# Patient Record
Sex: MALE | Race: WHITE | NOT HISPANIC OR LATINO | Employment: OTHER | URBAN - METROPOLITAN AREA
[De-identification: names, ages, dates, MRNs, and addresses within clinical notes are randomized per-mention and may not be internally consistent; named-entity substitution may affect disease eponyms.]

---

## 2017-01-31 ENCOUNTER — TELEPHONE (OUTPATIENT)
Dept: VASCULAR LAB | Facility: MEDICAL CENTER | Age: 77
End: 2017-01-31

## 2017-02-02 ENCOUNTER — HOSPITAL ENCOUNTER (OUTPATIENT)
Dept: LAB | Facility: MEDICAL CENTER | Age: 77
End: 2017-02-02
Attending: OPHTHALMOLOGY
Payer: MEDICARE

## 2017-02-02 ENCOUNTER — HOSPITAL ENCOUNTER (OUTPATIENT)
Dept: RADIOLOGY | Facility: MEDICAL CENTER | Age: 77
End: 2017-02-02
Attending: OPHTHALMOLOGY
Payer: MEDICARE

## 2017-02-02 ENCOUNTER — APPOINTMENT (OUTPATIENT)
Dept: LAB | Facility: MEDICAL CENTER | Age: 77
End: 2017-02-02
Payer: MEDICARE

## 2017-02-02 ENCOUNTER — HOSPITAL ENCOUNTER (OUTPATIENT)
Dept: LAB | Facility: MEDICAL CENTER | Age: 77
End: 2017-02-02
Attending: FAMILY MEDICINE
Payer: MEDICARE

## 2017-02-02 DIAGNOSIS — I82.402 LEG DVT (DEEP VENOUS THROMBOEMBOLISM), ACUTE, LEFT (HCC): ICD-10-CM

## 2017-02-02 DIAGNOSIS — I26.99 PULMONARY EMBOLISM ON RIGHT (HCC): ICD-10-CM

## 2017-02-02 LAB
BUN SERPL-MCNC: 22 MG/DL (ref 8–22)
CREAT SERPL-MCNC: 0.94 MG/DL (ref 0.5–1.4)
INR PPP: 1.78 (ref 0.87–1.13)
PROTHROMBIN TIME: 21.3 SEC (ref 12–14.6)

## 2017-02-02 PROCEDURE — 85610 PROTHROMBIN TIME: CPT

## 2017-02-03 ENCOUNTER — ANTICOAGULATION MONITORING (OUTPATIENT)
Dept: VASCULAR LAB | Facility: MEDICAL CENTER | Age: 77
End: 2017-02-03

## 2017-02-03 NOTE — PROGRESS NOTES
LM for patient to call back to Anticoagulation Clinic to verify current dosing.  He does have subtherapeutic INR of 1.78, but we do not have his current warfarin dosing.  He has been on and off warfarin recently for several eye procedures.  Zafar Eubanks, PHARMD

## 2017-02-13 ENCOUNTER — HOSPITAL ENCOUNTER (OUTPATIENT)
Dept: RADIOLOGY | Facility: MEDICAL CENTER | Age: 77
End: 2017-02-13
Attending: OPHTHALMOLOGY
Payer: MEDICARE

## 2017-02-13 DIAGNOSIS — C69.62: ICD-10-CM

## 2017-02-13 PROCEDURE — 700117 HCHG RX CONTRAST REV CODE 255: Performed by: OPHTHALMOLOGY

## 2017-02-13 PROCEDURE — 70543 MRI ORBT/FAC/NCK W/O &W/DYE: CPT

## 2017-02-13 PROCEDURE — A9579 GAD-BASE MR CONTRAST NOS,1ML: HCPCS | Performed by: OPHTHALMOLOGY

## 2017-02-13 RX ADMIN — GADODIAMIDE 20 ML: 287 INJECTION INTRAVENOUS at 18:17

## 2017-02-27 ENCOUNTER — TELEPHONE (OUTPATIENT)
Dept: VASCULAR LAB | Facility: MEDICAL CENTER | Age: 77
End: 2017-02-27

## 2017-02-27 ENCOUNTER — HOSPITAL ENCOUNTER (OUTPATIENT)
Facility: MEDICAL CENTER | Age: 77
End: 2017-02-27
Attending: FAMILY MEDICINE
Payer: MEDICARE

## 2017-02-27 ENCOUNTER — OFFICE VISIT (OUTPATIENT)
Dept: URGENT CARE | Facility: PHYSICIAN GROUP | Age: 77
End: 2017-02-27
Payer: MEDICARE

## 2017-02-27 VITALS
BODY MASS INDEX: 39.37 KG/M2 | TEMPERATURE: 99.1 F | RESPIRATION RATE: 24 BRPM | WEIGHT: 275 LBS | OXYGEN SATURATION: 96 % | DIASTOLIC BLOOD PRESSURE: 74 MMHG | HEIGHT: 70 IN | SYSTOLIC BLOOD PRESSURE: 146 MMHG | HEART RATE: 85 BPM

## 2017-02-27 DIAGNOSIS — J20.9 ACUTE BRONCHITIS, UNSPECIFIED ORGANISM: ICD-10-CM

## 2017-02-27 LAB
INR PPP: 2.78 (ref 0.87–1.13)
PROTHROMBIN TIME: 30.2 SEC (ref 12–14.6)

## 2017-02-27 PROCEDURE — 99214 OFFICE O/P EST MOD 30 MIN: CPT | Performed by: PHYSICIAN ASSISTANT

## 2017-02-27 PROCEDURE — G8484 FLU IMMUNIZE NO ADMIN: HCPCS | Performed by: PHYSICIAN ASSISTANT

## 2017-02-27 PROCEDURE — 4040F PNEUMOC VAC/ADMIN/RCVD: CPT | Performed by: PHYSICIAN ASSISTANT

## 2017-02-27 PROCEDURE — 1036F TOBACCO NON-USER: CPT | Performed by: PHYSICIAN ASSISTANT

## 2017-02-27 PROCEDURE — 1101F PT FALLS ASSESS-DOCD LE1/YR: CPT | Performed by: PHYSICIAN ASSISTANT

## 2017-02-27 PROCEDURE — G8432 DEP SCR NOT DOC, RNG: HCPCS | Performed by: PHYSICIAN ASSISTANT

## 2017-02-27 PROCEDURE — 85610 PROTHROMBIN TIME: CPT

## 2017-02-27 PROCEDURE — G8419 CALC BMI OUT NRM PARAM NOF/U: HCPCS | Performed by: PHYSICIAN ASSISTANT

## 2017-02-27 PROCEDURE — 36415 COLL VENOUS BLD VENIPUNCTURE: CPT

## 2017-02-27 RX ORDER — DOXYCYCLINE HYCLATE 100 MG
100 TABLET ORAL 2 TIMES DAILY
Qty: 20 TAB | Refills: 0 | Status: SHIPPED | OUTPATIENT
Start: 2017-02-27 | End: 2017-07-06

## 2017-02-27 NOTE — MR AVS SNAPSHOT
"        Kiran Flores Yumi   2017 4:05 PM   Office Visit   MRN: [id number]    Department:  [facility]   Dept Phone:  [phone]    Description:  Male : 1940   Provider:  Lindsey Anderson PA-C           Reason for Visit     Cough Pt states he's had cough for a while but it's become worse in the last 3-4 days       Allergies as of 2017     Allergen Noted Reactions    Hydrocodone 2016   Vomiting, Nausea    Nexium 2016       Diarrhea        You were diagnosed with     Acute bronchitis, unspecified organism   [[id number]]         Vital Signs     Blood Pressure Pulse Temperature Respirations Height Weight    146/74 mmHg 85 37.3 °C (99.1 °F) 24 1.778 m (5' 10\") 124.739 kg (275 lb)    Body Mass Index Oxygen Saturation Smoking Status             39.46 kg/m2 96% Never Smoker          Basic Information     Date Of Birth Sex Race Ethnicity Preferred Language    1940 Male White Non- English      Problem List              ICD-10-CM Priority Class Noted - Resolved    Essential hypertension, benign I10   2009 - Present    ED (erectile dysfunction) N52.9   2009 - Present    Glaucoma H40.9   2009 - Present    Rosacea L71.9   2009 - Present    Arthropathy M12.9   2009 - Present    Reflux esophagitis K21.0   2009 - Present    Esophageal stricture K22.2   2009 - Present    Essential hypertension I10   2009 - Present    Rosacea L71.9   2009 - Present    Arthropathy of ankle and foot M12.9   2009 - Present    Glaucoma H40.9   2009 - Present    Foot pain M79.673   2010 - Present    BPH (benign prostatic hyperplasia) N40.0   2010 - Present    Low serum testosterone level E29.1   2012 - Present    Vitamin D deficiency disease E55.9   2012 - Present    Carpal tunnel syndrome, bilateral G56.03   2012 - Present    Chronic gout M1A.9XX0   2012 - Present    Benign neoplasm of eyelid D23.10   2015 - Present   " Malignant neoplasm of skin of eyelid, 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   4/1/2015 - Present    Diverticulosis of large intestine without hemorrhage K57.30   8/4/2015 - Present    History of colonic polyps Z86.010   8/4/2015 - Present    Pulmonary embolism on right (CMS-HCC) I26.99   11/30/2015 - Present    History of inferior vena caval filter placement Z98.890   7/26/2016 - Present    Generalized edema R60.1   9/16/2016 - Present    Edema R60.9   10/21/2016 - Present    Sleep apnea G47.30   10/21/2016 - Present    Obesity (BMI 30-39.9) E66.9   10/21/2016 - Present    Leg DVT (deep venous thromboembolism), acute (HCC) I82.409   11/9/2016 - Present      Health Maintenance        Date Due Completion Dates    IMM INFLUENZA (1) 9/1/2016 11/30/2015, 11/1/2008    COLONOSCOPY 8/4/2020 8/4/2015    IMM DTaP/Tdap/Td Vaccine (3 - Td) 12/30/2021 12/30/2011, 12/1/2001, 12/1/2001            Current Immunizations     13-VALENT PCV PREVNAR 5/15/2015    Dtap Vaccine 12/1/2001    Influenza Vaccine 11/1/2008    Influenza Vaccine Pediatric 11/1/2008    Influenza Vaccine Quad Inj (Pf) 11/30/2015    Pneumococcal Vaccine (UF)Historical Data 12/1/2001, 12/1/2001    Pneumococcal polysaccharide vaccine (PPSV-23) 12/30/2011    SHINGLES VACCINE 8/31/2009    TD Vaccine 12/1/2001    Tdap Vaccine 12/30/2011      Below and/or attached are the medications your provider expects you to take. Review all of your home medications and newly ordered medications with your provider and/or pharmacist. Follow medication instructions as directed by your provider and/or pharmacist. Please keep your medication list with you and share with your provider. Update the information when medications are discontinued, doses are changed, or new medications (including over-the-counter products) are added; and carry medication information at all times in the event of emergency situations     Allergies:  HYDROCODONE - Vomiting,Nausea     NEXIUM - (reactions not documented)                Medications  Valid as of: February 27, 2017 -  4:58 PM    Generic Name Brand Name Tablet Size Instructions for use    Albuterol Sulfate (Aero Soln) PROAIR  (90 BASE) MCG/ACT INHALE 2 PUFFS BY MOUTH EVERY 6 HOURS AS NEEDED FOR SHORTNESS OF BREATH. PROAIR OKAY        Cephalexin (Tab) Cephalexin 500 MG Take 1 Tab by mouth 4 times a day.        Doxycycline Hyclate (Tab) VIBRAMYCIN 100 MG Take 1 Tab by mouth 2 times a day.        Finasteride (Tab) PROSCAR 5 MG Take 1 tablet by mouth  every day        Furosemide (Tab) LASIX 40 MG Take 1 Tab by mouth every day.        Guaifenesin-Codeine (Syrup) TUSSI-ORGANIDIN -10 MG/5ML Take 5-10 mL by mouth 3 times a day as needed for Cough.        HydroCHLOROthiazide (Tab) HYDRODIURIL 25 MG Take 1 tablet by mouth  every day        Indomethacin (Cap) INDOCIN 50 MG Take 1 capsule by mouth  twice daily with meals for  gout flare ups        Latanoprost (Solution) XALATAN 0.005 % Place 1 Drop in right eye every evening.        Losartan Potassium (Tab) COZAAR 50 MG Take 1 Tab by mouth every day.        Metoprolol Tartrate (Tab) LOPRESSOR 50 MG Take 50 mg by mouth 2 times a day.        Multiple Vitamins-Minerals (Tab) CENTRUM SILVER  Take 2 Tabs by mouth every day.        Omega-3 Fatty Acids (Cap) fish oil 1000 MG Take 1,000 mg by mouth every day.        Omeprazole (CAPSULE DELAYED RELEASE) PRILOSEC 20 MG Take 20 mg by mouth every day.        Potassium (Tab) Potassium 99 MG Take 1 Tab by mouth every day.        Potassium Chloride (Tab CR) KLOR-CON 10 MEQ Take 1 Tab by mouth 2 times a day.        Tamsulosin HCl (Cap) FLOMAX 0.4 MG Take 0.4 mg by mouth ONE-HALF HOUR AFTER BREAKFAST.        Warfarin Sodium (Tab) COUMADIN 1 MG Take 2-3 mg by mouth See Admin Instructions. 2 mg then 3 mg and repeat sequence        .                 Medicines prescribed today were sent to:     [id number] - [place] - [address] [address]    Phone: [phone] Fax:  [phone]    Open 24 Hours?: No    [facility] - [address]    [address] [facility] [address] [facility] [address]    Phone: [phone] Fax: [phone]    Open 24 Hours?: No      Medication refill instructions:       If your prescription bottle indicates you have medication refills left, it is not necessary to call your provider’s office. Please contact your pharmacy and they will refill your medication.    If your prescription bottle indicates you do not have any refills left, you may request refills at any time through one of the following ways: The online [employer] system (except Urgent Care), by calling your provider’s office, or by asking your pharmacy to contact your provider’s office with a refill request. Medication refills are processed only during regular business hours and may not be available until the next business day. Your provider may request additional information or to have a follow-up visit with you prior to refilling your medication.   *Please Note: Medication refills are assigned a new Rx number when refilled electronically. Your pharmacy may indicate that no refills were authorized even though a new prescription for the same medication is available at the pharmacy. Please request the medicine by name with the pharmacy before contacting your provider for a refill.           MyChart Status: Patient Declined

## 2017-02-28 NOTE — PROGRESS NOTES
Chief Complaint   Patient presents with   • Cough     Pt states he's had cough for a while but it's become worse in the last 3-4 days        HISTORY OF PRESENT ILLNESS: Patient is a 76 y.o. male who presents today for 3-4 days of worsening cough, 2 weeks overall of not feeling well.  He feels his cough has become more productive and has been feeling unwell in general.  Does feel a bit winded as well.  He admits to preceding sinus pressure and mild sore throat but those symptoms have improved rather than worsening.  Has felt warm but no confirmed fevers.   Denies leg swelling, or inability lay flat.  No chest pain.  He has not taken anything for the symptoms other than a bit of OTC not helping. No hx of COPD.      Patient Active Problem List    Diagnosis Date Noted   • Leg DVT (deep venous thromboembolism), acute ([facility]) 11/09/2016   • Edema 10/21/2016   • Sleep apnea 10/21/2016   • Obesity (BMI 30-39.9) 10/21/2016   • Generalized edema 09/16/2016   • History of inferior vena caval filter placement 07/26/2016   • Pulmonary embolism on right (CMS-HCC) 11/30/2015   • Diverticulosis of large intestine without hemorrhage 08/04/2015   • History of colonic polyps 08/04/2015   • Malignant neoplasm of skin of eyelid, including canthus 04/01/2015   • Benign neoplasm of eyelid 02/11/2015   • Chronic gout 12/17/2012   • Carpal tunnel syndrome, bilateral 02/14/2012   • Low serum testosterone level 01/06/2012   • Vitamin D deficiency disease 01/06/2012   • BPH (benign prostatic hyperplasia) 12/30/2010   • Foot pain 09/28/2010   • Essential hypertension 09/18/2009   • Rosacea 09/18/2009   • Arthropathy of ankle and foot 09/18/2009   • Glaucoma 09/18/2009   • Essential hypertension, benign 05/18/2009   • ED (erectile dysfunction) 05/18/2009   • Glaucoma 05/18/2009   • Rosacea 05/18/2009   • Arthropathy 05/18/2009   • Reflux esophagitis 05/18/2009   • Esophageal stricture 05/18/2009       Allergies:Hydrocodone and Nexium    Current  Outpatient Prescriptions Ordered in [facility]   Medication Sig Dispense Refill   • losartan (COZAAR) 50 MG Tab Take 1 Tab by mouth every day. 30 Tab 3   • Cephalexin 500 MG Tab Take 1 Tab by mouth 4 times a day. 20 Tab 0   • guaifenesin-codeine (TUSSI-ORGANIDIN NR) 100-10 MG/5ML syrup Take 5-10 mL by mouth 3 times a day as needed for Cough. 120 mL 0   • tamsulosin (FLOMAX) 0.4 MG capsule Take 0.4 mg by mouth ONE-HALF HOUR AFTER BREAKFAST.     • PROAIR  (90 BASE) MCG/ACT Aero Soln inhalation aerosol INHALE 2 PUFFS BY MOUTH EVERY 6 HOURS AS NEEDED FOR SHORTNESS OF BREATH. PROAIR OKAY 8.5 Inhaler 3   • warfarin (COUMADIN) 1 MG Tab Take 2-3 mg by mouth See Admin Instructions. 2 mg then 3 mg and repeat sequence     • latanoprost (XALATAN) 0.005 % Solution Place 1 Drop in right eye every evening.     • Potassium 99 MG Tab Take 1 Tab by mouth every day.     • furosemide (LASIX) 40 MG Tab Take 1 Tab by mouth every day. 14 Tab 1   • potassium chloride ER (KLOR-CON) 10 MEQ tablet Take 1 Tab by mouth 2 times a day. 30 Tab 0   • hydrochlorothiazide (HYDRODIURIL) 25 MG Tab Take 1 tablet by mouth  every day 90 Tab 3   • indomethacin (INDOCIN) 50 MG Cap Take 1 capsule by mouth  twice daily with meals for  gout flare ups 160 Cap 1   • finasteride (PROSCAR) 5 MG Tab Take 1 tablet by mouth  every day 90 Tab 3   • metoprolol (LOPRESSOR) 50 MG Tab Take 50 mg by mouth 2 times a day.     • omeprazole (PRILOSEC) 20 MG delayed-release capsule Take 20 mg by mouth every day.     • Omega-3 Fatty Acids (FISH OIL) 1000 MG Cap capsule Take 1,000 mg by mouth every day.     • Multiple Vitamins-Minerals (CENTRUM SILVER) TABS Take 2 Tabs by mouth every day. 100 Tab 3     No current Epic-ordered facility-administered medications on file.       Past Medical History   Diagnosis Date   • Foot fracture 12/07   • Essential hypertension, benign    • Hypertrophy of prostate without urinary obstruction and other lower urinary tract symptoms (LUTS)    •  "Rosacea    • Reflux esophagitis    • Esophageal stricture 2007   • Disorders of bursae and tendons in shoulder region, unspecified    • Arthritis    • Arthropathy, unspecified, site unspecified    • Unspecified glaucoma    • Skin cancer    • Gastric ulcer 2007   • Indigestion    • Cancer (CMS-[facility])      lower lip skin cancer--   • Pneumonia    • Heart burn    • Pain 2/13/12     2/10 ankles, lower back   • GERD (gastroesophageal reflux disease)    • Bronchitis 2014   • Unspecified cataract      ONE REMOVED   • Personal history of venous thrombosis and embolism 2008     post knee replacement -took coumadin then       Social History   Substance Use Topics   • Smoking status: Never Smoker    • Smokeless tobacco: Former User     Types: Chew   • Alcohol Use: 3.5 oz/week     7 Glasses of wine per week       Family Status   Relation Status Death Age   • Mother     • Father     No family history on file. No pertinent FH     ROS:  Review of Systems   Constitutional: SEE HPI  HENT:SEE HPI  Eyes: Negative for blurred vision.   Respiratory: SEE HPI  Cardiovascular: Negative for chest pain, palpitations, orthopnea and leg swelling.   Gastrointestinal: Negative for heartburn, nausea, vomiting and abdominal pain.   All other systems reviewed and are negative.     Exam:  Blood pressure 146/74, pulse 85, temperature 37.3 °C (99.1 °F), resp. rate 24, height 1.778 m (5' 10\"), weight 124.739 kg (275 lb), SpO2 96 %.  General:  Well nourished, well developed male in NAD  Eyes: PERRLA, EOM within normal limits, no conjunctival injection, no scleral icterus, visual fields and acuity grossly intact.  Ears: Normal shape and symmetry, no tenderness, no discharge. External canals are without any significant edema or erythema. Tympanic membranes are without any inflammation, no effusion. Gross auditory acuity is intact  Nose: Symmetrical, sinuses without tenderness, no discharge.   Mouth: reasonable " hygiene, no erythema exudates or tonsillar enlargement.  Neck: no masses, range of motion within normal limits, no tracheal deviation. No lymphadenopathy  Pulmonary: Normal respiratory effort, few faint expiratory wheezes without crackles or rhonchi.   Cardiovascular: regular rate and rhythm without murmurs, rubs, or gallops.  Skin: No visible rashes or lesion. Warm, pink, dry.   Extremities: no clubbing, cyanosis, or edema.  Neuro: A&O x 3. Speech normal/clear.  Normal gait.       Assessment/Plan:  1. Acute bronchitis, unspecified organism  doxycycline (VIBRAMYCIN) 100 MG Tab       -fluids, rest emphasized.    -humidifier/steam inhalations.    -tx as above, patient was informed of abx interaction with warfarin and he will contact his clinic and inform them tomorrow.   -strict RTC precautions discussed as well as ER precautions.    -please keep upcoming appts with PCP and specialists.        Supportive care, differential diagnoses, and indications for immediate follow-up discussed with patient.   Pathogenesis of diagnosis discussed including typical length and natural progression.   Instructed to return to clinic or nearest emergency department for any change in condition, further concerns, or worsening of symptoms.  Patient states understanding of the plan of care and discharge instructions.  Instructed to make an appointment, for follow up, with their primary care provider.      Lindsey Anderson PA-C

## 2017-03-10 RX ORDER — TAMSULOSIN HYDROCHLORIDE 0.4 MG/1
CAPSULE ORAL
Qty: 90 CAP | Refills: 3 | Status: SHIPPED | OUTPATIENT
Start: 2017-03-10 | End: 2017-07-06

## 2017-03-24 ENCOUNTER — PATIENT OUTREACH (OUTPATIENT)
Dept: HEALTH INFORMATION MANAGEMENT | Facility: OTHER | Age: 77
End: 2017-03-24

## 2017-04-06 DIAGNOSIS — I10 ESSENTIAL HYPERTENSION, BENIGN: ICD-10-CM

## 2017-04-07 NOTE — PROGRESS NOTES
Attempt #:3    Verify PCP: yes    Communication Preference Obtained: yes     Review Care Team: yes    Annual Wellness Visit Scheduling  1. Scheduling Status:Scheduled    Care Gap Scheduling      Health Maintenance Due   Topic Date Due   • Annual Wellness Visit  SCHEDULED         MyChart Activation: declined  MyChart Jitendra: no  Virtual Visits: no  Opt In to Text Messages: no

## 2017-04-10 RX ORDER — LOSARTAN POTASSIUM 50 MG/1
50 TABLET ORAL DAILY
Qty: 90 TAB | Refills: 3 | Status: SHIPPED | OUTPATIENT
Start: 2017-04-10 | End: 2017-07-06

## 2017-04-10 RX ORDER — POTASSIUM CHLORIDE 750 MG/1
10 TABLET, FILM COATED, EXTENDED RELEASE ORAL 2 TIMES DAILY
Qty: 180 TAB | Refills: 3 | Status: SHIPPED | OUTPATIENT
Start: 2017-04-10 | End: 2017-07-06 | Stop reason: SDUPTHER

## 2017-04-10 RX ORDER — FINASTERIDE 5 MG/1
TABLET, FILM COATED ORAL
Qty: 90 TAB | Refills: 3 | Status: SHIPPED | OUTPATIENT
Start: 2017-04-10 | End: 2017-07-06 | Stop reason: SDUPTHER

## 2017-05-05 ENCOUNTER — HOSPITAL ENCOUNTER (OUTPATIENT)
Dept: HOSPITAL 8 - ROC | Age: 77
Discharge: HOME | End: 2017-05-05
Attending: RADIOLOGY
Payer: MEDICARE

## 2017-05-05 ENCOUNTER — HOSPITAL ENCOUNTER (OUTPATIENT)
Dept: LAB | Facility: MEDICAL CENTER | Age: 77
End: 2017-05-05
Attending: FAMILY MEDICINE
Payer: MEDICARE

## 2017-05-05 DIAGNOSIS — C44.129: Primary | ICD-10-CM

## 2017-05-05 DIAGNOSIS — I26.99 PULMONARY EMBOLISM ON RIGHT (HCC): ICD-10-CM

## 2017-05-05 DIAGNOSIS — I82.402 LEG DVT (DEEP VENOUS THROMBOEMBOLISM), ACUTE, LEFT (HCC): ICD-10-CM

## 2017-05-05 LAB
INR PPP: 1.41 (ref 0.87–1.13)
PROTHROMBIN TIME: 17.7 SEC (ref 12–14.6)

## 2017-05-05 PROCEDURE — 85610 PROTHROMBIN TIME: CPT

## 2017-05-05 PROCEDURE — 36415 COLL VENOUS BLD VENIPUNCTURE: CPT

## 2017-05-05 PROCEDURE — G0463 HOSPITAL OUTPT CLINIC VISIT: HCPCS

## 2017-05-08 RX ORDER — IPRATROPIUM BROMIDE AND ALBUTEROL 20; 100 UG/1; UG/1
SPRAY, METERED RESPIRATORY (INHALATION)
Qty: 1 INHALER | Refills: 3 | Status: SHIPPED | OUTPATIENT
Start: 2017-05-08 | End: 2017-07-06 | Stop reason: SDUPTHER

## 2017-05-15 ENCOUNTER — ANTICOAGULATION MONITORING (OUTPATIENT)
Dept: VASCULAR LAB | Facility: MEDICAL CENTER | Age: 77
End: 2017-05-15

## 2017-05-22 NOTE — PROGRESS NOTES
LM for patient to call Anticoagulation Clinic to establish care.  Received results indicating subtherapeutic INR of 1.41.  GABO GarciaD

## 2017-05-30 DIAGNOSIS — I26.99 PULMONARY EMBOLISM ON RIGHT (HCC): ICD-10-CM

## 2017-06-02 ENCOUNTER — APPOINTMENT (OUTPATIENT)
Dept: RADIOLOGY | Facility: MEDICAL CENTER | Age: 77
DRG: 907 | End: 2017-06-02
Attending: EMERGENCY MEDICINE
Payer: MEDICARE

## 2017-06-02 ENCOUNTER — APPOINTMENT (OUTPATIENT)
Dept: RADIOLOGY | Facility: MEDICAL CENTER | Age: 77
DRG: 907 | End: 2017-06-02
Attending: SURGERY
Payer: MEDICARE

## 2017-06-02 ENCOUNTER — RESOLUTE PROFESSIONAL BILLING HOSPITAL PROF FEE (OUTPATIENT)
Dept: HOSPITALIST | Facility: MEDICAL CENTER | Age: 77
End: 2017-06-02
Payer: MEDICARE

## 2017-06-02 ENCOUNTER — APPOINTMENT (OUTPATIENT)
Dept: RADIOLOGY | Facility: MEDICAL CENTER | Age: 77
DRG: 907 | End: 2017-06-02
Attending: NURSE PRACTITIONER
Payer: MEDICARE

## 2017-06-02 ENCOUNTER — HOSPITAL ENCOUNTER (INPATIENT)
Facility: MEDICAL CENTER | Age: 77
LOS: 26 days | DRG: 907 | End: 2017-06-28
Attending: EMERGENCY MEDICINE | Admitting: SURGERY
Payer: MEDICARE

## 2017-06-02 DIAGNOSIS — T45.515A WARFARIN-INDUCED COAGULOPATHY (HCC): ICD-10-CM

## 2017-06-02 DIAGNOSIS — Z86.711 HISTORY OF PULMONARY EMBOLUS (PE): ICD-10-CM

## 2017-06-02 DIAGNOSIS — D68.32 WARFARIN-INDUCED COAGULOPATHY (HCC): ICD-10-CM

## 2017-06-02 DIAGNOSIS — I44.1 AV BLOCK, MOBITZ 1: ICD-10-CM

## 2017-06-02 DIAGNOSIS — I82.539 CHRONIC DEEP VEIN THROMBOSIS (DVT) OF POPLITEAL VEIN, UNSPECIFIED LATERALITY (HCC): ICD-10-CM

## 2017-06-02 DIAGNOSIS — K66.8 PNEUMOPERITONEUM: ICD-10-CM

## 2017-06-02 DIAGNOSIS — D62 ACUTE BLOOD LOSS ANEMIA: ICD-10-CM

## 2017-06-02 DIAGNOSIS — J95.821 RESPIRATORY FAILURE FOLLOWING TRAUMA AND SURGERY (HCC): ICD-10-CM

## 2017-06-02 DIAGNOSIS — S21.432A: ICD-10-CM

## 2017-06-02 DIAGNOSIS — J94.2 HEMOTHORAX ON LEFT: ICD-10-CM

## 2017-06-02 PROBLEM — S21.439A: Status: ACTIVE | Noted: 2017-06-02

## 2017-06-02 LAB
ABO GROUP BLD: NORMAL
ALBUMIN SERPL BCP-MCNC: 3.4 G/DL (ref 3.2–4.9)
ALBUMIN/GLOB SERPL: 1.4 G/DL
ALP SERPL-CCNC: 85 U/L (ref 30–99)
ALT SERPL-CCNC: 24 U/L (ref 2–50)
ANION GAP SERPL CALC-SCNC: 10 MMOL/L (ref 0–11.9)
APTT PPP: 34.4 SEC (ref 24.7–36)
AST SERPL-CCNC: 15 U/L (ref 12–45)
BARCODED ABORH UBTYP: 5100
BARCODED ABORH UBTYP: 5100
BARCODED PRD CODE UBPRD: NORMAL
BARCODED PRD CODE UBPRD: NORMAL
BARCODED UNIT NUM UBUNT: NORMAL
BARCODED UNIT NUM UBUNT: NORMAL
BILIRUB SERPL-MCNC: 0.5 MG/DL (ref 0.1–1.5)
BLD GP AB SCN SERPL QL: NORMAL
BUN SERPL-MCNC: 22 MG/DL (ref 8–22)
CALCIUM SERPL-MCNC: 8.8 MG/DL (ref 8.5–10.5)
CFT BLD TEG: 5.6 MIN (ref 5–10)
CHLORIDE SERPL-SCNC: 106 MMOL/L (ref 96–112)
CLOT ANGLE BLD TEG: 67 DEGREES (ref 53–72)
CLOT LYSIS 30M P MA LENFR BLD TEG: 0 % (ref 0–8)
CO2 SERPL-SCNC: 21 MMOL/L (ref 20–33)
COMPONENT R 8504R: NORMAL
COMPONENT R 8504R: NORMAL
CREAT SERPL-MCNC: 0.97 MG/DL (ref 0.5–1.4)
CT.EXTRINSIC BLD ROTEM: 1.6 MIN (ref 1–3)
ERYTHROCYTE [DISTWIDTH] IN BLOOD BY AUTOMATED COUNT: 53.1 FL (ref 35.9–50)
ETHANOL BLD-MCNC: 0 G/DL
GFR SERPL CREATININE-BSD FRML MDRD: >60 ML/MIN/1.73 M 2
GLOBULIN SER CALC-MCNC: 2.5 G/DL (ref 1.9–3.5)
GLUCOSE SERPL-MCNC: 171 MG/DL (ref 65–99)
HCT VFR BLD AUTO: 42.8 % (ref 42–52)
HGB BLD-MCNC: 13.7 G/DL (ref 14–18)
INR PPP: 2 (ref 0.87–1.13)
MCF BLD TEG: 67.7 MM (ref 50–70)
MCH RBC QN AUTO: 29.8 PG (ref 27–33)
MCHC RBC AUTO-ENTMCNC: 32 G/DL (ref 33.7–35.3)
MCV RBC AUTO: 93 FL (ref 81.4–97.8)
PA AA BLD-ACNC: 9.1 %
PA ADP BLD-ACNC: 41.2 %
PLATELET # BLD AUTO: 170 K/UL (ref 164–446)
PMV BLD AUTO: 10.7 FL (ref 9–12.9)
POTASSIUM SERPL-SCNC: 4.5 MMOL/L (ref 3.6–5.5)
PRODUCT TYPE UPROD: NORMAL
PRODUCT TYPE UPROD: NORMAL
PROT SERPL-MCNC: 5.9 G/DL (ref 6–8.2)
PROTHROMBIN TIME: 23.3 SEC (ref 12–14.6)
RBC # BLD AUTO: 4.6 M/UL (ref 4.7–6.1)
RH BLD: NORMAL
SODIUM SERPL-SCNC: 137 MMOL/L (ref 135–145)
TEG ALGORITHM TGALG: NORMAL
UNIT STATUS USTAT: NORMAL
UNIT STATUS USTAT: NORMAL
WBC # BLD AUTO: 14.1 K/UL (ref 4.8–10.8)

## 2017-06-02 PROCEDURE — 86901 BLOOD TYPING SEROLOGIC RH(D): CPT

## 2017-06-02 PROCEDURE — 302220 CHEST TUBE TRAY: Performed by: SURGERY

## 2017-06-02 PROCEDURE — 02HV33Z INSERTION OF INFUSION DEVICE INTO SUPERIOR VENA CAVA, PERCUTANEOUS APPROACH: ICD-10-PCS | Performed by: SURGERY

## 2017-06-02 PROCEDURE — 700102 HCHG RX REV CODE 250 W/ 637 OVERRIDE(OP): Performed by: NURSE PRACTITIONER

## 2017-06-02 PROCEDURE — 700117 HCHG RX CONTRAST REV CODE 255: Performed by: EMERGENCY MEDICINE

## 2017-06-02 PROCEDURE — 80053 COMPREHEN METABOLIC PANEL: CPT

## 2017-06-02 PROCEDURE — 71010 DX-CHEST-LIMITED (1 VIEW): CPT

## 2017-06-02 PROCEDURE — B548ZZA ULTRASONOGRAPHY OF SUPERIOR VENA CAVA, GUIDANCE: ICD-10-PCS | Performed by: SURGERY

## 2017-06-02 PROCEDURE — 99291 CRITICAL CARE FIRST HOUR: CPT

## 2017-06-02 PROCEDURE — 90471 IMMUNIZATION ADMIN: CPT

## 2017-06-02 PROCEDURE — 85610 PROTHROMBIN TIME: CPT

## 2017-06-02 PROCEDURE — 770022 HCHG ROOM/CARE - ICU (200)

## 2017-06-02 PROCEDURE — 700101 HCHG RX REV CODE 250

## 2017-06-02 PROCEDURE — 85384 FIBRINOGEN ACTIVITY: CPT

## 2017-06-02 PROCEDURE — 700105 HCHG RX REV CODE 258: Performed by: EMERGENCY MEDICINE

## 2017-06-02 PROCEDURE — 3E0234Z INTRODUCTION OF SERUM, TOXOID AND VACCINE INTO MUSCLE, PERCUTANEOUS APPROACH: ICD-10-PCS | Performed by: SURGERY

## 2017-06-02 PROCEDURE — 30233N1 TRANSFUSION OF NONAUTOLOGOUS RED BLOOD CELLS INTO PERIPHERAL VEIN, PERCUTANEOUS APPROACH: ICD-10-PCS | Performed by: SURGERY

## 2017-06-02 PROCEDURE — 0W9B30Z DRAINAGE OF LEFT PLEURAL CAVITY WITH DRAINAGE DEVICE, PERCUTANEOUS APPROACH: ICD-10-PCS | Performed by: SURGERY

## 2017-06-02 PROCEDURE — 71260 CT THORAX DX C+: CPT

## 2017-06-02 PROCEDURE — P9016 RBC LEUKOCYTES REDUCED: HCPCS

## 2017-06-02 PROCEDURE — G0390 TRAUMA RESPONS W/HOSP CRITI: HCPCS

## 2017-06-02 PROCEDURE — 85347 COAGULATION TIME ACTIVATED: CPT

## 2017-06-02 PROCEDURE — 36415 COLL VENOUS BLD VENIPUNCTURE: CPT

## 2017-06-02 PROCEDURE — 700111 HCHG RX REV CODE 636 W/ 250 OVERRIDE (IP)

## 2017-06-02 PROCEDURE — 36430 TRANSFUSION BLD/BLD COMPNT: CPT

## 2017-06-02 PROCEDURE — A9270 NON-COVERED ITEM OR SERVICE: HCPCS | Performed by: NURSE PRACTITIONER

## 2017-06-02 PROCEDURE — 86900 BLOOD TYPING SEROLOGIC ABO: CPT

## 2017-06-02 PROCEDURE — 85730 THROMBOPLASTIN TIME PARTIAL: CPT

## 2017-06-02 PROCEDURE — 80307 DRUG TEST PRSMV CHEM ANLYZR: CPT

## 2017-06-02 PROCEDURE — 86850 RBC ANTIBODY SCREEN: CPT

## 2017-06-02 PROCEDURE — 90715 TDAP VACCINE 7 YRS/> IM: CPT | Performed by: EMERGENCY MEDICINE

## 2017-06-02 PROCEDURE — C1751 CATH, INF, PER/CENT/MIDLINE: HCPCS

## 2017-06-02 PROCEDURE — 96374 THER/PROPH/DIAG INJ IV PUSH: CPT

## 2017-06-02 PROCEDURE — 85027 COMPLETE CBC AUTOMATED: CPT

## 2017-06-02 PROCEDURE — 71010 DX-CHEST-PORTABLE (1 VIEW): CPT

## 2017-06-02 PROCEDURE — 700105 HCHG RX REV CODE 258: Performed by: NURSE PRACTITIONER

## 2017-06-02 PROCEDURE — 700111 HCHG RX REV CODE 636 W/ 250 OVERRIDE (IP): Performed by: EMERGENCY MEDICINE

## 2017-06-02 PROCEDURE — 36556 INSERT NON-TUNNEL CV CATH: CPT

## 2017-06-02 PROCEDURE — 700111 HCHG RX REV CODE 636 W/ 250 OVERRIDE (IP): Performed by: NURSE PRACTITIONER

## 2017-06-02 PROCEDURE — 85576 BLOOD PLATELET AGGREGATION: CPT

## 2017-06-02 PROCEDURE — 700111 HCHG RX REV CODE 636 W/ 250 OVERRIDE (IP): Performed by: SURGERY

## 2017-06-02 PROCEDURE — 96375 TX/PRO/DX INJ NEW DRUG ADDON: CPT

## 2017-06-02 RX ORDER — BISACODYL 10 MG
10 SUPPOSITORY, RECTAL RECTAL
Status: DISCONTINUED | OUTPATIENT
Start: 2017-06-02 | End: 2017-06-28 | Stop reason: HOSPADM

## 2017-06-02 RX ORDER — HYDROCODONE BITARTRATE AND ACETAMINOPHEN 5; 325 MG/1; MG/1
1-2 TABLET ORAL EVERY 6 HOURS PRN
Status: DISCONTINUED | OUTPATIENT
Start: 2017-06-02 | End: 2017-06-03

## 2017-06-02 RX ORDER — ONDANSETRON 2 MG/ML
INJECTION INTRAMUSCULAR; INTRAVENOUS
Status: COMPLETED | OUTPATIENT
Start: 2017-06-02 | End: 2017-06-02

## 2017-06-02 RX ORDER — AMOXICILLIN 250 MG
1 CAPSULE ORAL
Status: DISCONTINUED | OUTPATIENT
Start: 2017-06-02 | End: 2017-06-28 | Stop reason: HOSPADM

## 2017-06-02 RX ORDER — SODIUM CHLORIDE, SODIUM LACTATE, POTASSIUM CHLORIDE, CALCIUM CHLORIDE 600; 310; 30; 20 MG/100ML; MG/100ML; MG/100ML; MG/100ML
INJECTION, SOLUTION INTRAVENOUS CONTINUOUS
Status: DISCONTINUED | OUTPATIENT
Start: 2017-06-02 | End: 2017-06-07

## 2017-06-02 RX ORDER — ACETAMINOPHEN 325 MG/1
650 TABLET ORAL EVERY 6 HOURS
Status: DISCONTINUED | OUTPATIENT
Start: 2017-06-02 | End: 2017-06-02

## 2017-06-02 RX ORDER — MIDAZOLAM HYDROCHLORIDE 1 MG/ML
INJECTION INTRAMUSCULAR; INTRAVENOUS
Status: DISCONTINUED
Start: 2017-06-02 | End: 2017-06-02

## 2017-06-02 RX ORDER — DOCUSATE SODIUM 100 MG/1
100 CAPSULE, LIQUID FILLED ORAL 2 TIMES DAILY
Status: DISCONTINUED | OUTPATIENT
Start: 2017-06-02 | End: 2017-06-21

## 2017-06-02 RX ORDER — LIDOCAINE HYDROCHLORIDE 10 MG/ML
INJECTION, SOLUTION INFILTRATION; PERINEURAL
Status: DISCONTINUED
Start: 2017-06-02 | End: 2017-06-02

## 2017-06-02 RX ORDER — POLYETHYLENE GLYCOL 3350 17 G/17G
1 POWDER, FOR SOLUTION ORAL 2 TIMES DAILY
Status: DISCONTINUED | OUTPATIENT
Start: 2017-06-02 | End: 2017-06-28 | Stop reason: HOSPADM

## 2017-06-02 RX ORDER — SODIUM CHLORIDE 9 MG/ML
INJECTION, SOLUTION INTRAVENOUS
Status: COMPLETED | OUTPATIENT
Start: 2017-06-02 | End: 2017-06-02

## 2017-06-02 RX ORDER — ONDANSETRON 2 MG/ML
4 INJECTION INTRAMUSCULAR; INTRAVENOUS EVERY 4 HOURS PRN
Status: DISCONTINUED | OUTPATIENT
Start: 2017-06-02 | End: 2017-06-28 | Stop reason: HOSPADM

## 2017-06-02 RX ORDER — CHLORHEXIDINE GLUCONATE ORAL RINSE 1.2 MG/ML
15 SOLUTION DENTAL EVERY 12 HOURS
Status: DISCONTINUED | OUTPATIENT
Start: 2017-06-02 | End: 2017-06-02

## 2017-06-02 RX ORDER — AMOXICILLIN 250 MG
1 CAPSULE ORAL NIGHTLY
Status: DISCONTINUED | OUTPATIENT
Start: 2017-06-02 | End: 2017-06-21

## 2017-06-02 RX ORDER — ENEMA 19; 7 G/133ML; G/133ML
1 ENEMA RECTAL
Status: DISCONTINUED | OUTPATIENT
Start: 2017-06-02 | End: 2017-06-28 | Stop reason: HOSPADM

## 2017-06-02 RX ORDER — FAMOTIDINE 20 MG/1
20 TABLET, FILM COATED ORAL 2 TIMES DAILY
Status: DISCONTINUED | OUTPATIENT
Start: 2017-06-02 | End: 2017-06-02

## 2017-06-02 RX ORDER — ACETAMINOPHEN 325 MG/1
650 TABLET ORAL 4 TIMES DAILY
Status: DISCONTINUED | OUTPATIENT
Start: 2017-06-03 | End: 2017-06-06

## 2017-06-02 RX ORDER — MIDAZOLAM HYDROCHLORIDE 1 MG/ML
5 INJECTION INTRAMUSCULAR; INTRAVENOUS ONCE
Status: COMPLETED | OUTPATIENT
Start: 2017-06-02 | End: 2017-06-02

## 2017-06-02 RX ADMIN — SODIUM CHLORIDE 1000 ML: 9 INJECTION, SOLUTION INTRAVENOUS at 18:37

## 2017-06-02 RX ADMIN — MIDAZOLAM 5 MG: 1 INJECTION INTRAMUSCULAR; INTRAVENOUS at 19:30

## 2017-06-02 RX ADMIN — LIDOCAINE HYDROCHLORIDE: 10 INJECTION, SOLUTION INFILTRATION; PERINEURAL at 20:00

## 2017-06-02 RX ADMIN — CLOSTRIDIUM TETANI TOXOID ANTIGEN (FORMALDEHYDE INACTIVATED), CORYNEBACTERIUM DIPHTHERIAE TOXOID ANTIGEN (FORMALDEHYDE INACTIVATED), BORDETELLA PERTUSSIS TOXOID ANTIGEN (GLUTARALDEHYDE INACTIVATED), BORDETELLA PERTUSSIS FILAMENTOUS HEMAGGLUTININ ANTIGEN (FORMALDEHYDE INACTIVATED), BORDETELLA PERTUSSIS PERTACTIN ANTIGEN, AND BORDETELLA PERTUSSIS FIMBRIAE 2/3 ANTIGEN 0.5 ML: 5; 2; 2.5; 5; 3; 5 INJECTION, SUSPENSION INTRAMUSCULAR at 18:41

## 2017-06-02 RX ADMIN — MIDAZOLAM HYDROCHLORIDE 2 MG: 1 INJECTION, SOLUTION INTRAMUSCULAR; INTRAVENOUS at 19:30

## 2017-06-02 RX ADMIN — COAGULATION FACTOR VIIA (RECOMBINANT) 1000 MCG: KIT at 18:58

## 2017-06-02 RX ADMIN — HYDROCODONE BITARTRATE AND ACETAMINOPHEN 1 TABLET: 5; 325 TABLET ORAL at 21:11

## 2017-06-02 RX ADMIN — ONDANSETRON 4 MG: 2 INJECTION INTRAMUSCULAR; INTRAVENOUS at 18:50

## 2017-06-02 RX ADMIN — IOHEXOL 100 ML: 350 INJECTION, SOLUTION INTRAVENOUS at 19:12

## 2017-06-02 RX ADMIN — SODIUM CHLORIDE, POTASSIUM CHLORIDE, SODIUM LACTATE AND CALCIUM CHLORIDE: 600; 310; 30; 20 INJECTION, SOLUTION INTRAVENOUS at 21:11

## 2017-06-02 RX ADMIN — ACETAMINOPHEN 650 MG: 325 TABLET, FILM COATED ORAL at 21:11

## 2017-06-02 RX ADMIN — CEFAZOLIN SODIUM 2 G: 1 INJECTION, SOLUTION INTRAVENOUS at 18:48

## 2017-06-02 RX ADMIN — PHYTONADIONE 10 MG: 10 INJECTION, EMULSION INTRAMUSCULAR; INTRAVENOUS; SUBCUTANEOUS at 21:11

## 2017-06-02 RX ADMIN — FENTANYL CITRATE 25 MCG: 50 INJECTION, SOLUTION INTRAMUSCULAR; INTRAVENOUS at 19:45

## 2017-06-02 ASSESSMENT — ENCOUNTER SYMPTOMS
FLANK PAIN: 1
ABDOMINAL PAIN: 0
LOSS OF CONSCIOUSNESS: 0
SHORTNESS OF BREATH: 1
TINGLING: 0
BACK PAIN: 1
CHILLS: 0
FEVER: 0

## 2017-06-02 ASSESSMENT — COPD QUESTIONNAIRES
COPD SCREENING SCORE: 2
DO YOU EVER COUGH UP ANY MUCUS OR PHLEGM?: NO/ONLY WITH OCCASIONAL COLDS OR INFECTIONS
DURING THE PAST 4 WEEKS HOW MUCH DID YOU FEEL SHORT OF BREATH: NONE/LITTLE OF THE TIME
HAVE YOU SMOKED AT LEAST 100 CIGARETTES IN YOUR ENTIRE LIFE: NO/DON'T KNOW

## 2017-06-02 ASSESSMENT — PAIN SCALES - GENERAL
PAINLEVEL_OUTOF10: 5
PAINLEVEL_OUTOF10: 4

## 2017-06-02 ASSESSMENT — LIFESTYLE VARIABLES: EVER_SMOKED: NEVER

## 2017-06-02 NOTE — IP AVS SNAPSHOT
" <p align=\"LEFT\"><IMG SRC=\"//[email or web address]\" alt=\"Image\" WIDTH=\"50%\" HEIGHT=\"200\" BORDER=\"\"></p>                   Name:Kiran Eason  Medical Record Number:[id number]  CSN: [id number]    YOB: 1940   Age: 77 y.o.  Sex: male  HT:1.778 m (5' 10\") WT: 124 kg (273 lb 5.9 oz)          Admit Date: 6/2/2017     Discharge Date:   Today's Date: 6/28/2017  Attending Doctor:  Wolf Vaughn M.D.                  Allergies:  Review of patient's allergies indicates no known allergies.          Follow-up Information     1. Follow up with Wilfred Amin M.D.. Schedule an appointment as soon as possible for a visit on 7/10/2017.    Specialty:  Surgery    Contact information    [address]  [address]  [facility] [address] [phone]          2. Follow up with Jesus Craft M.D. In 1 week.    Specialty:  Family Medicine    Contact information    [address]  [facility] [address] [phone]           Medication List      Take these Medications        Instructions    acetaminophen 325 MG Tabs   Commonly known as:  TYLENOL    Take 2 Tabs by mouth 4 times a day.   Dose:  650 mg       albuterol 108 (90 BASE) MCG/ACT Aers inhalation aerosol    Inhale 2 Puffs by mouth every four hours as needed.   Dose:  2 Puff       enoxaparin 120 MG/0.8ML Soln inj   Commonly known as:  LOVENOX    Inject 120 mg as instructed every 12 hours.   Dose:  1 mg/kg       finasteride 5 MG Tabs   Commonly known as:  PROSCAR    Take 5 mg by mouth every day.   Dose:  5 mg       furosemide 40 MG Tabs   Commonly known as:  LASIX    Take 1 Tab by mouth every day.   Dose:  40 mg       oxycodone immediate-release 5 MG Tabs   Commonly known as:  ROXICODONE    Take 1 Tab by mouth every 6 hours as needed for Severe Pain.   Dose:  5 mg       potassium chloride 20 MEQ Pack   Commonly known as:  KLOR-CON    Take 10 mEq by mouth 2 times a day.   Dose:  10 mEq       potassium chloride SA 20 MEQ Tbcr   Commonly known as:  Kdur   " Take 1 Tab by mouth every day.   Dose:  20 mEq       warfarin 1 MG Tabs   Commonly known as:  COUMADIN    Take 1 mg by mouth every day.   Dose:  1 mg

## 2017-06-02 NOTE — IP AVS SNAPSHOT
6/28/2017    Kiran Eason  [address]  [facility] [address]    Dear Kiran:    [facility] wants to ensure your discharge home is safe and you or your loved ones have had all of your questions answered regarding your care after you leave the hospital.    Below is a list of resources and contact information should you have any questions regarding your hospital stay, follow-up instructions, or active medical symptoms.    Questions or Concerns Regarding… Contact   Medical Questions Related to Your Discharge  (7 days a week, 8am-5pm) Contact a Nurse Care Coordinator   [phone]   Medical Questions Not Related to Your Discharge  (24 hours a day / 7 days a week)  Contact the Nurse Health Line   [phone]    Medications or Discharge Instructions Refer to your discharge packet   or contact your [facility] Primary Care Provider   [phone]   Follow-up Appointment(s) Schedule your appointment via HipChat   or contact Scheduling [phone]   Billing Review your statement via HipChat  or contact Billing [phone]   Medical Records Review your records via HipChat   or contact Medical Records [phone]     You may receive a telephone call within two days of discharge. This call is to make certain you understand your discharge instructions and have the opportunity to have any questions answered. You can also easily access your medical information, test results and upcoming appointments via the HipChat free online health management tool. You can learn more and sign up at [employer]/HipChat. For assistance setting up your HipChat account, please call [phone].    Once again, we want to ensure your discharge home is safe and that you have a clear understanding of any next steps in your care. If you have any questions or concerns, please do not hesitate to contact us, we are here for you. Thank you for choosing [facility] for your healthcare needs.    Sincerely,    Your [facility] Healthcare Team

## 2017-06-02 NOTE — IP AVS SNAPSHOT
" Home Care Instructions                                                                                                                  Name:Kiran Eason  Medical Record Number:[id number]  CSN: [id number]    YOB: 1940   Age: 77 y.o.  Sex: male  HT:1.778 m (5' 10\") WT: 124 kg (273 lb 5.9 oz)          Admit Date: 6/2/2017     Discharge Date:   Today's Date: 6/28/2017  Attending Doctor:  Wolf Vaughn M.D.                  Allergies:  Review of patient's allergies indicates no known allergies.            Discharge Instructions       Discharge Instructions    Discharged to home by car with relative. Discharged via wheelchair, hospital escort: Yes.  Special equipment needed: Oxygen    Be sure to schedule a follow-up appointment with your primary care doctor or any specialists as instructed.     Discharge Plan:   Diet Plan: Discussed  Activity Level: Discussed  Confirmed Follow up Appointment: Patient to Call and Schedule Appointment  Confirmed Symptoms Management: Discussed  Medication Reconciliation Updated: Yes  Influenza Vaccine Indication: Patient Refuses    I understand that a diet low in cholesterol, fat, and sodium is recommended for good health. Unless I have been given specific instructions below for another diet, I accept this instruction as my diet prescription.   Other diet: cardiac    Special Instructions: None    · Is patient discharged on Warfarin / Coumadin?   Yes    You are receiving the drug warfarin. Please understand the importance of monitoring warfarin with scheduled PT/INR blood draws.  Follow-up with a call to your personal Doctor's office in 3 days to schedule a PT/INR. Home health RN to draw INR on Friday    IMPORTANT: HOW TO USE THIS INFORMATION:  This is a summary and does NOT have all possible information about this product. This information does not assure that this product is safe, effective, or appropriate for you. This information is not individual medical advice and does " "not substitute for the advice of your health care professional. Always ask your health care professional for complete information about this product and your specific health needs.      WARFARIN - ORAL (WARF-uh-rin)      COMMON BRAND NAME(S): Coumadin      WARNING:  Warfarin can cause very serious (possibly fatal) bleeding. This is more likely to occur when you first start taking this medication or if you take too much warfarin. To decrease your risk for bleeding, your doctor or other health care provider will monitor you closely and check your lab results (INR test) to make sure you are not taking too much warfarin. Keep all medical and laboratory appointments. Tell your doctor right away if you notice any signs of serious bleeding. See also Side Effects section.      USES:  This medication is used to treat blood clots (such as in deep vein thrombosis-DVT or pulmonary embolus-PE) and/or to prevent new clots from forming in your body. Preventing harmful blood clots helps to reduce the risk of a stroke or heart attack. Conditions that increase your risk of developing blood clots include a certain type of irregular heart rhythm (atrial fibrillation), heart valve replacement, recent heart attack, and certain surgeries (such as hip/knee replacement). Warfarin is commonly called a \"blood thinner,\" but the more correct term is \"anticoagulant.\" It helps to keep blood flowing smoothly in your body by decreasing the amount of certain substances (clotting proteins) in your blood.      HOW TO USE:  Read the Medication Guide provided by your pharmacist before you start taking warfarin and each time you get a refill. If you have any questions, ask your doctor or pharmacist. Take this medication by mouth with or without food as directed by your doctor or other health care professional, usually once a day. It is very important to take it exactly as directed. Do not increase the dose, take it more frequently, or stop using it " unless directed by your doctor. Dosage is based on your medical condition, laboratory tests (such as INR), and response to treatment. Your doctor or other health care provider will monitor you closely while you are taking this medication to determine the right dose for you. Use this medication regularly to get the most benefit from it. To help you remember, take it at the same time each day. It is important to eat a balanced, consistent diet while taking warfarin. Some foods can affect how warfarin works in your body and may affect your treatment and dose. Avoid sudden large increases or decreases in your intake of foods high in vitamin K (such as broccoli, cauliflower, cabbage, brussels sprouts, kale, spinach, and other green leafy vegetables, liver, green tea, certain vitamin supplements). If you are trying to lose weight, check with your doctor before you try to go on a diet. Cranberry products may also affect how your warfarin works. Limit the amount of cranberry juice (16 ounces/480 milliliters a day) or other cranberry products you may drink or eat.      SIDE EFFECTS:  Nausea, loss of appetite, or stomach/abdominal pain may occur. If any of these effects persist or worsen, tell your doctor or pharmacist promptly. Remember that your doctor has prescribed this medication because he or she has judged that the benefit to you is greater than the risk of side effects. Many people using this medication do not have serious side effects. This medication can cause serious bleeding if it affects your blood clotting proteins too much (shown by unusually high INR lab results). Even if your doctor stops your medication, this risk of bleeding can continue for up to a week. Tell your doctor right away if you have any signs of serious bleeding, including: unusual pain/swelling/discomfort, unusual/easy bruising, prolonged bleeding from cuts or gums, persistent/frequent nosebleeds, unusually heavy/prolonged menstrual flow,  pink/dark urine, coughing up blood, vomit that is bloody or looks like coffee grounds, severe headache, dizziness/fainting, unusual or persistent tiredness/weakness, bloody/black/tarry stools, chest pain, shortness of breath, difficulty swallowing. Tell your doctor right away if any of these unlikely but serious side effects occur: persistent nausea/vomiting, severe stomach/abdominal pain, yellowing eyes/skin. This drug rarely has caused very serious (possibly fatal) problems if its effects lead to small blood clots (usually at the beginning of treatment). This can lead to severe skin/tissue damage that may require surgery or amputation if left untreated. Patients with certain blood conditions (protein C or S deficiency) may be at greater risk. Get medical help right away if any of these rare but serious side effects occur: painful/red/purplish patches on the skin (such as on the toe, breast, abdomen), change in the amount of urine, vision changes, confusion, slurred speech, weakness on one side of the body. A very serious allergic reaction to this drug is rare. However, get medical help right away if you notice any symptoms of a serious allergic reaction, including: rash, itching/swelling (especially of the face/tongue/throat), severe dizziness, trouble breathing. This is not a complete list of possible side effects. If you notice other effects not listed above, contact your doctor or pharmacist. In the [place] - Call your doctor for medical advice about side effects. You may report side effects to FDA at 3-482-QMD-7130. In [place] - Call your doctor for medical advice about side effects. You may report side effects to [facility] [place] at 1-670.714.8718.      PRECAUTIONS:  Before taking warfarin, tell your doctor or pharmacist if you are allergic to it; or if you have any other allergies. This product may contain inactive ingredients, which can cause allergic reactions or other problems. Talk to your pharmacist for more  details. Before using this medication, tell your doctor or pharmacist your medical history, especially of: blood disorders (such as anemia, hemophilia), bleeding problems (such as bleeding of the stomach/intestines, bleeding in the brain), blood vessel disorders (such as aneurysms), recent major injury/surgery, liver disease, alcohol use, mental/mood disorders (including memory problems), frequent falls/injuries. It is important that all your doctors and dentists know that you take warfarin. Before having surgery or any medical/dental procedures, tell your doctor or dentist that you are taking this medication and about all the products you use (including prescription drugs, nonprescription drugs, and herbal products). Avoid getting injections into the muscles. If you must have an injection into a muscle (for example, a flu shot), it should be given in the arm. This way, it will be easier to check for bleeding and/or apply pressure bandages. This medication may cause stomach bleeding. Daily use of alcohol while using this medicine will increase your risk for stomach bleeding and may also affect how this medication works. Limit or avoid alcoholic beverages. If you have not been eating well, if you have an illness or infection that causes fever, vomiting, or diarrhea for more than 2 days, or if you start using any antibiotic medications, contact your doctor or pharmacist immediately because these conditions can affect how warfarin works. This medication can cause heavy bleeding. To lower the chance of getting cut, bruised, or injured, use great caution with sharp objects like safety razors and nail cutters. Use an electric razor when shaving and a soft toothbrush when brushing your teeth. Avoid activities such as contact sports. If you fall or injure yourself, especially if you hit your head, call your doctor immediately. Your doctor may need to check you. The Food & Drug Administration has stated that generic warfarin  "products are interchangeable. However, consult your doctor or pharmacist before switching warfarin products. Be careful not to take more than one medication that contains warfarin unless specifically directed by the doctor or health care provider who is monitoring your warfarin treatment. Older adults may be at greater risk for bleeding while using this drug. This medication is not recommended for use during pregnancy because of serious (possibly fatal) harm to an unborn baby. Discuss the use of reliable forms of birth control with your doctor. If you become pregnant or think you may be pregnant, tell your doctor immediately. If you are planning pregnancy, discuss a plan for managing your condition with your doctor before you become pregnant. Your doctor may switch the type of medication you use during pregnancy. Very small amounts of this medication may pass into breast milk but is unlikely to harm a nursing infant. Consult your doctor before breast-feeding.      DRUG INTERACTIONS:  Drug interactions may change how your medications work or increase your risk for serious side effects. This document does not contain all possible drug interactions. Keep a list of all the products you use (including prescription/nonprescription drugs and herbal products) and share it with your doctor and pharmacist. Do not start, stop, or change the dosage of any medicines without your doctor's approval. Warfarin interacts with many prescription, nonprescription, vitamin, and herbal products. This includes medications that are applied to the skin or inside the vagina or rectum. The interactions with warfarin usually result in an increase or decrease in the \"blood-thinning\" (anticoagulant) effect. Your doctor or other health care professional should closely monitor you to prevent serious bleeding or clotting problems. While taking warfarin, it is very important to tell your doctor or pharmacist of any changes in medications, vitamins, " or herbal products that you are taking. Some products that may interact with this drug include: capecitabine, imatinib, mifepristone. Aspirin, aspirin-like drugs (salicylates), and nonsteroidal anti-inflammatory drugs (NSAIDs such as ibuprofen, naproxen, celecoxib) may have effects similar to warfarin. These drugs may increase the risk of bleeding problems if taken during treatment with warfarin. Carefully check all prescription/nonprescription product labels (including drugs applied to the skin such as pain-relieving creams) since the products may contain NSAIDs or salicylates. Talk to your doctor about using a different medication (such as acetaminophen) to treat pain/fever. Low-dose aspirin and related drugs (such as clopidogrel, ticlopidine) should be continued if prescribed by your doctor for specific medical reasons such as heart attack or stroke prevention. Consult your doctor or pharmacist for more details. Many herbal products interact with warfarin. Tell your doctor before taking any herbal products, especially bromelains, coenzyme Q10, cranberry, danshen, dong quai, fenugreek, garlic, ginkgo biloba, ginseng, and [place] wort, among others. This medication may interfere with a certain laboratory test to measure theophylline levels, possibly causing false test results. Make sure laboratory personnel and all your doctors know you use this drug.      OVERDOSE:  If overdose is suspected, contact a poison control center or emergency room immediately. [place] residents can call the [place] National Poison Hotline at 1-458.486.2882. [place] residents can call a provincial poison control center. Symptoms of overdose may include: bloody/black/tarry stools, pink/dark urine, unusual/prolonged bleeding.      NOTES:  Do not share this medication with others. Laboratory and/or medical tests (such as INR, complete blood count) must be performed periodically to monitor your progress or check for side effects. Consult your doctor  for more details.      MISSED DOSE:  For the best possible benefit, do not miss any doses. If you do miss a dose and remember on the same day, take it as soon as you remember. If you remember on the next day, skip the missed dose and resume your usual dosing schedule. Do not double the dose to catch up because this could increase your risk for bleeding. Keep a record of missed doses to give to your doctor or pharmacist. Contact your doctor or pharmacist if you miss 2 or more doses in a row.      STORAGE:  Store at room temperature away from light and moisture. Do not store in the bathroom. Keep all medications away from children and pets. Do not flush medications down the toilet or pour them into a drain unless instructed to do so. Properly discard this product when it is  or no longer needed. Consult your pharmacist or local waste disposal company for more details about how to safely discard your product.      MEDICAL ALERT:  Your condition and medication can cause complications in a medical emergency. For information about enrolling in Medic[employer], call 1-240.228.4970 ([place]) or 1-868.833.5632 ([place]).      Information last revised 2010 Copyright(c) 2010 [employer]             · Is patient Post Blood Transfusion?  Yes  POST BLOOD TRANSFUSION INFORMATION (ADULT)    The purpose of blood transfusion is to correct anemia, low levels of blood clotting factors or to correct acute blood loss.   Blood transfusion is very safe but occasionally unexpected adverse reactions do occur. Most adverse reactions occur during transfusion, within one to two days following transfusion or one to two weeks following transfusion. Some adverse reactions can occur one to six months after transfusion and some even years later.             If any of the symptoms listed below happen to you during your transfusion,                                 please notify your nurse immediately.   · Fever or Chills  · Flushing of  the Face  · Hives, rash or itching  · Difficulty in breathing or shortness of breath  · Pain or oozing of blood from the IV needle site  · Low back pain  · Nausea or vomiting  · Weakness or fainting  · Chest pain  · Blood in the urine  · Decreased frequency of urination    The above symptoms may also occur within 24 to 48 after transfusion; if so, notify your physician.     · Yellowing of the skin    This can happen one to six months after transfusion; if so, notify your physician    Depression / Suicide Risk    As you are discharged from this [facility] facility, it is important to learn how to keep safe from harming yourself.    Recognize the warning signs:  · Abrupt changes in personality, positive or negative- including increase in energy   · Giving away possessions  · Change in eating patterns- significant weight changes-  positive or negative  · Change in sleeping patterns- unable to sleep or sleeping all the time   · Unwillingness or inability to communicate  · Depression  · Unusual sadness, discouragement and loneliness  · Talk of wanting to die  · Neglect of personal appearance   · Rebelliousness- reckless behavior  · Withdrawal from people/activities they love  · Confusion- inability to concentrate     If you or a loved one observes any of these behaviors or has concerns about self-harm, here's what you can do:  · Talk about it- your feelings and reasons for harming yourself  · Remove any means that you might use to hurt yourself (examples: pills, rope, extension cords, firearm)  · Get professional help from the community (Mental Health, Substance Abuse, psychological counseling)  · Do not be alone:Call your Safe Contact- someone whom you trust who will be there for you.  · Call your local CRISIS HOTLINE 704-8716 or 795-271-0451  · Call your local Children's Mobile Crisis Response Team [place] (503) 765-8373 or www.CommuniClique  · Call the toll free National Suicide Prevention Hotlines    · National Suicide Prevention Lifeline 937-358-PWKM (2702)  · [facility] 800-SUICIDE (668-2420)        1. DIET: Upon discharge from the hospital you may resume your normal preoperative diet. You may wish to stay with a bland diet for the first few days. However, you may advance your diet as quickly as you feel ready.     2. ACTIVITIES: After discharge from the hospital, you may resume full routine activities. Heavy lifting (over 25 pounds) and strenuous activities will make your incision sore and should generally be avoided until your first post-operative appointment. Routine activities of daily living are acceptable.     3. DRIVING: You may drive whenever you are no longer taking narcotic pain medications and are able to perform the activities needed to drive safely, i.e. turning, bending, twisting, etc.     4. BATHING: You may get the wound wet at any time after leaving the hospital. You may shower, but do not submerge in a bath for at least 48 hours. Dressings may come off after 48 hours.     5. BOWEL FUNCTION: A few patients, after this operation, will develop either frequent or loose stools after meals. This usually corrects itself after a few days, to a few weeks. If this occurs, do not worry; it is not unusual and will likely resolve. Much more common than loose stools, is constipation. The combination of pain medication and decreased activity level can cause constipation in otherwise normal patients. If you feel this is occurring, take a laxative (Milk of Magnesia, Ex-Lax, Senokot, etc.) until the problem has resolved.     6. PAIN MEDICATION: You will be given a prescription for pain medication at discharge. Please take these as directed. It is advisable to take your medication around the clock for the first 24-48 hours. You may continue any non-steroidal antiinflammatory medications such as Advil in the post-operative period. These may and should be taken with your narcotic pain  medication. It is important to remember not to take medications on an empty stomach as this may cause nausea. Avoid alcohol consumption while taking pain medication.     7. Call if you have: (1) Fevers to more than 101.0 F, (2) Unusual chest or leg pain, (3) Drainage or fluid from incision that may be foul smelling, increased tenderness or soreness at the wound or the wound edges are no longer together, redness or swelling at the incision site.       If you have any additional questions, please do not hesitate to call the office and speak to my medical assistant, myself, or the physician on call      Chest Tube, Care After  Refer to this sheet in the next few weeks. These instructions provide you with information on caring for yourself after your procedure. Your health care provider may also give you more specific instructions. Your treatment has been planned according to current medical practices, but problems sometimes occur. Call your health care provider if you have any problems or questions after your procedure.   WHAT TO EXPECT AFTER THE PROCEDURE  After the procedure, you will have a thin tube that passes through the skin between your ribs and into your chest. It is normal to have some pain or discomfort at the site of the chest tube insertion.   HOME CARE INSTRUCTIONS  If you go home with a chest tube still in place, follow these instructions:   · Be careful to avoid any activity that may cause your chest tube to become dislodged.  · Keep two clamps nearby. If any tube gets disconnected, clamp the tubing closest to your body with both clamps. Call your health care provider for directions. You may need to go to the emergency department if the problem cannot be solved.    · Check the tubing often to make sure it is not kinked. When the tubing is kinked, draining will not take place properly, and you may have some trouble breathing.    · Take these steps if the chest tube falls out:    · Do not panic.    · Open  a package of gauze coated with petroleum jelly.    · Open a package of dry, square gauze.    · Cover the wound first with the petroleum jelly gauze and then cover that with the dry, square gauze.    · Tape the dry, square gauze in place.    · After you have covered the site, call your health care provider for instructions. Your health care provider will decide if you need to go to the emergency department.    · You may shower with the chest tube in place if your health care provider approves. Cover the area where the chest tube comes out with a small plastic bag and tape it well. Bring the drainage device to the shower area and leave it outside the shower curtain or door. The unit should never be under the direct flow of water.    · Perform gentle exercise such as walking as directed by your health care provider. Talk to your health care provider about any other activity or exercise you want to do.    · Only take over-the-counter or prescription medicines as directed by your health care provider.    · Follow up with your health care provider as directed.  SEEK MEDICAL CARE IF:  · You have redness or swelling at the incision.    · Your incision has opened (the edges are not staying together).  · You have drainage from the incision area.    · Your drainage from the chest tube changes color or becomes red or bloody.    · Your pain is not controlled with the medicines prescribed.    SEEK IMMEDIATE MEDICAL CARE IF:  · You have a fever.    · You have any new trouble with breathing.    · You develop any chest pain.    · You develop unusual sweating.  · You have weakness.  · You feel lightheaded, or you faint.  · Your connecting tubes become disconnected.  · You develop red streaking of the skin that extends above or below the incision.  · Your chest tube falls out.       This information is not intended to replace advice given to you by your health care provider. Make sure you discuss any questions you have with your  health care provider.     Document Released: 10/08/2014 Document Reviewed: 10/08/2014  [employer] Patient Education ©2016 [employer]      Hemothorax  Hemothorax is a buildup of blood between your lung and the wall of your chest (pleural cavity). It is usually caused by an injury that results in bleeding.  Hemothorax can be a dangerous condition. As blood builds up in the pleural cavity, it can press on your lung and make it hard for you to breathe. Your lung may collapse. This means that air leaks from your lung and builds up in your pleural cavity (pneumothorax). This prevents your lung from expanding.   CAUSES   An injury (trauma) that causes a tear in a lung or in a blood vessel in the chest is the main cause of hemothorax. Other possible causes include:  · Tuberculosis.  · An injury caused by placing a tube into a blood vessel in the chest (central venous catheter).  · Cancer in the chest.  · A blood-clotting problem.  · Blood-thinning medicine.  · Lung or heart surgery.  SIGNS AND SYMPTOMS  Signs and symptoms may include:  · Rapid breathing.  · Difficulty breathing.  · Shortness of breath.  · Feeling light-headed.  · Anxiety.  · Restlessness.  · A rapid heart rate.  · Low blood pressure (hypotension).  · Chest pain.  · Cool, pale, blue, or sweaty skin.  DIAGNOSIS   Your health care provider may suspect a hemothorax from your signs and symptoms, especially if you had a recent injury. Your health care provider will also do a physical exam. This includes checking your breathing. You may also have a chest X-ray. If the cause of the hemothorax is not known, fluid from the pleural space may be removed for testing.  TREATMENT   You may have an IV line started to give you fluids or blood from a donor (transfusion). Treatment usually includes placing a small, flexible tube (chest tube) into the pleural cavity to drain fluid, blood, or extra air. A chest tube can also re-expand your lung if it collapses. If  bleeding continues after the chest tube is in place, you may need surgery to open the chest (thoracotomy) and control the bleeding.     This information is not intended to replace advice given to you by your health care provider. Make sure you discuss any questions you have with your health care provider.     Document Released: 09/13/2005 Document Revised: 01/08/2016 Document Reviewed: 09/23/2015  [employer] Interactive Patient Education ©2016 [employer]    Exploratory Laparotomy, Adult, Care After  Refer to this sheet in the next few weeks. These instructions provide you with information about caring for yourself after your procedure. Your health care provider may also give you more specific instructions. Your treatment has been planned according to current medical practices, but problems sometimes occur. Call your health care provider if you have any problems or questions after your procedure.  WHAT TO EXPECT AFTER THE PROCEDURE  After your procedure, it is typical to have:  · Abdominal soreness.  · Fatigue.  · A sore throat from tubes in your throat.  · A lack of appetite.  HOME CARE INSTRUCTIONS  Medicines  · Take medicines only as directed by your health care provider.  · Do not drive or operate heavy machinery while taking pain medicine.  Incision Care  · There are many different ways to close and cover an incision, including stitches (sutures), skin glue, and adhesive strips. Follow your health care provider's instructions about:  · Incision care.  · Bandage (dressing) changes and removal.  · Incision closure removal.  · Do not take showers or baths until your health care provider says that you can.  · Check your incision area daily for signs of infection. Watch for:  · Redness.  · Tenderness.  · Swelling.  · Drainage.  Activities  · Do not lift anything that is heavier than 10 pounds (4.5 kg) until your health care provider says that it is safe.  · Try to walk a little bit each day if your health care  provider says that it is okay.  · Ask your health care provider when you can start to do your usual activities again, such as driving, going back to work, and having sex.  Eating and Drinking  · You may eat what you usually eat. Include lots of whole grains, fruits, and vegetables in your diet. This will help to prevent constipation.  · Drink enough fluid to keep your urine clear or pale yellow.  General Instructions  · Keep all follow-up visits as directed by your health care provider. This is important.  SEEK MEDICAL CARE IF:   · You have a fever.  · You have chills.  · Your pain medicine is not helping.  · You have constipation or diarrhea.  · You have nausea or vomiting.  · You have drainage, redness, swelling, or pain at your incision site.  SEEK IMMEDIATE MEDICAL CARE IF:  · Your pain is getting worse.  · It has been more than 3 days since you been able to have a bowel movement.  · You have ongoing (persistent) vomiting.  · The edges of your incision open up.  · You have warmth, tenderness, and swelling in your calf.  · You have trouble breathing.  · You have chest pain.     This information is not intended to replace advice given to you by your health care provider. Make sure you discuss any questions you have with your health care provider.     Document Released: 08/01/2005 Document Revised: 01/08/2016 Document Reviewed: 08/05/2015  [employer] Interactive Patient Education ©2016 [employer]    Incentive Spirometer  An incentive spirometer is a tool that can help keep your lungs clear and active. This tool measures how well you are filling your lungs with each breath. Taking long, deep breaths may help reverse or decrease the chance of developing breathing (pulmonary) problems (especially infection) following:  · Surgery of the chest or abdomen.  · Surgery if you have a history of smoking or a lung problem.  · A long period of time when you are unable to move or be active.  BEFORE THE PROCEDURE   · If the  spirometer includes an indicator to show your best effort, your nurse or respiratory therapist will set it to a desired goal.  · If possible, sit up straight or lean slightly forward. Try not to slouch.  · Hold the incentive spirometer in an upright position.  INSTRUCTIONS FOR USE   · Sit on the edge of your bed if possible, or sit up as far as you can in bed or on a chair.  · Hold the incentive spirometer in an upright position.  · Breathe out normally.  · Place the mouthpiece in your mouth and seal your lips tightly around it.  · Breathe in slowly and as deeply as possible, raising the piston or the ball toward the top of the column.  · Hold your breath for 3-5 seconds or for as long as possible. Allow the piston or ball to fall to the bottom of the column.  · Remove the mouthpiece from your mouth and breathe out normally.  · Rest for a few seconds and repeat Steps 1 through 7 at least 10 times every 1-2 hours when you are awake. Take your time and take a few normal breaths between deep breaths.  · The spirometer may include an indicator to show your best effort. Use the indicator as a goal to work toward during each repetition.  · After each set of 10 deep breaths, practice coughing to be sure your lungs are clear. If you have an incision (the cut made at the time of surgery), support your incision when coughing by placing a pillow or rolled-up towels firmly against it.  Once you are able to get out of bed, walk around indoors and cough well. You may stop using the incentive spirometer when instructed by your caregiver.   RISKS AND COMPLICATIONS  · Breathing too quickly may cause dizziness. At an extreme, this could cause you to pass out. Take your time so you do not get dizzy or light-headed.  · If you are in pain, you may need to take or ask for pain medication before doing incentive spirometry. It is harder to take a deep breath if you are having pain.  AFTER USE  · Rest and breathe slowly and easily.  · It  can be helpful to keep a log of your progress. Your caregiver can provide you with a simple table to help with this.  If you are using the spirometer at home, follow these instructions:  SEEK MEDICAL CARE IF:   · You are having difficultly using the spirometer.  · You have trouble using the spirometer as often as instructed.  · Your pain medication is not giving enough relief while using the spirometer.  · You develop fever of 100.5°F (38.1°C) or higher.  SEEK IMMEDIATE MEDICAL CARE IF:   · You cough up bloody sputum that had not been present before.  · You develop fever of 102°F (38.9°C) or greater.  · You develop worsening pain at or near the incision site.  MAKE SURE YOU:   · Understand these instructions.  · Will watch your condition.  · Will get help right away if you are not doing well or get worse.     This information is not intended to replace advice given to you by your health care provider. Make sure you discuss any questions you have with your health care provider.     Document Released: 04/29/2008 Document Revised: 01/08/2016 Document Reviewed: 07/27/2015  [employer] Interactive Patient Education ©2016 [employer]    How and Where to Give Subcutaneous Enoxaparin Injections  Enoxaparin is an injectable medicine. It is used to help prevent blood clots from developing in your veins. Health care providers often use anticoagulants like enoxaparin to prevent clots following surgery. Enoxaparin is also used in combination with other medicines to treat blood clots and heart attacks. If blood clots are left untreated, they can be life threatening.   Enoxaparin comes in single-use syringes. You inject enoxaparin through a syringe into your belly (abdomen). You should change the injection site each time you give yourself a shot. Continue the enoxaparin injections as directed by your health care provider. Your health care provider will use blood clotting test results to decide when you can safely stop using  enoxaparin injections. If your health care provider prescribes any additional medicines, use the medicines exactly as directed.  HOW DO I INJECT ENOXAPARIN?   · Wash your hands with soap and water.  · Clean the selected injection site as directed by your health care provider.  · Remove the needle cap by pulling it straight off the syringe.  · Hold the syringe like a pencil using your writing hand.  · Use your other hand to pinch and hold an inch of the cleansed skin.  · Insert the entire needle straight down into the fold of skin.  · Push the plunger with your thumb until the syringe is empty.  · Pull the needle straight out of your skin.  · Enoxaparin injection prefilled syringes and graduated prefilled syringes are available with a system that shields the needle after injection. After you have completed your injection and removed the needle from your skin, firmly push down on the plunger. The protective sleeve will automatically cover the needle and you will hear a click. The click means the needle is safely covered.  · Place the syringe in the nearest needle box, also called a sharps container. If you do not have a sharps container, you can use a hard-sided plastic container with a secure lid, such as an empty laundry detergent bottle.  WHAT ELSE DO I NEED TO KNOW?  · Do not use enoxaparin if:  · You have allergies to heparin or pork products.  · You have been diagnosed with a condition called thrombocytopenia.  · Do not use the syringe or needle more than one time.  · Use medicines only as directed by your health care provider.  · Changes in medicines, supplements, diet, and illness can affect your anticoagulation therapy. Be sure to inform your health care provider of any of these changes.  · It is important that you tell all of your health care providers and your dentist that you are taking an anticoagulant, especially if you are injured or plan to have any type of procedure.  · While on anticoagulants, you  will need to have blood tests done routinely as directed by your health care provider.  · While using this medicine, avoid physical activities or sports that could result in a fall or cause injury.  · Follow up with your laboratory test and health care provider appointments as directed. It is very important to keep your appointments. Not keeping appointments could result in a chronic or permanent injury, pain, or disability.  · Before giving your medicine, you should make sure the injection is a clear and colorless or pale yellow solution. If your medicine becomes discolored or if there are particles in the syringe, do not use it and notify your health care provider.  · Keep your medicine safely stored at room temperature.  SEEK MEDICAL CARE IF:  · You develop any rashes on your skin.  · You have large areas of bruising on your skin.  · You have any worsening of the condition for which you take Enoxaparin.  · You develop a fever.  SEEK IMMEDIATE MEDICAL CARE IF:  · You develop bleeding problems such as:  · Bleeding from the gums or nose that does not stop quickly.  · Vomiting blood or coughing up blood.  · Blood in your urine.  · Blood in your stool, or stool that has a dark, tarry, or coffee grounds appearance.  · A cut that does not stop bleeding within 10 minutes.  These symptoms may represent a serious problem that is an emergency. Do not wait to see if the symptoms will go away. Get medical help right away. Call your local emergency services (911 in the [place] Do not drive yourself to the hospital.      This information is not intended to replace advice given to you by your health care provider. Make sure you discuss any questions you have with your health care provider.     Document Released: 10/19/2005 Document Revised: 01/08/2016 Document Reviewed: 06/04/2015  [employer] Interactive Patient Education ©2016 [employer]    Oxygen Use at Home  Oxygen can be prescribed for home use. The prescription will show  the flow rate. This is how much oxygen is to be used per minute. This will be listed in liters per minute (LPM or L/M). A liter is a metric measurement of volume.  You will use oxygen therapy as directed. It can be used while exercising, sleeping, or at rest. You may need oxygen continuously. Your health care provider may order a blood oxygen test (arterial blood gas or pulse oximetry test) that will show what your oxygen level is. Your health care provider will use these measurements to learn about your needs and follow your progress.  Home oxygen therapy is commonly used on patients with various lung (pulmonary) related conditions. Some of these conditions include:  · Asthma.  · Lung cancer.  · Pneumonia.  · Emphysema.  · Chronic bronchitis.  · Cystic fibrosis.  · Other lung diseases.  · Pulmonary fibrosis.  · Occupational lung disease.  · Heart failure.  · Chronic obstructive pulmonary disease (COPD).  3 COMMON WAYS OF PROVIDING OXYGEN THERAPY  · Gas: The gas form of oxygen is put into variously sized cylinders or tanks. The cylinders or oxygen tanks contain compressed oxygen. The cylinder is equipped with a regulator that controls the flow rate. Because the flow of oxygen out of the cylinder is constant, an oxygen conserving device may be attached to the system to avoid waste. This device releases the gas only when you inhale and cuts it off when you exhale. Oxygen can be provided in a small cylinder that can be carried with you. Large tanks are heavy and are only for stationary use. After use, empty tanks must be exchanged for full tanks.  · Liquid: The liquid form of oxygen is put into a container similar to a thermos. When released, the liquid converts to a gas and you breathe it in just like the compressed gas. This storage method takes up less space than the compressed gas cylinder, and you can transfer the liquid to a small, portable vessel at home. Liquid oxygen is more expensive than the compressed gas,  "and the vessel vents when not in use. An oxygen conserving device may be built into the vessel to conserve the oxygen. Liquid oxygen is very cold, around 297° below zero.  · Oxygen concentrator: This medical device filters oxygen from room air and gives almost 100% oxygen to the patient. Oxygen concentrators are powered by electricity. Benefits of this system are:  · It does not need to be resupplied.  · It is not as costly as liquid oxygen.  · Extra tubing permits the user to move around easier.  There are several types of small, portable oxygen systems available which can help you remain active and mobile. You must have a cylinder of oxygen as a backup in the event of a power failure. Advise your electric power company that you are on oxygen therapy in order to get priority service when there is a power failure.  OXYGEN DELIVERY DEVICES  There are 3 common ways to deliver oxygen to your body.  · Nasal cannula. This is a 2-pronged device inserted in the nostrils that is connected to tubing carrying the oxygen. The tubing can rest on the ears or be attached to the frame of eyeglasses.  · Mask. People who need a high flow of oxygen generally use a mask.  · Transtracheal catheter. Transtracheal oxygen therapy requires the insertion of a small, flexible tube (catheter) in the windpipe (trachea). This catheter is held in place by a necklace. Since transtracheal oxygen bypasses the mouth, nose, and throat, a humidifier is absolutely required at flow rates of 1 LPM or greater.  OXYGEN USE SAFETY TIPS  · Never smoke while using oxygen. Oxygen does not burn or explode, but flammable materials will burn faster in the presence of oxygen.  · Keep a fire extinguisher close by. Let your fire department know that you have oxygen in your home.  · Warn visitors not to smoke near you when you are using oxygen. Put up \"no smoking\" signs in your home where you most often use the oxygen.  · When you go to a restaurant with your " portable oxygen source, ask to be seated in the nonsmoking section.  · Stay at least 5 feet away from gas stoves, candles, lighted fireplaces, or other heat sources.  · Do not use materials that burn easily (flammable) while using your oxygen.  · If you use an oxygen cylinder, make sure it is secured to some fixed object or in a stand. If you use liquid oxygen, make sure the vessel is kept upright to keep the oxygen from pouring out. Liquid oxygen is so cold it can hurt your skin.  · If you use an oxygen concentrator, call your electric company so you will be given priority service if your power goes out. Avoid using extension cords, if possible.  · Regularly test your smoke detectors at home to make sure they work. If you receive care in your home from a nurse or other health care provider, he or she may also check to make sure your smoke detectors work.  GUIDELINES FOR CLEANING YOUR EQUIPMENT  · Wash the nasal prongs with a liquid soap. Thoroughly rinse them once or twice a week.  · Replace the prongs every 2 to 4 weeks. If you have an infection (cold, pneumonia) change them when you are well.  · Your health care provider will give you instructions on how to clean your transtracheal catheter.  · The humidifier bottle should be washed with soap and warm water and rinsed thoroughly between each refill. Air-dry the bottle before filling it with sterile or distilled water. The bottle and its top should be disinfected after they are cleaned.  · If you use an oxygen concentrator, unplug the unit. Then wipe down the cabinet with a damp cloth and dry it daily. The air filter should be cleaned at least twice a week.  · Follow your home medical equipment and service company's directions for cleaning the compressor filter.  HOME CARE INSTRUCTIONS   · Do not change the flow of oxygen unless directed by your health care provider.  · Do not use alcohol or other sedating drugs unless instructed. They slow your breathing  rate.  · Do not use materials that burn easily (flammable) while using your oxygen.  · Always keep a spare tank of oxygen. Plan ahead for holidays when you may not be able to get a prescription filled.  · Use water-based lubricants on your lips or nostrils. Do not use an oil-based product like petroleum jelly.  · To prevent your cheeks or the skin behind your ears from becoming irritated, tuck some gauze under the tubing.  · If you have persistent redness under your nose, call your health care provider.  · When you no longer need oxygen, your doctor will have the oxygen discontinued. Oxygen is not addicting or habit forming.  · Use the oxygen as instructed. Too much oxygen can be harmful and too little will not give you the benefit you need.  · Shortness of breath is not always from a lack of oxygen. If your oxygen level is not the cause of your shortness of breath, taking oxygen will not help.  SEEK MEDICAL CARE IF:   · You have frequent headaches.  · You have shortness of breath or a lasting cough.  · You have anxiety.  · You are confused.  · You are drowsy or sleepy all the time.  · You develop an illness which aggravates your breathing.  · You cannot exercise.  · You are restless.  · You have blue lips or fingernails.  · You have difficult or irregular breathing and it is getting worse.  · You have a fever.     This information is not intended to replace advice given to you by your health care provider. Make sure you discuss any questions you have with your health care provider.     Document Released: 03/09/2005 Document Revised: 01/08/2016 Document Reviewed: 07/30/2014  [employer] Interactive Patient Education ©2016 [employer]    Heart Failure  Heart failure is a condition in which the heart has trouble pumping blood. This means your heart does not pump blood efficiently for your body to work well. In some cases of heart failure, fluid may back up into your lungs or you may have swelling (edema) in your lower  legs. Heart failure is usually a long-term (chronic) condition. It is important for you to take good care of yourself and follow your health care provider's treatment plan.  CAUSES   Some health conditions can cause heart failure. Those health conditions include:  · High blood pressure (hypertension). Hypertension causes the heart muscle to work harder than normal. When pressure in the blood vessels is high, the heart needs to pump (contract) with more force in order to circulate blood throughout the body. High blood pressure eventually causes the heart to become stiff and weak.  · Coronary artery disease (CAD). CAD is the buildup of cholesterol and fat (plaque) in the arteries of the heart. The blockage in the arteries deprives the heart muscle of oxygen and blood. This can cause chest pain and may lead to a heart attack. High blood pressure can also contribute to CAD.  · Heart attack (myocardial infarction). A heart attack occurs when one or more arteries in the heart become blocked. The loss of oxygen damages the muscle tissue of the heart. When this happens, part of the heart muscle dies. The injured tissue does not contract as well and weakens the heart's ability to pump blood.  · Abnormal heart valves. When the heart valves do not open and close properly, it can cause heart failure. This makes the heart muscle pump harder to keep the blood flowing.  · Heart muscle disease (cardiomyopathy or myocarditis). Heart muscle disease is damage to the heart muscle from a variety of causes. These can include drug or alcohol abuse, infections, or unknown reasons. These can increase the risk of heart failure.  · Lung disease. Lung disease makes the heart work harder because the lungs do not work properly. This can cause a strain on the heart, leading it to fail.  · Diabetes. Diabetes increases the risk of heart failure. High blood sugar contributes to high fat (lipid) levels in the blood. Diabetes can also cause slow  damage to tiny blood vessels that carry important nutrients to the heart muscle. When the heart does not get enough oxygen and food, it can cause the heart to become weak and stiff. This leads to a heart that does not contract efficiently.  · Other conditions can contribute to heart failure. These include abnormal heart rhythms, thyroid problems, and low blood counts (anemia).  Certain unhealthy behaviors can increase the risk of heart failure, including:  · Being overweight.  · Smoking or chewing tobacco.  · Eating foods high in fat and cholesterol.  · Abusing illicit drugs or alcohol.  · Lacking physical activity.  SYMPTOMS   Heart failure symptoms may vary and can be hard to detect. Symptoms may include:  · Shortness of breath with activity, such as climbing stairs.  · Persistent cough.  · Swelling of the feet, ankles, legs, or abdomen.  · Unexplained weight gain.  · Difficulty breathing when lying flat (orthopnea).  · Waking from sleep because of the need to sit up and get more air.  · Rapid heartbeat.  · Fatigue and loss of energy.  · Feeling light-headed, dizzy, or close to fainting.  · Loss of appetite.  · Nausea.  · Increased urination during the night (nocturia).  DIAGNOSIS   A diagnosis of heart failure is based on your history, symptoms, physical examination, and diagnostic tests. Diagnostic tests for heart failure may include:  · Echocardiography.  · Electrocardiography.  · Chest X-ray.  · Blood tests.  · Exercise stress test.  · Cardiac angiography.  · Radionuclide scans.  TREATMENT   Treatment is aimed at managing the symptoms of heart failure. Medicines, behavioral changes, or surgical intervention may be necessary to treat heart failure.  · Medicines to help treat heart failure may include:  ¨ Angiotensin-converting enzyme (ACE) inhibitors. This type of medicine blocks the effects of a blood protein called angiotensin-converting enzyme. ACE inhibitors relax (dilate) the blood vessels and help lower  blood pressure.  ¨ Angiotensin receptor blockers (ARBs). This type of medicine blocks the actions of a blood protein called angiotensin. Angiotensin receptor blockers dilate the blood vessels and help lower blood pressure.  ¨ Water pills (diuretics). Diuretics cause the kidneys to remove salt and water from the blood. The extra fluid is removed through urination. This loss of extra fluid lowers the volume of blood the heart pumps.  ¨ Beta blockers. These prevent the heart from beating too fast and improve heart muscle strength.  ¨ Digitalis. This increases the force of the heartbeat.  · Healthy behavior changes include:  ¨ Obtaining and maintaining a healthy weight.  ¨ Stopping smoking or chewing tobacco.  ¨ Eating heart-healthy foods.  ¨ Limiting or avoiding alcohol.  ¨ Stopping illicit drug use.  ¨ Physical activity as directed by your health care provider.  · Surgical treatment for heart failure may include:  ¨ A procedure to open blocked arteries, repair damaged heart valves, or remove damaged heart muscle tissue.  ¨ A pacemaker to improve heart muscle function and control certain abnormal heart rhythms.  ¨ An internal cardioverter defibrillator to treat certain serious abnormal heart rhythms.  ¨ A left ventricular assist device (LVAD) to assist the pumping ability of the heart.  HOME CARE INSTRUCTIONS   · Take medicines only as directed by your health care provider. Medicines are important in reducing the workload of your heart, slowing the progression of heart failure, and improving your symptoms.  ¨ Do not stop taking your medicine unless directed by your health care provider.  ¨ Do not skip any dose of medicine.  ¨ Refill your prescriptions before you run out of medicine. Your medicines are needed every day.  · Engage in moderate physical activity if directed by your health care provider. Moderate physical activity can benefit some people. The elderly and people with severe heart failure should consult with  a health care provider for physical activity recommendations.  · Eat heart-healthy foods. Food choices should be free of trans fat and low in saturated fat, cholesterol, and salt (sodium). Healthy choices include fresh or frozen fruits and vegetables, fish, lean meats, legumes, fat-free or low-fat dairy products, and whole grain or high fiber foods. Talk to a dietitian to learn more about heart-healthy foods.  · Limit sodium if directed by your health care provider. Sodium restriction may reduce symptoms of heart failure in some people. Talk to a dietitian to learn more about heart-healthy seasonings.  · Use healthy cooking methods. Healthy cooking methods include roasting, grilling, broiling, baking, poaching, steaming, or stir-frying. Talk to a dietitian to learn more about healthy cooking methods.  · Limit fluids if directed by your health care provider. Fluid restriction may reduce symptoms of heart failure in some people.  · Weigh yourself every day. Daily weights are important in the early recognition of excess fluid. You should weigh yourself every morning after you urinate and before you eat breakfast. Wear the same amount of clothing each time you weigh yourself. Record your daily weight. Provide your health care provider with your weight record.  · Monitor and record your blood pressure if directed by your health care provider.  · Check your pulse if directed by your health care provider.  · Lose weight if directed by your health care provider. Weight loss may reduce symptoms of heart failure in some people.  · Stop smoking or chewing tobacco. Nicotine makes your heart work harder by causing your blood vessels to constrict. Do not use nicotine gum or patches before talking to your health care provider.  · Keep all follow-up visits as directed by your health care provider. This is important.  · Limit alcohol intake to no more than 1 drink per day for nonpregnant women and 2 drinks per day for men. One drink  equals 12 ounces of beer, 5 ounces of wine, or 1½ ounces of hard liquor. Drinking more than that is harmful to your heart. Tell your health care provider if you drink alcohol several times a week. Talk with your health care provider about whether alcohol is safe for you. If your heart has already been damaged by alcohol or you have severe heart failure, drinking alcohol should be stopped completely.  · Stop illicit drug use.  · Stay up-to-date with immunizations. It is especially important to prevent respiratory infections through current pneumococcal and influenza immunizations.  · Manage other health conditions such as hypertension, diabetes, thyroid disease, or abnormal heart rhythms as directed by your health care provider.  · Learn to manage stress.  · Plan rest periods when fatigued.  · Learn strategies to manage high temperatures. If the weather is extremely hot:  ¨ Avoid vigorous physical activity.  ¨ Use air conditioning or fans or seek a cooler location.  ¨ Avoid caffeine and alcohol.  ¨ Wear loose-fitting, lightweight, and light-colored clothing.  · Learn strategies to manage cold temperatures. If the weather is extremely cold:  ¨ Avoid vigorous physical activity.  ¨ Layer clothes.  ¨ Wear mittens or gloves, a hat, and a scarf when going outside.  ¨ Avoid alcohol.  · Obtain ongoing education and support as needed.  · Participate in or seek rehabilitation as needed to maintain or improve independence and quality of life.  SEEK MEDICAL CARE IF:   · You have a rapid weight gain.  · You have increasing shortness of breath that is unusual for you.  · You are unable to participate in your usual physical activities.  · You tire easily.  · You cough more than normal, especially with physical activity.  · You have any or more swelling in areas such as your hands, feet, ankles, or abdomen.  · You are unable to sleep because it is hard to breathe.  · You feel like your heart is beating fast (palpitations).  · You  become dizzy or light-headed upon standing up.  SEEK IMMEDIATE MEDICAL CARE IF:   · You have difficulty breathing.  · There is a change in mental status such as decreased alertness or difficulty with concentration.  · You have a pain or discomfort in your chest.  · You have an episode of fainting (syncope).  MAKE SURE YOU:   · Understand these instructions.  · Will watch your condition.  · Will get help right away if you are not doing well or get worse.     This information is not intended to replace advice given to you by your health care provider. Make sure you discuss any questions you have with your health care provider.     Document Released: 12/18/2006 Document Revised: 05/03/2016 Document Reviewed: 01/17/2014  [employer] Interactive Patient Education ©2016 [employer]        Follow-up Information     1. Follow up with Wilfred Amin M.D.. Schedule an appointment as soon as possible for a visit on 7/10/2017.    Specialty:  Surgery    Contact information    [address]  [address]  [facility] [address] [phone]          2. Follow up with Jesus Craft M.D. In 1 week.    Specialty:  Family Medicine    Contact information    [address]  [facility] [address] [phone]           Discharge Medication Instructions:    Below are the medications your physician expects you to take upon discharge:    Review all your home medications and newly ordered medications with your doctor and/or pharmacist. Follow medication instructions as directed by your doctor and/or pharmacist.    Please keep your medication list with you and share with your physician.               Medication List      START taking these medications        Instructions    Morning Afternoon Evening Bedtime    acetaminophen 325 MG Tabs   Last time this was given:  650 mg on 6/28/2017  5:01 PM   Commonly known as:  TYLENOL        Take 2 Tabs by mouth 4 times a day.   Dose:  650 mg                        albuterol 108 (90 BASE)  MCG/ACT Aers inhalation aerosol        Inhale 2 Puffs by mouth every four hours as needed.   Dose:  2 Puff                        enoxaparin 120 MG/0.8ML Soln inj   Last time this was given:  120 mg on 6/28/2017  9:32 AM   Commonly known as:  LOVENOX        Inject 120 mg as instructed every 12 hours.   Dose:  1 mg/kg                        furosemide 40 MG Tabs   Last time this was given:  40 mg on 6/28/2017  5:03 PM   Commonly known as:  LASIX        Take 1 Tab by mouth every day.   Dose:  40 mg                        oxycodone immediate-release 5 MG Tabs   Last time this was given:  10 mg on 6/25/2017  7:54 AM   Commonly known as:  ROXICODONE        Take 1 Tab by mouth every 6 hours as needed for Severe Pain.   Dose:  5 mg                        potassium chloride SA 20 MEQ Tbcr   Last time this was given:  10 mEq on 6/9/2017  9:11 AM   Commonly known as:  Kdur        Take 1 Tab by mouth every day.   Dose:  20 mEq                          CONTINUE taking these medications        Instructions    Morning Afternoon Evening Bedtime    finasteride 5 MG Tabs   Last time this was given:  5 mg on 6/28/2017  9:29 AM   Commonly known as:  PROSCAR        Take 5 mg by mouth every day.   Dose:  5 mg                        potassium chloride 20 MEQ Pack   Last time this was given:  10 mEq on 6/8/2017  8:28 PM   Commonly known as:  KLOR-CON        Take 10 mEq by mouth 2 times a day.   Dose:  10 mEq                        warfarin 1 MG Tabs   Last time this was given:  1 mg on 6/28/2017  5:01 PM   Commonly known as:  COUMADIN        Take 1 mg by mouth every day.   Dose:  1 mg                          STOP taking these medications     metoprolol 25 MG Tabs   Commonly known as:  LOPRESSOR                    Where to Get Your Medications      You can get these medications from any pharmacy     Bring a paper prescription for each of these medications    - potassium chloride SA 20 MEQ Tbcr      Information about where to get these  medications is not yet available     ! Ask your nurse or doctor about these medications    - acetaminophen 325 MG Tabs  - albuterol 108 (90 BASE) MCG/ACT Aers inhalation aerosol  - enoxaparin 120 MG/0.8ML Soln inj  - furosemide 40 MG Tabs  - oxycodone immediate-release 5 MG Tabs            Orders for after discharge     DME O2 New Set Up    Complete by:  As directed        REFERRAL TO ANTICOAGULATION MONITORING    Complete by:  As directed    If this Referral to the anticoagulation clinic is being ordered with a Referral to home health, then schedule the anticoagulation visit after the home health treatments are completed.       REFERRAL TO HOME HEALTH    Complete by:  As directed    Home health will create and establish a plan of care       REFERRAL TO PHYSIATRY (PMR)    Complete by:  As directed        REFERRAL TO PHYSIATRY (PMR)    Complete by:  As directed        REFERRAL TO SKILLED NURSING FACILITY    Complete by:  As directed              Instructions           Diet / Nutrition:    Follow any diet instructions given to you by your doctor or the dietician, including how much salt (sodium) you are allowed each day.    If you are overweight, talk to your doctor about a weight reduction plan.    Activity:    Remain physically active following your doctor's instructions about exercise and activity.    Rest often.     Any time you become even a little tired or short of breath, SIT DOWN and rest.    Worsening Symptoms:    Report any of the following signs and symptoms to the doctor's office immediately:    *Pain of jaw, arm, or neck  *Chest pain not relieved by medication                               *Dizziness or loss of consciousness  *Difficulty breathing even when at rest   *More tired than usual                                       *Bleeding drainage or swelling of surgical site  *Swelling of feet, ankles, legs or stomach                 *Fever (>100ºF)  *Pink or blood tinged sputum  *Weight gain (3lbs/day or  5lbs /week)           *Shock from internal defibrillator (if applicable)  *Palpitations or irregular heartbeats                *Cool and/or numb extremities    Stroke Awareness    Common Risk Factors for Stroke include:    Age  Atrial Fibrillation  Carotid Artery Stenosis  Diabetes Mellitus  Excessive alcohol consumption  High blood pressure  Overweight   Physical inactivity  Smoking    Warning signs and symptoms of a stroke include:    *Sudden numbness or weakness of the face, arm or leg (especially on one side of the body).  *Sudden confusion, trouble speaking or understanding.  *Sudden trouble seeing in one or both eyes.  *Sudden trouble walking, dizziness, loss of balance or coordination.Sudden severe headache with no known cause.    It is very important to get treatment quickly when a stroke occurs. If you experience any of the above warning signs, call 911 immediately.                   Disclaimer         Quit Smoking / Tobacco Use:    I understand the use of any tobacco products increases my chance of suffering from future heart disease or stroke and could cause other illnesses which may shorten my life. Quitting the use of tobacco products is the single most important thing I can do to improve my health. For further information on smoking / tobacco cessation call a Toll Free Quit Line at 1-519.771.6650 (*[employer] [place]) or 1-159.998.2472 (American Lung Association) or you can access the web based program at www.lungusa.org.    [place] Tobacco Users Help Line:  (441) 549-4702       Toll Free: 1-370.949.6139  Quit Tobacco Program [facility] (024)721-5219    Crisis Hotline:    [place] Crisis Hotline:  1-255-RVEIJSR or 1-430.763.2724    [place] Crisis Hotline:    1-784.560.8234 or 359-906-0408    Discharge Survey:   Thank you for choosing [facility]. We hope we did everything we could to make your hospital stay a pleasant one. You may be receiving a phone survey and we would  appreciate your time and participation in answering the questions. Your input is very valuable to us in our efforts to improve our service to our patients and their families.        My signature on this form indicates that:    1. I have reviewed and understand the above information.  2. My questions regarding this information have been answered to my satisfaction.  3. I have formulated a plan with my discharge nurse to obtain my prescribed medications for home.                  Disclaimer         __________________________________                     __________       ________                       Patient Signature                                                 Date                    Time

## 2017-06-02 NOTE — IP AVS SNAPSHOT
[employer] Access Code: [email or web address]  Expires: 7/28/2017  5:07 PM    Your email address is not on file at [email or web address].  Email Addresses are required for you to sign up for [employer], please contact [phone] to verify your personal information and to provide your email address prior to attempting to register for [employer].    Kiran Eason   [address]  [place] [address]    [employer]  A secure, online tool to manage your health information     [employer]’s [employer]® is a secure, online tool that connects you to your personalized health information from the privacy of your home -- day or night - making it very easy for you to manage your healthcare. Once the activation process is completed, you can even access your medical information using the [employer] jitendra, which is available for free in the Apple Jitendra store or Google Play store.     To learn more about [employer], visit [email or web address]    There are two levels of access available (as shown below):   My Chart Features  [facility] Primary Care Doctor [facility]  Specialists [facility]  Urgent  Care Non-[facility] Primary Care Doctor   Email your healthcare team securely and privately 24/7 X X X    Manage appointments: schedule your next appointment; view details of past/upcoming appointments X      Request prescription refills. X      View recent personal medical records, including lab and immunizations X X X X   View health record, including health history, allergies, medications X X X X   Read reports about your outpatient visits, procedures, consult and ER notes X X X X   See your discharge summary, which is a recap of your hospital and/or ER visit that includes your diagnosis, lab results, and care plan X X  X     How to register for [employer]:  Once your e-mail address has been verified, follow the following steps to sign up for [employer].     1. Go to  [email or web address]  2. Click on the Sign Up Now box, which takes you to the New Member Sign Up page. You will need to  provide the following information:  a. Enter your [employer] Access Code exactly as it appears at the top of this page. (You will not need to use this code after you’ve completed the sign-up process. If you do not sign up before the expiration date, you must request a new code.)   b. Enter your date of birth.   c. Enter your home email address.   d. Click Submit, and follow the next screen’s instructions.  3. Create a [employer] ID. This will be your [employer] login ID and cannot be changed, so think of one that is secure and easy to remember.  4. Create a [employer] password. You can change your password at any time.  5. Enter your Password Reset Question and Answer. This can be used at a later time if you forget your password.   6. Enter your e-mail address. This allows you to receive e-mail notifications when new information is available in [employer].  7. Click Sign Up. You can now view your health information.    For assistance activating your [employer] account, call [phone]

## 2017-06-03 ENCOUNTER — APPOINTMENT (OUTPATIENT)
Dept: RADIOLOGY | Facility: MEDICAL CENTER | Age: 77
DRG: 907 | End: 2017-06-03
Attending: NURSE PRACTITIONER
Payer: MEDICARE

## 2017-06-03 PROBLEM — I44.1 AV BLOCK, MOBITZ 1: Status: ACTIVE | Noted: 2017-06-03

## 2017-06-03 LAB
ABO GROUP BLD: NORMAL
ALBUMIN SERPL BCP-MCNC: 2.9 G/DL (ref 3.2–4.9)
ALBUMIN/GLOB SERPL: 1.4 G/DL
ALP SERPL-CCNC: 72 U/L (ref 30–99)
ALT SERPL-CCNC: 19 U/L (ref 2–50)
ANION GAP SERPL CALC-SCNC: 4 MMOL/L (ref 0–11.9)
AST SERPL-CCNC: 14 U/L (ref 12–45)
BASOPHILS # BLD AUTO: 0.2 % (ref 0–1.8)
BASOPHILS # BLD: 0.02 K/UL (ref 0–0.12)
BILIRUB SERPL-MCNC: 0.7 MG/DL (ref 0.1–1.5)
BUN SERPL-MCNC: 22 MG/DL (ref 8–22)
CALCIUM SERPL-MCNC: 8.3 MG/DL (ref 8.5–10.5)
CHLORIDE SERPL-SCNC: 107 MMOL/L (ref 96–112)
CO2 SERPL-SCNC: 25 MMOL/L (ref 20–33)
CREAT SERPL-MCNC: 0.89 MG/DL (ref 0.5–1.4)
EKG IMPRESSION: NORMAL
EOSINOPHIL # BLD AUTO: 0 K/UL (ref 0–0.51)
EOSINOPHIL NFR BLD: 0 % (ref 0–6.9)
ERYTHROCYTE [DISTWIDTH] IN BLOOD BY AUTOMATED COUNT: 54.5 FL (ref 35.9–50)
GFR SERPL CREATININE-BSD FRML MDRD: >60 ML/MIN/1.73 M 2
GLOBULIN SER CALC-MCNC: 2.1 G/DL (ref 1.9–3.5)
GLUCOSE BLD-MCNC: 143 MG/DL (ref 65–99)
GLUCOSE SERPL-MCNC: 141 MG/DL (ref 65–99)
HCT VFR BLD AUTO: 37.4 % (ref 42–52)
HGB BLD-MCNC: 11.9 G/DL (ref 14–18)
IMM GRANULOCYTES # BLD AUTO: 0.08 K/UL (ref 0–0.11)
IMM GRANULOCYTES NFR BLD AUTO: 0.8 % (ref 0–0.9)
INR PPP: 1.24 (ref 0.87–1.13)
LYMPHOCYTES # BLD AUTO: 1.11 K/UL (ref 1–4.8)
LYMPHOCYTES NFR BLD: 11 % (ref 22–41)
MAGNESIUM SERPL-MCNC: 1.8 MG/DL (ref 1.5–2.5)
MCH RBC QN AUTO: 29.4 PG (ref 27–33)
MCHC RBC AUTO-ENTMCNC: 31.8 G/DL (ref 33.7–35.3)
MCV RBC AUTO: 92.3 FL (ref 81.4–97.8)
MONOCYTES # BLD AUTO: 0.95 K/UL (ref 0–0.85)
MONOCYTES NFR BLD AUTO: 9.4 % (ref 0–13.4)
NEUTROPHILS # BLD AUTO: 7.96 K/UL (ref 1.82–7.42)
NEUTROPHILS NFR BLD: 78.6 % (ref 44–72)
NRBC # BLD AUTO: 0 K/UL
NRBC BLD AUTO-RTO: 0 /100 WBC
PHOSPHATE SERPL-MCNC: 3.5 MG/DL (ref 2.5–4.5)
PLATELET # BLD AUTO: 134 K/UL (ref 164–446)
PMV BLD AUTO: 10.4 FL (ref 9–12.9)
POTASSIUM SERPL-SCNC: 4.8 MMOL/L (ref 3.6–5.5)
PROT SERPL-MCNC: 5 G/DL (ref 6–8.2)
PROTHROMBIN TIME: 16 SEC (ref 12–14.6)
RBC # BLD AUTO: 4.05 M/UL (ref 4.7–6.1)
SODIUM SERPL-SCNC: 136 MMOL/L (ref 135–145)
WBC # BLD AUTO: 10.1 K/UL (ref 4.8–10.8)

## 2017-06-03 PROCEDURE — A9270 NON-COVERED ITEM OR SERVICE: HCPCS | Performed by: NURSE PRACTITIONER

## 2017-06-03 PROCEDURE — 84100 ASSAY OF PHOSPHORUS: CPT

## 2017-06-03 PROCEDURE — 80053 COMPREHEN METABOLIC PANEL: CPT

## 2017-06-03 PROCEDURE — 85025 COMPLETE CBC W/AUTO DIFF WBC: CPT

## 2017-06-03 PROCEDURE — 93005 ELECTROCARDIOGRAM TRACING: CPT | Performed by: INTERNAL MEDICINE

## 2017-06-03 PROCEDURE — 51798 US URINE CAPACITY MEASURE: CPT

## 2017-06-03 PROCEDURE — 93005 ELECTROCARDIOGRAM TRACING: CPT | Performed by: SURGERY

## 2017-06-03 PROCEDURE — 93010 ELECTROCARDIOGRAM REPORT: CPT | Mod: 76 | Performed by: INTERNAL MEDICINE

## 2017-06-03 PROCEDURE — 700105 HCHG RX REV CODE 258: Performed by: NURSE PRACTITIONER

## 2017-06-03 PROCEDURE — 700112 HCHG RX REV CODE 229: Performed by: NURSE PRACTITIONER

## 2017-06-03 PROCEDURE — 71010 DX-CHEST-PORTABLE (1 VIEW): CPT

## 2017-06-03 PROCEDURE — 93970 EXTREMITY STUDY: CPT

## 2017-06-03 PROCEDURE — 83735 ASSAY OF MAGNESIUM: CPT

## 2017-06-03 PROCEDURE — 700102 HCHG RX REV CODE 250 W/ 637 OVERRIDE(OP): Performed by: SURGERY

## 2017-06-03 PROCEDURE — 93970 EXTREMITY STUDY: CPT | Mod: 26 | Performed by: SURGERY

## 2017-06-03 PROCEDURE — 700102 HCHG RX REV CODE 250 W/ 637 OVERRIDE(OP): Performed by: NURSE PRACTITIONER

## 2017-06-03 PROCEDURE — 700111 HCHG RX REV CODE 636 W/ 250 OVERRIDE (IP): Performed by: SURGERY

## 2017-06-03 PROCEDURE — A9270 NON-COVERED ITEM OR SERVICE: HCPCS | Performed by: SURGERY

## 2017-06-03 PROCEDURE — 85610 PROTHROMBIN TIME: CPT

## 2017-06-03 PROCEDURE — C1729 CATH, DRAINAGE: HCPCS

## 2017-06-03 PROCEDURE — 770022 HCHG ROOM/CARE - ICU (200)

## 2017-06-03 PROCEDURE — 82962 GLUCOSE BLOOD TEST: CPT

## 2017-06-03 PROCEDURE — 700111 HCHG RX REV CODE 636 W/ 250 OVERRIDE (IP): Performed by: NURSE PRACTITIONER

## 2017-06-03 PROCEDURE — 99233 SBSQ HOSP IP/OBS HIGH 50: CPT | Performed by: SURGERY

## 2017-06-03 PROCEDURE — 93971 EXTREMITY STUDY: CPT

## 2017-06-03 RX ORDER — OXYCODONE HYDROCHLORIDE 10 MG/1
10 TABLET ORAL
Status: DISCONTINUED | OUTPATIENT
Start: 2017-06-03 | End: 2017-06-03

## 2017-06-03 RX ORDER — FINASTERIDE 5 MG/1
5 TABLET, FILM COATED ORAL DAILY
Status: DISCONTINUED | OUTPATIENT
Start: 2017-06-03 | End: 2017-06-28 | Stop reason: HOSPADM

## 2017-06-03 RX ORDER — FUROSEMIDE 20 MG/1
20 TABLET ORAL DAILY
Status: ON HOLD | COMMUNITY
End: 2017-06-28

## 2017-06-03 RX ORDER — OXYCODONE HYDROCHLORIDE 5 MG/1
5 TABLET ORAL
Status: DISCONTINUED | OUTPATIENT
Start: 2017-06-03 | End: 2017-06-21

## 2017-06-03 RX ORDER — OXYCODONE HYDROCHLORIDE 5 MG/1
5 TABLET ORAL EVERY 4 HOURS PRN
Status: DISCONTINUED | OUTPATIENT
Start: 2017-06-03 | End: 2017-06-03

## 2017-06-03 RX ORDER — OXYCODONE HYDROCHLORIDE 10 MG/1
10 TABLET ORAL
Status: DISCONTINUED | OUTPATIENT
Start: 2017-06-03 | End: 2017-06-16

## 2017-06-03 RX ORDER — FINASTERIDE 5 MG/1
5 TABLET, FILM COATED ORAL EVERY MORNING
Status: ON HOLD | COMMUNITY
End: 2018-03-22

## 2017-06-03 RX ORDER — WARFARIN SODIUM 1 MG/1
1 TABLET ORAL DAILY
COMMUNITY
End: 2017-12-13

## 2017-06-03 RX ORDER — OXYCODONE HYDROCHLORIDE 10 MG/1
10 TABLET ORAL EVERY 4 HOURS PRN
Status: DISCONTINUED | OUTPATIENT
Start: 2017-06-03 | End: 2017-06-03

## 2017-06-03 RX ORDER — POTASSIUM CHLORIDE 1.5 G/1.58G
10 POWDER, FOR SOLUTION ORAL 2 TIMES DAILY
COMMUNITY
End: 2017-12-13

## 2017-06-03 RX ORDER — HYDROMORPHONE HYDROCHLORIDE 2 MG/ML
1 INJECTION, SOLUTION INTRAMUSCULAR; INTRAVENOUS; SUBCUTANEOUS ONCE
Status: COMPLETED | OUTPATIENT
Start: 2017-06-03 | End: 2017-06-03

## 2017-06-03 RX ADMIN — FINASTERIDE 5 MG: 5 TABLET, FILM COATED ORAL at 11:02

## 2017-06-03 RX ADMIN — CEFAZOLIN SODIUM 1 G: 1 INJECTION, SOLUTION INTRAVENOUS at 13:32

## 2017-06-03 RX ADMIN — HYDROCODONE BITARTRATE AND ACETAMINOPHEN 1 TABLET: 5; 325 TABLET ORAL at 04:06

## 2017-06-03 RX ADMIN — HYDROMORPHONE HYDROCHLORIDE 0.5 MG: 1 INJECTION, SOLUTION INTRAMUSCULAR; INTRAVENOUS; SUBCUTANEOUS at 09:53

## 2017-06-03 RX ADMIN — POLYETHYLENE GLYCOL (3350) 1 PACKET: 17 POWDER, FOR SOLUTION ORAL at 07:50

## 2017-06-03 RX ADMIN — SODIUM CHLORIDE, POTASSIUM CHLORIDE, SODIUM LACTATE AND CALCIUM CHLORIDE: 600; 310; 30; 20 INJECTION, SOLUTION INTRAVENOUS at 10:05

## 2017-06-03 RX ADMIN — DOCUSATE SODIUM 100 MG: 100 CAPSULE ORAL at 20:38

## 2017-06-03 RX ADMIN — ACETAMINOPHEN 650 MG: 325 TABLET, FILM COATED ORAL at 13:32

## 2017-06-03 RX ADMIN — HYDROMORPHONE HYDROCHLORIDE 0.5 MG: 1 INJECTION, SOLUTION INTRAMUSCULAR; INTRAVENOUS; SUBCUTANEOUS at 00:14

## 2017-06-03 RX ADMIN — HYDROMORPHONE HYDROCHLORIDE 0.5 MG: 1 INJECTION, SOLUTION INTRAMUSCULAR; INTRAVENOUS; SUBCUTANEOUS at 07:15

## 2017-06-03 RX ADMIN — ACETAMINOPHEN 650 MG: 325 TABLET, FILM COATED ORAL at 07:50

## 2017-06-03 RX ADMIN — DOCUSATE SODIUM 100 MG: 100 CAPSULE ORAL at 07:50

## 2017-06-03 RX ADMIN — OXYCODONE HYDROCHLORIDE 10 MG: 10 TABLET ORAL at 16:32

## 2017-06-03 RX ADMIN — ACETAMINOPHEN 650 MG: 325 TABLET, FILM COATED ORAL at 20:38

## 2017-06-03 RX ADMIN — OXYCODONE HYDROCHLORIDE 5 MG: 5 TABLET ORAL at 09:34

## 2017-06-03 RX ADMIN — OXYCODONE HYDROCHLORIDE 10 MG: 10 TABLET ORAL at 13:32

## 2017-06-03 RX ADMIN — ACETAMINOPHEN 650 MG: 325 TABLET, FILM COATED ORAL at 16:30

## 2017-06-03 RX ADMIN — HYDROMORPHONE HYDROCHLORIDE 1 MG: 2 INJECTION INTRAMUSCULAR; INTRAVENOUS; SUBCUTANEOUS at 11:24

## 2017-06-03 RX ADMIN — CEFAZOLIN SODIUM 1 G: 1 INJECTION, SOLUTION INTRAVENOUS at 05:25

## 2017-06-03 RX ADMIN — SENNOSIDES AND DOCUSATE SODIUM 1 TABLET: 8.6; 5 TABLET ORAL at 20:38

## 2017-06-03 ASSESSMENT — ENCOUNTER SYMPTOMS
ABDOMINAL PAIN: 0
DOUBLE VISION: 0
SHORTNESS OF BREATH: 0
HEADACHES: 0
BLURRED VISION: 0
CHILLS: 0
NERVOUS/ANXIOUS: 0
FEVER: 0
FOCAL WEAKNESS: 0
BACK PAIN: 0

## 2017-06-03 ASSESSMENT — PAIN SCALES - GENERAL
PAINLEVEL_OUTOF10: 1
PAINLEVEL_OUTOF10: 2
PAINLEVEL_OUTOF10: 3
PAINLEVEL_OUTOF10: 2
PAINLEVEL_OUTOF10: 5
PAINLEVEL_OUTOF10: 4
PAINLEVEL_OUTOF10: 4
PAINLEVEL_OUTOF10: 2
PAINLEVEL_OUTOF10: 7
PAINLEVEL_OUTOF10: 2
PAINLEVEL_OUTOF10: 5
PAINLEVEL_OUTOF10: 6
PAINLEVEL_OUTOF10: 4
PAINLEVEL_OUTOF10: 1
PAINLEVEL_OUTOF10: 3
PAINLEVEL_OUTOF10: 7
PAINLEVEL_OUTOF10: 6

## 2017-06-03 ASSESSMENT — LIFESTYLE VARIABLES
EVER_SMOKED: NEVER
ALCOHOL_USE: NO

## 2017-06-03 NOTE — ED NOTES
Patient BIB by [facility] department because of patient falling off tractor and hay hook struck left posterior flank. Hook was removed prior to arrival. Patient is currently on coumadin.

## 2017-06-03 NOTE — CARE PLAN
Problem: Pain Management  Goal: Pain level will decrease to patient’s comfort goal  Outcome: PROGRESSING AS EXPECTED  Pt pain after turn is 7/10. IV dilaudid .5mg provided. Pt pain is now 3/10. Sleeping after admin.     Problem: Respiratory:  Goal: Respiratory status will improve  Outcome: PROGRESSING AS EXPECTED  Pt on 5L mask. Educated on importance of coughing, turning and deep breathing. Educated on use of incentive spirometer. Pt pulling 750 on IS at this time.

## 2017-06-03 NOTE — ED PROVIDER NOTES
"ED Provider Note    Scribed for Donny Alexis M.D. by Braulio Rios. 6/2/2017, 6:42 PM.    Primary care provider: None  Means of arrival: Ambulance  History obtained from: Patient  History limited by: None    CHIEF COMPLAINT  No chief complaint on file.      OLY Ingram is a 77 y.o. male brought in by ambulance to the Emergency Department as a trauma red for a puncture wound to the left flank. Patient was standing on his tractor when he lost his  and fell backward. His left flank was struck by a hay hook hanging on the tractor and the patient was suspended in the air with the hook in his flank. He did not hit his head or lose consciousness.  The hook was removed and the patient received 300 mL of normal saline IV fluid prior to arrival. He now complains of some shortness of breath and flank pain worsened with inspiration. His systolic blood pressure just prior to arrival was 90 mmHg. He has no fever, chills, abdominal pain, numbness, or other symptoms. Patient takes warfarin.       REVIEW OF SYSTEMS  Review of Systems   Constitutional: Negative for fever and chills.   Respiratory: Positive for shortness of breath.    Gastrointestinal: Negative for abdominal pain.   Genitourinary: Positive for flank pain (secondary to hook).   Musculoskeletal: Positive for back pain (secondary to hook).   Neurological: Negative for tingling and loss of consciousness.        Negative for numbness   All other systems reviewed and are negative.      PAST MEDICAL HISTORY   Deep vein thrombosis     SURGICAL HISTORY  patient denies any surgical history    SOCIAL HISTORY     History   Drug Use No       FAMILY HISTORY  No contributing family history noted.     CURRENT MEDICATIONS  Reviewed.  See Encounter Summary.     ALLERGIES  Allergies not on file    PHYSICAL EXAM  VITAL SIGNS: BP 94/41 mmHg  Pulse 68  Temp(Src) 36.6 °C (97.9 °F)  Resp 27  Ht 1.778 m (5' 10\")  Wt 117.935 kg (260 lb)  BMI 37.31 kg/m2  SpO2 " 94%  Vitals reviewed.  Constitutional: Well developed, Well nourished, moderate distress  HENT: Normocephalic, Atraumatic, Bilateral external ears normal, Oropharynx moist, No oral exudates, Nose normal.   Eyes: PERRL, EOMI, Conjunctiva normal, No discharge.   Neck: Normal range of motion, No tenderness, Supple, No stridor.  Neck is clinically cleared  Cardiovascular: Normal heart rate, Normal rhythm, No murmurs, No rubs, No gallops.   Thorax & Lungs: Normal breath sounds, No respiratory distress, No wheezing, No chest tenderness.   Abdomen: Bowel sounds normal, Soft, No tenderness  Skin: Warm, Dry, No erythema, No rash.   Back: Puncture wound to left flank with associated hematoma  Musculoskeletal: No edema. No tenderness to palpation or major deformities noted.   Neurologic: Alert, Normal motor function, Normal sensory function, No focal deficits noted.   Psychiatric: Affect anxious    LABS  Results for orders placed or performed during the hospital encounter of 06/02/17   DIAGNOSTIC ALCOHOL   Result Value Ref Range    Diagnostic Alcohol 0.00 0.00 g/dL   CBC WITHOUT DIFFERENTIAL   Result Value Ref Range    WBC 14.1 (H) 4.8 - 10.8 K/uL    RBC 4.60 (L) 4.70 - 6.10 M/uL    Hemoglobin 13.7 (L) 14.0 - 18.0 g/dL    Hematocrit 42.8 42.0 - 52.0 %    MCV 93.0 81.4 - 97.8 fL    MCH 29.8 27.0 - 33.0 pg    MCHC 32.0 (L) 33.7 - 35.3 g/dL    RDW 53.1 (H) 35.9 - 50.0 fL    Platelet Count 170 164 - 446 K/uL    MPV 10.7 9.0 - 12.9 fL   COMP METABOLIC PANEL   Result Value Ref Range    Sodium 137 135 - 145 mmol/L    Potassium 4.5 3.6 - 5.5 mmol/L    Chloride 106 96 - 112 mmol/L    Co2 21 20 - 33 mmol/L    Anion Gap 10.0 0.0 - 11.9    Glucose 171 (H) 65 - 99 mg/dL    Bun 22 8 - 22 mg/dL    Creatinine 0.97 0.50 - 1.40 mg/dL    Calcium 8.8 8.5 - 10.5 mg/dL    AST(SGOT) 15 12 - 45 U/L    ALT(SGPT) 24 2 - 50 U/L    Alkaline Phosphatase 85 30 - 99 U/L    Total Bilirubin 0.5 0.1 - 1.5 mg/dL    Albumin 3.4 3.2 - 4.9 g/dL    Total Protein  5.9 (L) 6.0 - 8.2 g/dL    Globulin 2.5 1.9 - 3.5 g/dL    A-G Ratio 1.4 g/dL   PROTHROMBIN TIME   Result Value Ref Range    PT 23.3 (H) 12.0 - 14.6 sec    INR 2.00 (H) 0.87 - 1.13   APTT   Result Value Ref Range    APTT 34.4 24.7 - 36.0 sec   COD (ADULT)   Result Value Ref Range    ABO Grouping Only AB     Rh Grouping Only POS     Antibody Screen-Cod NEG    UN-XM'D RBC   Result Value Ref Range    Component R       R3                  Red Blood Cells3    [id number]   transfused   06/02/17   18:50      Product Type Red Blood Cells LR Pheresis     Dispense Status Transfused     Unit Number (Barcoded) [id number]     Product Code (Barcoded) [id number]     Blood Type (Barcoded) 5100     Component R       R3                  Red Blood Cells3    [id number]   released     06/02/17   19:30      Product Type Red Blood Cells LR Pheresis     Dispense Status Returned     Unit Number (Barcoded) [id number]     Product Code (Barcoded) [id number]     Blood Type (Barcoded) 5100    ESTIMATED GFR   Result Value Ref Range    GFR If African American >60 >60 mL/min/1.73 m 2    GFR If Non African American >60 >60 mL/min/1.73 m 2      All labs reviewed by me.    RADIOLOGY  CT-CHEST,ABDOMEN,PELVIS WITH   Final Result      1.  Posterior left 11th and 12th rib fractures with hemothorax.      2.  Left flank hematoma with fluid tracking along the inferior aspect of the spleen without obvious splenic injury.      3.  Bilateral renal cysts.      4.  Diverticulosis      This was discussed with Sameera Alexis at 7:30 PM.      DX-CHEST-LIMITED (1 VIEW)    (Results Pending)   DX-CHEST-LIMITED (1 VIEW)    (Results Pending)     The radiologist's interpretation of all radiological studies have been reviewed by me.    COURSE & MEDICAL DECISION MAKING  Pertinent Labs & Imaging studies reviewed. (See chart for details)    6:30 PM Patient seen and examined at bedside. The patient presents with a puncture wound to the left flank, and the differential  diagnosis includes but is not limited to possible pneumothorax or hemothorax, intraabdominal or retroperitoneal hemorrhage. Ordered for CT-chest, CT chest abdomen pelvis, CT pelvis, DX-chest, diagnostic alcohol, prothrombin time, APTT, platelet estimate, ABO confirmation, CBC, CMP, component cellular, COD, ABO confirmation to evaluate. Patient will be treated with Novoseven IV, Zofran 4 mg (IV), Ancef IVPB for his symptoms. Fluid bolus initiated for hemorrhagic shock. Patient's tetanus vaccination will be updated with Adacel 0.5 mL injection.     6:34 PM Dr. Amin (trauma surgery) at bedside. Patient will be admitted to the ICU with the trauma service.    The patient was a trauma red.  He is transferred over and primary survey is performed.  IV is established.  He has received 300 mL of fluid in the field is currently normotensive.  Stat, one view AP portable chest x-ray shows a layered left hemothorax with an enlarged heart.  Fluids are ready to be infused, but he is not requiring fluids at this time.  Trauma labs and CT is ordered.  The patient be admitted to the trauma ICU.  He is critically ill.  History is obtained from the patient    Differential history is obtained from the paramedics.      DISPOSITION:  Patient will be admitted to Dr. Amin in critical condition.     FINAL IMPRESSION  1. Penetrating back wound, left, initial encounter    2. Hemothorax on left    3. Acute blood loss anemia         Braulio CERVANTES (Scribe), am scribing for, and in the presence of, Donny Alexis M.D..    Electronically signed by: Braulio Rios (Scribe), 6/2/2017    Donny CERVANTES M.D. personally performed the services described in this documentation, as scribed by Braulio Rios in my presence, and it is both accurate and complete.    The note accurately reflects work and decisions made by me.  Donny Alexis  6/2/2017  8:16 PM

## 2017-06-03 NOTE — PROGRESS NOTES
"  Trauma/Surgical Progress Note    Author: Shahram Chinchilla Date & Time created: 6/3/2017   8:03 AM     Interval Events:  Coumadin for history of dvt  Chest tube   hct 37  Restart anticoagulation soon  Intermittent bradycardia  Duplex pending  Advance diet      Review of Systems   Constitutional: Negative for fever and chills.   Eyes: Negative for blurred vision and double vision.   Respiratory: Negative for shortness of breath.    Cardiovascular: Positive for chest pain.   Gastrointestinal: Negative for abdominal pain.   Genitourinary: Negative for dysuria.   Musculoskeletal: Negative for back pain.   Neurological: Negative for focal weakness and headaches.   Psychiatric/Behavioral: The patient is not nervous/anxious.      Hemodynamics:  Blood pressure 94/41, pulse 102, temperature 37.5 °C (99.5 °F), resp. rate 28, height 1.778 m (5' 10\"), weight 122.4 kg (269 lb 13.5 oz), SpO2 94 %.     Respiratory:    Respiration: (!) 28, Pulse Oximetry: 94 %, O2 Daily Delivery Respiratory : OxyMask  Chest Tube Group 1 (A) Left-Tube Status / Drainage: Sutured in Place;Patent;Moderate;Sanguinous, Chest Tube Group 1 (A) Left-Device: Closed Drainage System;Suction 20 cm Water  Work Of Breathing / Effort: Moderate  RUL Breath Sounds: Clear, RML Breath Sounds: Diminished, RLL Breath Sounds: Diminished, ABIGAIL Breath Sounds: Clear, LLL Breath Sounds: Diminished  Fluids:    Intake/Output Summary (Last 24 hours) at 06/03/17 0803  Last data filed at 06/03/17 0600   Gross per 24 hour   Intake   3360 ml   Output   2480 ml   Net    880 ml     Admit Weight: 117.935 kg (260 lb)  Current Weight: 122.4 kg (269 lb 13.5 oz)    Physical Exam   Constitutional: He is oriented to person, place, and time.   HENT:   Head: Normocephalic.   Eyes: Pupils are equal, round, and reactive to light.   Cardiovascular: Regular rhythm.    Pulmonary/Chest: No respiratory distress. He has no wheezes.   Abdominal: There is no tenderness.   Neurological: He is oriented to " person, place, and time.   Skin: Skin is warm and dry. He is not diaphoretic.       Medical Decision Making/Problem List:    Active Hospital Problems    Diagnosis   • Acute blood loss anemia [D62]     Priority: High     1L of blood loss reported on scene  Large paraspinous, posterior hematoma  300cc hemothorax  Trend hemoglobin  Transfuse 1U PRBC for any hemoglobin <7     • Hemothorax on left [J94.2]     Priority: High     300cc evacuated initially  24F chest tube placed  Serial CXR ordered     • Penetrating back wound [S21.239A]     Priority: High     Large left paraspinous, posterior hematoma  No active extravasation  Ancef x 24hrs     • History of pulmonary embolus (PE) [Z86.711]     Priority: Medium     With DVT  On warfarin as an outpatient  IVC filter present  Full BLE DVT Study PENDING     • Warfarin-induced coagulopathy (CMS-HCC) [T45.511A, D68.9]     Priority: Medium     INR 2 on arrival, given 1mg Factor VII and Vitamin K  Trend INR, correct as clinically warranted       Core Measures & Quality Metrics:  Labs reviewed, Medications reviewed and Radiology images reviewed  Castaneda catheter: No Castaneda                  HERBIE Score  Discussed patient condition with RN, RT and Pharmacy.  CRITICAL CARE TIME EXCLUDING PROCEDURES: 35   minutes

## 2017-06-03 NOTE — CARE PLAN
Problem: Safety  Goal: Will remain free from falls  Outcome: PROGRESSING AS EXPECTED  Safety and fall precautions in place, bed in lowest position.  Educated patient on call light system.  Personal items and call light within reach, bed alarm on.          Problem: Pain Management  Goal: Pain level will decrease to patient’s comfort goal  Outcome: PROGRESSING AS EXPECTED  Assessed patient's pain via 0-10 scale, medicated per MAR. Also helping to reposition the patient Q2hr and providing extra pillows and blankets.

## 2017-06-03 NOTE — PROGRESS NOTES
Dr. Chinchilla notified that patient is having increasingly frequent PVCs. Mag and phos ordered. EKG ordered.

## 2017-06-03 NOTE — H&P
TRAUMA HISTORY AND PHYSICAL    DATE OF SERVICE: 6/2/2017    ACTIVATION LEVEL: RED.     HISTORY OF PRESENT ILLNESS: The patient is a 77 year old male who fell out of his tractor onto a hay bale hook which penetrated his left posterior flank. The patient was able to free himself and reportedly lost 1L of blood on scene.  The patient is on coumadin for a history of DVT/PE.  His systolic blood pressure was 90.  The patient was triaged as a RED in accordance with established pre hospital protocols. An expeditious primary and secondary survey with required adjuncts was conducted. See Trauma Narrator for full details.    Upon arrival, the patient is awake and able to provide history.  His HR is 70 and  mmHg.  Trauma work up ensued.  Vascular access was an issue and a central line was placed.  During placement, he developed hypotension and nausea.  He was given a unit of PRBC from the trauma fridge and a 1L crystalloid bolus with good response.  He was given factor VII, Ancef, and tetanus in the trauma room as well.    PAST MEDICAL HISTORY: DVT/PE with IVC filter placement 2016.      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     ALLERGIES: Review of patient's allergies indicates not on file.     CURRENT MEDICATIONS:   Coumadin, patient cannot recall.  Full med rec pending.    FAMILY HISTORY: Reviewed and found to be non-contributory    SOCIAL HISTORY: Denies habitual use of alcohol.  Non-smoker.    REVIEW OF SYSTEMS:   Review of Systems:  Constitutional: Negative for fever, chills, weight loss, malaise/fatigue and diaphoresis.   HENT: Negative for hearing loss, ear pain, nosebleeds, congestion, sore throat, neck pain, tinnitus and ear discharge.    Eyes: Negative for blurred vision, double vision, photophobia, pain, discharge and redness.   Respiratory: Negative for cough, hemoptysis, sputum production, shortness of breath, wheezing and stridor.    Cardiovascular: Negative for  chest pain, palpitations, orthopnea, claudication, leg swelling and PND.   Gastrointestinal: Negative for heartburn, nausea, vomiting, abdominal pain, diarrhea, constipation, blood in stool and melena.   Genitourinary: Negative for dysuria, urgency, frequency, hematuria and flank pain.   Musculoskeletal: Negative for myalgias, back pain, joint pain and falls.   Skin: Negative for itching and rash.  Neurological: Negative for dizziness, tingling, tremors, sensory change, speech change, focal weakness, seizures, loss of consciousness, weakness and headaches.   Endo/Heme/Allergies: Negative for environmental allergies and polydipsia. Does not bruise/bleed easily.   Psychiatric/Behavioral: Negative for depression, suicidal ideas, hallucinations, memory loss and substance abuse. The patient is not nervous/anxious and does not have insomnia.      PHYSICAL EXAMINATION:     GENERAL:  Obese and elderly appearing and in no acute distress    HEENT:    · HEAD: Atraumatic, normocephalic.    · EARS: Normal pinna bilaterally.  External auditory canals are without discharge. No hemotympanum.   · EYES: Conjunctivae and sclerae are clear. Extraocular movements are full. Pupils are equal, round, and reactive to light.    · NOSE: No rhinorrhea  · THROAT: Oral mucosa is moist.    FACE: The midface and jaw are stable. No malocclusion of bite is evident on visual inspection    NECK:  Soft and supple without lymphadenopathy. No masses are noted.  Trachea is midline.     CHEST:  Lungs are clear to auscultation bilaterally. Symmetrical rise with respiration.  Left postero-inferior tenderness.  No crepitance.  No wounds, lacerations, or excoriations.    CARDIOVASCULAR:  Regular rate and rhythm.  Occasional asymptomatic bradycardia down to 40s.  No jugulo-venous distention.  Palpable pulses present in all four extremities.      ABDOMEN:  Soft, non-tender, non-distended.  Non-tympanitic.  No wounds, lacerations, or excoriations.    BACK/PELVIS:     · 2cm full thickness penetrating injury over the left lower posterior flank with softball sized hematoma, non-expanding, mild active venous oozing.    RECTAL:  Deferred    GENITOURINARY:  The patient has normal external reproductive anatomy.    EXTREMITIES:  · RIGHT ARM: Without deformities, wounds, lacerations, or excoriations.  Full passive and active range of motion without pain.  · LEFT ARM: Without deformities, wounds, lacerations, or excoriations.  Full passive and active range of motion without pain.  · RIGHT LEG: Without deformities, wounds, lacerations, or excoriations.  Full passive and active range of motion without pain.  · LEFT LEG: Without deformities, wounds, lacerations, or excoriations.  Full passive and active range of motion without pain.    NEUROLOGIC:  Ring[place] Coma Score 15. Cranial nerves II through XII are grossly intact. Motor and sensory exams are normal in all four extremities. Motor and sensory reflexes are 2+ and symmetric with bilateral plantar responses.    PSYCHIATRIC: Affect and mood is appropriate for age and condition.    LABORATORY VALUES:   Recent Labs      06/02/17   1835   WBC  14.1*   RBC  4.60*   HEMOGLOBIN  13.7*   HEMATOCRIT  42.8   MCV  93.0   MCH  29.8   MCHC  32.0*   RDW  53.1*   PLATELETCT  170   MPV  10.7     Recent Labs      06/02/17   1835   SODIUM  137   POTASSIUM  4.5   CHLORIDE  106   CO2  21   GLUCOSE  171*   BUN  22   CREATININE  0.97   CALCIUM  8.8     Recent Labs      06/02/17   1835   ASTSGOT  15   ALTSGPT  24   TBILIRUBIN  0.5   ALKPHOSPHAT  85   GLOBULIN  2.5   INR  2.00*     Recent Labs      06/02/17   1835   APTT  34.4   INR  2.00*        IMAGING:   DX-CHEST-PORTABLE (1 VIEW)   Final Result      1.  Placement of left chest tube      2.  Bilateral atelectasis      3.  Left internal jugular catheter appears appropriately located      DX-CHEST-LIMITED (1 VIEW)   Final Result      1.  No acute cardiopulmonary abnormality identified.      2.  Marked  enlargement of cardiac silhouette      DX-CHEST-LIMITED (1 VIEW)   Final Result      1.  Placement of left internal jugular catheter tip projecting over the proximal SVC      2.  Enlarged cardiac silhouette      CT-CHEST,ABDOMEN,PELVIS WITH   Final Result      1.  Posterior left 11th and 12th rib fractures with hemothorax.      2.  Left flank hematoma with fluid tracking along the inferior aspect of the spleen without obvious splenic injury.      3.  Bilateral renal cysts.      4.  Diverticulosis      This was discussed with Sameera Alexis at 7:30 PM.      DX-CHEST-PORTABLE (1 VIEW)    (Results Pending)   LE VENOUS DUPLEX    (Results Pending)       IMPRESSION AND PLAN:     Active Hospital Problems    Diagnosis   • Acute blood loss anemia [D62]     Priority: High     1L of blood loss reported on scene  Large paraspinous, posterior hematoma  300cc hemothorax  Trend hemoglobin  Transfuse 1U PRBC for any hemoglobin <7     • Hemothorax on left [J94.2]     Priority: High     300cc evacuated initially  24F chest tube placed  Serial CXR ordered     • Penetrating back wound [S21.239A]     Priority: High     Large left paraspinous, posterior hematoma  No active extravasation  Ancef x 24hrs     • History of pulmonary embolus (PE) [Z86.711]     Priority: Medium     With DVT  On warfarin as an outpatient  IVC filter present  Full BLE DVT Study PENDING     • Warfarin-induced coagulopathy (CMS-HCC) [T45.511A, D68.9]     Priority: Medium     INR 2 on arrival, given 1mg Factor VII and Vitamin K  Trend INR, correct as clinically warranted         DISPOSITION:  ICU.    CRITICAL CARE:  I provided the following critical care services: Management of penetrating chest injury with active bleeding, warfarin induced coagulopathy, and blood loss requiring transfusion of blood products with high risk for decompensation    Critical care time spent exclusive of procedures :75 minutes    ____________________________________   Dhiraj Amin MD      SHIREEN / ELDA     DD: 6/2/2017   DT: 8:15 PM

## 2017-06-03 NOTE — PROGRESS NOTES
Dr. Chinchilla paged with results of LE duplex. Updated that patient is retaining 700ml urine. Ok to place palencia catheter. Updated that pt is increasingly tachycardic 90s-110s. Continue current plan of care.

## 2017-06-03 NOTE — PROCEDURES
DATE OF OPERATION: 6/2/2017     PRE-PROCEDURE DIAGNOSES: Penetrating chest trauma with left hemothorax    POST-PROCEDURE DIAGNOSES: Same     PROCEDURE PERFORMED:   1. Left tube thoracostomy .     PERFORMED BY: Wilfred Amin M.D.      FINDINGS: Retrieval of 300cc liquid blood    PROCEDURE: The patient was properly identified and optimally positioned with the arm restrained and padded above the patient's head. Continuous hemodynamic and oxygen saturation monitoring was employed through out the procedure.  Moderate intravenous sedation was administered. The left chest wall was widely prepped and draped into a sterile field.     Local anesthetic was used to infiltrate the chest tube site.  A 3-cm transverse incision was made in the mid-axillary line and the subcutaneous tissue spread bluntly. The thoracic cavity was accessed bluntly over the rib.  A finger was placed through the tract confirming intra-thoracic entry.  A 24 Fr chest tube was directed toward the chest apex and secured to the skin with an 0-Ethibond suture.     The chest tube was connected to closed suction drainage and a sterile chest tube dressing was applied. The patient tolerated the procedure well. There were no apparent complications.  A post-procedural chest x-ray was ordered and showed improvement in the pre-procedural chest x-ray findings.  ____________________________________   Dhiraj Amin MD    DD: 6/2/2017  8:15 PM

## 2017-06-03 NOTE — PROGRESS NOTES
Pt arrived on unit. MD Amin bedside with APRN. 1 Unit of PRBC transfusing with 1 Liter of NS bolus. Pt is alert and oriented on 4 liters of O2 NC.     19:52 MD and APRN inserted a chest tube on the left side. Patient was medicated with 25mcg of fentanyl and 2mg of Versed. Pt was placed on 10L via a Mask during the procedure.  300ml of sanguinous fluid was suctioned out before the chest tube was placed and sutured into place. Pt had roughly 30sec of Sinus bradycardia during the procedure but returned to SR 60-70bpm.

## 2017-06-03 NOTE — DISCHARGE PLANNING
Trauma Response    Referral: Trauma red Response    Intervention: SW responded to trauma red.  Pt was BIB [facility] after falling on hay hook.  Pt was alert upon arrival.  Pts name is Kiran Browning (: 1940).  SW obtained the following pt information: Pt requested that MSW called his wife Emma Browning ([phone] or ) MSW left message with Emma. MSW to wait call back from wife. Per EMS, [place] and should be notifying pt's wife as well.     Plan: MSW to remain available support

## 2017-06-03 NOTE — PROCEDURES
DATE OF OPERATION: 6/2/2017    PROCEDURE: Central line placement    PERFORMED BY: Dhiraj Amin MD    DIAGNOSIS: Penetrating chest injury    INDICATIONS: Venous insufficiency    DESCRIPTION OF PROCEDURE: Full barrier precautions were used for the procedure including cap, sterile gown, sterile gloves, and sterile full body drape. Continuous hemodynamic and oxygen saturation monitoring was employed through out the procedure.  The patient was placed in the trendelenburg position and exposure was maximized with the use of a towel roll behind the patient's upper back. The patient's left anterior neck  was prepped and draped in the standard sterile fashion. Lidocaine was injected in the skin and subcutaneous tissues for anesthesia.  The ultrasound was used to locate the vascular structures. The left internal jugular vein was accessed with a needle. The modified seldinger technique was used to place a 7Fr 20cm triple lumen catheter. The ultrasound was used to verify placement of the wire within the correct vessel. The catheter was secured to the skin with silk suture.   Sterile dressings were applied, including a biopatch. The patient tolerated the procedure well.  A chest x-ray was ordered for verification and showed the tip of the catheter overlying the superior vena cava and no evidence of pneumothorax.     Dhiraj IYER / ELDA   DD: 6/2/2017  9:39 PM

## 2017-06-04 ENCOUNTER — APPOINTMENT (OUTPATIENT)
Dept: RADIOLOGY | Facility: MEDICAL CENTER | Age: 77
DRG: 907 | End: 2017-06-04
Attending: NURSE PRACTITIONER
Payer: MEDICARE

## 2017-06-04 PROBLEM — I82.539 CHRONIC DEEP VEIN THROMBOSIS (DVT) OF POPLITEAL VEIN (HCC): Status: ACTIVE | Noted: 2017-06-04

## 2017-06-04 LAB
ALBUMIN SERPL BCP-MCNC: 2.9 G/DL (ref 3.2–4.9)
ALBUMIN/GLOB SERPL: 1.3 G/DL
ALP SERPL-CCNC: 64 U/L (ref 30–99)
ALT SERPL-CCNC: 12 U/L (ref 2–50)
ANION GAP SERPL CALC-SCNC: 5 MMOL/L (ref 0–11.9)
AST SERPL-CCNC: 11 U/L (ref 12–45)
BASOPHILS # BLD AUTO: 0.3 % (ref 0–1.8)
BASOPHILS # BLD: 0.02 K/UL (ref 0–0.12)
BILIRUB SERPL-MCNC: 0.9 MG/DL (ref 0.1–1.5)
BUN SERPL-MCNC: 16 MG/DL (ref 8–22)
CALCIUM SERPL-MCNC: 8 MG/DL (ref 8.5–10.5)
CHLORIDE SERPL-SCNC: 106 MMOL/L (ref 96–112)
CO2 SERPL-SCNC: 25 MMOL/L (ref 20–33)
CREAT SERPL-MCNC: 0.73 MG/DL (ref 0.5–1.4)
EKG IMPRESSION: NORMAL
EOSINOPHIL # BLD AUTO: 0.1 K/UL (ref 0–0.51)
EOSINOPHIL NFR BLD: 1.4 % (ref 0–6.9)
ERYTHROCYTE [DISTWIDTH] IN BLOOD BY AUTOMATED COUNT: 54.2 FL (ref 35.9–50)
ERYTHROCYTE [DISTWIDTH] IN BLOOD BY AUTOMATED COUNT: 56.8 FL (ref 35.9–50)
GFR SERPL CREATININE-BSD FRML MDRD: >60 ML/MIN/1.73 M 2
GLOBULIN SER CALC-MCNC: 2.2 G/DL (ref 1.9–3.5)
GLUCOSE BLD-MCNC: 115 MG/DL (ref 65–99)
GLUCOSE SERPL-MCNC: 131 MG/DL (ref 65–99)
HCT VFR BLD AUTO: 31.4 % (ref 42–52)
HCT VFR BLD AUTO: 33.6 % (ref 42–52)
HGB BLD-MCNC: 10.1 G/DL (ref 14–18)
HGB BLD-MCNC: 10.6 G/DL (ref 14–18)
IMM GRANULOCYTES # BLD AUTO: 0.05 K/UL (ref 0–0.11)
IMM GRANULOCYTES NFR BLD AUTO: 0.7 % (ref 0–0.9)
LYMPHOCYTES # BLD AUTO: 1.05 K/UL (ref 1–4.8)
LYMPHOCYTES NFR BLD: 14.9 % (ref 22–41)
MCH RBC QN AUTO: 30.1 PG (ref 27–33)
MCH RBC QN AUTO: 30.1 PG (ref 27–33)
MCHC RBC AUTO-ENTMCNC: 31.5 G/DL (ref 33.7–35.3)
MCHC RBC AUTO-ENTMCNC: 32.2 G/DL (ref 33.7–35.3)
MCV RBC AUTO: 93.7 FL (ref 81.4–97.8)
MCV RBC AUTO: 95.5 FL (ref 81.4–97.8)
MONOCYTES # BLD AUTO: 0.93 K/UL (ref 0–0.85)
MONOCYTES NFR BLD AUTO: 13.2 % (ref 0–13.4)
NEUTROPHILS # BLD AUTO: 4.89 K/UL (ref 1.82–7.42)
NEUTROPHILS NFR BLD: 69.5 % (ref 44–72)
NRBC # BLD AUTO: 0 K/UL
NRBC BLD AUTO-RTO: 0 /100 WBC
PLATELET # BLD AUTO: 106 K/UL (ref 164–446)
PLATELET # BLD AUTO: 108 K/UL (ref 164–446)
PMV BLD AUTO: 10.5 FL (ref 9–12.9)
PMV BLD AUTO: 10.8 FL (ref 9–12.9)
POTASSIUM SERPL-SCNC: 4.4 MMOL/L (ref 3.6–5.5)
PROT SERPL-MCNC: 5.1 G/DL (ref 6–8.2)
RBC # BLD AUTO: 3.35 M/UL (ref 4.7–6.1)
RBC # BLD AUTO: 3.52 M/UL (ref 4.7–6.1)
SODIUM SERPL-SCNC: 136 MMOL/L (ref 135–145)
WBC # BLD AUTO: 6.2 K/UL (ref 4.8–10.8)
WBC # BLD AUTO: 7 K/UL (ref 4.8–10.8)

## 2017-06-04 PROCEDURE — 71010 DX-CHEST-PORTABLE (1 VIEW): CPT

## 2017-06-04 PROCEDURE — 80053 COMPREHEN METABOLIC PANEL: CPT

## 2017-06-04 PROCEDURE — 700105 HCHG RX REV CODE 258: Performed by: NURSE PRACTITIONER

## 2017-06-04 PROCEDURE — 99233 SBSQ HOSP IP/OBS HIGH 50: CPT | Performed by: SURGERY

## 2017-06-04 PROCEDURE — A9270 NON-COVERED ITEM OR SERVICE: HCPCS | Performed by: SURGERY

## 2017-06-04 PROCEDURE — 82962 GLUCOSE BLOOD TEST: CPT

## 2017-06-04 PROCEDURE — 700112 HCHG RX REV CODE 229: Performed by: NURSE PRACTITIONER

## 2017-06-04 PROCEDURE — 700102 HCHG RX REV CODE 250 W/ 637 OVERRIDE(OP): Performed by: SURGERY

## 2017-06-04 PROCEDURE — A9270 NON-COVERED ITEM OR SERVICE: HCPCS | Performed by: NURSE PRACTITIONER

## 2017-06-04 PROCEDURE — 700111 HCHG RX REV CODE 636 W/ 250 OVERRIDE (IP): Performed by: SURGERY

## 2017-06-04 PROCEDURE — 770022 HCHG ROOM/CARE - ICU (200)

## 2017-06-04 PROCEDURE — 85027 COMPLETE CBC AUTOMATED: CPT

## 2017-06-04 PROCEDURE — 700102 HCHG RX REV CODE 250 W/ 637 OVERRIDE(OP): Performed by: NURSE PRACTITIONER

## 2017-06-04 PROCEDURE — 85025 COMPLETE CBC W/AUTO DIFF WBC: CPT

## 2017-06-04 RX ADMIN — ACETAMINOPHEN 650 MG: 325 TABLET, FILM COATED ORAL at 12:34

## 2017-06-04 RX ADMIN — DOCUSATE SODIUM 100 MG: 100 CAPSULE ORAL at 07:47

## 2017-06-04 RX ADMIN — ACETAMINOPHEN 650 MG: 325 TABLET, FILM COATED ORAL at 17:10

## 2017-06-04 RX ADMIN — FINASTERIDE 5 MG: 5 TABLET, FILM COATED ORAL at 07:54

## 2017-06-04 RX ADMIN — ENOXAPARIN SODIUM 40 MG: 100 INJECTION SUBCUTANEOUS at 10:14

## 2017-06-04 RX ADMIN — MAGNESIUM HYDROXIDE 30 ML: 400 SUSPENSION ORAL at 07:47

## 2017-06-04 RX ADMIN — DOCUSATE SODIUM 100 MG: 100 CAPSULE ORAL at 20:53

## 2017-06-04 RX ADMIN — OXYCODONE HYDROCHLORIDE 5 MG: 5 TABLET ORAL at 20:57

## 2017-06-04 RX ADMIN — POLYETHYLENE GLYCOL (3350) 1 PACKET: 17 POWDER, FOR SOLUTION ORAL at 07:47

## 2017-06-04 RX ADMIN — ACETAMINOPHEN 650 MG: 325 TABLET, FILM COATED ORAL at 20:53

## 2017-06-04 RX ADMIN — SODIUM CHLORIDE, POTASSIUM CHLORIDE, SODIUM LACTATE AND CALCIUM CHLORIDE: 600; 310; 30; 20 INJECTION, SOLUTION INTRAVENOUS at 04:19

## 2017-06-04 RX ADMIN — OXYCODONE HYDROCHLORIDE 5 MG: 5 TABLET ORAL at 07:47

## 2017-06-04 RX ADMIN — OXYCODONE HYDROCHLORIDE 5 MG: 5 TABLET ORAL at 17:14

## 2017-06-04 RX ADMIN — ENOXAPARIN SODIUM 40 MG: 100 INJECTION SUBCUTANEOUS at 20:53

## 2017-06-04 RX ADMIN — SENNOSIDES AND DOCUSATE SODIUM 1 TABLET: 8.6; 5 TABLET ORAL at 20:53

## 2017-06-04 RX ADMIN — POLYETHYLENE GLYCOL (3350) 1 PACKET: 17 POWDER, FOR SOLUTION ORAL at 20:53

## 2017-06-04 RX ADMIN — ACETAMINOPHEN 650 MG: 325 TABLET, FILM COATED ORAL at 07:47

## 2017-06-04 ASSESSMENT — PAIN SCALES - GENERAL
PAINLEVEL_OUTOF10: 1
PAINLEVEL_OUTOF10: 2
PAINLEVEL_OUTOF10: 2
PAINLEVEL_OUTOF10: 4
PAINLEVEL_OUTOF10: 5
PAINLEVEL_OUTOF10: 5
PAINLEVEL_OUTOF10: 2
PAINLEVEL_OUTOF10: 3
PAINLEVEL_OUTOF10: 1
PAINLEVEL_OUTOF10: 2
PAINLEVEL_OUTOF10: 2

## 2017-06-04 ASSESSMENT — ENCOUNTER SYMPTOMS
DOUBLE VISION: 0
SHORTNESS OF BREATH: 1
FEVER: 0
CHILLS: 0
HEADACHES: 0
NAUSEA: 0
ABDOMINAL PAIN: 0
FOCAL WEAKNESS: 0

## 2017-06-04 NOTE — PROGRESS NOTES
"  Trauma/Surgical Progress Note    Author: Shahram Chinchilla Date & Time created: 6/4/2017   8:01 AM     Interval Events:  Chest wall pain  Sob off Oxygen  Mild Drop in hct.    Begin lovenox.  Full anticoagulation in am if hct stable on lovenox    Review of Systems   Constitutional: Negative for fever and chills.   Eyes: Negative for double vision.   Respiratory: Positive for shortness of breath.    Cardiovascular: Positive for chest pain.   Gastrointestinal: Negative for nausea and abdominal pain.   Neurological: Negative for focal weakness and headaches.     Hemodynamics:  Blood pressure 94/41, pulse 81, temperature 37.3 °C (99.2 °F), resp. rate 24, height 1.778 m (5' 10\"), weight 122.1 kg (269 lb 2.9 oz), SpO2 93 %.     Respiratory:    Respiration: (!) 24, Pulse Oximetry: 93 %  Chest Tube Group 1 (A) Left-Tube Status / Drainage: Moderate;Red;Patent;Draining;Sanguinous, Chest Tube Group 1 (A) Left-Device: Closed Drainage System;Suction 20 cm Water  Work Of Breathing / Effort: Mild  RUL Breath Sounds: Clear, RML Breath Sounds: Clear, RLL Breath Sounds: Diminished, ABIGAIL Breath Sounds: Clear, LLL Breath Sounds: Diminished  Fluids:    Intake/Output Summary (Last 24 hours) at 06/04/17 0801  Last data filed at 06/04/17 0800   Gross per 24 hour   Intake   3910 ml   Output   2415 ml   Net   1495 ml     Admit Weight: 117.935 kg (260 lb)  Current Weight: 122.1 kg (269 lb 2.9 oz)    Physical Exam   Constitutional: He is oriented to person, place, and time.   HENT:   Head: Normocephalic.   Eyes: Pupils are equal, round, and reactive to light.   Cardiovascular: Regular rhythm.    Pulmonary/Chest: No respiratory distress. He has no wheezes.   Abdominal: There is no tenderness.   Musculoskeletal: He exhibits edema.   Neurological: He is oriented to person, place, and time.   Skin: Skin is warm and dry. He is not diaphoretic.       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 (CMS-HCC) [I82.539]     Priority: High     Prior to admission  Old clot Left popliteal  Anticoagulate when hct stable     • Acute blood loss anemia [D62]     Priority: High     1L of blood loss reported on scene  Large paraspinous, posterior hematoma  300cc hemothorax  Trend hemoglobin  Transfuse 1U PRBC for any hemoglobin <7     • Hemothorax on left [J94.2]     Priority: High     300cc evacuated initially  24F chest tube placed  Serial CXR      • Penetrating back wound [S21.239A]     Priority: High     Large left paraspinous, posterior hematoma  No active extravasation  Ancef x 24hrs     • AV block, Mobitz 1 [I44.1]     Priority: Medium     Cardiology consult  Observation only     • History of pulmonary embolus (PE) [Z86.711]     Priority: Medium     With DVT  On warfarin as an outpatient  IVC filter present       • Warfarin-induced coagulopathy (CMS-HCC) [T45.511A, D68.9]     Priority: Medium     INR 2 on arrival, given 1mg Factor VII and Vitamin K  Trend INR, correct as clinically warranted       Core Measures & Quality Metrics:  Labs reviewed, Medications reviewed and Radiology images reviewed  Castaneda catheter: No Castaneda      DVT Prophylaxis: Enoxaparin (Lovenox)  DVT prophylaxis - mechanical: SCDs  Ulcer prophylaxis: No        HERBIE Score  Discussed patient condition with RN, RT and Pharmacy.  CRITICAL CARE TIME EXCLUDING PROCEDURES: 35    minutes

## 2017-06-04 NOTE — PROGRESS NOTES
Report received from AKUA Nettles. Assumed pt care at 2300. Pt in no distress, resting comfortably.

## 2017-06-04 NOTE — CONSULTS
Cardiology Consult Note    DOS: 6/3/2017    Consulting physician: Shahram Chinchilla    Chief complaint/Reason for consult: Bradyarrhythmia    HPI:  Patient is a 76 yo male with history of DVT on chronic anticoagulation with coumadin who fell from his tractor on to a hay bale hook penetrating his L flank and suspending him mid-air. He is s/p resuscitation with blood products and evacuation of L hemothorax on his L chest. He was reversed with Factor VII and Vitamin K. He was recovering the surgical ICU when staff noticed his rhythm on monitor was irregular. EKG was obtained showing possible AV block and cardiology asked to evaluate. He denies history of syncope. Says he did not lose consciousness falling off the tractor.     ROS (+ highlighted in red):  Constitutional: Fevers/chills/fatigue/weightloss  HEENT: Blurry vision/eye pain/sore throat/hearing loss  Respiratory: Shortness of breath/cough  Cardiovascular: Chest pain/palpitations/edema/orthopnea/syncope  GI: Nausea/vomitting/diarrhea  MSK: Arthralgias/myagias/muscle weakness  Skin: Rash/sores  Neurological: Numbness/tremors/vertigo  Endocrine: Excessive thirst/polyuria/cold intolerance/heat intolerance  Psych: Depression/anxiety    Past Medical History   Diagnosis Date   • Back pain age 30     lower back pain   • Blood clotting disorder (CMS-[facility]) current 2017     DVT and PE   • Hypertension    • Cataract      cataract and glacoma   • Glaucoma    • BPH (benign prostatic hyperplasia)    • Arthritis    • At risk for sleep apnea    • Cancer (CMS-[facility])      squamous cell carinoma left eye    • Indigestion    • Arrhythmia      unsure of time. thinks he was in hospital    • Pneumonia      2007   • Urinary incontinence      occasional incontinence        Past Surgical History   Procedure Laterality Date   • Other orthopedic surgery  greater than 10 years ago     bilat knee replacement with hardware   • Other orthopedic surgery  greater than 10 years     left ankle / foot  repair complted 2 years before knee surgery   • Other       IVC filter 2016       Social History     Social History   • Marital Status: N/A     Spouse Name: N/A   • Number of Children: N/A   • Years of Education: N/A     Occupational History   • Not on file.     Social History Main Topics   • Smoking status: Never Smoker    • Smokeless tobacco: Former User     Quit date: 06/03/1967   • Alcohol Use: No      Comment: 0   • Drug Use: No   • Sexual Activity: Not on file     Other Topics Concern   • Not on file     Social History Narrative   • No narrative on file       History reviewed. No pertinent family history.    Allergies not on file    Current Facility-Administered Medications   Medication Dose Route Frequency Provider Last Rate Last Dose   • finasteride (PROSCAR) tablet 5 mg  5 mg Oral DAILY Shahram Chinchilla M.D.   5 mg at 06/03/17 1102   • oxycodone immediate-release (ROXICODONE) tablet 5 mg  5 mg Oral Q3HRS PRN Shahram Chinchilla M.D.        Or   • oxycodone immediate release (ROXICODONE) tablet 10 mg  10 mg Oral Q3HRS PRN Shahram Chinchilla M.D.   10 mg at 06/03/17 1632   • Respiratory Care per Protocol   Nebulization Continuous RT Madison Mckay, A.P.R.N.       • docusate sodium (COLACE) capsule 100 mg  100 mg Oral BID Madison Mckay, A.P.R.N.   100 mg at 06/03/17 0750   • senna-docusate (PERICOLACE or SENOKOT S) 8.6-50 MG per tablet 1 Tab  1 Tab Oral Nightly Madison Mckay, A.P.R.N.   Stopped at 06/02/17 2100   • senna-docusate (PERICOLACE or SENOKOT S) 8.6-50 MG per tablet 1 Tab  1 Tab Oral Q24HRS PRN Madison Mckay A.P.R.N.       • polyethylene glycol/lytes (MIRALAX) PACKET 1 Packet  1 Packet Oral BID GLADIS Fisher.P.R.N.   1 Packet at 06/03/17 0750   • magnesium hydroxide (MILK OF MAGNESIA) suspension 30 mL  30 mL Oral DAILY Madison Mckay A.P.R.N.   Stopped at 06/02/17 2045   • bisacodyl (DULCOLAX) suppository 10 mg  10 mg Rectal Q24HRS PRN Madison Mckay, GLADIS.P.R.N.       • fleet enema 133 mL  1 Each Rectal  Once PRN Madison Mckay, A.P.R.N.       • LR infusion   Intravenous Continuous Madison Mckay, A.P.R.N. 75 mL/hr at 06/03/17 1005     • ondansetron (ZOFRAN) syringe/vial injection 4 mg  4 mg Intravenous Q4HRS PRN Madison Mckay, A.P.R.N.       • HYDROmorphone (DILAUDID) injection 0.5 mg  0.5 mg Intravenous Q HOUR PRN Madison Mckay, A.P.R.N.   0.5 mg at 06/03/17 0953   • acetaminophen (TYLENOL) tablet 650 mg  650 mg Oral 4X/DAY Wilfred Amin M.D.   650 mg at 06/03/17 1630       Physical Exam:  Filed Vitals:    06/03/17 1600 06/03/17 1632 06/03/17 1700 06/03/17 1800   BP:       Pulse: 88 119 113 84   Temp: 36.6 °C (97.9 °F)   36.6 °C (97.9 °F)   Resp: 10 36 20 15   Height:       Weight:       SpO2: 92% 91% 93% 96%     General appearance: Somnolent, AD, conversant   Eyes: anicteric sclerae, moist conjunctivae; no lid-lag; PERRLA  HENT: Atraumatic; oropharynx clear with moist mucous membranes and no mucosal ulcerations; normal hard and soft palate  Neck: Trachea midline; FROM, supple, no thyromegaly or lymphadenopathy  Lungs: CTA, with normal respiratory effort and no intercostal retractions, L sided chest tube in place  CV: RRR, no MRGs, no JVD   Abdomen: Soft, non-tender; no masses or HSM  Extremities: 1+ bilateral peripheral edema or extremity lymphadenopathy  Skin: Normal temperature, turgor and texture; no rash, ulcers or subcutaneous nodules  Psych: Appropriate affect, oriented to person, place and time    Data:  Labs reviewed    CXR interpreted by me:  L sided chest tube    EKG interpreted by me:   Sinus, Mobitz I AV block    Impression/Plan:  1)Mobitz I AV block  2)Intermittent bradycardia  3)Hemothorax 2/2 to penetrating trauma  4)H/o of DVT  5)Chronic anticoagulation    -I have reviewed available rhythm strips and 12 lead EKGs  -He has very apparent GA prolongation and then non conducted sinus beat with resumption of AV conduction with shorter GA consistent with classic Mobitz I block  -Typically this is  at the level of the node and can be associated with his acute illness at hand and no indication for pacing if asymptomatic  -Reportedly he was nicole overnight into the 30s for a few seconds. This was not recorded and I cannot comment on the rhythm.  -Will follow-up, if AV block does not progress, then will sign off    Cruz Vargas MD

## 2017-06-04 NOTE — CARE PLAN
Problem: Communication  Goal: The ability to communicate needs accurately and effectively will improve  Outcome: PROGRESSING AS EXPECTED  Patient able to verbalize needs and use call light.     Problem: Safety  Goal: Will remain free from injury  Outcome: PROGRESSING AS EXPECTED  Patient practices safety as educated.

## 2017-06-04 NOTE — PROGRESS NOTES
Pt care assumed. Pt up in chair, no c/o pain, VSS. Plan of care reviewed with pt. Call light with in reach, bed in lowest position, fall precautions in place. Will continue to monitor.

## 2017-06-04 NOTE — PROGRESS NOTES
STAT EKG for unusual rhythym on monitor. Possible second degree type 1 per Dr. Chinchilla who reviewed EKG and rhythym strip at bedside. Consulting cardiology. Pt is asymptomatic.

## 2017-06-05 ENCOUNTER — APPOINTMENT (OUTPATIENT)
Dept: RADIOLOGY | Facility: MEDICAL CENTER | Age: 77
DRG: 907 | End: 2017-06-05
Attending: NURSE PRACTITIONER
Payer: MEDICARE

## 2017-06-05 LAB
ALBUMIN SERPL BCP-MCNC: 2.8 G/DL (ref 3.2–4.9)
ALBUMIN/GLOB SERPL: 1.3 G/DL
ALP SERPL-CCNC: 68 U/L (ref 30–99)
ALT SERPL-CCNC: 12 U/L (ref 2–50)
ANION GAP SERPL CALC-SCNC: 3 MMOL/L (ref 0–11.9)
AST SERPL-CCNC: 12 U/L (ref 12–45)
BASOPHILS # BLD AUTO: 0.2 % (ref 0–1.8)
BASOPHILS # BLD: 0.02 K/UL (ref 0–0.12)
BILIRUB SERPL-MCNC: 0.6 MG/DL (ref 0.1–1.5)
BUN SERPL-MCNC: 14 MG/DL (ref 8–22)
CALCIUM SERPL-MCNC: 8.1 MG/DL (ref 8.5–10.5)
CHLORIDE SERPL-SCNC: 104 MMOL/L (ref 96–112)
CO2 SERPL-SCNC: 30 MMOL/L (ref 20–33)
CREAT SERPL-MCNC: 0.77 MG/DL (ref 0.5–1.4)
EOSINOPHIL # BLD AUTO: 0.1 K/UL (ref 0–0.51)
EOSINOPHIL NFR BLD: 1.2 % (ref 0–6.9)
ERYTHROCYTE [DISTWIDTH] IN BLOOD BY AUTOMATED COUNT: 53.5 FL (ref 35.9–50)
GFR SERPL CREATININE-BSD FRML MDRD: >60 ML/MIN/1.73 M 2
GLOBULIN SER CALC-MCNC: 2.2 G/DL (ref 1.9–3.5)
GLUCOSE SERPL-MCNC: 138 MG/DL (ref 65–99)
HCT VFR BLD AUTO: 31.6 % (ref 42–52)
HGB BLD-MCNC: 10.3 G/DL (ref 14–18)
IMM GRANULOCYTES # BLD AUTO: 0.05 K/UL (ref 0–0.11)
IMM GRANULOCYTES NFR BLD AUTO: 0.6 % (ref 0–0.9)
LYMPHOCYTES # BLD AUTO: 1.13 K/UL (ref 1–4.8)
LYMPHOCYTES NFR BLD: 13.3 % (ref 22–41)
MCH RBC QN AUTO: 30.6 PG (ref 27–33)
MCHC RBC AUTO-ENTMCNC: 32.6 G/DL (ref 33.7–35.3)
MCV RBC AUTO: 93.8 FL (ref 81.4–97.8)
MONOCYTES # BLD AUTO: 0.96 K/UL (ref 0–0.85)
MONOCYTES NFR BLD AUTO: 11.3 % (ref 0–13.4)
NEUTROPHILS # BLD AUTO: 6.25 K/UL (ref 1.82–7.42)
NEUTROPHILS NFR BLD: 73.4 % (ref 44–72)
NRBC # BLD AUTO: 0 K/UL
NRBC BLD AUTO-RTO: 0 /100 WBC
PLATELET # BLD AUTO: 110 K/UL (ref 164–446)
PMV BLD AUTO: 10.3 FL (ref 9–12.9)
POTASSIUM SERPL-SCNC: 4.2 MMOL/L (ref 3.6–5.5)
PROT SERPL-MCNC: 5 G/DL (ref 6–8.2)
RBC # BLD AUTO: 3.37 M/UL (ref 4.7–6.1)
SODIUM SERPL-SCNC: 137 MMOL/L (ref 135–145)
WBC # BLD AUTO: 8.5 K/UL (ref 4.8–10.8)

## 2017-06-05 PROCEDURE — 770022 HCHG ROOM/CARE - ICU (200)

## 2017-06-05 PROCEDURE — 71010 DX-CHEST-PORTABLE (1 VIEW): CPT

## 2017-06-05 PROCEDURE — A9270 NON-COVERED ITEM OR SERVICE: HCPCS | Performed by: NURSE PRACTITIONER

## 2017-06-05 PROCEDURE — 700111 HCHG RX REV CODE 636 W/ 250 OVERRIDE (IP): Performed by: SURGERY

## 2017-06-05 PROCEDURE — 80053 COMPREHEN METABOLIC PANEL: CPT

## 2017-06-05 PROCEDURE — 700102 HCHG RX REV CODE 250 W/ 637 OVERRIDE(OP): Performed by: SURGERY

## 2017-06-05 PROCEDURE — A9270 NON-COVERED ITEM OR SERVICE: HCPCS | Performed by: SURGERY

## 2017-06-05 PROCEDURE — G8996 SWALLOW CURRENT STATUS: HCPCS | Mod: CI

## 2017-06-05 PROCEDURE — 700102 HCHG RX REV CODE 250 W/ 637 OVERRIDE(OP): Performed by: NURSE PRACTITIONER

## 2017-06-05 PROCEDURE — 94667 MNPJ CHEST WALL 1ST: CPT

## 2017-06-05 PROCEDURE — 85025 COMPLETE CBC W/AUTO DIFF WBC: CPT

## 2017-06-05 PROCEDURE — G8997 SWALLOW GOAL STATUS: HCPCS | Mod: CH

## 2017-06-05 PROCEDURE — 99233 SBSQ HOSP IP/OBS HIGH 50: CPT | Performed by: SURGERY

## 2017-06-05 PROCEDURE — 94668 MNPJ CHEST WALL SBSQ: CPT

## 2017-06-05 PROCEDURE — 92610 EVALUATE SWALLOWING FUNCTION: CPT

## 2017-06-05 PROCEDURE — 700112 HCHG RX REV CODE 229: Performed by: NURSE PRACTITIONER

## 2017-06-05 RX ADMIN — OXYCODONE HYDROCHLORIDE 5 MG: 5 TABLET ORAL at 04:41

## 2017-06-05 RX ADMIN — ACETAMINOPHEN 650 MG: 325 TABLET, FILM COATED ORAL at 13:11

## 2017-06-05 RX ADMIN — DOCUSATE SODIUM 100 MG: 100 CAPSULE ORAL at 08:23

## 2017-06-05 RX ADMIN — POLYETHYLENE GLYCOL (3350) 1 PACKET: 17 POWDER, FOR SOLUTION ORAL at 20:36

## 2017-06-05 RX ADMIN — DOCUSATE SODIUM 100 MG: 100 CAPSULE ORAL at 20:36

## 2017-06-05 RX ADMIN — ACETAMINOPHEN 650 MG: 325 TABLET, FILM COATED ORAL at 08:23

## 2017-06-05 RX ADMIN — ENOXAPARIN SODIUM 40 MG: 100 INJECTION SUBCUTANEOUS at 08:23

## 2017-06-05 RX ADMIN — OXYCODONE HYDROCHLORIDE 5 MG: 5 TABLET ORAL at 14:50

## 2017-06-05 RX ADMIN — FINASTERIDE 5 MG: 5 TABLET, FILM COATED ORAL at 10:45

## 2017-06-05 RX ADMIN — OXYCODONE HYDROCHLORIDE 10 MG: 10 TABLET ORAL at 08:23

## 2017-06-05 RX ADMIN — MAGNESIUM HYDROXIDE 30 ML: 400 SUSPENSION ORAL at 08:23

## 2017-06-05 RX ADMIN — ENOXAPARIN SODIUM 100 MG: 100 INJECTION SUBCUTANEOUS at 20:36

## 2017-06-05 RX ADMIN — SENNOSIDES AND DOCUSATE SODIUM 1 TABLET: 8.6; 5 TABLET ORAL at 20:36

## 2017-06-05 RX ADMIN — ACETAMINOPHEN 650 MG: 325 TABLET, FILM COATED ORAL at 20:35

## 2017-06-05 RX ADMIN — OXYCODONE HYDROCHLORIDE 10 MG: 10 TABLET ORAL at 21:00

## 2017-06-05 RX ADMIN — POLYETHYLENE GLYCOL (3350) 1 PACKET: 17 POWDER, FOR SOLUTION ORAL at 08:23

## 2017-06-05 ASSESSMENT — ENCOUNTER SYMPTOMS
DOUBLE VISION: 0
FEVER: 0
CHILLS: 0
FOCAL WEAKNESS: 0
HEADACHES: 0
NAUSEA: 0
SHORTNESS OF BREATH: 1
ABDOMINAL PAIN: 0

## 2017-06-05 ASSESSMENT — PAIN SCALES - GENERAL
PAINLEVEL_OUTOF10: 2
PAINLEVEL_OUTOF10: 4
PAINLEVEL_OUTOF10: 2
PAINLEVEL_OUTOF10: 1
PAINLEVEL_OUTOF10: 1
PAINLEVEL_OUTOF10: 3
PAINLEVEL_OUTOF10: 0
PAINLEVEL_OUTOF10: 1
PAINLEVEL_OUTOF10: 0
PAINLEVEL_OUTOF10: 2
PAINLEVEL_OUTOF10: 0
PAINLEVEL_OUTOF10: 0
PAINLEVEL_OUTOF10: 4
PAINLEVEL_OUTOF10: 6
PAINLEVEL_OUTOF10: 0

## 2017-06-05 NOTE — PROGRESS NOTES
Pt care assumed. Pt resting in bed, no c/o pain, VSS. Assessment completed.Plan of care reviewed with pt. Call light with in reach, bed in lowest position, fall precautions in place. Will continue to monitor.

## 2017-06-05 NOTE — PROGRESS NOTES
"  Trauma/Surgical Progress Note    Author: Shahram Chinchilla Date & Time created: 6/5/2017   8:05 AM     Interval Events:  tmax 101.4  Clear, diminished left lower  High ct output  IS 1000  HCT stable , to therapeutic Lovenox       Review of Systems   Constitutional: Negative for fever and chills.   Eyes: Negative for double vision.   Respiratory: Positive for shortness of breath.    Cardiovascular: Positive for chest pain.   Gastrointestinal: Negative for nausea and abdominal pain.   Neurological: Negative for focal weakness and headaches.     Hemodynamics:  Blood pressure 94/41, pulse 100, temperature 37.1 °C (98.8 °F), resp. rate 11, height 1.778 m (5' 10\"), weight 122.8 kg (270 lb 11.6 oz), SpO2 91 %.     Respiratory:    Respiration: (!) 11, Pulse Oximetry: 91 %  Chest Tube Group 1 (A) Left-Tube Status / Drainage: Moderate;Patent;Draining;Serosanguinous;Sanguinous, Chest Tube Group 1 (A) Left-Device: Closed Drainage System;Suction 20 cm Water  Work Of Breathing / Effort: Mild  RUL Breath Sounds: Clear, RML Breath Sounds: Clear, RLL Breath Sounds: Diminished, ABIGAIL Breath Sounds: Clear, LLL Breath Sounds: Diminished  Fluids:    Intake/Output Summary (Last 24 hours) at 06/05/17 0805  Last data filed at 06/05/17 0800   Gross per 24 hour   Intake   3580 ml   Output   3350 ml   Net    230 ml     Admit Weight: 117.935 kg (260 lb)  Current Weight: 122.8 kg (270 lb 11.6 oz)    Physical Exam   Constitutional: He is oriented to person, place, and time.   HENT:   Head: Normocephalic.   Eyes: Pupils are equal, round, and reactive to light.   Cardiovascular: Regular rhythm.    Pulmonary/Chest: No respiratory distress. He has decreased breath sounds. He has no wheezes.   Abdominal: There is no tenderness.   Musculoskeletal: He exhibits edema.   Neurological: He is oriented to person, place, and time.   Skin: Skin is warm and dry. He is not diaphoretic.       Medical Decision Making/Problem List:    Active Hospital Problems    " Diagnosis   • Chronic deep vein thrombosis (DVT) of popliteal vein (CMS-HCC) [I82.539]     Priority: High     Prior to admission  Old clot Left popliteal  Anticoagulate when hct stable     • Acute blood loss anemia [D62]     Priority: High     1L of blood loss reported on scene  Large paraspinous, posterior hematoma  300cc hemothorax  Trend hemoglobin  Transfuse 1U PRBC for any hemoglobin <7     • Hemothorax on left [J94.2]     Priority: High     300cc evacuated initially  24F chest tube placed  Serial CXR      • Penetrating back wound [S21.239A]     Priority: High     Large left paraspinous, posterior hematoma  No active extravasation  Ancef x 24hrs     • AV block, Mobitz 1 [I44.1]     Priority: Medium     Cardiology consult  Observation only     • History of pulmonary embolus (PE) [Z86.711]     Priority: Medium     With DVT  On warfarin as an outpatient  IVC filter present       • Warfarin-induced coagulopathy (CMS-HCC) [T45.511A, D68.9]     Priority: Medium     INR 2 on arrival, given 1mg Factor VII and Vitamin K  Trend INR, correct as clinically warranted       Core Measures & Quality Metrics:  Labs reviewed, Medications reviewed and Radiology images reviewed  Castaneda catheter: No Castaneda      DVT Prophylaxis: Enoxaparin (Lovenox)  DVT prophylaxis - mechanical: SCDs  Ulcer prophylaxis: No        HERBIE Score  Discussed patient condition with RN, RT and Pharmacy.  CRITICAL CARE TIME EXCLUDING PROCEDURES: 35  minutes

## 2017-06-05 NOTE — CARE PLAN
Problem: Safety  Goal: Will remain free from injury  Bed controls locked and lowered. Call light within reach. Patient educated on the use of call light. Treaded sock on patient. Bed alarm on.    Problem: Knowledge Deficit  Goal: Knowledge of disease process/condition, treatment plan, diagnostic tests, and medications will improve  Patient educated on plan of care. Patient verbalizes understanding. All questions answered.

## 2017-06-05 NOTE — THERAPY
"Speech Language Therapy Clinical Swallow Evaluation completed.  Functional Status: fxnl swallow for regular diet with floated med pass; downgrade if pt experiences any difficulty, to D3/NTL.  Recommendations - Diet: Diet / Liquid Recommendation: Regular                          Strategies: No Straws                          Medication Administration: Medication Administration : Float Whole with Puree  Plan of Care: Patient with no further skilled SLP needs in the acute care setting at this time  Post-Acute Therapy: Currently anticipate no further skilled therapy needs once patient is discharged from the inpatient setting.    See \"Rehab Therapy-Acute\" Patient Summary Report for complete documentation.   "

## 2017-06-06 ENCOUNTER — APPOINTMENT (OUTPATIENT)
Dept: RADIOLOGY | Facility: MEDICAL CENTER | Age: 77
DRG: 907 | End: 2017-06-06
Attending: NURSE PRACTITIONER
Payer: MEDICARE

## 2017-06-06 PROBLEM — I50.9 CHRONIC CONGESTIVE HEART FAILURE (HCC): Status: ACTIVE | Noted: 2017-06-06

## 2017-06-06 PROBLEM — I25.10 CAD (CORONARY ARTERY DISEASE): Status: ACTIVE | Noted: 2017-06-06

## 2017-06-06 LAB
ALBUMIN SERPL BCP-MCNC: 2.6 G/DL (ref 3.2–4.9)
ALBUMIN/GLOB SERPL: 1 G/DL
ALP SERPL-CCNC: 81 U/L (ref 30–99)
ALT SERPL-CCNC: 19 U/L (ref 2–50)
ANION GAP SERPL CALC-SCNC: 4 MMOL/L (ref 0–11.9)
AST SERPL-CCNC: 21 U/L (ref 12–45)
BASOPHILS # BLD AUTO: 0.2 % (ref 0–1.8)
BASOPHILS # BLD: 0.02 K/UL (ref 0–0.12)
BILIRUB SERPL-MCNC: 0.9 MG/DL (ref 0.1–1.5)
BUN SERPL-MCNC: 14 MG/DL (ref 8–22)
CALCIUM SERPL-MCNC: 8.1 MG/DL (ref 8.5–10.5)
CHLORIDE SERPL-SCNC: 104 MMOL/L (ref 96–112)
CO2 SERPL-SCNC: 30 MMOL/L (ref 20–33)
CREAT SERPL-MCNC: 0.74 MG/DL (ref 0.5–1.4)
EOSINOPHIL # BLD AUTO: 0.05 K/UL (ref 0–0.51)
EOSINOPHIL NFR BLD: 0.6 % (ref 0–6.9)
ERYTHROCYTE [DISTWIDTH] IN BLOOD BY AUTOMATED COUNT: 53.5 FL (ref 35.9–50)
GFR SERPL CREATININE-BSD FRML MDRD: >60 ML/MIN/1.73 M 2
GLOBULIN SER CALC-MCNC: 2.6 G/DL (ref 1.9–3.5)
GLUCOSE SERPL-MCNC: 121 MG/DL (ref 65–99)
HCT VFR BLD AUTO: 32.2 % (ref 42–52)
HGB BLD-MCNC: 10.2 G/DL (ref 14–18)
IMM GRANULOCYTES # BLD AUTO: 0.04 K/UL (ref 0–0.11)
IMM GRANULOCYTES NFR BLD AUTO: 0.5 % (ref 0–0.9)
LYMPHOCYTES # BLD AUTO: 1.24 K/UL (ref 1–4.8)
LYMPHOCYTES NFR BLD: 14.7 % (ref 22–41)
MCH RBC QN AUTO: 30.2 PG (ref 27–33)
MCHC RBC AUTO-ENTMCNC: 31.7 G/DL (ref 33.7–35.3)
MCV RBC AUTO: 95.3 FL (ref 81.4–97.8)
MONOCYTES # BLD AUTO: 1.07 K/UL (ref 0–0.85)
MONOCYTES NFR BLD AUTO: 12.7 % (ref 0–13.4)
NEUTROPHILS # BLD AUTO: 5.99 K/UL (ref 1.82–7.42)
NEUTROPHILS NFR BLD: 71.3 % (ref 44–72)
NRBC # BLD AUTO: 0 K/UL
NRBC BLD AUTO-RTO: 0 /100 WBC
PLATELET # BLD AUTO: 111 K/UL (ref 164–446)
PMV BLD AUTO: 10.2 FL (ref 9–12.9)
POTASSIUM SERPL-SCNC: 4.2 MMOL/L (ref 3.6–5.5)
PROT SERPL-MCNC: 5.2 G/DL (ref 6–8.2)
RBC # BLD AUTO: 3.38 M/UL (ref 4.7–6.1)
SODIUM SERPL-SCNC: 138 MMOL/L (ref 135–145)
WBC # BLD AUTO: 8.4 K/UL (ref 4.8–10.8)

## 2017-06-06 PROCEDURE — G8988 SELF CARE GOAL STATUS: HCPCS | Mod: CI

## 2017-06-06 PROCEDURE — 700111 HCHG RX REV CODE 636 W/ 250 OVERRIDE (IP): Performed by: SURGERY

## 2017-06-06 PROCEDURE — 700102 HCHG RX REV CODE 250 W/ 637 OVERRIDE(OP): Performed by: NURSE PRACTITIONER

## 2017-06-06 PROCEDURE — A9270 NON-COVERED ITEM OR SERVICE: HCPCS | Performed by: SURGERY

## 2017-06-06 PROCEDURE — 99291 CRITICAL CARE FIRST HOUR: CPT | Performed by: SURGERY

## 2017-06-06 PROCEDURE — 80053 COMPREHEN METABOLIC PANEL: CPT

## 2017-06-06 PROCEDURE — 700102 HCHG RX REV CODE 250 W/ 637 OVERRIDE(OP): Performed by: SURGERY

## 2017-06-06 PROCEDURE — 71010 DX-CHEST-PORTABLE (1 VIEW): CPT

## 2017-06-06 PROCEDURE — A9270 NON-COVERED ITEM OR SERVICE: HCPCS | Performed by: NURSE PRACTITIONER

## 2017-06-06 PROCEDURE — 97166 OT EVAL MOD COMPLEX 45 MIN: CPT

## 2017-06-06 PROCEDURE — 85025 COMPLETE CBC W/AUTO DIFF WBC: CPT

## 2017-06-06 PROCEDURE — G8978 MOBILITY CURRENT STATUS: HCPCS | Mod: CK

## 2017-06-06 PROCEDURE — G8987 SELF CARE CURRENT STATUS: HCPCS | Mod: CK

## 2017-06-06 PROCEDURE — 97162 PT EVAL MOD COMPLEX 30 MIN: CPT

## 2017-06-06 PROCEDURE — G8979 MOBILITY GOAL STATUS: HCPCS | Mod: CI

## 2017-06-06 PROCEDURE — 770001 HCHG ROOM/CARE - MED/SURG/GYN PRIV*

## 2017-06-06 PROCEDURE — 94669 MECHANICAL CHEST WALL OSCILL: CPT

## 2017-06-06 PROCEDURE — 94668 MNPJ CHEST WALL SBSQ: CPT

## 2017-06-06 PROCEDURE — 700112 HCHG RX REV CODE 229: Performed by: NURSE PRACTITIONER

## 2017-06-06 RX ORDER — IBUPROFEN 600 MG/1
600 TABLET ORAL 3 TIMES DAILY
Status: DISCONTINUED | OUTPATIENT
Start: 2017-06-06 | End: 2017-06-08

## 2017-06-06 RX ORDER — ERYTHROMYCIN 5 MG/G
OINTMENT OPHTHALMIC DAILY
Status: DISCONTINUED | OUTPATIENT
Start: 2017-06-07 | End: 2017-06-24

## 2017-06-06 RX ORDER — POTASSIUM CHLORIDE 1.5 G/1.58G
10 POWDER, FOR SOLUTION ORAL 2 TIMES DAILY
Status: DISCONTINUED | OUTPATIENT
Start: 2017-06-06 | End: 2017-06-09

## 2017-06-06 RX ORDER — FUROSEMIDE 20 MG/1
20 TABLET ORAL DAILY
Status: DISCONTINUED | OUTPATIENT
Start: 2017-06-06 | End: 2017-06-09

## 2017-06-06 RX ORDER — POTASSIUM CHLORIDE 1.5 G/1.58G
10 POWDER, FOR SOLUTION ORAL 2 TIMES DAILY
Status: DISCONTINUED | OUTPATIENT
Start: 2017-06-06 | End: 2017-06-06

## 2017-06-06 RX ORDER — POTASSIUM CHLORIDE 20 MEQ/1
10 TABLET, EXTENDED RELEASE ORAL 2 TIMES DAILY
Status: DISCONTINUED | OUTPATIENT
Start: 2017-06-06 | End: 2017-06-09

## 2017-06-06 RX ORDER — ERYTHROMYCIN 5 MG/G
OINTMENT OPHTHALMIC EVERY 6 HOURS
Status: DISCONTINUED | OUTPATIENT
Start: 2017-06-06 | End: 2017-06-06

## 2017-06-06 RX ADMIN — SENNOSIDES AND DOCUSATE SODIUM 1 TABLET: 8.6; 5 TABLET ORAL at 22:03

## 2017-06-06 RX ADMIN — DOCUSATE SODIUM 100 MG: 100 CAPSULE ORAL at 09:04

## 2017-06-06 RX ADMIN — IBUPROFEN 600 MG: 600 TABLET, FILM COATED ORAL at 14:48

## 2017-06-06 RX ADMIN — METOPROLOL TARTRATE 25 MG: 25 TABLET, FILM COATED ORAL at 14:48

## 2017-06-06 RX ADMIN — METOPROLOL TARTRATE 25 MG: 25 TABLET, FILM COATED ORAL at 22:03

## 2017-06-06 RX ADMIN — POTASSIUM CHLORIDE 10 MEQ: 1500 TABLET, EXTENDED RELEASE ORAL at 22:02

## 2017-06-06 RX ADMIN — IBUPROFEN 600 MG: 600 TABLET, FILM COATED ORAL at 10:56

## 2017-06-06 RX ADMIN — ENOXAPARIN SODIUM 100 MG: 100 INJECTION SUBCUTANEOUS at 09:04

## 2017-06-06 RX ADMIN — IBUPROFEN 600 MG: 600 TABLET, FILM COATED ORAL at 22:02

## 2017-06-06 RX ADMIN — OXYCODONE HYDROCHLORIDE 5 MG: 5 TABLET ORAL at 01:54

## 2017-06-06 RX ADMIN — ACETAMINOPHEN 650 MG: 325 TABLET, FILM COATED ORAL at 09:04

## 2017-06-06 RX ADMIN — POLYETHYLENE GLYCOL (3350) 1 PACKET: 17 POWDER, FOR SOLUTION ORAL at 09:04

## 2017-06-06 RX ADMIN — DOCUSATE SODIUM 100 MG: 100 CAPSULE ORAL at 22:02

## 2017-06-06 RX ADMIN — FINASTERIDE 5 MG: 5 TABLET, FILM COATED ORAL at 09:04

## 2017-06-06 RX ADMIN — FUROSEMIDE 20 MG: 20 TABLET ORAL at 16:32

## 2017-06-06 RX ADMIN — POLYETHYLENE GLYCOL (3350) 1 PACKET: 17 POWDER, FOR SOLUTION ORAL at 22:03

## 2017-06-06 RX ADMIN — ENOXAPARIN SODIUM 120 MG: 150 INJECTION SUBCUTANEOUS at 22:04

## 2017-06-06 RX ADMIN — OXYCODONE HYDROCHLORIDE 10 MG: 10 TABLET ORAL at 09:37

## 2017-06-06 RX ADMIN — MAGNESIUM HYDROXIDE 30 ML: 400 SUSPENSION ORAL at 09:04

## 2017-06-06 ASSESSMENT — PAIN SCALES - GENERAL
PAINLEVEL_OUTOF10: 2
PAINLEVEL_OUTOF10: 1
PAINLEVEL_OUTOF10: 3
PAINLEVEL_OUTOF10: 0
PAINLEVEL_OUTOF10: ASSUMED PAIN PRESENT
PAINLEVEL_OUTOF10: 4
PAINLEVEL_OUTOF10: 3
PAINLEVEL_OUTOF10: 0
PAINLEVEL_OUTOF10: 3
PAINLEVEL_OUTOF10: 1
PAINLEVEL_OUTOF10: 3
PAINLEVEL_OUTOF10: 1

## 2017-06-06 ASSESSMENT — COGNITIVE AND FUNCTIONAL STATUS - GENERAL
DRESSING REGULAR LOWER BODY CLOTHING: A LOT
EATING MEALS: A LITTLE
DRESSING REGULAR UPPER BODY CLOTHING: A LOT
TOILETING: TOTAL
SUGGESTED CMS G CODE MODIFIER DAILY ACTIVITY: CL
HELP NEEDED FOR BATHING: A LOT
PERSONAL GROOMING: A LITTLE
DAILY ACTIVITIY SCORE: 13

## 2017-06-06 ASSESSMENT — ACTIVITIES OF DAILY LIVING (ADL): TOILETING: INDEPENDENT

## 2017-06-06 ASSESSMENT — GAIT ASSESSMENTS
ASSISTIVE DEVICE: FRONT WHEEL WALKER
DISTANCE (FEET): 7
GAIT LEVEL OF ASSIST: MINIMAL ASSIST

## 2017-06-06 NOTE — PROGRESS NOTES
"  Trauma/Surgical Progress Note    Author: Misha Malin Date & Time created: 6/6/2017   1:55 PM     Interval Events:  Pain control improved  Tolerating diet  Hemodynamics:  Blood pressure 94/41, pulse 101, temperature 36.9 °C (98.5 °F), resp. rate 41, height 1.778 m (5' 10\"), weight 123.5 kg (272 lb 4.3 oz), SpO2 94 %.     Respiratory:    Respiration: (!) 41, Pulse Oximetry: 94 %, O2 Daily Delivery Respiratory : Silicone Nasal Cannula  Chest Tube Group 1 (A) Left-Tube Status / Drainage: Moderate;Patent;Draining;Serosanguinous;Sanguinous, Chest Tube Group 1 (A) Left-Device: Closed Drainage System;Suction 20 cm Water  PEP/CPT Method: Positive Airway Pressure Device, Work Of Breathing / Effort: Mild  RUL Breath Sounds: Clear, RML Breath Sounds: Clear, RLL Breath Sounds: Diminished, ABIGAIL Breath Sounds: Clear, LLL Breath Sounds: Diminished  Fluids:    Intake/Output Summary (Last 24 hours) at 06/06/17 1355  Last data filed at 06/06/17 0800   Gross per 24 hour   Intake   2760 ml   Output   1595 ml   Net   1165 ml     Admit Weight: 117.935 kg (260 lb)  Current Weight: 123.5 kg (272 lb 4.3 oz)    Physical Exam   Constitutional: He is oriented to person, place, and time. He appears well-developed and well-nourished.   HENT:   Head: Normocephalic and atraumatic.   Eyes: Pupils are equal, round, and reactive to light.   Left eye scar   Neck: Normal range of motion. No thyromegaly present.   Cardiovascular: Normal rate and regular rhythm.    Pulmonary/Chest:   Left chest tube in place, serosang drainage   Abdominal: Soft. Bowel sounds are normal. He exhibits no distension.   Genitourinary: Penis normal.   Musculoskeletal: Normal range of motion. He exhibits no edema.   Neurological: He is alert and oriented to person, place, and time.   Skin: Skin is warm and dry.   Psychiatric: He has a normal mood and affect. His behavior is normal.   Nursing note and vitals reviewed.      Medical Decision Making/Problem List:    Active " Hospital Problems    Diagnosis   • Chronic deep vein thrombosis (DVT) of popliteal vein (CMS-HCC) [I82.539]     Priority: High     Present on admission, takes outpatient coumadin  Old clot in left popliteal vein  6/4 prophylactic Lovenox  6/6 Therapeutic Lovenox started  Restart coumadin on discharge     • Acute blood loss anemia [D62]     Priority: High     1L of blood loss reported on scene  Large paraspinous, posterior hematoma  300cc hemothorax  Trend hemoglobin  Transfuse 1U PRBC for any hemoglobin <7     • Hemothorax on left [J94.2]     Priority: High     300cc evacuated initially  24F chest tube placed  6/4  High output 400 cc  Serial CXR      • Penetrating back wound [S21.239A]     Priority: High     Large left paraspinous, posterior hematoma  No active extravasation  Ancef x 24hrs     • History of pulmonary embolus (PE) [Z86.711]     Priority: High     With DVT  On warfarin as an outpatient  IVC filter present       • Chronic congestive heart failure (CMS-HCC) [I50.9]     Priority: Medium     Home lasix and potassium restarted 6/6     • CAD (coronary artery disease) [I25.10]     Priority: Medium     Home metoprolol restarted 6/6     • AV block, Mobitz 1 [I44.1]     Priority: Medium     Cardiology consult  Observation only     • Warfarin-induced coagulopathy (CMS-HCC) [T45.511A, D68.9]     Priority: Medium     Coumadin for h/o PE  INR 2 on arrival, given 1mg Factor VII and Vitamin K  Trend INR, correct as clinically warranted  Restart coumadin on discharge       Core Measures & Quality Metrics:  Labs reviewed, Medications reviewed and Radiology images reviewed  Castaneda catheter: No Castaneda      DVT Prophylaxis: Enoxaparin (Lovenox)  DVT prophylaxis - mechanical: SCDs  Ulcer prophylaxis: Yes  Antibiotics: Treating active infection/contamination beyond 24 hours perioperative coverage    Plan:  Restart home lasix/potassium and metoprolol  Chest tube to water seal  Serial CXR  Monitor h/h    HERBIE Score  Discussed  patient condition with RN, RT and Pharmacy.  The patient is/remains critically ill with penetrating chest trauma while on full dose anticoagulation, acute blood loss anemia.    I provided the following critical care services: management of above.    Critical care time spent exclusive of procedures: 38 minutes.      Misha Malin MD  [phone]

## 2017-06-06 NOTE — THERAPY
"Occupational Therapy Evaluation completed.   Functional Status:  Max A x2 supine to sit.  Max A UB and LB dressing.  Min A sit to stand with bed height raised.  CGA to walk in room with FWW.  Pt left up in chair.  Plan of Care: Will benefit from Occupational Therapy 3 times per week  Discharge Recommendations:  Equipment: Will Continue to Assess for Equipment Needs. Post-acute therapy Discharge to a transitional care facility for continued skilled therapy services.    See \"Rehab Therapy-Acute\" Patient Summary Report for complete documentation.    "

## 2017-06-06 NOTE — THERAPY
"Physical Therapy Evaluation completed.   Bed Mobility:  Supine to Sit: Maximal Assist (x2 to pivot to EOB, chest tube on L prevented rolling)  Transfers: Sit to Stand: Minimal Assist (bed height raised. )  Gait: Level Of Assist: Minimal Assist with Front-Wheel Walker       Plan of Care: Will benefit from Physical Therapy 3 times per week  Discharge Recommendations: Equipment: Front-Wheel Walker. Post-acute therapy Discharge to a transitional care facility for continued skilled therapy services.    See \"Rehab Therapy-Acute\" Patient Summary Report for complete documentation.     "

## 2017-06-06 NOTE — CARE PLAN
Problem: Hyperinflation:  Goal: Prevent or improve atelectasis  PEP Q4, Pt achieving 1300 on IS

## 2017-06-06 NOTE — CARE PLAN
Problem: Venous Thromboembolism (VTW)/Deep Vein Thrombosis (DVT) Prevention:  Goal: Patient will participate in Venous Thrombosis (VTE)/Deep Vein Thrombosis (DVT)Prevention Measures  SCDs in place on lower extremities. Lovenox administered per MAR.    Problem: Pain Management  Goal: Pain level will decrease to patient’s comfort goal  0-10 pain scale used to assess patient's pain. Pharmacological interventions implemented per MAR. Non-pharmacological interventions provided.

## 2017-06-07 ENCOUNTER — APPOINTMENT (OUTPATIENT)
Dept: RADIOLOGY | Facility: MEDICAL CENTER | Age: 77
DRG: 907 | End: 2017-06-07
Attending: NURSE PRACTITIONER
Payer: MEDICARE

## 2017-06-07 LAB
ANION GAP SERPL CALC-SCNC: 5 MMOL/L (ref 0–11.9)
BASOPHILS # BLD AUTO: 0.1 % (ref 0–1.8)
BASOPHILS # BLD: 0.01 K/UL (ref 0–0.12)
BUN SERPL-MCNC: 17 MG/DL (ref 8–22)
CALCIUM SERPL-MCNC: 8.6 MG/DL (ref 8.5–10.5)
CHLORIDE SERPL-SCNC: 103 MMOL/L (ref 96–112)
CO2 SERPL-SCNC: 27 MMOL/L (ref 20–33)
CREAT SERPL-MCNC: 0.85 MG/DL (ref 0.5–1.4)
EOSINOPHIL # BLD AUTO: 0.07 K/UL (ref 0–0.51)
EOSINOPHIL NFR BLD: 0.8 % (ref 0–6.9)
ERYTHROCYTE [DISTWIDTH] IN BLOOD BY AUTOMATED COUNT: 53.5 FL (ref 35.9–50)
GFR SERPL CREATININE-BSD FRML MDRD: >60 ML/MIN/1.73 M 2
GLUCOSE SERPL-MCNC: 165 MG/DL (ref 65–99)
HCT VFR BLD AUTO: 32.8 % (ref 42–52)
HGB BLD-MCNC: 10.4 G/DL (ref 14–18)
IMM GRANULOCYTES # BLD AUTO: 0.06 K/UL (ref 0–0.11)
IMM GRANULOCYTES NFR BLD AUTO: 0.7 % (ref 0–0.9)
LYMPHOCYTES # BLD AUTO: 0.71 K/UL (ref 1–4.8)
LYMPHOCYTES NFR BLD: 8.2 % (ref 22–41)
MCH RBC QN AUTO: 30.1 PG (ref 27–33)
MCHC RBC AUTO-ENTMCNC: 31.7 G/DL (ref 33.7–35.3)
MCV RBC AUTO: 95.1 FL (ref 81.4–97.8)
MONOCYTES # BLD AUTO: 0.87 K/UL (ref 0–0.85)
MONOCYTES NFR BLD AUTO: 10 % (ref 0–13.4)
NEUTROPHILS # BLD AUTO: 6.98 K/UL (ref 1.82–7.42)
NEUTROPHILS NFR BLD: 80.2 % (ref 44–72)
NRBC # BLD AUTO: 0 K/UL
NRBC BLD AUTO-RTO: 0 /100 WBC
PLATELET # BLD AUTO: 137 K/UL (ref 164–446)
PMV BLD AUTO: 10.1 FL (ref 9–12.9)
POTASSIUM SERPL-SCNC: 4.3 MMOL/L (ref 3.6–5.5)
RBC # BLD AUTO: 3.45 M/UL (ref 4.7–6.1)
SODIUM SERPL-SCNC: 135 MMOL/L (ref 135–145)
WBC # BLD AUTO: 8.7 K/UL (ref 4.8–10.8)

## 2017-06-07 PROCEDURE — 94668 MNPJ CHEST WALL SBSQ: CPT

## 2017-06-07 PROCEDURE — 700102 HCHG RX REV CODE 250 W/ 637 OVERRIDE(OP): Performed by: SURGERY

## 2017-06-07 PROCEDURE — 92526 ORAL FUNCTION THERAPY: CPT

## 2017-06-07 PROCEDURE — G8997 SWALLOW GOAL STATUS: HCPCS | Mod: CH

## 2017-06-07 PROCEDURE — 85025 COMPLETE CBC W/AUTO DIFF WBC: CPT

## 2017-06-07 PROCEDURE — A9270 NON-COVERED ITEM OR SERVICE: HCPCS | Performed by: NURSE PRACTITIONER

## 2017-06-07 PROCEDURE — 0BPQX0Z REMOVAL OF DRAINAGE DEVICE FROM PLEURA, EXTERNAL APPROACH: ICD-10-PCS | Performed by: SURGERY

## 2017-06-07 PROCEDURE — G8998 SWALLOW D/C STATUS: HCPCS | Mod: CH

## 2017-06-07 PROCEDURE — 700112 HCHG RX REV CODE 229: Performed by: NURSE PRACTITIONER

## 2017-06-07 PROCEDURE — 80048 BASIC METABOLIC PNL TOTAL CA: CPT

## 2017-06-07 PROCEDURE — A9270 NON-COVERED ITEM OR SERVICE: HCPCS | Performed by: SURGERY

## 2017-06-07 PROCEDURE — 770001 HCHG ROOM/CARE - MED/SURG/GYN PRIV*

## 2017-06-07 PROCEDURE — 700102 HCHG RX REV CODE 250 W/ 637 OVERRIDE(OP): Performed by: NURSE PRACTITIONER

## 2017-06-07 PROCEDURE — 71010 DX-CHEST-PORTABLE (1 VIEW): CPT

## 2017-06-07 PROCEDURE — 700101 HCHG RX REV CODE 250: Performed by: SURGERY

## 2017-06-07 PROCEDURE — 700111 HCHG RX REV CODE 636 W/ 250 OVERRIDE (IP): Performed by: SURGERY

## 2017-06-07 RX ADMIN — POLYETHYLENE GLYCOL (3350) 1 PACKET: 17 POWDER, FOR SOLUTION ORAL at 09:20

## 2017-06-07 RX ADMIN — FUROSEMIDE 20 MG: 20 TABLET ORAL at 09:19

## 2017-06-07 RX ADMIN — IBUPROFEN 600 MG: 600 TABLET, FILM COATED ORAL at 21:27

## 2017-06-07 RX ADMIN — OXYCODONE HYDROCHLORIDE 5 MG: 5 TABLET ORAL at 18:22

## 2017-06-07 RX ADMIN — METOPROLOL TARTRATE 25 MG: 25 TABLET, FILM COATED ORAL at 21:27

## 2017-06-07 RX ADMIN — DOCUSATE SODIUM 100 MG: 100 CAPSULE ORAL at 09:18

## 2017-06-07 RX ADMIN — ERYTHROMYCIN: 5 OINTMENT OPHTHALMIC at 09:00

## 2017-06-07 RX ADMIN — POTASSIUM CHLORIDE 10 MEQ: 1500 TABLET, EXTENDED RELEASE ORAL at 21:26

## 2017-06-07 RX ADMIN — METOPROLOL TARTRATE 25 MG: 25 TABLET, FILM COATED ORAL at 09:20

## 2017-06-07 RX ADMIN — ENOXAPARIN SODIUM 120 MG: 150 INJECTION SUBCUTANEOUS at 09:15

## 2017-06-07 RX ADMIN — FINASTERIDE 5 MG: 5 TABLET, FILM COATED ORAL at 09:19

## 2017-06-07 RX ADMIN — ENOXAPARIN SODIUM 120 MG: 150 INJECTION SUBCUTANEOUS at 21:27

## 2017-06-07 RX ADMIN — IBUPROFEN 600 MG: 600 TABLET, FILM COATED ORAL at 09:19

## 2017-06-07 RX ADMIN — POTASSIUM CHLORIDE 10 MEQ: 1500 TABLET, EXTENDED RELEASE ORAL at 09:20

## 2017-06-07 ASSESSMENT — PAIN SCALES - GENERAL
PAINLEVEL_OUTOF10: 4
PAINLEVEL_OUTOF10: 1

## 2017-06-07 NOTE — THERAPY
"Speech Language Therapy dysphagia treatment completed.   Functional Status:  fxnl swallow and recall/demo of strategies for med pass with current regular textured diet.  Recommendations: No further SLP; goals met   Plan of Care: Patient with no further skilled SLP needs in the acute care setting at this time  Post-Acute Therapy: Currently anticipate no further skilled therapy needs once patient is discharged from the inpatient setting.    See \"Rehab Therapy-Acute\" Patient Summary Report for complete documentation.     "

## 2017-06-07 NOTE — DISCHARGE PLANNING
PT/OT recommending SNF. Speech has signed off not further needs. Pt still has chest tube. Will wait to see how pt progresses.

## 2017-06-07 NOTE — PROGRESS NOTES
Received report from AKUA Moreno and assumed care of pt at 1900. A&0x4, VSS on 2L o2 per NC, tolerating regular diet, no straws, pt ambulates with 2 person assist and FWW, PIV saline locked, denies pain, mepilex to sacrum, L chest tube to water seal, POC discussed, call light within reach, hourly rounding in place.

## 2017-06-07 NOTE — CARE PLAN
Problem: Safety  Goal: Will remain free from injury  Outcome: PROGRESSING AS EXPECTED  Continue all safety measures    Problem: Venous Thromboembolism (VTW)/Deep Vein Thrombosis (DVT) Prevention:  Goal: Patient will participate in Venous Thrombosis (VTE)/Deep Vein Thrombosis (DVT)Prevention Measures  Outcome: PROGRESSING AS EXPECTED  Continue lovenox, SCDS and out of bed as much as possible.

## 2017-06-07 NOTE — PROGRESS NOTES
Earlier helped to stand and void fior urine, then to chair for diet, he is stiff and slow to move, walker was needed for this, lungs diminished TO. Daughter at bedside asking about discharge, this was gone over with Rao.

## 2017-06-07 NOTE — PROGRESS NOTES
Pt arrived to unit from ICU at shift change. Pt denies chest pain/shortness of breath. Pt denies numbness/tingling. Pt tolerating regular diet with no c/o nausea/vomiting. Pt denies pain at this time, continue to monitor pain level and provide intervention at this time. Chest tube in place, to water seal per active order. Dressing CDI and dated. Sutures noted to L eye from previous procedure prior to hospitalization. Mepilex in place to sacrum, healed pressure ulcer noted. Dressing noted from central line removal. Castaneda removed prior to transferring, will assess for proper voiding following. Family at bedside. Pt and family oriented to room and unit. Plan of care reviewed. Bed alarm in place. Bed in lowest position. Call light within reach. Continue to monitor.

## 2017-06-07 NOTE — PROGRESS NOTES
"  Trauma/Surgical Progress Note          Interval Events:  ICU transfer yesterday  CXR stable post-water seal    -Remove left chest tube  -Follow up CXR tomorrow am  -Anticipating discharge to inpatient Rehab    Hemodynamics:  Blood pressure 116/59, pulse 80, temperature 35.9 °C (96.7 °F), resp. rate 20, height 1.778 m (5' 10\"), weight 123.5 kg (272 lb 4.3 oz), SpO2 94 %.     Respiratory:    Respiration: 20, Pulse Oximetry: 94 %, O2 Daily Delivery Respiratory : Silicone Nasal Cannula  Chest Tube Group 1 (A) Left-Tube Status / Drainage: Moderate;Patent;Draining;Serosanguinous;Sanguinous, Chest Tube Group 1 (A) Left-Device: Closed Drainage System;Suction 20 cm Water  PEP/CPT Method: Positive Airway Pressure Device, Work Of Breathing / Effort: Mild  RUL Breath Sounds: Clear, RML Breath Sounds: Clear, RLL Breath Sounds: Diminished, ABIGAIL Breath Sounds: Clear, LLL Breath Sounds: Diminished  Fluids:    Intake/Output Summary (Last 24 hours) at 06/06/17 1355  Last data filed at 06/06/17 0800   Gross per 24 hour   Intake   2760 ml   Output   1595 ml   Net   1165 ml     Admit Weight: 117.935 kg (260 lb)  Current      Physical Exam   Constitutional: He is oriented to person, place, and time. He appears well-developed and well-nourished.   HENT:   Head: Normocephalic and atraumatic.   Eyes: Pupils are equal, round, and reactive to light.   Left eye scar   Neck: Normal range of motion. No thyromegaly present.   Cardiovascular: Normal rate and regular rhythm.    Pulmonary/Chest:   Left chest tube in place, <150cc serosang drainage in the past 24hrs   Abdominal: Soft. Bowel sounds are normal. He exhibits no distension.   Genitourinary: Penis normal.   Musculoskeletal: Normal range of motion. He exhibits no edema.   Neurological: He is alert and oriented to person, place, and time.   Skin: Skin is warm and dry.   Psychiatric: He has a normal mood and affect. His behavior is normal.   Nursing note and vitals reviewed.      Medical " Decision Making/Problem List:    Active Hospital Problems    Diagnosis   • Chronic deep vein thrombosis (DVT) of popliteal vein (CMS-HCC) [I82.539]     Priority: High     Present on admission, takes outpatient coumadin  Old clot in left popliteal vein  6/4 prophylactic Lovenox  6/6 Therapeutic Lovenox started  Restart coumadin on discharge     • Acute blood loss anemia [D62]     Priority: High     1L of blood loss reported on scene  Large paraspinous, posterior hematoma  300cc hemothorax  Trend hemoglobin  Transfuse 1U PRBC for any hemoglobin <7     • Hemothorax on left [J94.2]     Priority: High     300cc evacuated initially  24F chest tube placed  6/6 - Water sealed  Serial CXR      • Penetrating back wound [S21.239A]     Priority: High     Large left paraspinous, posterior hematoma  No active extravasation  Ancef x 24hrs     • History of pulmonary embolus (PE) [Z86.711]     Priority: High     With DVT  On warfarin as an outpatient  IVC filter present       • Chronic congestive heart failure (CMS-HCC) [I50.9]     Priority: Medium     Home lasix and potassium restarted 6/6     • CAD (coronary artery disease) [I25.10]     Priority: Medium     Home metoprolol restarted 6/6     • AV block, Mobitz 1 [I44.1]     Priority: Medium     Cardiology consult  Observation only     • Warfarin-induced coagulopathy (CMS-HCC) [T45.511A, D68.9]     Priority: Medium     Coumadin for h/o PE  INR 2 on arrival, given 1mg Factor VII and Vitamin K  Trend INR, correct as clinically warranted  Restart coumadin on discharge       Core Measures & Quality Metrics:  Labs reviewed, Medications reviewed and Radiology images reviewed  Castaneda catheter: No Castaneda      DVT Prophylaxis: Enoxaparin (Lovenox)  DVT prophylaxis - mechanical: SCDs  Ulcer prophylaxis: Yes  Antibiotics: Treating active infection/contamination beyond 24 hours perioperative coverage        HERBIE Score  Dhiraj Amin MD    DATE OF SERVICE: 6/7/2017

## 2017-06-07 NOTE — DISCHARGE PLANNING
Thank you for the opportunity to assist with this patient as they transition to post acute services.  We are aware of the Rehab referral from SOLIS Valenzuela.  Dr. Armas to consult this referral. Our Transitional Case Coordinator will follow. At this time patient is showing to have Medicare as their coverage.   Please do not hesitate to call us if you require additional assistance my phone number is [phone] Christopher.

## 2017-06-07 NOTE — DISCHARGE PLANNING
PMR referral from Bianca ELY      Pulmonary debility post trauma ongoing medical management as well as therapy intervention. Anticipate post acute services IRF to increase success in retuning home with spouse support when medically cleared. Dr. Armas to consult per protocol. Thank you for the opportunity to participate in this patients care as he prepares to transitions to post acute services.

## 2017-06-08 ENCOUNTER — HOSPITAL ENCOUNTER (INPATIENT)
Facility: REHABILITATION | Age: 77
End: 2017-06-08
Attending: PHYSICAL MEDICINE & REHABILITATION | Admitting: PHYSICAL MEDICINE & REHABILITATION
Payer: MEDICARE

## 2017-06-08 ENCOUNTER — APPOINTMENT (OUTPATIENT)
Dept: RADIOLOGY | Facility: MEDICAL CENTER | Age: 77
DRG: 907 | End: 2017-06-08
Attending: NURSE PRACTITIONER
Payer: MEDICARE

## 2017-06-08 ENCOUNTER — APPOINTMENT (OUTPATIENT)
Dept: RADIOLOGY | Facility: MEDICAL CENTER | Age: 77
DRG: 907 | End: 2017-06-08
Attending: SURGERY
Payer: MEDICARE

## 2017-06-08 LAB
ALBUMIN SERPL BCP-MCNC: 2.4 G/DL (ref 3.2–4.9)
ALBUMIN SERPL BCP-MCNC: 2.5 G/DL (ref 3.2–4.9)
ALBUMIN/GLOB SERPL: 1 G/DL
ALBUMIN/GLOB SERPL: 1.1 G/DL
ALP SERPL-CCNC: 104 U/L (ref 30–99)
ALP SERPL-CCNC: 84 U/L (ref 30–99)
ALT SERPL-CCNC: 25 U/L (ref 2–50)
ALT SERPL-CCNC: 42 U/L (ref 2–50)
AMORPH CRY #/AREA URNS HPF: PRESENT /HPF
ANION GAP SERPL CALC-SCNC: 8 MMOL/L (ref 0–11.9)
ANION GAP SERPL CALC-SCNC: 8 MMOL/L (ref 0–11.9)
APPEARANCE UR: ABNORMAL
AST SERPL-CCNC: 18 U/L (ref 12–45)
AST SERPL-CCNC: 42 U/L (ref 12–45)
BASOPHILS # BLD AUTO: 0.3 % (ref 0–1.8)
BASOPHILS # BLD AUTO: 0.4 % (ref 0–1.8)
BASOPHILS # BLD: 0.04 K/UL (ref 0–0.12)
BASOPHILS # BLD: 0.04 K/UL (ref 0–0.12)
BILIRUB SERPL-MCNC: 0.6 MG/DL (ref 0.1–1.5)
BILIRUB SERPL-MCNC: 0.9 MG/DL (ref 0.1–1.5)
BILIRUB UR QL STRIP.AUTO: NEGATIVE
BNP SERPL-MCNC: 70 PG/ML (ref 0–100)
BUN SERPL-MCNC: 22 MG/DL (ref 8–22)
BUN SERPL-MCNC: 26 MG/DL (ref 8–22)
CALCIUM SERPL-MCNC: 8 MG/DL (ref 8.5–10.5)
CALCIUM SERPL-MCNC: 8.2 MG/DL (ref 8.5–10.5)
CFT BLD TEG: 5 MIN (ref 5–10)
CFT P HPASE BLD TEG: 4.8 MIN (ref 5–10)
CHLORIDE SERPL-SCNC: 102 MMOL/L (ref 96–112)
CHLORIDE SERPL-SCNC: 103 MMOL/L (ref 96–112)
CLOT ANGLE BLD TEG: 73 DEGREES (ref 53–72)
CLOT ANGLE P HPASE BLD TEG: 73.6 DEGREES (ref 53–72)
CLOT INIT P HPASE BLD TEG: 1.1 MIN (ref 1–3)
CLOT LYSIS 30M P MA LENFR BLD TEG: 0 % (ref 0–8)
CLOT LYSIS 30M P MA LENFR BLD TEG: 0 % (ref 0–8)
CO2 SERPL-SCNC: 23 MMOL/L (ref 20–33)
CO2 SERPL-SCNC: 24 MMOL/L (ref 20–33)
COLOR UR: ABNORMAL
CREAT SERPL-MCNC: 0.96 MG/DL (ref 0.5–1.4)
CREAT SERPL-MCNC: 0.98 MG/DL (ref 0.5–1.4)
CT.EXTRINSIC BLD ROTEM: 1.2 MIN (ref 1–3)
EKG IMPRESSION: NORMAL
EOSINOPHIL # BLD AUTO: 0.01 K/UL (ref 0–0.51)
EOSINOPHIL # BLD AUTO: 0.09 K/UL (ref 0–0.51)
EOSINOPHIL NFR BLD: 0.1 % (ref 0–6.9)
EOSINOPHIL NFR BLD: 0.7 % (ref 0–6.9)
ERYTHROCYTE [DISTWIDTH] IN BLOOD BY AUTOMATED COUNT: 53.9 FL (ref 35.9–50)
ERYTHROCYTE [DISTWIDTH] IN BLOOD BY AUTOMATED COUNT: 54.4 FL (ref 35.9–50)
GFR SERPL CREATININE-BSD FRML MDRD: >60 ML/MIN/1.73 M 2
GFR SERPL CREATININE-BSD FRML MDRD: >60 ML/MIN/1.73 M 2
GLOBULIN SER CALC-MCNC: 2.2 G/DL (ref 1.9–3.5)
GLOBULIN SER CALC-MCNC: 2.6 G/DL (ref 1.9–3.5)
GLUCOSE SERPL-MCNC: 129 MG/DL (ref 65–99)
GLUCOSE SERPL-MCNC: 222 MG/DL (ref 65–99)
GLUCOSE UR STRIP.AUTO-MCNC: NEGATIVE MG/DL
GRAN CASTS #/AREA URNS LPF: ABNORMAL /LPF
HCT VFR BLD AUTO: 25.7 % (ref 42–52)
HCT VFR BLD AUTO: 30 % (ref 42–52)
HGB BLD-MCNC: 8.2 G/DL (ref 14–18)
HGB BLD-MCNC: 8.7 G/DL (ref 14–18)
HGB BLD-MCNC: 9.5 G/DL (ref 14–18)
HYALINE CASTS #/AREA URNS LPF: >10 /LPF
IMM GRANULOCYTES # BLD AUTO: 0.14 K/UL (ref 0–0.11)
IMM GRANULOCYTES # BLD AUTO: 0.14 K/UL (ref 0–0.11)
IMM GRANULOCYTES NFR BLD AUTO: 1.1 % (ref 0–0.9)
IMM GRANULOCYTES NFR BLD AUTO: 1.4 % (ref 0–0.9)
KETONES UR STRIP.AUTO-MCNC: NEGATIVE MG/DL
LACTATE BLD-SCNC: 3.7 MMOL/L (ref 0.5–2)
LACTATE BLD-SCNC: 4.3 MMOL/L (ref 0.5–2)
LACTATE BLD-SCNC: 5.5 MMOL/L (ref 0.5–2)
LEUKOCYTE ESTERASE UR QL STRIP.AUTO: NEGATIVE
LV EJECT FRACT  99904: 75
LYMPHOCYTES # BLD AUTO: 1.36 K/UL (ref 1–4.8)
LYMPHOCYTES # BLD AUTO: 1.72 K/UL (ref 1–4.8)
LYMPHOCYTES NFR BLD: 14 % (ref 22–41)
LYMPHOCYTES NFR BLD: 14.1 % (ref 22–41)
MAGNESIUM SERPL-MCNC: 2.1 MG/DL (ref 1.5–2.5)
MCF BLD TEG: 72.3 MM (ref 50–70)
MCF P HPASE BLD TEG: 73.2 MM (ref 50–70)
MCH RBC QN AUTO: 30.3 PG (ref 27–33)
MCH RBC QN AUTO: 30.6 PG (ref 27–33)
MCHC RBC AUTO-ENTMCNC: 31.7 G/DL (ref 33.7–35.3)
MCHC RBC AUTO-ENTMCNC: 31.9 G/DL (ref 33.7–35.3)
MCV RBC AUTO: 95.5 FL (ref 81.4–97.8)
MCV RBC AUTO: 95.9 FL (ref 81.4–97.8)
MICRO URNS: ABNORMAL
MONOCYTES # BLD AUTO: 1.05 K/UL (ref 0–0.85)
MONOCYTES # BLD AUTO: 1.31 K/UL (ref 0–0.85)
MONOCYTES NFR BLD AUTO: 10.7 % (ref 0–13.4)
MONOCYTES NFR BLD AUTO: 10.8 % (ref 0–13.4)
MUCOUS THREADS #/AREA URNS HPF: ABNORMAL /HPF
NEUTROPHILS # BLD AUTO: 7.08 K/UL (ref 1.82–7.42)
NEUTROPHILS # BLD AUTO: 8.91 K/UL (ref 1.82–7.42)
NEUTROPHILS NFR BLD: 73.1 % (ref 44–72)
NEUTROPHILS NFR BLD: 73.3 % (ref 44–72)
NITRITE UR QL STRIP.AUTO: NEGATIVE
NRBC # BLD AUTO: 0 K/UL
NRBC # BLD AUTO: 0.02 K/UL
NRBC BLD AUTO-RTO: 0 /100 WBC
NRBC BLD AUTO-RTO: 0.2 /100 WBC
PA AA BLD-ACNC: 0 %
PA ADP BLD-ACNC: 0 %
PH UR STRIP.AUTO: 5.5 [PH]
PHOSPHATE SERPL-MCNC: 3.5 MG/DL (ref 2.5–4.5)
PLATELET # BLD AUTO: 160 K/UL (ref 164–446)
PLATELET # BLD AUTO: 212 K/UL (ref 164–446)
PMV BLD AUTO: 10.3 FL (ref 9–12.9)
PMV BLD AUTO: 10.4 FL (ref 9–12.9)
POTASSIUM SERPL-SCNC: 4.4 MMOL/L (ref 3.6–5.5)
POTASSIUM SERPL-SCNC: 4.6 MMOL/L (ref 3.6–5.5)
PROT SERPL-MCNC: 4.6 G/DL (ref 6–8.2)
PROT SERPL-MCNC: 5.1 G/DL (ref 6–8.2)
PROT UR QL STRIP: NEGATIVE MG/DL
RBC # BLD AUTO: 2.68 M/UL (ref 4.7–6.1)
RBC # BLD AUTO: 3.14 M/UL (ref 4.7–6.1)
RBC # URNS HPF: ABNORMAL /HPF
RBC UR QL AUTO: NEGATIVE
SODIUM SERPL-SCNC: 133 MMOL/L (ref 135–145)
SODIUM SERPL-SCNC: 135 MMOL/L (ref 135–145)
SP GR UR STRIP.AUTO: 1.02
TEG ALGORITHM TGALG: ABNORMAL
TROPONIN I SERPL-MCNC: 0.01 NG/ML (ref 0–0.04)
TROPONIN I SERPL-MCNC: 0.02 NG/ML (ref 0–0.04)
WBC # BLD AUTO: 12.2 K/UL (ref 4.8–10.8)
WBC # BLD AUTO: 9.7 K/UL (ref 4.8–10.8)
WBC #/AREA URNS HPF: ABNORMAL /HPF

## 2017-06-08 PROCEDURE — A9270 NON-COVERED ITEM OR SERVICE: HCPCS | Performed by: SURGERY

## 2017-06-08 PROCEDURE — 770022 HCHG ROOM/CARE - ICU (200)

## 2017-06-08 PROCEDURE — 93308 TTE F-UP OR LMTD: CPT | Mod: 26 | Performed by: INTERNAL MEDICINE

## 2017-06-08 PROCEDURE — 700102 HCHG RX REV CODE 250 W/ 637 OVERRIDE(OP): Performed by: SURGERY

## 2017-06-08 PROCEDURE — 700102 HCHG RX REV CODE 250 W/ 637 OVERRIDE(OP): Performed by: NURSE PRACTITIONER

## 2017-06-08 PROCEDURE — 80053 COMPREHEN METABOLIC PANEL: CPT

## 2017-06-08 PROCEDURE — 87040 BLOOD CULTURE FOR BACTERIA: CPT

## 2017-06-08 PROCEDURE — 83880 ASSAY OF NATRIURETIC PEPTIDE: CPT

## 2017-06-08 PROCEDURE — 85347 COAGULATION TIME ACTIVATED: CPT

## 2017-06-08 PROCEDURE — 85576 BLOOD PLATELET AGGREGATION: CPT

## 2017-06-08 PROCEDURE — 51798 US URINE CAPACITY MEASURE: CPT

## 2017-06-08 PROCEDURE — 71260 CT THORAX DX C+: CPT

## 2017-06-08 PROCEDURE — 700112 HCHG RX REV CODE 229: Performed by: NURSE PRACTITIONER

## 2017-06-08 PROCEDURE — 93321 DOPPLER ECHO F-UP/LMTD STD: CPT | Mod: 26 | Performed by: INTERNAL MEDICINE

## 2017-06-08 PROCEDURE — A9270 NON-COVERED ITEM OR SERVICE: HCPCS | Performed by: NURSE PRACTITIONER

## 2017-06-08 PROCEDURE — 85018 HEMOGLOBIN: CPT

## 2017-06-08 PROCEDURE — 93308 TTE F-UP OR LMTD: CPT

## 2017-06-08 PROCEDURE — 71010 DX-CHEST-PORTABLE (1 VIEW): CPT

## 2017-06-08 PROCEDURE — 700117 HCHG RX CONTRAST REV CODE 255: Performed by: SURGERY

## 2017-06-08 PROCEDURE — 85025 COMPLETE CBC W/AUTO DIFF WBC: CPT

## 2017-06-08 PROCEDURE — 93010 ELECTROCARDIOGRAM REPORT: CPT | Performed by: INTERNAL MEDICINE

## 2017-06-08 PROCEDURE — 81001 URINALYSIS AUTO W/SCOPE: CPT

## 2017-06-08 PROCEDURE — 700105 HCHG RX REV CODE 258: Performed by: SURGERY

## 2017-06-08 PROCEDURE — 84100 ASSAY OF PHOSPHORUS: CPT

## 2017-06-08 PROCEDURE — 83605 ASSAY OF LACTIC ACID: CPT

## 2017-06-08 PROCEDURE — 93321 DOPPLER ECHO F-UP/LMTD STD: CPT

## 2017-06-08 PROCEDURE — 85384 FIBRINOGEN ACTIVITY: CPT | Mod: 91

## 2017-06-08 PROCEDURE — 93325 DOPPLER ECHO COLOR FLOW MAPG: CPT

## 2017-06-08 PROCEDURE — 94668 MNPJ CHEST WALL SBSQ: CPT

## 2017-06-08 PROCEDURE — 94669 MECHANICAL CHEST WALL OSCILL: CPT

## 2017-06-08 PROCEDURE — 84484 ASSAY OF TROPONIN QUANT: CPT | Mod: 91

## 2017-06-08 PROCEDURE — 93005 ELECTROCARDIOGRAM TRACING: CPT | Performed by: NURSE PRACTITIONER

## 2017-06-08 PROCEDURE — 83735 ASSAY OF MAGNESIUM: CPT

## 2017-06-08 RX ORDER — SODIUM CHLORIDE, SODIUM LACTATE, POTASSIUM CHLORIDE, CALCIUM CHLORIDE 600; 310; 30; 20 MG/100ML; MG/100ML; MG/100ML; MG/100ML
1000 INJECTION, SOLUTION INTRAVENOUS ONCE
Status: COMPLETED | OUTPATIENT
Start: 2017-06-08 | End: 2017-06-08

## 2017-06-08 RX ADMIN — IOHEXOL 100 ML: 350 INJECTION, SOLUTION INTRAVENOUS at 17:50

## 2017-06-08 RX ADMIN — OXYCODONE HYDROCHLORIDE 10 MG: 10 TABLET ORAL at 03:14

## 2017-06-08 RX ADMIN — SODIUM CHLORIDE, POTASSIUM CHLORIDE, SODIUM LACTATE AND CALCIUM CHLORIDE 1000 ML: 600; 310; 30; 20 INJECTION, SOLUTION INTRAVENOUS at 13:48

## 2017-06-08 RX ADMIN — SODIUM CHLORIDE, POTASSIUM CHLORIDE, SODIUM LACTATE AND CALCIUM CHLORIDE 1000 ML: 600; 310; 30; 20 INJECTION, SOLUTION INTRAVENOUS at 17:00

## 2017-06-08 RX ADMIN — POLYETHYLENE GLYCOL (3350) 1 PACKET: 17 POWDER, FOR SOLUTION ORAL at 20:29

## 2017-06-08 RX ADMIN — ERYTHROMYCIN: 5 OINTMENT OPHTHALMIC at 12:11

## 2017-06-08 RX ADMIN — SENNOSIDES AND DOCUSATE SODIUM 1 TABLET: 8.6; 5 TABLET ORAL at 20:28

## 2017-06-08 RX ADMIN — FINASTERIDE 5 MG: 5 TABLET, FILM COATED ORAL at 12:33

## 2017-06-08 RX ADMIN — SODIUM CHLORIDE, POTASSIUM CHLORIDE, SODIUM LACTATE AND CALCIUM CHLORIDE 1000 ML: 600; 310; 30; 20 INJECTION, SOLUTION INTRAVENOUS at 10:04

## 2017-06-08 RX ADMIN — POTASSIUM CHLORIDE 10 MEQ: 1.5 POWDER, FOR SOLUTION ORAL at 20:28

## 2017-06-08 RX ADMIN — POTASSIUM CHLORIDE 10 MEQ: 1500 TABLET, EXTENDED RELEASE ORAL at 12:12

## 2017-06-08 RX ADMIN — OXYCODONE HYDROCHLORIDE 10 MG: 10 TABLET ORAL at 06:37

## 2017-06-08 RX ADMIN — DOCUSATE SODIUM 100 MG: 100 CAPSULE ORAL at 20:29

## 2017-06-08 ASSESSMENT — ENCOUNTER SYMPTOMS
PALPITATIONS: 0
HEADACHES: 0
VOMITING: 0
NAUSEA: 0
WEAKNESS: 1
SORE THROAT: 0
ABDOMINAL PAIN: 0
DIZZINESS: 0
FOCAL WEAKNESS: 0
FEVER: 0
DIARRHEA: 0
NAUSEA: 1
CHILLS: 1
SHORTNESS OF BREATH: 1
COUGH: 0
DOUBLE VISION: 0
MYALGIAS: 0
CHILLS: 0

## 2017-06-08 ASSESSMENT — PAIN SCALES - GENERAL
PAINLEVEL_OUTOF10: 0
PAINLEVEL_OUTOF10: 5
PAINLEVEL_OUTOF10: 1
PAINLEVEL_OUTOF10: 3
PAINLEVEL_OUTOF10: 0
PAINLEVEL_OUTOF10: 0
PAINLEVEL_OUTOF10: 3
PAINLEVEL_OUTOF10: 7
PAINLEVEL_OUTOF10: 3
PAINLEVEL_OUTOF10: 0

## 2017-06-08 NOTE — PROGRESS NOTES
Received report from AKUA French and resumed care of pt at 1900. A&0x4, VSS on 1L o2 per NC, tolerating regular diet, no straws, pt ambulates with 2 person assist and FWW, PIV saline locked, denies pain, mepilex to sacrum, POC discussed, call light within reach, hourly rounding in place.

## 2017-06-08 NOTE — PROGRESS NOTES
Jono from Lab called with critical result of lactic at 4.3. Critical lab result read back to Jono.   Dr. Beth notified of critical lab result at 1100.  Critical lab result read back by  4.3.

## 2017-06-08 NOTE — PROGRESS NOTES
Complains of nausea, BP 97/72, skin cool, HOB is up and breathing easy a little SOB noted, O2 at 2 liters pulse ox 90-96, P 49-70.

## 2017-06-08 NOTE — PROGRESS NOTES
CNA entered pts room to take vital signs and notice lucille under pt was blood soaked. Notified this RN. Pt has large, firm area to L flank area. Pt asymptomatic, VSS on 1L o2 per NC, A&0x4. Will monitor

## 2017-06-08 NOTE — CARE PLAN
Problem: Safety  Goal: Will remain free from injury  Outcome: PROGRESSING AS EXPECTED  Oriented pt to unit routine. Updated pt on plan of care. Call light within reach. Bed alarm on. Bed in low position with brake set.     Problem: Skin Integrity  Goal: Risk for impaired skin integrity will decrease  Outcome: PROGRESSING AS EXPECTED  Q 2hr turns in place. Assessing for breakdown, redness and blanching. Padding bony prominences with pillows.

## 2017-06-08 NOTE — PROGRESS NOTES
Jono from Lab called with critical result of lactic at 5.5. Critical lab result read back to Jono.   Dr. Beth notified of critical lab result at 1645.  Critical lab result read back by Dr. Beth. New orders recieved and implemented.

## 2017-06-08 NOTE — CONSULTS
DATE OF SERVICE:  06/08/2017    HISTORY OF PRESENT ILLNESS:  Patient is a 77-year-old male, status post   admission to [facility] on 06/02/2017 after a penetrating   chest injury from falling off a tractor resulting in a left hemothorax.    Patient required placement of a left tube thoracotomy on 06/02/2017 and was   continued to be monitored in the acute care setting.    Therapies have been initiated, at the time of this consult speech language   pathology had evaluated the patient and he was doing well and he was   tolerating regular texture diet.  Physical therapy had mobilizing him.  He was   max assist with any bed mobility.  His transfers still required min assist   and once we get him up he was min assist with a front wheel walker.    Other medical issues continue to be monitored in the acute setting include   monitoring for his anemia, cardiology continue to be followed for his   cardiovascular status, and also there was blood cultures pending to make sure   no infectious etiology was there.    Rehabilitation consulted to address his eventual rehabilitation needs.  We   will continue therapies in acute while in the ICU and anticipate transfer to   the floor.  We will continue therapies and follow up what level of rehab   patient needs.    Case was reviewed with rehab admissions.  We will continue to upgrade   therapies as tolerated and follow up on the rehab, we coordination with   discharge planning.    Thank you for allowing us to participate in this patient's care.    This is Dr. Dylan Armas in coverage for [facility].       ____________________________________     MD MARIELLE JERNIGAN / ELDA    DD:  06/08/2017 13:14:59  DT:  06/08/2017 15:37:13    D#:  [id number]  Job#:  [id number]

## 2017-06-08 NOTE — PROGRESS NOTES
0845- arrived to T422 to transport pt as higher level of care to SICU. Bedside report received from Gillian FATIMA. Pt placed on transport monitor. Pt A&OX4 at this time, stable despite tachycardia with frequent PVC. Pt transported to SICU with 2 ACLS RN'S.    0915- pt at SICU, room S113. Placed on unit monitor. Pt remains condition same as above.

## 2017-06-08 NOTE — PROGRESS NOTES
"  Trauma/Surgical Progress Note    Author: Tico Valenzuela Date & Time created: 6/8/2017   7:29 AM     Interval Events:    Not feeling well this AM   Nursing reporting bloody drainage from posterior wound overnight. No active bleeding at this time  Bradycardic and irregular to ascultation.   No pneumothorax post chest tube removal     - Stat labs pending   - EKG/Troponin pending  - Initiate remote telemetry monitoring  - Hold lovenox/NSAID and beta blocker  - Counseled     Review of Systems   Constitutional: Negative for fever and chills.   Eyes: Negative for double vision.   Respiratory: Positive for shortness of breath.    Cardiovascular: Negative for chest pain.   Gastrointestinal: Positive for nausea. Negative for abdominal pain.   Neurological: Negative for focal weakness and headaches.     Hemodynamics:  Blood pressure 119/55, pulse 77, temperature 36.4 °C (97.6 °F), resp. rate 18, height 1.778 m (5' 10\"), weight 123.5 kg (272 lb 4.3 oz), SpO2 96 %.     Respiratory:    Respiration: 18, Pulse Oximetry: 96 %, O2 Daily Delivery Respiratory : Silicone Nasal Cannula     PEP/CPT Method: Positive Airway Pressure Device, Work Of Breathing / Effort: Moderate  RUL Breath Sounds: Clear, RML Breath Sounds: Clear, RLL Breath Sounds: Diminished, ABIGAIL Breath Sounds: Clear, LLL Breath Sounds: Diminished  Fluids:    Intake/Output Summary (Last 24 hours) at 06/08/17 0729  Last data filed at 06/08/17 0400   Gross per 24 hour   Intake    690 ml   Output    550 ml   Net    140 ml     Admit Weight: 117.935 kg (260 lb)  Current      Physical Exam   Constitutional: He is oriented to person, place, and time. He appears well-developed and well-nourished.   HENT:   Head: Normocephalic and atraumatic.   Eyes: Pupils are equal, round, and reactive to light.   Left eye scar   Neck: Normal range of motion. No thyromegaly present.   Cardiovascular:   Irregular and bradycardic to ascultation   Abdominal: Soft. Bowel sounds are normal. He " exhibits no distension.   Genitourinary: Penis normal.   Musculoskeletal: Normal range of motion. He exhibits no edema.   Neurological: He is alert and oriented to person, place, and time.   Skin: Skin is warm and dry.   No drainage from posterior stab wound   Psychiatric: He has a normal mood and affect. His behavior is normal.   Nursing note and vitals reviewed.      Medical Decision Making/Problem List:    Active Hospital Problems    Diagnosis   • Chronic deep vein thrombosis (DVT) of popliteal vein (CMS-HCC) [I82.539]     Priority: High     Present on admission, takes outpatient coumadin  Old clot in left popliteal vein  6/4 prophylactic Lovenox  6/6 Therapeutic Lovenox started  Restart coumadin on discharge     • Acute blood loss anemia [D62]     Priority: High     1L of blood loss reported on scene  Large paraspinous, posterior hematoma  300cc hemothorax  Trend hemoglobin   Transfuse 1U PRBC for any hemoglobin <7     • Hemothorax on left [J94.2]     Priority: High     300cc evacuated initially  24F chest tube placed  6/6 - Water sealed  6/7 - Interval removal of chest tube  Serial CXR      • Penetrating back wound [S21.239A]     Priority: High     Large left paraspinous, posterior hematoma  No active extravasation  Ancef x 24hrs     • History of pulmonary embolus (PE) [Z86.711]     Priority: High     With DVT  On warfarin as an outpatient  IVC filter present       • Chronic congestive heart failure (CMS-HCC) [I50.9]     Priority: Medium     Home lasix and potassium restarted 6/6     • CAD (coronary artery disease) [I25.10]     Priority: Medium     Home metoprolol restarted 6/6     • AV block, Mobitz 1 [I44.1]     Priority: Medium     Cardiology consult  Observation only     • Warfarin-induced coagulopathy (CMS-HCC) [T45.511A, D68.9]     Priority: Medium     Coumadin for h/o PE  INR 2 on arrival, given 1mg Factor VII and Vitamin K  Trend INR, correct as clinically warranted  Restart coumadin on discharge        Core Measures & Quality Metrics:  Labs reviewed, Medications reviewed and Radiology images reviewed  Castaneda catheter: No Castaneda      DVT Prophylaxis: Enoxaparin (Lovenox)  DVT prophylaxis - mechanical: SCDs  Ulcer prophylaxis: Yes    Assessed for rehab: Patient was assess for and/or received rehabilitation services during this hospitalization    Total Score: 6    Discussed patient condition with RN, Patient and trauma surgery, Dr. Wilfred Amin.

## 2017-06-08 NOTE — PROGRESS NOTES
NP called regarding pt bleeding during the night, increased SOB and increased abd and back pain , orders received. BP is 95/60 pulse from 55- 102. Pt is nauseated, remains alert.

## 2017-06-08 NOTE — PROGRESS NOTES
Cardiology Progress Note               Author: Lindsey Rico Date & Time created: 6/8/2017  9:20 AM     Interval History:  This is a 77-year-old gentleman who presented with a penetrating injury to his left posterior flank from a hay bell after he fell off his tractor. Patient has a history of a DVT with IVC filter placed in 2016, no apparent cardiac history from chart.  Cardiology was originally consulted for Mobitz type I AV block, resumed back on the case today for increased PVCs, bradycardia, hypotension    6/8:  Chest tube was removed on 6/7    Pt states he is SOB        Echo at bedside  Frequent PVC on monitor      Review of Systems   Constitutional: Positive for chills and malaise/fatigue. Negative for fever.   HENT: Negative for congestion and sore throat.    Respiratory: Positive for shortness of breath. Negative for cough.    Cardiovascular: Negative for chest pain and palpitations.   Gastrointestinal: Negative for nausea, vomiting and diarrhea.   Musculoskeletal: Negative for myalgias.   Skin: Negative for rash.   Neurological: Positive for weakness. Negative for dizziness and headaches.       Physical Exam   Constitutional: He is oriented to person, place, and time. He appears well-developed and well-nourished. No distress.   HENT:   Head: Normocephalic and atraumatic.   Right Ear: External ear normal.   Left Ear: External ear normal.   Eyes: Conjunctivae are normal.   Neck: Neck supple.   Cardiovascular: Normal rate, regular rhythm and normal heart sounds.  Frequent extrasystoles are present.   Pulmonary/Chest: Breath sounds normal. Accessory muscle usage present. No respiratory distress. He has no wheezes.   Musculoskeletal: Normal range of motion. He exhibits edema (1 pitting).   Lymphadenopathy:     He has no cervical adenopathy.   Neurological: He is alert and oriented to person, place, and time.   Skin: Skin is warm. He is diaphoretic. There is pallor.   Psychiatric: He has a normal mood and  affect. His behavior is normal. Judgment and thought content normal.   Nursing note and vitals reviewed.      Hemodynamics:  Temp (24hrs), Av.3 °C (97.4 °F), Min:36.1 °C (96.9 °F), Max:36.4 °C (97.6 °F)  Temperature: 36.3 °C (97.3 °F)  Pulse  Av.2  Min: 53  Max: 124   Blood Pressure : (!) 95/60 mmHg (RN notified)     Respiratory:    Respiration: 18, Pulse Oximetry: 96 %, O2 Daily Delivery Respiratory : Silicone Nasal Cannula     PEP/CPT Method: Positive Airway Pressure Device, Work Of Breathing / Effort: Moderate  RUL Breath Sounds: Clear, RML Breath Sounds: Clear, RLL Breath Sounds: Diminished, ABIGAIL Breath Sounds: Clear, LLL Breath Sounds: Diminished  Fluids:        GI/Nutrition:  Orders Placed This Encounter   Procedures   • DIET ORDER     Standing Status: Standing      Number of Occurrences: 1      Standing Expiration Date:      Order Specific Question:  Diet:     Answer:  Regular [1]     Lab Results:  Recent Labs      17   0500  17   0727   WBC  8.4  8.7  12.2*   RBC  3.38*  3.45*  3.14*   HEMOGLOBIN  10.2*  10.4*  9.5*   HEMATOCRIT  32.2*  32.8*  30.0*   MCV  95.3  95.1  95.5   MCH  30.2  30.1  30.3   MCHC  31.7*  31.7*  31.7*   RDW  53.5*  53.5*  54.4*   PLATELETCT  111*  137*  212   MPV  10.2  10.1  10.4     Recent Labs      17   0500  17   0314  17   0726   SODIUM  138  135  135   POTASSIUM  4.2  4.3  4.4   CHLORIDE  104  103  103   CO2  30  27  24   GLUCOSE  121*  165*  222*   BUN  14  17  22   CREATININE  0.74  0.85  0.96   CALCIUM  8.1*  8.6  8.2*             Recent Labs      17   0726   TROPONINI  0.02             Medical Decision Making, by Problem:  Active Hospital Problems    Diagnosis   • Chronic deep vein thrombosis (DVT) of popliteal vein (CMS-HCC) [I82.539]   • Acute blood loss anemia [D62]   • Hemothorax on left [J94.2]   • Penetrating back wound [S21.780A]   • History of pulmonary embolus (PE) [Z86.711]   • Chronic congestive heart  failure (CMS-HCC) [I50.9]   • CAD (coronary artery disease) [I25.10]   • AV block, Mobitz 1 [I44.1]   • Warfarin-induced coagulopathy (CMS-HCC) [T45.511A, D68.9]       Plan:  PVC's:  Stat echo at bedside, EF appears in good range  No pericardial effusions noted  No AS seen on prelim    Will finish echo eval after moving pt to ICU  Noted increased WBC count and pulse up to 106 per chart,   Will repeat trop in 6 hrs  Felt more possible sepsis work up in nature then isolated cardiac event    DC BB while hypotensive  Mag 2.1    Will follow  Case and plan with Dr. Vazquez        Core Measures

## 2017-06-08 NOTE — PROGRESS NOTES
Trauma Follow-Up:    Hemoglobin down a bit, WBC 12  No documented fevers, if anything has been mildly hypothermic  Cardiology recs reviewed    -Urinalysis, Blood cultures x2  -Q6hr hemoglobins  -D/C'd Lovenox and Ibuprofen  -Teg/PM, lactic acid both pending    Dhiraj Amin MD    DATE OF SERVICE: 6/8/2017

## 2017-06-08 NOTE — PROGRESS NOTES
C/o nausea, color pale skin cool, BP 93/55 P. 54, at times 100, R.20. Daughter here and informed of condition, she will call wife.

## 2017-06-09 ENCOUNTER — APPOINTMENT (OUTPATIENT)
Dept: RADIOLOGY | Facility: MEDICAL CENTER | Age: 77
DRG: 907 | End: 2017-06-09
Attending: NURSE PRACTITIONER
Payer: MEDICARE

## 2017-06-09 LAB
ALBUMIN SERPL BCP-MCNC: 2.2 G/DL (ref 3.2–4.9)
ALBUMIN/GLOB SERPL: 1 G/DL
ALP SERPL-CCNC: 81 U/L (ref 30–99)
ALT SERPL-CCNC: 42 U/L (ref 2–50)
ANION GAP SERPL CALC-SCNC: 6 MMOL/L (ref 0–11.9)
AST SERPL-CCNC: 36 U/L (ref 12–45)
BASOPHILS # BLD AUTO: 0.2 % (ref 0–1.8)
BASOPHILS # BLD AUTO: 0.3 % (ref 0–1.8)
BASOPHILS # BLD: 0.02 K/UL (ref 0–0.12)
BASOPHILS # BLD: 0.03 K/UL (ref 0–0.12)
BILIRUB SERPL-MCNC: 0.7 MG/DL (ref 0.1–1.5)
BUN SERPL-MCNC: 27 MG/DL (ref 8–22)
CALCIUM SERPL-MCNC: 7.9 MG/DL (ref 8.5–10.5)
CHLORIDE SERPL-SCNC: 102 MMOL/L (ref 96–112)
CO2 SERPL-SCNC: 26 MMOL/L (ref 20–33)
CREAT SERPL-MCNC: 0.85 MG/DL (ref 0.5–1.4)
EOSINOPHIL # BLD AUTO: 0.01 K/UL (ref 0–0.51)
EOSINOPHIL # BLD AUTO: 0.03 K/UL (ref 0–0.51)
EOSINOPHIL NFR BLD: 0.1 % (ref 0–6.9)
EOSINOPHIL NFR BLD: 0.3 % (ref 0–6.9)
ERYTHROCYTE [DISTWIDTH] IN BLOOD BY AUTOMATED COUNT: 54.6 FL (ref 35.9–50)
ERYTHROCYTE [DISTWIDTH] IN BLOOD BY AUTOMATED COUNT: 55.7 FL (ref 35.9–50)
GFR SERPL CREATININE-BSD FRML MDRD: >60 ML/MIN/1.73 M 2
GLOBULIN SER CALC-MCNC: 2.3 G/DL (ref 1.9–3.5)
GLUCOSE SERPL-MCNC: 152 MG/DL (ref 65–99)
HCT VFR BLD AUTO: 22.6 % (ref 42–52)
HCT VFR BLD AUTO: 23.3 % (ref 42–52)
HGB BLD-MCNC: 7.2 G/DL (ref 14–18)
HGB BLD-MCNC: 7.4 G/DL (ref 14–18)
IMM GRANULOCYTES # BLD AUTO: 0.18 K/UL (ref 0–0.11)
IMM GRANULOCYTES # BLD AUTO: 0.26 K/UL (ref 0–0.11)
IMM GRANULOCYTES NFR BLD AUTO: 1.9 % (ref 0–0.9)
IMM GRANULOCYTES NFR BLD AUTO: 2.2 % (ref 0–0.9)
LACTATE BLD-SCNC: 1.8 MMOL/L (ref 0.5–2)
LYMPHOCYTES # BLD AUTO: 0.87 K/UL (ref 1–4.8)
LYMPHOCYTES # BLD AUTO: 1.04 K/UL (ref 1–4.8)
LYMPHOCYTES NFR BLD: 8.7 % (ref 22–41)
LYMPHOCYTES NFR BLD: 9.2 % (ref 22–41)
MCH RBC QN AUTO: 30.1 PG (ref 27–33)
MCH RBC QN AUTO: 30.5 PG (ref 27–33)
MCHC RBC AUTO-ENTMCNC: 31.8 G/DL (ref 33.7–35.3)
MCHC RBC AUTO-ENTMCNC: 31.9 G/DL (ref 33.7–35.3)
MCV RBC AUTO: 94.6 FL (ref 81.4–97.8)
MCV RBC AUTO: 95.9 FL (ref 81.4–97.8)
MONOCYTES # BLD AUTO: 0.82 K/UL (ref 0–0.85)
MONOCYTES # BLD AUTO: 0.96 K/UL (ref 0–0.85)
MONOCYTES NFR BLD AUTO: 10.1 % (ref 0–13.4)
MONOCYTES NFR BLD AUTO: 6.8 % (ref 0–13.4)
NEUTROPHILS # BLD AUTO: 7.41 K/UL (ref 1.82–7.42)
NEUTROPHILS # BLD AUTO: 9.85 K/UL (ref 1.82–7.42)
NEUTROPHILS NFR BLD: 78.2 % (ref 44–72)
NEUTROPHILS NFR BLD: 82 % (ref 44–72)
NRBC # BLD AUTO: 0.02 K/UL
NRBC # BLD AUTO: 0.03 K/UL
NRBC BLD AUTO-RTO: 0.2 /100 WBC
NRBC BLD AUTO-RTO: 0.3 /100 WBC
PLATELET # BLD AUTO: 165 K/UL (ref 164–446)
PLATELET # BLD AUTO: 185 K/UL (ref 164–446)
PMV BLD AUTO: 10.2 FL (ref 9–12.9)
PMV BLD AUTO: 10.2 FL (ref 9–12.9)
POTASSIUM SERPL-SCNC: 4.6 MMOL/L (ref 3.6–5.5)
PROT SERPL-MCNC: 4.5 G/DL (ref 6–8.2)
RBC # BLD AUTO: 2.39 M/UL (ref 4.7–6.1)
RBC # BLD AUTO: 2.43 M/UL (ref 4.7–6.1)
SODIUM SERPL-SCNC: 134 MMOL/L (ref 135–145)
WBC # BLD AUTO: 12 K/UL (ref 4.8–10.8)
WBC # BLD AUTO: 9.5 K/UL (ref 4.8–10.8)

## 2017-06-09 PROCEDURE — 80053 COMPREHEN METABOLIC PANEL: CPT

## 2017-06-09 PROCEDURE — A9270 NON-COVERED ITEM OR SERVICE: HCPCS | Performed by: SURGERY

## 2017-06-09 PROCEDURE — 97535 SELF CARE MNGMENT TRAINING: CPT

## 2017-06-09 PROCEDURE — 99291 CRITICAL CARE FIRST HOUR: CPT | Performed by: SURGERY

## 2017-06-09 PROCEDURE — A9270 NON-COVERED ITEM OR SERVICE: HCPCS | Performed by: NURSE PRACTITIONER

## 2017-06-09 PROCEDURE — 94668 MNPJ CHEST WALL SBSQ: CPT

## 2017-06-09 PROCEDURE — 83605 ASSAY OF LACTIC ACID: CPT

## 2017-06-09 PROCEDURE — 700102 HCHG RX REV CODE 250 W/ 637 OVERRIDE(OP): Performed by: SURGERY

## 2017-06-09 PROCEDURE — 94669 MECHANICAL CHEST WALL OSCILL: CPT

## 2017-06-09 PROCEDURE — 97110 THERAPEUTIC EXERCISES: CPT

## 2017-06-09 PROCEDURE — 99292 CRITICAL CARE ADDL 30 MIN: CPT | Performed by: SURGERY

## 2017-06-09 PROCEDURE — 700102 HCHG RX REV CODE 250 W/ 637 OVERRIDE(OP): Performed by: NURSE PRACTITIONER

## 2017-06-09 PROCEDURE — 71010 DX-CHEST-PORTABLE (1 VIEW): CPT

## 2017-06-09 PROCEDURE — 85025 COMPLETE CBC W/AUTO DIFF WBC: CPT | Mod: 91

## 2017-06-09 PROCEDURE — 97530 THERAPEUTIC ACTIVITIES: CPT

## 2017-06-09 PROCEDURE — 700112 HCHG RX REV CODE 229: Performed by: NURSE PRACTITIONER

## 2017-06-09 PROCEDURE — 770022 HCHG ROOM/CARE - ICU (200)

## 2017-06-09 RX ORDER — ACETAMINOPHEN 325 MG/1
650 TABLET ORAL EVERY 4 HOURS PRN
Status: DISCONTINUED | OUTPATIENT
Start: 2017-06-09 | End: 2017-06-21

## 2017-06-09 RX ADMIN — FUROSEMIDE 20 MG: 20 TABLET ORAL at 09:11

## 2017-06-09 RX ADMIN — POTASSIUM CHLORIDE 10 MEQ: 1500 TABLET, EXTENDED RELEASE ORAL at 09:11

## 2017-06-09 RX ADMIN — FINASTERIDE 5 MG: 5 TABLET, FILM COATED ORAL at 09:11

## 2017-06-09 RX ADMIN — SENNOSIDES AND DOCUSATE SODIUM 1 TABLET: 8.6; 5 TABLET ORAL at 20:43

## 2017-06-09 RX ADMIN — POLYETHYLENE GLYCOL (3350) 1 PACKET: 17 POWDER, FOR SOLUTION ORAL at 20:43

## 2017-06-09 RX ADMIN — DOCUSATE SODIUM 100 MG: 100 CAPSULE ORAL at 09:11

## 2017-06-09 RX ADMIN — OXYCODONE HYDROCHLORIDE 5 MG: 5 TABLET ORAL at 04:52

## 2017-06-09 RX ADMIN — OXYCODONE HYDROCHLORIDE 5 MG: 5 TABLET ORAL at 20:53

## 2017-06-09 RX ADMIN — ERYTHROMYCIN: 5 OINTMENT OPHTHALMIC at 09:11

## 2017-06-09 RX ADMIN — POLYETHYLENE GLYCOL (3350) 1 PACKET: 17 POWDER, FOR SOLUTION ORAL at 09:11

## 2017-06-09 RX ADMIN — DOCUSATE SODIUM 100 MG: 100 CAPSULE ORAL at 20:43

## 2017-06-09 RX ADMIN — MAGNESIUM HYDROXIDE 30 ML: 400 SUSPENSION ORAL at 09:11

## 2017-06-09 ASSESSMENT — COGNITIVE AND FUNCTIONAL STATUS - GENERAL
DRESSING REGULAR LOWER BODY CLOTHING: A LOT
TOILETING: TOTAL
DRESSING REGULAR UPPER BODY CLOTHING: A LOT
SUGGESTED CMS G CODE MODIFIER DAILY ACTIVITY: CL
EATING MEALS: A LITTLE
PERSONAL GROOMING: A LITTLE
DAILY ACTIVITIY SCORE: 13
HELP NEEDED FOR BATHING: A LOT

## 2017-06-09 ASSESSMENT — PAIN SCALES - GENERAL
PAINLEVEL_OUTOF10: 0
PAINLEVEL_OUTOF10: 4
PAINLEVEL_OUTOF10: 3
PAINLEVEL_OUTOF10: 0
PAINLEVEL_OUTOF10: 0
PAINLEVEL_OUTOF10: 1
PAINLEVEL_OUTOF10: 5
PAINLEVEL_OUTOF10: 0

## 2017-06-09 ASSESSMENT — LIFESTYLE VARIABLES: DO YOU DRINK ALCOHOL: NO

## 2017-06-09 NOTE — CARE PLAN
Problem: Knowledge Deficit  Goal: Knowledge of disease process/condition, treatment plan, diagnostic tests, and medications will improve  Intervention: Assess knowledge level of disease process/condition, treatment plan, diagnostic tests, and medications  Pt and family educated on POC and goals. Pt and family verbalized understanding, answered all questions, no further needs at this time, pt resting comfortably.       Problem: Skin Integrity  Goal: Risk for impaired skin integrity will decrease  Intervention: Assess risk factors for impaired skin integrity and/or pressure ulcers  Performing Erick Skin Risk assessment every shift or more to maintain or improve skin integrity. Any signs of skin breakdown are being documented per hospital policy. Utilizing barrier wipes, cream, and moisturizer when need to help prevent skin breakdown. Pt mobilizing as per MD orders.

## 2017-06-09 NOTE — PROGRESS NOTES
"  Trauma/Surgical Progress Note    Author: Misha Malin Date & Time created: 6/9/2017   1:26 PM     Interval Events:  Transferred back to ICU for pallor, hypotension, tachycardia, lactic acidosis. Crystalloid resuscitation with initial worsening of lactate, then improvement to normal. WBC normal. CT chest/abd/pelvis showed some enlargement of flank hemorrhage but nothing active or drainable.   Hemodynamics:  Blood pressure 95/60, pulse 104, temperature 38.2 °C (100.8 °F), resp. rate 36, height 1.778 m (5' 10\"), weight 126.2 kg (278 lb 3.5 oz), SpO2 97 %.     Respiratory:    Respiration: (!) 36, Pulse Oximetry: 97 %, O2 Daily Delivery Respiratory : Silicone Nasal Cannula     PEP/CPT Method: Positive Airway Pressure Device, Work Of Breathing / Effort: Moderate;Tachypnea;Increased Work of Breathing (Increased work of breathing when mobilizing)  RUL Breath Sounds: Clear, RML Breath Sounds: Clear, RLL Breath Sounds: Clear;Diminished, ABIGAIL Breath Sounds: Clear, LLL Breath Sounds: Clear;Diminished  Fluids:    Intake/Output Summary (Last 24 hours) at 06/09/17 1326  Last data filed at 06/09/17 1200   Gross per 24 hour   Intake   2700 ml   Output   1920 ml   Net    780 ml     Admit Weight: 117.935 kg (260 lb)  Current Weight: (!) 126.2 kg (278 lb 3.5 oz)    Physical Exam   Constitutional: He is oriented to person, place, and time.   Elderly appearing, NAD   HENT:   Head: Normocephalic and atraumatic.   Eyes: Pupils are equal, round, and reactive to light.   Surgical scar below left eye   Neck: Neck supple. No tracheal deviation present.   Cardiovascular:   Tachycardic, regular   Pulmonary/Chest:   Some wheezing bilaterally  Left flank ecchymosis extending down to buttock. Chest wound site without erythema, drainage, or pain on palpation.   Abdominal: Soft. He exhibits no distension. There is no tenderness.   Genitourinary: Penis normal.   Musculoskeletal: Normal range of motion. He exhibits edema.   Neurological: He is " alert and oriented to person, place, and time.   Skin: Skin is warm and dry.   Psychiatric: He has a normal mood and affect. His behavior is normal.   Nursing note and vitals reviewed.      Medical Decision Making/Problem List:    Active Hospital Problems    Diagnosis   • Chronic deep vein thrombosis (DVT) of popliteal vein (CMS-HCC) [I82.539]     Priority: High     Present on admission, takes outpatient coumadin  Old clot in left popliteal vein  6/4 prophylactic Lovenox  6/6 Therapeutic Lovenox started, stopped 6/8 upon ICU transfer  Hold any anticoagulation due to increase in flank hematoma  Will need trauma duplex studies starting 6/10 if prophylactic Lovenox not restarted     • Acute blood loss anemia [D62]     Priority: High     1L of blood loss reported on scene  Large paraspinous, posterior hematoma  300cc hemothorax  Some drop in h/h with crystalloid administration 6/8 - 6/9. Serial h/h.   Transfuse 1U PRBC for any hemoglobin <7     • Hemothorax on left [J94.2]     Priority: High     300cc evacuated initially  24F chest tube placed  6/6 - Water sealed  6/7 - Interval removal of chest tube  6/8 - No pneumothorax post chest tube removal.  Serial CXR      • Penetrating back wound [S21.239A]     Priority: High     Large left paraspinous, posterior hematoma  No active extravasation  6/8 CT chest showed slightly larger SQ fluid than on admission. No discrete drainable hematoma  Ancef x 24hrs      • History of pulmonary embolus (PE) [Z86.711]     Priority: High     With DVT  On warfarin as an outpatient  IVC filter present   Restart anticoagulation as outpatient.       • Chronic congestive heart failure (CMS-HCC) [I50.9]     Priority: Medium     Home lasix and potassium restarted 6/6  Lasix stopped 6/8 upon ICU transfer     • CAD (coronary artery disease) [I25.10]     Priority: Medium     Home metoprolol restarted 6/6      • AV block, Mobitz 1 [I44.1]     Priority: Medium     Observation only  6/8 - EKG changes  with frequent PVC's. Troponin < 0.02.  Cruz Vargas MD. Cardiology      • Warfarin-induced coagulopathy (CMS-HCC) [T45.511A, D68.9]     Priority: Medium     Coumadin for h/o PE  INR 2 on arrival, given 1mg Factor VII and Vitamin K  Trend INR, correct as clinically warranted  Restart coumadin on discharge        Core Measures & Quality Metrics:  Labs reviewed, Medications reviewed and Radiology images reviewed  Castaneda catheter: Critically Ill - Requiring Accurate Measurement of Urinary Output      DVT Prophylaxis: Contraindicated - High bleeding risk  DVT prophylaxis - mechanical: SCDs  Ulcer prophylaxis: Not indicated      Plan:  Hold Lovenox, serial h/h. Transfuse for Hb < 7.  Judicious fluid administration at this time but hold lasix for time being in setting of hypotension.  Blunt chest protocol. Aggressive pulmonary hygiene and pain management.      HERBIE Score  Discussed patient condition with RN, RT, Pharmacy and Dr. Amin.  The patient is/remains critically ill with hypotension, lactic acidosis, penetrating chest trauma.    I provided the following critical care services: management of above    Critical care time spent exclusive of procedures: 80 minutes thus far.    Misha Malin MD  [phone]

## 2017-06-09 NOTE — CARE PLAN
Problem: Skin Integrity  Goal: Risk for impaired skin integrity will decrease  Intervention: Assess risk factors for impaired skin integrity and/or pressure ulcers  Pt assessed for risk factors for impaired skin integrity and/or pressure ulcers.      Problem: Pain Management  Goal: Pain level will decrease to patient’s comfort goal  Intervention: Follow pain managment plan developed in collaboration with patient and Interdisciplinary Team  Pt assessed for pain and medications administered as needed per the MAR.

## 2017-06-09 NOTE — PROGRESS NOTES
1740- pt taken on transport monitor to CT scan with transport tech. Pt stable at this time.   1805- pt back to S113 , placed on unit monitor, without incident. Pt stable at this time.

## 2017-06-09 NOTE — THERAPY
"Occupational Therapy Treatment completed with focus on ADLs, ADL transfers and patient education.  Functional Status:  Max A ADLs and Mod A transfer, noting increased Assist with descent and KING with all tasks.  Plan of Care: Will benefit from Occupational Therapy 3 times per week  Discharge Recommendations:  Equipment Will Continue to Assess for Equipment Needs. Post-acute therapy Discharge to a transitional care facility for continued skilled therapy services.    Patient seen for OT treat focused on EOB/chair ADLs and functional transfers. Patient motivated to eat breakfast in chair and required Max A ADLs and Mod A transfer, noting increased Assist with descent and KING with all tasks. Patient verbalize will consider rehab facility prior to DC home 2/2 level of assist he current needs. Patient would benefit from continued skilled OT in this setting followed by rehab to [facility] prior to DC home with wife and services.    See \"Rehab Therapy-Acute\" Patient Summary Report for complete documentation.   "

## 2017-06-09 NOTE — CARE PLAN
Problem: Nutritional:  Goal: Achieve adequate nutritional intake  Patient will consume >50% of meal/supplements.   Outcome: NOT MET

## 2017-06-09 NOTE — HEART FAILURE PROGRAM
"Cardiovascular Nurse Navigator () Progress Note:     This patient has a history of HF. However, per Lindsey Rico's note, 6/8 the HF is chronic and not in exacerbation at this time. Therefore, a seven day HF f/u appt is not currently indicated.    Should pt develop an iatrogenic exacerbation, he will require a seven day f/u appt which can be achieved by placing a \"schedule heart failure follow up appointment\" order per protocol or by calling the hospital schedulers at 1937.     Thank you and please call with questions or concerns.  "

## 2017-06-10 ENCOUNTER — APPOINTMENT (OUTPATIENT)
Dept: RADIOLOGY | Facility: MEDICAL CENTER | Age: 77
DRG: 907 | End: 2017-06-10
Attending: NURSE PRACTITIONER
Payer: MEDICARE

## 2017-06-10 LAB
ABO GROUP BLD: NORMAL
ALBUMIN SERPL BCP-MCNC: 2.2 G/DL (ref 3.2–4.9)
ALBUMIN/GLOB SERPL: 1 G/DL
ALP SERPL-CCNC: 98 U/L (ref 30–99)
ALT SERPL-CCNC: 45 U/L (ref 2–50)
ANION GAP SERPL CALC-SCNC: 6 MMOL/L (ref 0–11.9)
AST SERPL-CCNC: 36 U/L (ref 12–45)
BARCODED ABORH UBTYP: 8400
BARCODED PRD CODE UBPRD: NORMAL
BARCODED UNIT NUM UBUNT: NORMAL
BASOPHILS # BLD AUTO: 0.3 % (ref 0–1.8)
BASOPHILS # BLD: 0.03 K/UL (ref 0–0.12)
BILIRUB SERPL-MCNC: 0.7 MG/DL (ref 0.1–1.5)
BLD GP AB SCN SERPL QL: NORMAL
BUN SERPL-MCNC: 25 MG/DL (ref 8–22)
CALCIUM SERPL-MCNC: 7.6 MG/DL (ref 8.5–10.5)
CHLORIDE SERPL-SCNC: 104 MMOL/L (ref 96–112)
CO2 SERPL-SCNC: 27 MMOL/L (ref 20–33)
COMPONENT R 8504R: NORMAL
CREAT SERPL-MCNC: 0.72 MG/DL (ref 0.5–1.4)
EKG IMPRESSION: NORMAL
EOSINOPHIL # BLD AUTO: 0.1 K/UL (ref 0–0.51)
EOSINOPHIL NFR BLD: 1 % (ref 0–6.9)
ERYTHROCYTE [DISTWIDTH] IN BLOOD BY AUTOMATED COUNT: 55.8 FL (ref 35.9–50)
GFR SERPL CREATININE-BSD FRML MDRD: >60 ML/MIN/1.73 M 2
GLOBULIN SER CALC-MCNC: 2.3 G/DL (ref 1.9–3.5)
GLUCOSE SERPL-MCNC: 152 MG/DL (ref 65–99)
HCT VFR BLD AUTO: 21.3 % (ref 42–52)
HGB BLD-MCNC: 6.6 G/DL (ref 14–18)
HGB RETIC QN AUTO: 29.1 PG/CELL (ref 29–35)
IMM GRANULOCYTES # BLD AUTO: 0.39 K/UL (ref 0–0.11)
IMM GRANULOCYTES NFR BLD AUTO: 3.9 % (ref 0–0.9)
IMM RETICS NFR: 44.6 % (ref 9.3–17.4)
IRON SATN MFR SERPL: 18 % (ref 15–55)
IRON SERPL-MCNC: 33 UG/DL (ref 50–180)
LACTATE BLD-SCNC: 1.7 MMOL/L (ref 0.5–2)
LYMPHOCYTES # BLD AUTO: 1.15 K/UL (ref 1–4.8)
LYMPHOCYTES NFR BLD: 11.4 % (ref 22–41)
MCH RBC QN AUTO: 30.3 PG (ref 27–33)
MCHC RBC AUTO-ENTMCNC: 31 G/DL (ref 33.7–35.3)
MCV RBC AUTO: 97.7 FL (ref 81.4–97.8)
MONOCYTES # BLD AUTO: 1.01 K/UL (ref 0–0.85)
MONOCYTES NFR BLD AUTO: 10 % (ref 0–13.4)
NEUTROPHILS # BLD AUTO: 7.42 K/UL (ref 1.82–7.42)
NEUTROPHILS NFR BLD: 73.4 % (ref 44–72)
NRBC # BLD AUTO: 0.04 K/UL
NRBC BLD AUTO-RTO: 0.4 /100 WBC
PLATELET # BLD AUTO: 167 K/UL (ref 164–446)
PMV BLD AUTO: 10.2 FL (ref 9–12.9)
POTASSIUM SERPL-SCNC: 4.5 MMOL/L (ref 3.6–5.5)
PRODUCT TYPE UPROD: NORMAL
PROT SERPL-MCNC: 4.5 G/DL (ref 6–8.2)
RBC # BLD AUTO: 2.18 M/UL (ref 4.7–6.1)
RETICS # AUTO: 0.17 M/UL (ref 0.04–0.06)
RETICS/RBC NFR: 7.9 % (ref 0.8–2.1)
RH BLD: NORMAL
SODIUM SERPL-SCNC: 137 MMOL/L (ref 135–145)
TIBC SERPL-MCNC: 179 UG/DL (ref 250–450)
UNIT STATUS USTAT: NORMAL
WBC # BLD AUTO: 10.1 K/UL (ref 4.8–10.8)

## 2017-06-10 PROCEDURE — 93005 ELECTROCARDIOGRAM TRACING: CPT | Performed by: SURGERY

## 2017-06-10 PROCEDURE — 86923 COMPATIBILITY TEST ELECTRIC: CPT

## 2017-06-10 PROCEDURE — A9270 NON-COVERED ITEM OR SERVICE: HCPCS | Performed by: NURSE PRACTITIONER

## 2017-06-10 PROCEDURE — 71010 DX-CHEST-PORTABLE (1 VIEW): CPT

## 2017-06-10 PROCEDURE — 700102 HCHG RX REV CODE 250 W/ 637 OVERRIDE(OP): Performed by: SURGERY

## 2017-06-10 PROCEDURE — 36430 TRANSFUSION BLD/BLD COMPNT: CPT

## 2017-06-10 PROCEDURE — 700102 HCHG RX REV CODE 250 W/ 637 OVERRIDE(OP): Performed by: NURSE PRACTITIONER

## 2017-06-10 PROCEDURE — A9270 NON-COVERED ITEM OR SERVICE: HCPCS | Performed by: SURGERY

## 2017-06-10 PROCEDURE — 770022 HCHG ROOM/CARE - ICU (200)

## 2017-06-10 PROCEDURE — 93010 ELECTROCARDIOGRAM REPORT: CPT | Performed by: INTERNAL MEDICINE

## 2017-06-10 PROCEDURE — 86901 BLOOD TYPING SEROLOGIC RH(D): CPT

## 2017-06-10 PROCEDURE — 99233 SBSQ HOSP IP/OBS HIGH 50: CPT | Performed by: SURGERY

## 2017-06-10 PROCEDURE — 700112 HCHG RX REV CODE 229: Performed by: NURSE PRACTITIONER

## 2017-06-10 PROCEDURE — 700105 HCHG RX REV CODE 258: Performed by: SURGERY

## 2017-06-10 PROCEDURE — P9016 RBC LEUKOCYTES REDUCED: HCPCS

## 2017-06-10 PROCEDURE — 83550 IRON BINDING TEST: CPT

## 2017-06-10 PROCEDURE — 700111 HCHG RX REV CODE 636 W/ 250 OVERRIDE (IP): Performed by: SURGERY

## 2017-06-10 PROCEDURE — 86850 RBC ANTIBODY SCREEN: CPT

## 2017-06-10 PROCEDURE — 86900 BLOOD TYPING SEROLOGIC ABO: CPT

## 2017-06-10 PROCEDURE — 85046 RETICYTE/HGB CONCENTRATE: CPT

## 2017-06-10 PROCEDURE — 83540 ASSAY OF IRON: CPT

## 2017-06-10 PROCEDURE — 83605 ASSAY OF LACTIC ACID: CPT

## 2017-06-10 PROCEDURE — 85025 COMPLETE CBC W/AUTO DIFF WBC: CPT

## 2017-06-10 PROCEDURE — 80053 COMPREHEN METABOLIC PANEL: CPT

## 2017-06-10 RX ORDER — ACETAMINOPHEN 325 MG/1
650 TABLET ORAL ONCE
Status: COMPLETED | OUTPATIENT
Start: 2017-06-10 | End: 2017-06-10

## 2017-06-10 RX ORDER — DIPHENHYDRAMINE HCL 25 MG
25 TABLET ORAL ONCE
Status: COMPLETED | OUTPATIENT
Start: 2017-06-10 | End: 2017-06-10

## 2017-06-10 RX ORDER — DIPHENHYDRAMINE HYDROCHLORIDE 50 MG/ML
25 INJECTION INTRAMUSCULAR; INTRAVENOUS ONCE
Status: COMPLETED | OUTPATIENT
Start: 2017-06-10 | End: 2017-06-10

## 2017-06-10 RX ADMIN — POLYETHYLENE GLYCOL (3350) 1 PACKET: 17 POWDER, FOR SOLUTION ORAL at 08:48

## 2017-06-10 RX ADMIN — MAGNESIUM HYDROXIDE 30 ML: 400 SUSPENSION ORAL at 08:49

## 2017-06-10 RX ADMIN — IRON DEXTRAN 25 MG: 50 INJECTION INTRAMUSCULAR; INTRAVENOUS at 12:03

## 2017-06-10 RX ADMIN — DIPHENHYDRAMINE HCL 25 MG: 25 TABLET ORAL at 11:36

## 2017-06-10 RX ADMIN — DOCUSATE SODIUM 100 MG: 100 CAPSULE ORAL at 08:49

## 2017-06-10 RX ADMIN — ACETAMINOPHEN 650 MG: 325 TABLET, FILM COATED ORAL at 11:36

## 2017-06-10 RX ADMIN — IRON DEXTRAN 2075 MG: 50 INJECTION INTRAMUSCULAR; INTRAVENOUS at 14:48

## 2017-06-10 RX ADMIN — ERYTHROMYCIN: 5 OINTMENT OPHTHALMIC at 08:49

## 2017-06-10 RX ADMIN — FINASTERIDE 5 MG: 5 TABLET, FILM COATED ORAL at 08:49

## 2017-06-10 ASSESSMENT — PAIN SCALES - GENERAL
PAINLEVEL_OUTOF10: 0

## 2017-06-10 ASSESSMENT — ENCOUNTER SYMPTOMS
CHILLS: 0
HEADACHES: 0
DOUBLE VISION: 0
ABDOMINAL PAIN: 0
FOCAL WEAKNESS: 0
FEVER: 0
BACK PAIN: 1

## 2017-06-10 ASSESSMENT — LIFESTYLE VARIABLES: DO YOU DRINK ALCOHOL: NO

## 2017-06-10 NOTE — CARE PLAN
Problem: Skin Integrity  Goal: Risk for impaired skin integrity will decrease  Outcome: PROGRESSING AS EXPECTED  Q2h turns in place. Full skin check performed every shift and pressure points assessed throughout shift.    Problem: Respiratory:  Goal: Respiratory status will improve  Outcome: PROGRESSING AS EXPECTED  Continuous pulse ox in place. Pt encouraged to use IS and cough deep breathe - taught to splint with pillow for chest discomfort.

## 2017-06-10 NOTE — CARE PLAN
Problem: Bowel/Gastric:  Goal: Normal bowel function is maintained or improved  Intervention: Educate patient and significant other/support system about diet, fluid intake, medications and activity to promote bowel function  Patient tolerating diet and is taking oral fluids      Intervention: Educate patient and significant other/support system about signs and symptoms of constipation and interventions to implement  Patient had large loose stool this am and states he feels better now. Abdomen still distended and firm in the upper quadrants.

## 2017-06-10 NOTE — FLOWSHEET NOTE
IS     06/10/17 0734   Events/Summary/Plan   Events/Summary/Plan IS   Interdisciplinary Plan of Care-Goals (Indications)   Blunt Chest Indications Chest Trauma   Hyperinflation Protocol Indications Chest Trauma (follow Blunt Chest Protocol)   Incentive Spirometry Group   Breathing Exercises Yes   Incentive Spirometer Volume 1750 mL   Incentive Spirometer Date Last Changed 06/05/17   Incentive Spirometer Next Change Date (Q 30 Days) 07/05/17   Chest Exam   Work Of Breathing / Effort Mild   Respiration (!) 27   Pulse 91   Heart Rate (Monitored) 90   Breath Sounds   Pre/Post Intervention Pre Intervention Assessment   RUL Breath Sounds Clear   RML Breath Sounds Diminished   RLL Breath Sounds Diminished   ABIGAIL Breath Sounds Clear   LLL Breath Sounds Diminished   Secretions   Cough Non Productive   How Sputum Obtained Spontaneous   Oximetry   Continuous Oximetry Yes   O2 Alarms Set & Reviewed Yes   Oxygen   Pulse Oximetry 95 %   O2 (LPM) 3   O2 Daily Delivery Respiratory  Silicone Nasal Cannula

## 2017-06-10 NOTE — FLOWSHEET NOTE
IS     06/10/17 1028   Events/Summary/Plan   Events/Summary/Plan IS   Interdisciplinary Plan of Care-Goals (Indications)   Blunt Chest Indications Chest Trauma   Interdisciplinary Plan of Care-Outcomes    Blunt Chest IS Outcome Patient can Explain Need for I.S., Continues to use I.S., is Free of Active Lung Disease and Reaches a Stable Baseline   Hyperinflation Protocol Goals/Outcome Stable Vital Capacity x24 hrs and Patient Understands / uses I.S.   Incentive Spirometry Group   Breathing Exercises Yes   Incentive Spirometer Volume 1800 mL   Chest Exam   Work Of Breathing / Effort Mild   Respiration (!) 24   Pulse 89   Heart Rate (Monitored) 86   Breath Sounds   Pre/Post Intervention Pre Intervention Assessment   RUL Breath Sounds Clear   RML Breath Sounds Clear   RLL Breath Sounds Diminished   ABIGAIL Breath Sounds Clear   LLL Breath Sounds Diminished   Oximetry   Continuous Oximetry Yes   Oxygen   Pulse Oximetry 94 %   O2 (LPM) 3   O2 Daily Delivery Respiratory  Silicone Nasal Cannula

## 2017-06-10 NOTE — PROGRESS NOTES
IV Iron Per Pharmacy Note    Patient Lean Body Weight:  73 kg  Reason for Iron Replacement: Acute blood loss      Lab Results   Component Value Date/Time    WBC 10.1 06/10/2017 04:48 AM    RBC 2.18* 06/10/2017 04:48 AM    HEMOGLOBIN 6.6* 06/10/2017 04:48 AM    HEMATOCRIT 21.3* 06/10/2017 04:48 AM    MCV 97.7 06/10/2017 04:48 AM    MCH 30.3 06/10/2017 04:48 AM    MCHC 31.0* 06/10/2017 04:48 AM    MPV 10.2 06/10/2017 04:48 AM       Recent Labs      06/10/17   0448   IRON  33*         Recent Labs      06/10/17   0448   CREATININE  0.72          Assessment/Plan:  1. IV Iron Indicated.   2. Give Iron Dextran 25 mg IV test dose following diphenhydramine/acetaminophen premeds over 30 minutes per protocol.  3. If no reaction (Anaphylaxis, Hypotension/Hypertension, N/V/D, Chest pain/Back Pain, Urticaria/Pruritis) in the next hour, proceed to full dose. Nursing to call the pharmacy IV room at ext. [phone] for full dose.  4. Full dose: Iron Dextran 2075 mg IV over 4 hours. Continue to monitor for delayed ADR including: Arthralgia/myalgia, Headache/backache, chills/dizziness/malaise, moderate to high fever and n/v.      Breanne Kumar, PharmD, BCPS

## 2017-06-11 ENCOUNTER — APPOINTMENT (OUTPATIENT)
Dept: RADIOLOGY | Facility: MEDICAL CENTER | Age: 77
DRG: 907 | End: 2017-06-11
Attending: SURGERY
Payer: MEDICARE

## 2017-06-11 ENCOUNTER — APPOINTMENT (OUTPATIENT)
Dept: RADIOLOGY | Facility: MEDICAL CENTER | Age: 77
DRG: 907 | End: 2017-06-11
Attending: NURSE PRACTITIONER
Payer: MEDICARE

## 2017-06-11 PROBLEM — M25.562 ACUTE PAIN OF LEFT KNEE: Status: ACTIVE | Noted: 2017-06-11

## 2017-06-11 LAB
ALBUMIN SERPL BCP-MCNC: 2.2 G/DL (ref 3.2–4.9)
ALBUMIN/GLOB SERPL: 0.9 G/DL
ALP SERPL-CCNC: 106 U/L (ref 30–99)
ALT SERPL-CCNC: 45 U/L (ref 2–50)
ANION GAP SERPL CALC-SCNC: 6 MMOL/L (ref 0–11.9)
AST SERPL-CCNC: 27 U/L (ref 12–45)
BASOPHILS # BLD AUTO: 0.3 % (ref 0–1.8)
BASOPHILS # BLD AUTO: 0.3 % (ref 0–1.8)
BASOPHILS # BLD: 0.03 K/UL (ref 0–0.12)
BASOPHILS # BLD: 0.04 K/UL (ref 0–0.12)
BILIRUB SERPL-MCNC: 0.8 MG/DL (ref 0.1–1.5)
BNP SERPL-MCNC: 53 PG/ML (ref 0–100)
BUN SERPL-MCNC: 22 MG/DL (ref 8–22)
CALCIUM SERPL-MCNC: 8.1 MG/DL (ref 8.5–10.5)
CHLORIDE SERPL-SCNC: 104 MMOL/L (ref 96–112)
CO2 SERPL-SCNC: 26 MMOL/L (ref 20–33)
CREAT SERPL-MCNC: 0.77 MG/DL (ref 0.5–1.4)
EOSINOPHIL # BLD AUTO: 0.09 K/UL (ref 0–0.51)
EOSINOPHIL # BLD AUTO: 0.1 K/UL (ref 0–0.51)
EOSINOPHIL NFR BLD: 0.8 % (ref 0–6.9)
EOSINOPHIL NFR BLD: 0.8 % (ref 0–6.9)
ERYTHROCYTE [DISTWIDTH] IN BLOOD BY AUTOMATED COUNT: 60 FL (ref 35.9–50)
ERYTHROCYTE [DISTWIDTH] IN BLOOD BY AUTOMATED COUNT: 60.1 FL (ref 35.9–50)
GFR SERPL CREATININE-BSD FRML MDRD: >60 ML/MIN/1.73 M 2
GLOBULIN SER CALC-MCNC: 2.4 G/DL (ref 1.9–3.5)
GLUCOSE BLD-MCNC: 165 MG/DL (ref 65–99)
GLUCOSE BLD-MCNC: 180 MG/DL (ref 65–99)
GLUCOSE BLD-MCNC: 216 MG/DL (ref 65–99)
GLUCOSE SERPL-MCNC: 218 MG/DL (ref 65–99)
HCT VFR BLD AUTO: 25.2 % (ref 42–52)
HCT VFR BLD AUTO: 27.1 % (ref 42–52)
HGB BLD-MCNC: 8.1 G/DL (ref 14–18)
HGB BLD-MCNC: 8.4 G/DL (ref 14–18)
IMM GRANULOCYTES # BLD AUTO: 0.46 K/UL (ref 0–0.11)
IMM GRANULOCYTES # BLD AUTO: 0.6 K/UL (ref 0–0.11)
IMM GRANULOCYTES NFR BLD AUTO: 4.2 % (ref 0–0.9)
IMM GRANULOCYTES NFR BLD AUTO: 4.9 % (ref 0–0.9)
LYMPHOCYTES # BLD AUTO: 1.06 K/UL (ref 1–4.8)
LYMPHOCYTES # BLD AUTO: 1.08 K/UL (ref 1–4.8)
LYMPHOCYTES NFR BLD: 8.8 % (ref 22–41)
LYMPHOCYTES NFR BLD: 9.7 % (ref 22–41)
MCH RBC QN AUTO: 30 PG (ref 27–33)
MCH RBC QN AUTO: 30.8 PG (ref 27–33)
MCHC RBC AUTO-ENTMCNC: 31 G/DL (ref 33.7–35.3)
MCHC RBC AUTO-ENTMCNC: 32.1 G/DL (ref 33.7–35.3)
MCV RBC AUTO: 95.8 FL (ref 81.4–97.8)
MCV RBC AUTO: 96.8 FL (ref 81.4–97.8)
MONOCYTES # BLD AUTO: 0.8 K/UL (ref 0–0.85)
MONOCYTES # BLD AUTO: 1.12 K/UL (ref 0–0.85)
MONOCYTES NFR BLD AUTO: 7.3 % (ref 0–13.4)
MONOCYTES NFR BLD AUTO: 9.1 % (ref 0–13.4)
NEUTROPHILS # BLD AUTO: 8.52 K/UL (ref 1.82–7.42)
NEUTROPHILS # BLD AUTO: 9.38 K/UL (ref 1.82–7.42)
NEUTROPHILS NFR BLD: 76.1 % (ref 44–72)
NEUTROPHILS NFR BLD: 77.7 % (ref 44–72)
NRBC # BLD AUTO: 0.07 K/UL
NRBC # BLD AUTO: 0.08 K/UL
NRBC BLD AUTO-RTO: 0.6 /100 WBC
NRBC BLD AUTO-RTO: 0.6 /100 WBC
PLATELET # BLD AUTO: 191 K/UL (ref 164–446)
PLATELET # BLD AUTO: 216 K/UL (ref 164–446)
PMV BLD AUTO: 10.2 FL (ref 9–12.9)
PMV BLD AUTO: 9.8 FL (ref 9–12.9)
POTASSIUM SERPL-SCNC: 4.3 MMOL/L (ref 3.6–5.5)
PROT SERPL-MCNC: 4.6 G/DL (ref 6–8.2)
RBC # BLD AUTO: 2.63 M/UL (ref 4.7–6.1)
RBC # BLD AUTO: 2.8 M/UL (ref 4.7–6.1)
SODIUM SERPL-SCNC: 136 MMOL/L (ref 135–145)
WBC # BLD AUTO: 11 K/UL (ref 4.8–10.8)
WBC # BLD AUTO: 12.3 K/UL (ref 4.8–10.8)

## 2017-06-11 PROCEDURE — A9270 NON-COVERED ITEM OR SERVICE: HCPCS | Performed by: SURGERY

## 2017-06-11 PROCEDURE — 80053 COMPREHEN METABOLIC PANEL: CPT

## 2017-06-11 PROCEDURE — 93971 EXTREMITY STUDY: CPT

## 2017-06-11 PROCEDURE — 770022 HCHG ROOM/CARE - ICU (200)

## 2017-06-11 PROCEDURE — 700102 HCHG RX REV CODE 250 W/ 637 OVERRIDE(OP): Performed by: SURGERY

## 2017-06-11 PROCEDURE — 82962 GLUCOSE BLOOD TEST: CPT | Mod: 91

## 2017-06-11 PROCEDURE — 83880 ASSAY OF NATRIURETIC PEPTIDE: CPT

## 2017-06-11 PROCEDURE — 73560 X-RAY EXAM OF KNEE 1 OR 2: CPT | Mod: LT

## 2017-06-11 PROCEDURE — 99233 SBSQ HOSP IP/OBS HIGH 50: CPT | Performed by: SURGERY

## 2017-06-11 PROCEDURE — 85025 COMPLETE CBC W/AUTO DIFF WBC: CPT

## 2017-06-11 RX ORDER — FUROSEMIDE 20 MG/1
20 TABLET ORAL
Status: DISCONTINUED | OUTPATIENT
Start: 2017-06-11 | End: 2017-06-13

## 2017-06-11 RX ADMIN — FUROSEMIDE 20 MG: 20 TABLET ORAL at 11:36

## 2017-06-11 RX ADMIN — INSULIN LISPRO 2 UNITS: 100 INJECTION, SOLUTION INTRAVENOUS; SUBCUTANEOUS at 21:20

## 2017-06-11 RX ADMIN — FINASTERIDE 5 MG: 5 TABLET, FILM COATED ORAL at 09:26

## 2017-06-11 RX ADMIN — ACETAMINOPHEN 650 MG: 325 TABLET, FILM COATED ORAL at 13:02

## 2017-06-11 RX ADMIN — INSULIN LISPRO 3 UNITS: 100 INJECTION, SOLUTION INTRAVENOUS; SUBCUTANEOUS at 11:40

## 2017-06-11 RX ADMIN — ERYTHROMYCIN: 5 OINTMENT OPHTHALMIC at 09:26

## 2017-06-11 RX ADMIN — OXYCODONE HYDROCHLORIDE 5 MG: 5 TABLET ORAL at 21:20

## 2017-06-11 RX ADMIN — METOPROLOL TARTRATE 25 MG: 25 TABLET, FILM COATED ORAL at 11:36

## 2017-06-11 RX ADMIN — INSULIN LISPRO 2 UNITS: 100 INJECTION, SOLUTION INTRAVENOUS; SUBCUTANEOUS at 17:41

## 2017-06-11 RX ADMIN — METOPROLOL TARTRATE 25 MG: 25 TABLET, FILM COATED ORAL at 21:19

## 2017-06-11 ASSESSMENT — PAIN SCALES - GENERAL
PAINLEVEL_OUTOF10: 0
PAINLEVEL_OUTOF10: 3
PAINLEVEL_OUTOF10: 0
PAINLEVEL_OUTOF10: 3
PAINLEVEL_OUTOF10: 0

## 2017-06-11 ASSESSMENT — ENCOUNTER SYMPTOMS
ABDOMINAL PAIN: 0
FEVER: 0
HEADACHES: 0
DOUBLE VISION: 0
BACK PAIN: 1
FOCAL WEAKNESS: 0
CHILLS: 0

## 2017-06-11 ASSESSMENT — LIFESTYLE VARIABLES: DO YOU DRINK ALCOHOL: NO

## 2017-06-11 NOTE — PROGRESS NOTES
"  Trauma/Surgical Progress Note    Author: Jorge Etienne Date & Time created: 6/10/2017   7:23 PM     Interval Events:    Hb continues to drop - transfuse 1 PRBC  Check iron studies and replace as indicated.  CT chest/abd/pelvis showed some enlargement of flank hemorrhage but no active hemorrhage  BLE trauma duplex ordered as no lovenox due to continued bleeding    Review of Systems   Constitutional: Negative for fever and chills.   Eyes: Negative for double vision.   Cardiovascular: Negative for chest pain.   Gastrointestinal: Negative for abdominal pain.   Musculoskeletal: Positive for back pain.   Neurological: Negative for focal weakness and headaches.     Hemodynamics:  Blood pressure 113/58, pulse 96, temperature 36.4 °C (97.5 °F), resp. rate 32, height 1.778 m (5' 10\"), weight 124.8 kg (275 lb 2.2 oz), SpO2 97 %.     Respiratory:    Respiration: (!) 32, Pulse Oximetry: 97 %, O2 Daily Delivery Respiratory : Silicone Nasal Cannula     Work Of Breathing / Effort: Mild  RUL Breath Sounds: Clear, RML Breath Sounds: Diminished, RLL Breath Sounds: Diminished, ABIGAIL Breath Sounds: Clear, LLL Breath Sounds: Diminished  Fluids:    Intake/Output Summary (Last 24 hours) at 06/10/17 1923  Last data filed at 06/10/17 1800   Gross per 24 hour   Intake   1410 ml   Output   1250 ml   Net    160 ml     Admit Weight: 117.935 kg (260 lb)  Current Weight: 124.8 kg (275 lb 2.2 oz)    Physical Exam   Constitutional: He is oriented to person, place, and time.   Elderly appearing, NAD   HENT:   Head: Normocephalic and atraumatic.   Eyes: Pupils are equal, round, and reactive to light. No scleral icterus.   Neck: Neck supple. No tracheal deviation present.   Cardiovascular: Normal rate and regular rhythm.    Pulmonary/Chest:   Some wheezing bilaterally  Left flank ecchymosis extending down to buttock. Chest wound site without erythema, drainage, or pain on palpation.   Abdominal: Soft. He exhibits no distension. There is no " tenderness.   Genitourinary: Penis normal.   voiding   Musculoskeletal: Normal range of motion. He exhibits edema.   Neurological: He is alert and oriented to person, place, and time.   Skin: Skin is warm and dry.   Psychiatric: He has a normal mood and affect. His behavior is normal.   Nursing note and vitals reviewed.      Medical Decision Making/Problem List:    Active Hospital Problems    Diagnosis   • Chronic deep vein thrombosis (DVT) of popliteal vein (CMS-HCC) [I82.539]     Priority: High     Present on admission, takes outpatient coumadin  Old clot in left popliteal vein  6/4 prophylactic Lovenox  6/6 Therapeutic Lovenox started  6/8 - stopped upon ICU transfer  Hold any anticoagulation due to increase in flank hematoma  6/10 - Will need trauma duplex studies ordered     • Acute blood loss anemia [D62]     Priority: High     1L of blood loss reported on scene  Large paraspinous, posterior hematoma  6/10 - drop in H/H- transfuse 1 unit PRBC  Check iron studies and replace as indicated.  Transfuse 1 unit PRBC if Hb < 7.0     • Penetrating back wound [S21.239A]     Priority: High     Large left paraspinous, posterior hematoma  No active extravasation  6/8 CT chest showed slightly larger SQ fluid than on admission. No discrete drainable hematoma  Ancef x 24hrs      • Chronic congestive heart failure (CMS-HCC) [I50.9]     Priority: Medium     6/6 - Home lasix and potassium started  6/8 - Lasix stopped upon ICU transfer     • CAD (coronary artery disease) [I25.10]     Priority: Medium     6/6 - Home metoprolol restarted     • AV block, Mobitz 1 [I44.1]     Priority: Medium     Observation only  6/8 - EKG changes with frequent PVC's. Troponin < 0.02.  Cruz Vargas MD. Cardiology      • History of pulmonary embolus (PE) [Z86.711]     Priority: Medium     With DVT  On warfarin as an outpatient  IVC filter present   Restart anticoagulation as outpatient.       • Warfarin-induced coagulopathy (CMS-HCC) [T45.511A,  D68.9]     Priority: Medium     Coumadin for h/o PE  INR 2 on arrival, given 1 mg Factor VII and Vitamin K  Trend INR, correct as clinically warranted  Restart coumadin on discharge      • Hemothorax on left [J94.2]     Priority: Low     300cc evacuated initially  24F chest tube placed  6/6 - Water sealed  6/7 - Interval removal of chest tube  6/8 - No pneumothorax post chest tube removal.  Serial CXR        Core Measures & Quality Metrics:  Labs reviewed, Medications reviewed and Radiology images reviewed  Castaneda catheter: No Castaneda      DVT Prophylaxis: Contraindicated - High bleeding risk  DVT prophylaxis - mechanical: SCDs  Ulcer prophylaxis: Not indicated        HERBIE Score  Discussed patient condition with RN, RT, Pharmacy and Patient.  CRITICAL CARE TIME EXCLUDING PROCEDURES: 36 minutes

## 2017-06-11 NOTE — PROGRESS NOTES
"  Trauma/Surgical Progress Note    Author: Jorge Etienne Date & Time created: 6/11/2017   2:38 PM     Interval Events:    Complaint of left knee pain with mobility / x ray ordered.  Hemodynamically stable  IS to 1400 cc  Hb rising / restart lovenox in am    Review of Systems   Constitutional: Negative for fever and chills.   Eyes: Negative for double vision.   Cardiovascular: Negative for chest pain.   Gastrointestinal: Negative for abdominal pain.   Musculoskeletal: Positive for back pain and joint pain (left knee).   Neurological: Negative for focal weakness and headaches.     Hemodynamics:  Blood pressure 113/58, pulse 81, temperature 36.6 °C (97.9 °F), resp. rate 34, height 1.778 m (5' 10\"), weight 124.8 kg (275 lb 2.2 oz), SpO2 94 %.     Respiratory:    Respiration: (!) 34, Pulse Oximetry: 94 %, O2 Daily Delivery Respiratory : Silicone Nasal Cannula     Work Of Breathing / Effort: Mild  RUL Breath Sounds: Clear, RML Breath Sounds: Diminished, RLL Breath Sounds: Diminished, ABIGAIL Breath Sounds: Clear, LLL Breath Sounds: Diminished  Fluids:    Intake/Output Summary (Last 24 hours) at 06/11/17 1438  Last data filed at 06/11/17 1200   Gross per 24 hour   Intake    460 ml   Output   1050 ml   Net   -590 ml     Admit Weight: 117.935 kg (260 lb)  Current      Physical Exam   Constitutional: He is oriented to person, place, and time.   Elderly appearing, NAD   HENT:   Head: Normocephalic and atraumatic.   Eyes: Pupils are equal, round, and reactive to light. No scleral icterus.   Neck: Neck supple. No tracheal deviation present.   Cardiovascular: Normal rate.    Multiple PACs   Pulmonary/Chest: Effort normal. No respiratory distress. He exhibits tenderness.   Some wheezing bilaterally  Left flank ecchymosis extending down to buttock. Chest wound site without erythema, drainage, or pain on palpation.   Abdominal: Soft. He exhibits no distension. There is no tenderness.   Genitourinary: Penis normal.   voiding "   Musculoskeletal: Normal range of motion. He exhibits edema.   Neurological: He is alert and oriented to person, place, and time.   Skin: Skin is warm and dry.   Psychiatric: He has a normal mood and affect. His behavior is normal.   Nursing note and vitals reviewed.      Medical Decision Making/Problem List:    Active Hospital Problems    Diagnosis   • Chronic deep vein thrombosis (DVT) of popliteal vein (CMS-HCC) [I82.539]     Priority: High     Present on admission, takes outpatient coumadin  Old clot in left popliteal vein  6/4 prophylactic Lovenox  6/6 Therapeutic Lovenox started  6/8 - stopped upon ICU transfer  Hold any anticoagulation due to increase in flank hematoma  6/10 - Trauma duplex studies ordered     • Acute blood loss anemia [D62]     Priority: High     1L of blood loss reported on scene  Large paraspinous, posterior hematoma  6/10 - drop in H/H- transfuse 1 unit PRBC / Iron studies low - replace per protocol.  Transfuse 1 unit PRBC if Hb < 7.0     • Acute pain of left knee [M25.562]     Priority: Medium     Prior TKR  6/11 - pain with mobility / x ray ordered     • Chronic congestive heart failure (CMS-HCC) [I50.9]     Priority: Medium     6/6 - Home lasix and potassium started  6/8 - Lasix stopped upon ICU transfer  6/11- BNP low / restart lasix as BLE edema     • CAD (coronary artery disease) [I25.10]     Priority: Medium     6/6 - Home metoprolol restarted     • AV block, Mobitz 1 [I44.1]     Priority: Medium     Observation only  6/8 - EKG changes with frequent PVC's. Troponin < 0.02.  Cruz Vargas MD. Cardiology      • Penetrating back wound [S21.982A]     Priority: Medium     Large left paraspinous, posterior hematoma  No active extravasation  6/8 CT chest showed slightly larger SQ fluid than on admission. No discrete drainable hematoma  Ancef x 24hrs      • History of pulmonary embolus (PE) [Z86.711]     Priority: Medium     With DVT  On warfarin as an outpatient  IVC filter present    Restart anticoagulation as outpatient.       • Warfarin-induced coagulopathy (CMS-HCC) [T45.511A, D68.9]     Priority: Medium     Coumadin for h/o PE  INR 2 on arrival, given 1 mg Factor VII and Vitamin K  Trend INR, correct as clinically warranted  Restart coumadin on discharge      • Hemothorax on left [J94.2]     Priority: Low     300cc evacuated initially  24F chest tube placed  6/6 - Water sealed  6/7 - Interval removal of chest tube  6/8 - No pneumothorax post chest tube removal.  Serial CXR        Core Measures & Quality Metrics:  Labs reviewed, Medications reviewed and Radiology images reviewed  Castaneda catheter: No Castaneda      DVT Prophylaxis: Contraindicated - High bleeding risk  DVT prophylaxis - mechanical: SCDs  Ulcer prophylaxis: Not indicated        HERBIE Score  Discussed patient condition with RN, RT, Pharmacy and Patient.  CRITICAL CARE TIME EXCLUDING PROCEDURES: 36 minutes

## 2017-06-11 NOTE — CARE PLAN
Problem: Venous Thromboembolism (VTW)/Deep Vein Thrombosis (DVT) Prevention:  Goal: Patient will participate in Venous Thrombosis (VTE)/Deep Vein Thrombosis (DVT)Prevention Measures  Intervention: Assess and monitor for anticoagulation complications  Pharmalogical DVT prevention held at this time due to bleeding as evident of drop in hemoglobin.   Intervention: Ensure patient wears graduated elastic stockings (MARZENA hose) and/or SCDs, if ordered, when in bed or chair (Remove at least once per shift for skin check)  Patient wears SCDs while in bed and up to chair. Removed q4h for skin check.

## 2017-06-12 ENCOUNTER — APPOINTMENT (OUTPATIENT)
Dept: RADIOLOGY | Facility: MEDICAL CENTER | Age: 77
DRG: 907 | End: 2017-06-12
Attending: NURSE PRACTITIONER
Payer: MEDICARE

## 2017-06-12 PROBLEM — E66.9 OBESITY (BMI 30-39.9): Status: ACTIVE | Noted: 2017-06-12

## 2017-06-12 LAB
ALBUMIN SERPL BCP-MCNC: 2.3 G/DL (ref 3.2–4.9)
ALBUMIN/GLOB SERPL: 1 G/DL
ALP SERPL-CCNC: 116 U/L (ref 30–99)
ALT SERPL-CCNC: 49 U/L (ref 2–50)
ANION GAP SERPL CALC-SCNC: 3 MMOL/L (ref 0–11.9)
ANISOCYTOSIS BLD QL SMEAR: ABNORMAL
ANISOCYTOSIS BLD QL SMEAR: ABNORMAL
AST SERPL-CCNC: 31 U/L (ref 12–45)
BASOPHILS # BLD AUTO: 0 % (ref 0–1.8)
BASOPHILS # BLD AUTO: 0 % (ref 0–1.8)
BASOPHILS # BLD: 0 K/UL (ref 0–0.12)
BASOPHILS # BLD: 0 K/UL (ref 0–0.12)
BILIRUB SERPL-MCNC: 0.9 MG/DL (ref 0.1–1.5)
BNP SERPL-MCNC: 57 PG/ML (ref 0–100)
BUN SERPL-MCNC: 20 MG/DL (ref 8–22)
CALCIUM SERPL-MCNC: 7.8 MG/DL (ref 8.5–10.5)
CHLORIDE SERPL-SCNC: 103 MMOL/L (ref 96–112)
CO2 SERPL-SCNC: 29 MMOL/L (ref 20–33)
CREAT SERPL-MCNC: 0.68 MG/DL (ref 0.5–1.4)
EOSINOPHIL # BLD AUTO: 0.18 K/UL (ref 0–0.51)
EOSINOPHIL # BLD AUTO: 0.32 K/UL (ref 0–0.51)
EOSINOPHIL NFR BLD: 1.7 % (ref 0–6.9)
EOSINOPHIL NFR BLD: 2.6 % (ref 0–6.9)
ERYTHROCYTE [DISTWIDTH] IN BLOOD BY AUTOMATED COUNT: 59.1 FL (ref 35.9–50)
ERYTHROCYTE [DISTWIDTH] IN BLOOD BY AUTOMATED COUNT: 61.5 FL (ref 35.9–50)
GFR SERPL CREATININE-BSD FRML MDRD: >60 ML/MIN/1.73 M 2
GLOBULIN SER CALC-MCNC: 2.4 G/DL (ref 1.9–3.5)
GLUCOSE BLD-MCNC: 135 MG/DL (ref 65–99)
GLUCOSE BLD-MCNC: 174 MG/DL (ref 65–99)
GLUCOSE BLD-MCNC: 191 MG/DL (ref 65–99)
GLUCOSE BLD-MCNC: 214 MG/DL (ref 65–99)
GLUCOSE SERPL-MCNC: 126 MG/DL (ref 65–99)
HCT VFR BLD AUTO: 25.4 % (ref 42–52)
HCT VFR BLD AUTO: 28.5 % (ref 42–52)
HGB BLD-MCNC: 7.9 G/DL (ref 14–18)
HGB BLD-MCNC: 8.7 G/DL (ref 14–18)
LYMPHOCYTES # BLD AUTO: 0.74 K/UL (ref 1–4.8)
LYMPHOCYTES # BLD AUTO: 0.94 K/UL (ref 1–4.8)
LYMPHOCYTES NFR BLD: 7 % (ref 22–41)
LYMPHOCYTES NFR BLD: 7.7 % (ref 22–41)
MACROCYTES BLD QL SMEAR: ABNORMAL
MANUAL DIFF BLD: NORMAL
MANUAL DIFF BLD: NORMAL
MCH RBC QN AUTO: 29.7 PG (ref 27–33)
MCH RBC QN AUTO: 30.4 PG (ref 27–33)
MCHC RBC AUTO-ENTMCNC: 30.5 G/DL (ref 33.7–35.3)
MCHC RBC AUTO-ENTMCNC: 31.1 G/DL (ref 33.7–35.3)
MCV RBC AUTO: 97.3 FL (ref 81.4–97.8)
MCV RBC AUTO: 97.7 FL (ref 81.4–97.8)
MICROCYTES BLD QL SMEAR: ABNORMAL
MONOCYTES # BLD AUTO: 0.47 K/UL (ref 0–0.85)
MONOCYTES # BLD AUTO: 0.63 K/UL (ref 0–0.85)
MONOCYTES NFR BLD AUTO: 4.4 % (ref 0–13.4)
MONOCYTES NFR BLD AUTO: 5.2 % (ref 0–13.4)
MORPHOLOGY BLD-IMP: NORMAL
MORPHOLOGY BLD-IMP: NORMAL
MYELOCYTES NFR BLD MANUAL: 1.7 %
MYELOCYTES NFR BLD MANUAL: 1.8 %
NEUTROPHILS # BLD AUTO: 10.1 K/UL (ref 1.82–7.42)
NEUTROPHILS # BLD AUTO: 8.93 K/UL (ref 1.82–7.42)
NEUTROPHILS NFR BLD: 81.9 % (ref 44–72)
NEUTROPHILS NFR BLD: 84.2 % (ref 44–72)
NEUTS BAND NFR BLD MANUAL: 0.9 % (ref 0–10)
NRBC # BLD AUTO: 0.04 K/UL
NRBC # BLD AUTO: 0.06 K/UL
NRBC BLD AUTO-RTO: 0.3 /100 WBC
NRBC BLD AUTO-RTO: 0.6 /100 WBC
PLATELET # BLD AUTO: 212 K/UL (ref 164–446)
PLATELET # BLD AUTO: 250 K/UL (ref 164–446)
PLATELET BLD QL SMEAR: NORMAL
PLATELET BLD QL SMEAR: NORMAL
PMV BLD AUTO: 10.2 FL (ref 9–12.9)
PMV BLD AUTO: 9.9 FL (ref 9–12.9)
POLYCHROMASIA BLD QL SMEAR: NORMAL
POLYCHROMASIA BLD QL SMEAR: NORMAL
POTASSIUM SERPL-SCNC: 4.4 MMOL/L (ref 3.6–5.5)
PROMYELOCYTES NFR BLD MANUAL: 0.9 %
PROT SERPL-MCNC: 4.7 G/DL (ref 6–8.2)
RBC # BLD AUTO: 2.6 M/UL (ref 4.7–6.1)
RBC # BLD AUTO: 2.93 M/UL (ref 4.7–6.1)
RBC BLD AUTO: PRESENT
RBC BLD AUTO: PRESENT
SODIUM SERPL-SCNC: 135 MMOL/L (ref 135–145)
WBC # BLD AUTO: 10.6 K/UL (ref 4.8–10.8)
WBC # BLD AUTO: 12.2 K/UL (ref 4.8–10.8)

## 2017-06-12 PROCEDURE — 700112 HCHG RX REV CODE 229: Performed by: NURSE PRACTITIONER

## 2017-06-12 PROCEDURE — 80053 COMPREHEN METABOLIC PANEL: CPT

## 2017-06-12 PROCEDURE — 85007 BL SMEAR W/DIFF WBC COUNT: CPT

## 2017-06-12 PROCEDURE — 83880 ASSAY OF NATRIURETIC PEPTIDE: CPT

## 2017-06-12 PROCEDURE — 82962 GLUCOSE BLOOD TEST: CPT | Mod: 91

## 2017-06-12 PROCEDURE — 71010 DX-CHEST-PORTABLE (1 VIEW): CPT

## 2017-06-12 PROCEDURE — 700102 HCHG RX REV CODE 250 W/ 637 OVERRIDE(OP): Performed by: SURGERY

## 2017-06-12 PROCEDURE — A9270 NON-COVERED ITEM OR SERVICE: HCPCS | Performed by: NURSE PRACTITIONER

## 2017-06-12 PROCEDURE — 97530 THERAPEUTIC ACTIVITIES: CPT

## 2017-06-12 PROCEDURE — 770022 HCHG ROOM/CARE - ICU (200)

## 2017-06-12 PROCEDURE — 700111 HCHG RX REV CODE 636 W/ 250 OVERRIDE (IP): Performed by: SURGERY

## 2017-06-12 PROCEDURE — A9270 NON-COVERED ITEM OR SERVICE: HCPCS | Performed by: SURGERY

## 2017-06-12 PROCEDURE — 99233 SBSQ HOSP IP/OBS HIGH 50: CPT | Performed by: SURGERY

## 2017-06-12 PROCEDURE — 85027 COMPLETE CBC AUTOMATED: CPT

## 2017-06-12 RX ORDER — CALCIUM CARBONATE 500 MG/1
1000 TABLET, CHEWABLE ORAL EVERY 6 HOURS PRN
Status: DISCONTINUED | OUTPATIENT
Start: 2017-06-12 | End: 2017-06-28 | Stop reason: HOSPADM

## 2017-06-12 RX ADMIN — FINASTERIDE 5 MG: 5 TABLET, FILM COATED ORAL at 09:02

## 2017-06-12 RX ADMIN — METOPROLOL TARTRATE 25 MG: 25 TABLET, FILM COATED ORAL at 09:02

## 2017-06-12 RX ADMIN — OXYCODONE HYDROCHLORIDE 5 MG: 5 TABLET ORAL at 05:08

## 2017-06-12 RX ADMIN — INSULIN LISPRO 2 UNITS: 100 INJECTION, SOLUTION INTRAVENOUS; SUBCUTANEOUS at 16:49

## 2017-06-12 RX ADMIN — ENOXAPARIN SODIUM 30 MG: 100 INJECTION SUBCUTANEOUS at 20:26

## 2017-06-12 RX ADMIN — INSULIN LISPRO 2 UNITS: 100 INJECTION, SOLUTION INTRAVENOUS; SUBCUTANEOUS at 20:29

## 2017-06-12 RX ADMIN — OXYCODONE HYDROCHLORIDE 5 MG: 5 TABLET ORAL at 09:15

## 2017-06-12 RX ADMIN — FUROSEMIDE 20 MG: 20 TABLET ORAL at 09:02

## 2017-06-12 RX ADMIN — ENOXAPARIN SODIUM 30 MG: 100 INJECTION SUBCUTANEOUS at 09:02

## 2017-06-12 RX ADMIN — ERYTHROMYCIN: 5 OINTMENT OPHTHALMIC at 09:00

## 2017-06-12 RX ADMIN — METOPROLOL TARTRATE 25 MG: 25 TABLET, FILM COATED ORAL at 20:26

## 2017-06-12 RX ADMIN — DOCUSATE SODIUM 100 MG: 100 CAPSULE ORAL at 09:02

## 2017-06-12 RX ADMIN — INSULIN LISPRO 3 UNITS: 100 INJECTION, SOLUTION INTRAVENOUS; SUBCUTANEOUS at 11:19

## 2017-06-12 ASSESSMENT — COGNITIVE AND FUNCTIONAL STATUS - GENERAL
DRESSING REGULAR UPPER BODY CLOTHING: A LOT
PERSONAL GROOMING: A LITTLE
DAILY ACTIVITIY SCORE: 14
EATING MEALS: A LITTLE
SUGGESTED CMS G CODE MODIFIER DAILY ACTIVITY: CK
TOILETING: A LOT
DRESSING REGULAR LOWER BODY CLOTHING: A LOT
HELP NEEDED FOR BATHING: A LOT

## 2017-06-12 ASSESSMENT — PAIN SCALES - GENERAL
PAINLEVEL_OUTOF10: 0
PAINLEVEL_OUTOF10: 1
PAINLEVEL_OUTOF10: 3
PAINLEVEL_OUTOF10: 0
PAINLEVEL_OUTOF10: 1
PAINLEVEL_OUTOF10: 4
PAINLEVEL_OUTOF10: 1
PAINLEVEL_OUTOF10: 0
PAINLEVEL_OUTOF10: 1
PAINLEVEL_OUTOF10: 0
PAINLEVEL_OUTOF10: 1

## 2017-06-12 ASSESSMENT — ENCOUNTER SYMPTOMS
CHILLS: 0
FOCAL WEAKNESS: 0
BACK PAIN: 1
FEVER: 0
ABDOMINAL PAIN: 0
DOUBLE VISION: 0
HEADACHES: 0

## 2017-06-12 ASSESSMENT — GAIT ASSESSMENTS
DISTANCE (FEET): 50
GAIT LEVEL OF ASSIST: MINIMAL ASSIST
ASSISTIVE DEVICE: WHEELCHAIR PUSH
DEVIATION: DECREASED BASE OF SUPPORT

## 2017-06-12 NOTE — THERAPY
"Physical Therapy Treatment completed.   Bed Mobility:  Supine to Sit: Moderate Assist  Transfers: Sit to Stand: Moderate Assist  Gait: Level Of Assist: Minimal Assist with Wheelchair       Plan of Care: Plan to complete next treatment by 6/14  Discharge Recommendations: Equipment: Will Continue to Assess for Equipment Needs. Post-acute therapy Discharge to a transitional care facility for continued skilled therapy services.     See \"Rehab Therapy-Acute\" Patient Summary Report for complete documentation.       "

## 2017-06-12 NOTE — CARE PLAN
Problem: Skin Integrity  Goal: Risk for impaired skin integrity will decrease  Intervention: Assess risk factors for impaired skin integrity and/or pressure ulcers  Performing Erick Skin Risk assessment every shift or more to maintain or improve skin integrity. Any signs of skin breakdown are being documented per hospital policy. Utilizing barrier wipes, cream, and moisturizer when need to help prevent skin breakdown. Pt mobilizing as per MD orders. Pt ambulated 50 feet twice with front wheel walker.       Problem: Pain Management  Goal: Pain level will decrease to patient’s comfort goal  Intervention: Follow pain managment plan developed in collaboration with patient and Interdisciplinary Team  Pt educated to use 0-10 pain scale for PRN pain medications; administered as per MD orders.

## 2017-06-13 ENCOUNTER — APPOINTMENT (OUTPATIENT)
Dept: RADIOLOGY | Facility: MEDICAL CENTER | Age: 77
DRG: 907 | End: 2017-06-13
Attending: NURSE PRACTITIONER
Payer: MEDICARE

## 2017-06-13 LAB
ANION GAP SERPL CALC-SCNC: 6 MMOL/L (ref 0–11.9)
BACTERIA BLD CULT: NORMAL
BACTERIA BLD CULT: NORMAL
BASOPHILS # BLD AUTO: 0.2 % (ref 0–1.8)
BASOPHILS # BLD: 0.02 K/UL (ref 0–0.12)
BNP SERPL-MCNC: 82 PG/ML (ref 0–100)
BUN SERPL-MCNC: 16 MG/DL (ref 8–22)
CALCIUM SERPL-MCNC: 8.1 MG/DL (ref 8.5–10.5)
CHLORIDE SERPL-SCNC: 101 MMOL/L (ref 96–112)
CO2 SERPL-SCNC: 26 MMOL/L (ref 20–33)
COMMENT 1642: NORMAL
CREAT SERPL-MCNC: 0.67 MG/DL (ref 0.5–1.4)
EOSINOPHIL # BLD AUTO: 0.11 K/UL (ref 0–0.51)
EOSINOPHIL NFR BLD: 1 % (ref 0–6.9)
ERYTHROCYTE [DISTWIDTH] IN BLOOD BY AUTOMATED COUNT: 59.1 FL (ref 35.9–50)
GFR SERPL CREATININE-BSD FRML MDRD: >60 ML/MIN/1.73 M 2
GLUCOSE BLD-MCNC: 121 MG/DL (ref 65–99)
GLUCOSE BLD-MCNC: 129 MG/DL (ref 65–99)
GLUCOSE BLD-MCNC: 160 MG/DL (ref 65–99)
GLUCOSE BLD-MCNC: 242 MG/DL (ref 65–99)
GLUCOSE SERPL-MCNC: 125 MG/DL (ref 65–99)
HCT VFR BLD AUTO: 27.3 % (ref 42–52)
HGB BLD-MCNC: 8.5 G/DL (ref 14–18)
IMM GRANULOCYTES # BLD AUTO: 0.64 K/UL (ref 0–0.11)
IMM GRANULOCYTES NFR BLD AUTO: 5.7 % (ref 0–0.9)
LYMPHOCYTES # BLD AUTO: 1.24 K/UL (ref 1–4.8)
LYMPHOCYTES NFR BLD: 11 % (ref 22–41)
MCH RBC QN AUTO: 30.4 PG (ref 27–33)
MCHC RBC AUTO-ENTMCNC: 31.1 G/DL (ref 33.7–35.3)
MCV RBC AUTO: 97.5 FL (ref 81.4–97.8)
MONOCYTES # BLD AUTO: 0.99 K/UL (ref 0–0.85)
MONOCYTES NFR BLD AUTO: 8.8 % (ref 0–13.4)
MORPHOLOGY BLD-IMP: NORMAL
NEUTROPHILS # BLD AUTO: 8.29 K/UL (ref 1.82–7.42)
NEUTROPHILS NFR BLD: 73.3 % (ref 44–72)
NRBC # BLD AUTO: 0.04 K/UL
NRBC BLD AUTO-RTO: 0.4 /100 WBC
PLATELET # BLD AUTO: 230 K/UL (ref 164–446)
PMV BLD AUTO: 9.8 FL (ref 9–12.9)
POTASSIUM SERPL-SCNC: 4.6 MMOL/L (ref 3.6–5.5)
RBC # BLD AUTO: 2.8 M/UL (ref 4.7–6.1)
SIGNIFICANT IND 70042: NORMAL
SIGNIFICANT IND 70042: NORMAL
SITE SITE: NORMAL
SITE SITE: NORMAL
SODIUM SERPL-SCNC: 133 MMOL/L (ref 135–145)
SOURCE SOURCE: NORMAL
SOURCE SOURCE: NORMAL
WBC # BLD AUTO: 11.3 K/UL (ref 4.8–10.8)

## 2017-06-13 PROCEDURE — 700111 HCHG RX REV CODE 636 W/ 250 OVERRIDE (IP): Performed by: SURGERY

## 2017-06-13 PROCEDURE — 700102 HCHG RX REV CODE 250 W/ 637 OVERRIDE(OP): Performed by: SURGERY

## 2017-06-13 PROCEDURE — 700112 HCHG RX REV CODE 229: Performed by: NURSE PRACTITIONER

## 2017-06-13 PROCEDURE — 770022 HCHG ROOM/CARE - ICU (200)

## 2017-06-13 PROCEDURE — A9270 NON-COVERED ITEM OR SERVICE: HCPCS | Performed by: NURSE PRACTITIONER

## 2017-06-13 PROCEDURE — 83880 ASSAY OF NATRIURETIC PEPTIDE: CPT

## 2017-06-13 PROCEDURE — 80048 BASIC METABOLIC PNL TOTAL CA: CPT

## 2017-06-13 PROCEDURE — 97530 THERAPEUTIC ACTIVITIES: CPT

## 2017-06-13 PROCEDURE — 82962 GLUCOSE BLOOD TEST: CPT | Mod: 91

## 2017-06-13 PROCEDURE — A9270 NON-COVERED ITEM OR SERVICE: HCPCS | Performed by: SURGERY

## 2017-06-13 PROCEDURE — 85025 COMPLETE CBC W/AUTO DIFF WBC: CPT

## 2017-06-13 PROCEDURE — 71010 DX-CHEST-PORTABLE (1 VIEW): CPT

## 2017-06-13 PROCEDURE — 99232 SBSQ HOSP IP/OBS MODERATE 35: CPT | Performed by: SURGERY

## 2017-06-13 RX ADMIN — METOPROLOL TARTRATE 25 MG: 25 TABLET, FILM COATED ORAL at 08:35

## 2017-06-13 RX ADMIN — DOCUSATE SODIUM 100 MG: 100 CAPSULE ORAL at 08:34

## 2017-06-13 RX ADMIN — INSULIN LISPRO 2 UNITS: 100 INJECTION, SOLUTION INTRAVENOUS; SUBCUTANEOUS at 20:39

## 2017-06-13 RX ADMIN — ENOXAPARIN SODIUM 30 MG: 100 INJECTION SUBCUTANEOUS at 20:31

## 2017-06-13 RX ADMIN — METOPROLOL TARTRATE 25 MG: 25 TABLET, FILM COATED ORAL at 22:24

## 2017-06-13 RX ADMIN — FINASTERIDE 5 MG: 5 TABLET, FILM COATED ORAL at 08:36

## 2017-06-13 RX ADMIN — ERYTHROMYCIN: 5 OINTMENT OPHTHALMIC at 08:36

## 2017-06-13 RX ADMIN — ENOXAPARIN SODIUM 30 MG: 100 INJECTION SUBCUTANEOUS at 08:35

## 2017-06-13 RX ADMIN — FUROSEMIDE 20 MG: 20 TABLET ORAL at 08:36

## 2017-06-13 RX ADMIN — OXYCODONE HYDROCHLORIDE 10 MG: 10 TABLET ORAL at 08:35

## 2017-06-13 RX ADMIN — DOCUSATE SODIUM 100 MG: 100 CAPSULE ORAL at 20:31

## 2017-06-13 RX ADMIN — INSULIN LISPRO 3 UNITS: 100 INJECTION, SOLUTION INTRAVENOUS; SUBCUTANEOUS at 16:55

## 2017-06-13 ASSESSMENT — PAIN SCALES - GENERAL
PAINLEVEL_OUTOF10: 1
PAINLEVEL_OUTOF10: 3
PAINLEVEL_OUTOF10: 1
PAINLEVEL_OUTOF10: 1

## 2017-06-13 ASSESSMENT — ENCOUNTER SYMPTOMS
FOCAL WEAKNESS: 0
ABDOMINAL PAIN: 0
CHILLS: 0
FEVER: 0
DOUBLE VISION: 0
HEADACHES: 0
BACK PAIN: 1

## 2017-06-13 NOTE — CARE PLAN
Problem: Knowledge Deficit  Goal: Knowledge of disease process/condition, treatment plan, diagnostic tests, and medications will improve  Intervention: Assess knowledge level of disease process/condition, treatment plan, diagnostic tests, and medications  Pt and family educated on POC and goals. Pt and family verbalized understanding, answered all questions, no further needs at this time, pt resting comfortably.       Problem: Mobility  Goal: Risk for activity intolerance will decrease  Intervention: Assess and monitor signs of activity intolerance  Pt up with two person assist to ambulate. Pt ambulated with front wheel walker. Pt gets up into chair for all meals.

## 2017-06-13 NOTE — PROGRESS NOTES
"  Trauma/Surgical Progress Note    Author: JeffTanner Etienne Date & Time created: 6/12/2017   5:30 PM     Interval Events:    COntinued improvement.  OOB and ambulating with FWW.  Restart lovenox / follow H/H    Review of Systems   Constitutional: Negative for fever and chills.   Eyes: Negative for double vision.   Cardiovascular: Negative for chest pain.   Gastrointestinal: Negative for abdominal pain.   Musculoskeletal: Positive for back pain and joint pain (left knee).   Neurological: Negative for focal weakness and headaches.     Hemodynamics:  Blood pressure 113/58, pulse 77, temperature 36.9 °C (98.4 °F), resp. rate 20, height 1.778 m (5' 10\"), weight 122.7 kg (270 lb 8.1 oz), SpO2 97 %.     Respiratory:    Respiration: 20, Pulse Oximetry: 97 %     Work Of Breathing / Effort: Moderate;Tachypnea;Increased Work of Breathing (Increased work of breathing when mobilizing)  RUL Breath Sounds: Clear, RML Breath Sounds: Clear, RLL Breath Sounds: Clear;Diminished, ABIGAIL Breath Sounds: Clear, LLL Breath Sounds: Clear;Diminished  Fluids:    Intake/Output Summary (Last 24 hours) at 06/12/17 1730  Last data filed at 06/12/17 1600   Gross per 24 hour   Intake    800 ml   Output   1725 ml   Net   -925 ml     Admit Weight: 117.935 kg (260 lb)  Current Weight: 122.7 kg (270 lb 8.1 oz)    Physical Exam   Constitutional: He is oriented to person, place, and time.   Elderly appearing, NAD   HENT:   Head: Normocephalic and atraumatic.   Eyes: Pupils are equal, round, and reactive to light. No scleral icterus.   Neck: Neck supple. No tracheal deviation present.   Cardiovascular: Normal rate.    Multiple PACs   Pulmonary/Chest: Effort normal. No respiratory distress. He exhibits tenderness.   Left flank ecchymosis extending down to buttock. Chest wound site without erythema, drainage, or pain on palpation.   Abdominal: Soft. He exhibits no distension. There is no tenderness.   Genitourinary: Penis normal.   voiding "   Musculoskeletal: Normal range of motion. He exhibits edema.   Neurological: He is alert and oriented to person, place, and time.   Skin: Skin is warm and dry.   Psychiatric: He has a normal mood and affect. His behavior is normal.   Nursing note and vitals reviewed.      Medical Decision Making/Problem List:    Active Hospital Problems    Diagnosis   • Chronic deep vein thrombosis (DVT) of popliteal vein (CMS-HCC) [I82.539]     Priority: High     Present on admission, takes outpatient coumadin  Old clot in left popliteal vein  6/4 prophylactic Lovenox  6/6 Therapeutic Lovenox started  6/8 - stopped upon ICU transfer  Hold any anticoagulation due to increase in flank hematoma  6/10 - Trauma duplex studies ordered     • Acute blood loss anemia [D62]     Priority: High     1L of blood loss reported on scene  Large paraspinous, posterior hematoma  6/10 - drop in H/H- transfuse 1 unit PRBC / Iron studies low - replace per protocol.  Transfuse 1 unit PRBC if Hb < 7.0     • Obesity (BMI 30-39.9) [E66.9]     Priority: Medium     BMI -  38.8     • Chronic congestive heart failure (CMS-HCC) [I50.9]     Priority: Medium     6/6 - Home lasix and potassium started  6/8 - Lasix stopped upon ICU transfer  6/11- BNP low / restart lasix as BLE edema     • CAD (coronary artery disease) [I25.10]     Priority: Medium     6/6 - Home metoprolol restarted     • AV block, Mobitz 1 [I44.1]     Priority: Medium     Observation only  6/8 - EKG changes with frequent PVC's. Troponin < 0.02.  6/11 - Restart metoprolol  Cruz Vargas MD. Cardiology      • Penetrating back wound [S21.948A]     Priority: Medium     Large left paraspinous, posterior hematoma  No active extravasation  6/8 CT chest showed slightly larger SQ fluid than on admission. No discrete drainable hematoma  Ancef x 24hrs      • History of pulmonary embolus (PE) [Z86.711]     Priority: Medium     With DVT  On warfarin as an outpatient  IVC filter present   Restart  anticoagulation as outpatient.       • Warfarin-induced coagulopathy (CMS-HCC) [T45.511A, D68.9]     Priority: Medium     Coumadin for h/o PE  INR 2 on arrival, given 1 mg Factor VII and Vitamin K  Trend INR, correct as clinically warranted  Restart coumadin on discharge      • Acute pain of left knee [M25.562]     Priority: Low     Prior TKR  6/11 - pain with mobility / x ray: Negative for left knee fracture or malalignment     • Hemothorax on left [J94.2]     Priority: Low     300cc evacuated initially  24F chest tube placed  6/6 - Water sealed  6/7 - Interval removal of chest tube  6/8 - No pneumothorax post chest tube removal.  Serial CXR        Core Measures & Quality Metrics:  Labs reviewed, Medications reviewed and Radiology images reviewed  Castaneda catheter: No Castaneda      DVT Prophylaxis: Enoxaparin (Lovenox)  DVT prophylaxis - mechanical: SCDs  Ulcer prophylaxis: Not indicated        HERBIE Score  Discussed patient condition with Family, RN, RT, Pharmacy and Patient.  CRITICAL CARE TIME EXCLUDING PROCEDURES: 37 minutes

## 2017-06-13 NOTE — CARE PLAN
Problem: Venous Thromboembolism (VTW)/Deep Vein Thrombosis (DVT) Prevention:  Goal: Patient will participate in Venous Thrombosis (VTE)/Deep Vein Thrombosis (DVT)Prevention Measures  Outcome: PROGRESSING AS EXPECTED  Monitoring for signs of anticoagulation complications, scds ordered, encouraging ambulation with team during day.    Problem: Respiratory:  Goal: Respiratory status will improve  Outcome: PROGRESSING AS EXPECTED  Currently using 2L nc, gets sob with exertion, encouraged to deep breath & use IS.

## 2017-06-13 NOTE — PROGRESS NOTES
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   Eyes: Negative for double vision.   Cardiovascular: Positive for leg swelling. Negative for chest pain.   Gastrointestinal: Negative for abdominal pain.   Genitourinary:        Voiding    Musculoskeletal: Positive for back pain and joint pain (left knee).   Neurological: Negative for focal weakness and headaches.   All other systems reviewed and are negative.    Hemodynamics:  Blood pressure 113/58, pulse 71, temperature 37.3 °C (99.2 °F), resp. rate 14, height 1.778 m (5' 10\"), weight 125 kg (275 lb 9.2 oz), SpO2 95 %.     Respiratory:    Respiration: 14, Pulse Oximetry: 95 %     Work Of Breathing / Effort: Moderate;Tachypnea;Increased Work of Breathing (Increased work of breathing when mobilizing)  RUL Breath Sounds: Clear, RML Breath Sounds: Clear, RLL Breath Sounds: Clear;Diminished, ABIGAIL Breath Sounds: Clear, LLL Breath Sounds: Clear;Diminished  Fluids:    Intake/Output Summary (Last 24 hours) at 06/13/17 1156  Last data filed at 06/13/17 0800   Gross per 24 hour   Intake   1150 ml   Output   1900 ml   Net   -750 ml     Admit Weight: 117.935 kg (260 lb)  Current Weight: 125 kg (275 lb 9.2 oz)    Physical Exam   Constitutional: He is oriented to person, place, and time. He appears well-nourished. No distress.   HENT:   Head: Normocephalic and atraumatic.   Eyes: No scleral icterus.   Left eye premorbid ptosis    Neck: " Neck supple. No tracheal deviation present.   Cardiovascular: Normal rate and regular rhythm.    Pulmonary/Chest: Effort normal. No respiratory distress.   Chest wound site without erythema    Abdominal: Soft. He exhibits no distension. There is no tenderness.   Musculoskeletal: Normal range of motion.   Left flank ecchymosis extending down to buttock.    Neurological: He is alert and oriented to person, place, and time.   Skin: Skin is warm and dry.   Psychiatric: He has a normal mood and affect. His behavior is normal.   Nursing note and vitals reviewed.      Medical Decision Making/Problem List:    Active Hospital Problems    Diagnosis   • Chronic deep vein thrombosis (DVT) of popliteal vein (CMS-HCC) [I82.539]     Priority: High     Present on admission, takes outpatient coumadin  Old clot in left popliteal vein  6/4 - Prophylactic Lovenox  6/6 - Therapeutic Lovenox started  6/8 - Stopped upon ICU transfer  6/11 - Trauma screening bilateral lower extremity venous duplex positive partially occlusive CHRONIC DVT seen in the left popliteal - No acute DVT noted   6/12 - Prophylactic Lovenox resumed      • Acute blood loss anemia [D62]     Priority: High     1L of blood loss reported on scene  Large paraspinous, posterior hematoma  6/10 - drop in H/H- transfuse 1 unit PRBC / Iron studies low - replace per protocol.  6/13 - Stable Hgb  Transfuse 1 unit PRBC if Hb < 7.0     • Obesity (BMI 30-39.9) [E66.9]     Priority: Medium     BMI -  38.8     • Chronic congestive heart failure (CMS-HCC) [I50.9]     Priority: Medium     6/6 - Home lasix and potassium started  6/8 - Lasix stopped upon ICU transfer  6/11- BNP low / restart lasix as BLE edema      • CAD (coronary artery disease) [I25.10]     Priority: Medium     6/6 - Home metoprolol restarted     • AV block, Mobitz 1 [I44.1]     Priority: Medium     Observation only  6/8 - EKG changes with frequent PVC's. Troponin < 0.02.  6/11 - Restart metoprolol  Cruz Vargas MD.  Cardiology       • Penetrating back wound [S21.239A]     Priority: Medium     Large left paraspinous, posterior hematoma  No active extravasation  6/8 - CT chest showed slightly larger SQ fluid than on admission. No discrete drainable hematoma  Ancef x 24hrs       • History of pulmonary embolus (PE) [Z86.711]     Priority: Medium     With DVT  On warfarin as an outpatient  IVC filter present   Restart anticoagulation as outpatient.        • Warfarin-induced coagulopathy (CMS-HCC) [T45.511A, D68.9]     Priority: Medium     Coumadin for h/o PE  INR 2 on arrival, given 1 mg Factor VII and Vitamin K  Trend INR, correct as clinically warranted  Restart coumadin on discharge       • Acute pain of left knee [M25.562]     Priority: Low     Prior TKR  6/11 - pain with mobility / x ray: Negative for left knee fracture or malalignment      • Hemothorax on left [J94.2]     Priority: Low     300cc evacuated initially  24F chest tube placed  6/6 - Water sealed  6/7 - Interval removal of chest tube  6/8 - No pneumothorax post chest tube removal.  Serial CXR         Core Measures & Quality Metrics:  Labs reviewed, Medications reviewed and Radiology images reviewed  Castaneda catheter: No Castaneda      DVT Prophylaxis: Enoxaparin (Lovenox)  DVT prophylaxis - mechanical: SCDs  Ulcer prophylaxis: Not indicated        Total Score: 8  ETOH Screening     Intervention complete date: 6/13/2017  Patient response to intervention: Denies substance abuse - no further intervention indicated .     Discussed patient condition with RN, Patient and trauma surgery. Dr. IVIS Etienne     Patient seen, data reviewed and discussed.  Agree with assessment and plan.  UJSTEN

## 2017-06-14 ENCOUNTER — APPOINTMENT (OUTPATIENT)
Dept: RADIOLOGY | Facility: MEDICAL CENTER | Age: 77
DRG: 907 | End: 2017-06-14
Attending: NURSE PRACTITIONER
Payer: MEDICARE

## 2017-06-14 PROBLEM — J94.2 HEMOTHORAX ON LEFT: Status: RESOLVED | Noted: 2017-06-02 | Resolved: 2017-06-14

## 2017-06-14 PROBLEM — C80.1 CANCER (HCC): Status: ACTIVE | Noted: 2017-06-14

## 2017-06-14 PROBLEM — J94.2 HEMOTHORAX ON LEFT: Status: ACTIVE | Noted: 2017-06-14

## 2017-06-14 LAB
ANION GAP SERPL CALC-SCNC: 5 MMOL/L (ref 0–11.9)
ANISOCYTOSIS BLD QL SMEAR: ABNORMAL
BASOPHILS # BLD AUTO: 0.4 % (ref 0–1.8)
BASOPHILS # BLD: 0.04 K/UL (ref 0–0.12)
BNP SERPL-MCNC: 108 PG/ML (ref 0–100)
BUN SERPL-MCNC: 17 MG/DL (ref 8–22)
CALCIUM SERPL-MCNC: 8.1 MG/DL (ref 8.5–10.5)
CHLORIDE SERPL-SCNC: 104 MMOL/L (ref 96–112)
CO2 SERPL-SCNC: 26 MMOL/L (ref 20–33)
COMMENT 1642: NORMAL
CREAT SERPL-MCNC: 0.75 MG/DL (ref 0.5–1.4)
EKG IMPRESSION: NORMAL
EOSINOPHIL # BLD AUTO: 0.14 K/UL (ref 0–0.51)
EOSINOPHIL NFR BLD: 1.3 % (ref 0–6.9)
ERYTHROCYTE [DISTWIDTH] IN BLOOD BY AUTOMATED COUNT: 63.9 FL (ref 35.9–50)
GFR SERPL CREATININE-BSD FRML MDRD: >60 ML/MIN/1.73 M 2
GLUCOSE BLD-MCNC: 102 MG/DL (ref 65–99)
GLUCOSE BLD-MCNC: 143 MG/DL (ref 65–99)
GLUCOSE BLD-MCNC: 156 MG/DL (ref 65–99)
GLUCOSE BLD-MCNC: 177 MG/DL (ref 65–99)
GLUCOSE SERPL-MCNC: 141 MG/DL (ref 65–99)
HCT VFR BLD AUTO: 28.7 % (ref 42–52)
HGB BLD-MCNC: 8.6 G/DL (ref 14–18)
HYPOCHROMIA BLD QL SMEAR: ABNORMAL
IMM GRANULOCYTES # BLD AUTO: 0.66 K/UL (ref 0–0.11)
IMM GRANULOCYTES NFR BLD AUTO: 5.9 % (ref 0–0.9)
LYMPHOCYTES # BLD AUTO: 1.26 K/UL (ref 1–4.8)
LYMPHOCYTES NFR BLD: 11.3 % (ref 22–41)
MCH RBC QN AUTO: 29.8 PG (ref 27–33)
MCHC RBC AUTO-ENTMCNC: 30 G/DL (ref 33.7–35.3)
MCV RBC AUTO: 99.3 FL (ref 81.4–97.8)
MICROCYTES BLD QL SMEAR: ABNORMAL
MONOCYTES # BLD AUTO: 0.85 K/UL (ref 0–0.85)
MONOCYTES NFR BLD AUTO: 7.6 % (ref 0–13.4)
MORPHOLOGY BLD-IMP: NORMAL
NEUTROPHILS # BLD AUTO: 8.21 K/UL (ref 1.82–7.42)
NEUTROPHILS NFR BLD: 73.5 % (ref 44–72)
NRBC # BLD AUTO: 0.03 K/UL
NRBC BLD AUTO-RTO: 0.3 /100 WBC
OVALOCYTES BLD QL SMEAR: NORMAL
PLATELET # BLD AUTO: 248 K/UL (ref 164–446)
PLATELET BLD QL SMEAR: NORMAL
PMV BLD AUTO: 9.7 FL (ref 9–12.9)
POIKILOCYTOSIS BLD QL SMEAR: NORMAL
POLYCHROMASIA BLD QL SMEAR: NORMAL
POTASSIUM SERPL-SCNC: 4.4 MMOL/L (ref 3.6–5.5)
RBC # BLD AUTO: 2.89 M/UL (ref 4.7–6.1)
RBC BLD AUTO: PRESENT
SODIUM SERPL-SCNC: 135 MMOL/L (ref 135–145)
WBC # BLD AUTO: 11.2 K/UL (ref 4.8–10.8)

## 2017-06-14 PROCEDURE — A9270 NON-COVERED ITEM OR SERVICE: HCPCS | Performed by: SURGERY

## 2017-06-14 PROCEDURE — 80048 BASIC METABOLIC PNL TOTAL CA: CPT

## 2017-06-14 PROCEDURE — 93005 ELECTROCARDIOGRAM TRACING: CPT | Performed by: SURGERY

## 2017-06-14 PROCEDURE — A9270 NON-COVERED ITEM OR SERVICE: HCPCS | Performed by: NURSE PRACTITIONER

## 2017-06-14 PROCEDURE — 82962 GLUCOSE BLOOD TEST: CPT | Mod: 91

## 2017-06-14 PROCEDURE — 700102 HCHG RX REV CODE 250 W/ 637 OVERRIDE(OP): Performed by: NURSE PRACTITIONER

## 2017-06-14 PROCEDURE — 700102 HCHG RX REV CODE 250 W/ 637 OVERRIDE(OP): Performed by: SURGERY

## 2017-06-14 PROCEDURE — 83880 ASSAY OF NATRIURETIC PEPTIDE: CPT

## 2017-06-14 PROCEDURE — 99233 SBSQ HOSP IP/OBS HIGH 50: CPT | Performed by: SURGERY

## 2017-06-14 PROCEDURE — 700112 HCHG RX REV CODE 229: Performed by: NURSE PRACTITIONER

## 2017-06-14 PROCEDURE — 93010 ELECTROCARDIOGRAM REPORT: CPT | Performed by: INTERNAL MEDICINE

## 2017-06-14 PROCEDURE — 770006 HCHG ROOM/CARE - MED/SURG/GYN SEMI*

## 2017-06-14 PROCEDURE — 85025 COMPLETE CBC W/AUTO DIFF WBC: CPT

## 2017-06-14 PROCEDURE — 700111 HCHG RX REV CODE 636 W/ 250 OVERRIDE (IP): Performed by: SURGERY

## 2017-06-14 RX ADMIN — SENNOSIDES AND DOCUSATE SODIUM 1 TABLET: 8.6; 5 TABLET ORAL at 20:48

## 2017-06-14 RX ADMIN — INSULIN LISPRO 2 UNITS: 100 INJECTION, SOLUTION INTRAVENOUS; SUBCUTANEOUS at 21:03

## 2017-06-14 RX ADMIN — METOPROLOL TARTRATE 25 MG: 25 TABLET, FILM COATED ORAL at 08:09

## 2017-06-14 RX ADMIN — POLYETHYLENE GLYCOL (3350) 1 PACKET: 17 POWDER, FOR SOLUTION ORAL at 20:49

## 2017-06-14 RX ADMIN — ERYTHROMYCIN: 5 OINTMENT OPHTHALMIC at 08:10

## 2017-06-14 RX ADMIN — DOCUSATE SODIUM 100 MG: 100 CAPSULE ORAL at 20:48

## 2017-06-14 RX ADMIN — ENOXAPARIN SODIUM 30 MG: 100 INJECTION SUBCUTANEOUS at 20:49

## 2017-06-14 RX ADMIN — FINASTERIDE 5 MG: 5 TABLET, FILM COATED ORAL at 08:09

## 2017-06-14 RX ADMIN — METOPROLOL TARTRATE 25 MG: 25 TABLET, FILM COATED ORAL at 20:49

## 2017-06-14 RX ADMIN — ENOXAPARIN SODIUM 30 MG: 100 INJECTION SUBCUTANEOUS at 09:11

## 2017-06-14 RX ADMIN — DOCUSATE SODIUM 100 MG: 100 CAPSULE ORAL at 08:09

## 2017-06-14 ASSESSMENT — PATIENT HEALTH QUESTIONNAIRE - PHQ9
SUM OF ALL RESPONSES TO PHQ QUESTIONS 1-9: 0
SUM OF ALL RESPONSES TO PHQ9 QUESTIONS 1 AND 2: 0
1. LITTLE INTEREST OR PLEASURE IN DOING THINGS: NOT AT ALL

## 2017-06-14 ASSESSMENT — ENCOUNTER SYMPTOMS
ROS GI COMMENTS: BM 6/13
GASTROINTESTINAL NEGATIVE: 1
RESPIRATORY NEGATIVE: 1
MYALGIAS: 0
NEUROLOGICAL NEGATIVE: 1
CONSTITUTIONAL NEGATIVE: 1
BACK PAIN: 1
BLURRED VISION: 1
NECK PAIN: 0

## 2017-06-14 ASSESSMENT — PAIN SCALES - GENERAL
PAINLEVEL_OUTOF10: 1
PAINLEVEL_OUTOF10: 1
PAINLEVEL_OUTOF10: 0
PAINLEVEL_OUTOF10: 0
PAINLEVEL_OUTOF10: 1
PAINLEVEL_OUTOF10: 1
PAINLEVEL_OUTOF10: 2

## 2017-06-14 ASSESSMENT — LIFESTYLE VARIABLES
DO YOU DRINK ALCOHOL: YES
SUBSTANCE_ABUSE: 0

## 2017-06-14 NOTE — CARE PLAN
Problem: Safety  Goal: Will remain free from injury  Outcome: PROGRESSING AS EXPECTED  Up oob to bedside commode, discussed use of siderails & FWW with assistance of 2 healthcare workers.  Patient understands use of safe transfers/mobility.     Problem: Venous Thromboembolism (VTW)/Deep Vein Thrombosis (DVT) Prevention:  Goal: Patient will participate in Venous Thrombosis (VTE)/Deep Vein Thrombosis (DVT)Prevention Measures  Outcome: PROGRESSING AS EXPECTED  Participating in use of current measures for DVT prophylaxis-SCDS & LMWH.  Encouraged mobilization to bedside commode.

## 2017-06-14 NOTE — PROGRESS NOTES
"  Trauma/Surgical Progress Note    Author: Ayse Hafsa Date & Time created: 6/14/2017   10:52 AM     Interval Events:  Doing well  Adequate pain control, tolerating oral diet, bladder and bowel function at baseline  Medically stable for transfer to [facility] on telemonitoring  PT/OT  Rehab referral pending  Attending Dr. Amin updated     Review of Systems   Constitutional: Negative.    HENT: Negative.    Eyes: Positive for blurred vision (Minimal to left eye).   Respiratory: Negative.    Cardiovascular: Positive for leg swelling. Negative for chest pain.   Gastrointestinal: Negative.         BM 6/13   Genitourinary: Negative.         Voiding   Musculoskeletal: Positive for back pain. Negative for myalgias, joint pain and neck pain.   Skin: Negative.    Neurological: Negative.    Psychiatric/Behavioral: Negative for substance abuse.     Hemodynamics:  Blood pressure 113/58, pulse 81, temperature 36.8 °C (98.2 °F), resp. rate 34, height 1.778 m (5' 10\"), weight 126 kg (277 lb 12.5 oz), SpO2 98 %.     Respiratory:    Respiration: (!) 34, Pulse Oximetry: 98 %     Work Of Breathing / Effort: Mild;Tachypnea  RUL Breath Sounds: Clear, RML Breath Sounds: Clear, RLL Breath Sounds: Diminished, ABIGAIL Breath Sounds: Clear, LLL Breath Sounds: Diminished  Fluids:    Intake/Output Summary (Last 24 hours) at 06/14/17 1052  Last data filed at 06/14/17 0900   Gross per 24 hour   Intake   1150 ml   Output   1550 ml   Net   -400 ml     Admit Weight: 117.935 kg (260 lb)  Current Weight: (!) 126 kg (277 lb 12.5 oz)    Physical Exam   Constitutional: He is oriented to person, place, and time. He appears well-developed. No distress.   HENT:   Head: Normocephalic.   Eyes: Pupils are equal, round, and reactive to light. No scleral icterus.   Left eye premorbid ptosis   Neck: Normal range of motion. Neck supple. No JVD present. No tracheal deviation present.   Cardiovascular: Normal rate and regular rhythm.    Pulmonary/Chest: Effort normal and " breath sounds normal. No respiratory distress. He exhibits no tenderness.   Chest wound site without erythema   Abdominal: Soft. Bowel sounds are normal. He exhibits no distension. There is no tenderness. There is no guarding.   Musculoskeletal: Normal range of motion.   Left flank ecchymosis extending down to buttock   Neurological: He is alert and oriented to person, place, and time.   Skin: Skin is warm and dry.   Psychiatric: He has a normal mood and affect. His behavior is normal.   Nursing note and vitals reviewed.      Medical Decision Making/Problem List:    Active Hospital Problems    Diagnosis   • Chronic deep vein thrombosis (DVT) of popliteal vein (CMS-HCC) [I82.539]     Priority: High     Present on admission, takes outpatient coumadin.  Old clot in left popliteal vein.  6/4 - Prophylactic Lovenox initiated.  6/3 - Trauma screening bilateral lower extremity venous duplex positive for chronic DVT of the left popliteal vein; no acute process in either leg.  6/6 - Therapeutic Lovenox started.  6/8 - Stopped upon ICU transfer.  6/11 - Trauma screening bilateral lower extremity venous duplex positive for partially occlusive chronic DVT seen in the left popliteal vein as  membrane-like structure with response to compression and good venous flow. No evidence of acute DVT seen in the left lower extremity.  6/12 - Prophylactic Lovenox resumed.     • Obesity (BMI 30-39.9) [E66.9]     Priority: Medium     BMI -  38.8     • Chronic congestive heart failure (CMS-HCC) [I50.9]     Priority: Medium     6/6 - Home lasix and potassium started.  6/8 - Lasix stopped upon ICU transfer.  6/11- BNP low / restart lasix as BLE edema.  6/12 - BP low - Lasix and potassium on hold.  6/14 - BNP elevated. Resume Lasix.     • CAD (coronary artery disease) [I25.10]     Priority: Medium     6/6 - Home metoprolol restarted.  Held on transfer to ICU.  6/11 - Resumed.     • AV block, Mobitz 1 [I44.1]     Priority: Medium     Observation  only.  6/8 - EKG changes with frequent PVC's. Troponin < 0.02. Metoprolol held on transfer to ICU.  6/11 - Restart metoprolol  Cruz Vargas MD. Cardiology     • Acute blood loss anemia [D62]     Priority: Medium     1L of blood loss reported on scene.  Large paraspinous, posterior hematoma.  6/2 - Transfused 2 units PRBC.  6/10 - Transfused 1 unit PRBC. Iron replaced per pharmacy kinetics.  Transfuse 1 unit PRBC if Hb < 7.0.     • Penetrating back wound [S21.239A]     Priority: Medium     Large left paraspinous, posterior hematoma.  No active extravasation.  6/8 - CT chest showed slightly larger SQ fluid than on admission. No discrete drainable hematoma.  Ancef x 24hrs.     • History of pulmonary embolus (PE) [Z86.711]     Priority: Medium     With DVT.  On warfarin as an outpatient.  IVC filter present.  6/12 - Prophylactic Lovenox resumed.  Restart anticoagulation as outpatient.     • Warfarin-induced coagulopathy (CMS-HCC) [T45.511A, D68.9]     Priority: Medium     Coumadin for h/o PE.  INR 2 on arrival, given 1 mg Factor VII and Vitamin K.  Trend INR, correct as clinically warranted.  6/12 - Prophylactic Lovenox resumed.  Restart coumadin on discharge.     • Cancer (CMS-[facility]) [C80.1]     Priority: Low     Premorbid.  Squamous cell carinoma left eye.  Home eye drops resumed.     • Acute pain of left knee [M25.562]     Priority: Low     Prior TKR.  6/11 - Imaging negative for left knee fracture or malalignment.       Core Measures & Quality Metrics:  Labs reviewed, Medications reviewed and Radiology images reviewed  Castaneda catheter: No Castaneda      DVT Prophylaxis: Enoxaparin (Lovenox)  DVT prophylaxis - mechanical: SCDs  Ulcer prophylaxis: Not indicated    Assessed for rehab: Patient was assess for and/or received rehabilitation services during this hospitalization    Total Score: 8    ETOH Screening     Intervention complete date: 6/13/2017  Patient response to intervention: Denies substance abuse - no further  intervention indicated .       Discussed patient condition with RN, Patient and trauma surgery. Dr. Link    Seen on rounds  Clinically stable  Ok to floor  Discussed with RN, RT, pharmacy, and Ayse Link MD

## 2017-06-14 NOTE — DISCHARGE PLANNING
Care Transition Team Assessment  Spoke to wife for CTT assessment. They live in [address] [facility] in [place]. Pt resides on ground level. Wife states pt has been weak and moving slowly following radiation for eyelid Ca. Pt is independent with ADLs. Discharge planning needs undetermined at this time. CTT will continue to follow.  Information Source  Orientation : Oriented x 4  Information Given By: Significant Other  Informant's Name: wifeEmma  Who is responsible for making decisions for patient? : Patient    Readmission Evaluation  Is this a readmission?: No    Elopement Risk  Legal Hold: No  Ambulatory or Self Mobile in Wheelchair: No-Not an Elopement Risk  Disoriented: No  Psychiatric Symptoms: None  History of Wandering: No  Elopement this Admit: No  Vocalizing Wanting to Leave: No  Displays Behaviors, Body Language Wanting to Leave: No-Not at Risk for Elopement  Elopement Risk: Not at Risk for Elopement    Interdisciplinary Discharge Planning  Does Admitting Nurse Feel This Could be a Complex Discharge?: No  Primary Care Physician:   Lives with - Patient's Self Care Capacity: Spouse  Patient or legal guardian wants to designate a caregiver (see row info): No  Support Systems: Family Member(s), Friends / Neighbors  Housing / Facility: [address] [place] [address]  Do You Take your Prescribed Medications Regularly: Yes  Able to Return to Previous ADL's: Yes  Mobility Issues: No  Prior Services: None, Home-Independent  Patient Expects to be Discharged to:: home  Assistance Needed: Unknown at this Time  Durable Medical Equipment: Not Applicable    Discharge Preparedness  What is your plan after discharge?: Uncertain - pending medical team collaboration  What are your discharge supports?: Spouse              Vision / Hearing Impairment  Vision Impairment : Yes  Right Eye Vision: Wears Glasses  Left Eye Vision: Other (Comments) (stitches in place from previous sx)  Hearing Impairment : No    Values / Beliefs /  Concerns  Values / Beliefs Concerns : No  Special Hospitalization Concerns: none    Advance Directive  Advance Directive?: None, Clinically incapacitated / Inappropriate    Domestic Abuse  Have you ever been the victim of abuse or violence?: No  Physical Abuse or Sexual Abuse: No  Verbal Abuse or Emotional Abuse: No  Possible Abuse Reported to:: Not Applicable    Psychological Assessment  History of Substance Abuse: None  History of Psychiatric Problems: No         Anticipated Discharge Information  Anticipated discharge disposition: Discharge needs currently unknown

## 2017-06-14 NOTE — CARE PLAN
Problem: Venous Thromboembolism (VTW)/Deep Vein Thrombosis (DVT) Prevention:  Goal: Patient will participate in Venous Thrombosis (VTE)/Deep Vein Thrombosis (DVT)Prevention Measures  Outcome: PROGRESSING AS EXPECTED    Problem: Bowel/Gastric:  Goal: Normal bowel function is maintained or improved  Outcome: PROGRESSING AS EXPECTED  Goal: Will not experience complications related to bowel motility  Outcome: PROGRESSING AS EXPECTED    Problem: Skin Integrity  Goal: Risk for impaired skin integrity will decrease  Outcome: PROGRESSING AS EXPECTED    Problem: Respiratory:  Goal: Respiratory status will improve  Outcome: PROGRESSING AS EXPECTED

## 2017-06-14 NOTE — PROGRESS NOTES
Dr. Link notified of new onset 2nd degree heart block, ordered EKG    Cardiology already consulted and aware of AV block, no intervention unless patient becomes bradycardic  OK to transfer to floor

## 2017-06-15 ENCOUNTER — APPOINTMENT (OUTPATIENT)
Dept: RADIOLOGY | Facility: MEDICAL CENTER | Age: 77
DRG: 907 | End: 2017-06-15
Attending: NURSE PRACTITIONER
Payer: MEDICARE

## 2017-06-15 LAB
ANION GAP SERPL CALC-SCNC: 8 MMOL/L (ref 0–11.9)
ANISOCYTOSIS BLD QL SMEAR: ABNORMAL
BASOPHILS # BLD AUTO: 0 % (ref 0–1.8)
BASOPHILS # BLD: 0 K/UL (ref 0–0.12)
BNP SERPL-MCNC: 217 PG/ML (ref 0–100)
BUN SERPL-MCNC: 20 MG/DL (ref 8–22)
CALCIUM SERPL-MCNC: 8 MG/DL (ref 8.5–10.5)
CHLORIDE SERPL-SCNC: 107 MMOL/L (ref 96–112)
CO2 SERPL-SCNC: 23 MMOL/L (ref 20–33)
CREAT SERPL-MCNC: 0.69 MG/DL (ref 0.5–1.4)
EOSINOPHIL # BLD AUTO: 0.19 K/UL (ref 0–0.51)
EOSINOPHIL NFR BLD: 1.7 % (ref 0–6.9)
ERYTHROCYTE [DISTWIDTH] IN BLOOD BY AUTOMATED COUNT: 69 FL (ref 35.9–50)
GFR SERPL CREATININE-BSD FRML MDRD: >60 ML/MIN/1.73 M 2
GLUCOSE BLD-MCNC: 103 MG/DL (ref 65–99)
GLUCOSE BLD-MCNC: 218 MG/DL (ref 65–99)
GLUCOSE SERPL-MCNC: 149 MG/DL (ref 65–99)
HCT VFR BLD AUTO: 26.8 % (ref 42–52)
HGB BLD-MCNC: 8.1 G/DL (ref 14–18)
LG PLATELETS BLD QL SMEAR: NORMAL
LYMPHOCYTES # BLD AUTO: 0.78 K/UL (ref 1–4.8)
LYMPHOCYTES NFR BLD: 7 % (ref 22–41)
MACROCYTES BLD QL SMEAR: ABNORMAL
MANUAL DIFF BLD: NORMAL
MCH RBC QN AUTO: 30 PG (ref 27–33)
MCHC RBC AUTO-ENTMCNC: 30.2 G/DL (ref 33.7–35.3)
MCV RBC AUTO: 99.3 FL (ref 81.4–97.8)
METAMYELOCYTES NFR BLD MANUAL: 1.7 %
MONOCYTES # BLD AUTO: 0.39 K/UL (ref 0–0.85)
MONOCYTES NFR BLD AUTO: 3.5 % (ref 0–13.4)
MORPHOLOGY BLD-IMP: NORMAL
NEUTROPHILS # BLD AUTO: 9.56 K/UL (ref 1.82–7.42)
NEUTROPHILS NFR BLD: 86.1 % (ref 44–72)
NRBC # BLD AUTO: 0.02 K/UL
NRBC BLD AUTO-RTO: 0.2 /100 WBC
PLATELET # BLD AUTO: 269 K/UL (ref 164–446)
PLATELET BLD QL SMEAR: NORMAL
PMV BLD AUTO: 10 FL (ref 9–12.9)
POLYCHROMASIA BLD QL SMEAR: NORMAL
POTASSIUM SERPL-SCNC: 4 MMOL/L (ref 3.6–5.5)
RBC # BLD AUTO: 2.7 M/UL (ref 4.7–6.1)
RBC BLD AUTO: PRESENT
SODIUM SERPL-SCNC: 138 MMOL/L (ref 135–145)
WBC # BLD AUTO: 11.1 K/UL (ref 4.8–10.8)

## 2017-06-15 PROCEDURE — A9270 NON-COVERED ITEM OR SERVICE: HCPCS | Performed by: NURSE PRACTITIONER

## 2017-06-15 PROCEDURE — 83880 ASSAY OF NATRIURETIC PEPTIDE: CPT

## 2017-06-15 PROCEDURE — 700102 HCHG RX REV CODE 250 W/ 637 OVERRIDE(OP): Performed by: SURGERY

## 2017-06-15 PROCEDURE — 700111 HCHG RX REV CODE 636 W/ 250 OVERRIDE (IP): Performed by: SURGERY

## 2017-06-15 PROCEDURE — 82962 GLUCOSE BLOOD TEST: CPT | Mod: 91

## 2017-06-15 PROCEDURE — A9270 NON-COVERED ITEM OR SERVICE: HCPCS | Performed by: SURGERY

## 2017-06-15 PROCEDURE — 71010 DX-CHEST-PORTABLE (1 VIEW): CPT

## 2017-06-15 PROCEDURE — A6250 SKIN SEAL PROTECT MOISTURIZR: HCPCS | Performed by: SURGERY

## 2017-06-15 PROCEDURE — 85007 BL SMEAR W/DIFF WBC COUNT: CPT

## 2017-06-15 PROCEDURE — 700112 HCHG RX REV CODE 229: Performed by: NURSE PRACTITIONER

## 2017-06-15 PROCEDURE — 306310 ANTI-EMBOLISM STOCKINGS XXLRG REG: Performed by: SURGERY

## 2017-06-15 PROCEDURE — 80048 BASIC METABOLIC PNL TOTAL CA: CPT

## 2017-06-15 PROCEDURE — 85027 COMPLETE CBC AUTOMATED: CPT

## 2017-06-15 PROCEDURE — 51798 US URINE CAPACITY MEASURE: CPT

## 2017-06-15 PROCEDURE — 99233 SBSQ HOSP IP/OBS HIGH 50: CPT | Performed by: SURGERY

## 2017-06-15 PROCEDURE — 770020 HCHG ROOM/CARE - TELE (206)

## 2017-06-15 PROCEDURE — 700102 HCHG RX REV CODE 250 W/ 637 OVERRIDE(OP): Performed by: NURSE PRACTITIONER

## 2017-06-15 RX ORDER — FUROSEMIDE 20 MG/1
20 TABLET ORAL
Status: DISCONTINUED | OUTPATIENT
Start: 2017-06-15 | End: 2017-06-16

## 2017-06-15 RX ORDER — ALBUTEROL SULFATE 90 UG/1
2 AEROSOL, METERED RESPIRATORY (INHALATION) EVERY 4 HOURS PRN
Status: DISCONTINUED | OUTPATIENT
Start: 2017-06-15 | End: 2017-06-28 | Stop reason: HOSPADM

## 2017-06-15 RX ADMIN — INSULIN LISPRO 3 UNITS: 100 INJECTION, SOLUTION INTRAVENOUS; SUBCUTANEOUS at 21:38

## 2017-06-15 RX ADMIN — ERYTHROMYCIN: 5 OINTMENT OPHTHALMIC at 08:53

## 2017-06-15 RX ADMIN — FINASTERIDE 5 MG: 5 TABLET, FILM COATED ORAL at 08:49

## 2017-06-15 RX ADMIN — ENOXAPARIN SODIUM 30 MG: 100 INJECTION SUBCUTANEOUS at 21:00

## 2017-06-15 RX ADMIN — METOPROLOL TARTRATE 25 MG: 25 TABLET, FILM COATED ORAL at 08:49

## 2017-06-15 RX ADMIN — OXYCODONE HYDROCHLORIDE 5 MG: 5 TABLET ORAL at 21:29

## 2017-06-15 RX ADMIN — FUROSEMIDE 20 MG: 20 TABLET ORAL at 12:24

## 2017-06-15 RX ADMIN — DOCUSATE SODIUM 100 MG: 100 CAPSULE ORAL at 08:49

## 2017-06-15 RX ADMIN — ENOXAPARIN SODIUM 30 MG: 100 INJECTION SUBCUTANEOUS at 08:52

## 2017-06-15 RX ADMIN — ACETAMINOPHEN 650 MG: 325 TABLET, FILM COATED ORAL at 08:48

## 2017-06-15 RX ADMIN — METOPROLOL TARTRATE 25 MG: 25 TABLET, FILM COATED ORAL at 21:29

## 2017-06-15 ASSESSMENT — ENCOUNTER SYMPTOMS
CONSTITUTIONAL NEGATIVE: 1
BACK PAIN: 1
MYALGIAS: 0
ROS GI COMMENTS: BM 6/15
RESPIRATORY NEGATIVE: 1
NECK PAIN: 0
GASTROINTESTINAL NEGATIVE: 1
BLURRED VISION: 1
NEUROLOGICAL NEGATIVE: 1

## 2017-06-15 ASSESSMENT — PAIN SCALES - GENERAL
PAINLEVEL_OUTOF10: 6
PAINLEVEL_OUTOF10: 4
PAINLEVEL_OUTOF10: 2
PAINLEVEL_OUTOF10: 5
PAINLEVEL_OUTOF10: 5
PAINLEVEL_OUTOF10: 0
PAINLEVEL_OUTOF10: 5
PAINLEVEL_OUTOF10: 0
PAINLEVEL_OUTOF10: 0
PAINLEVEL_OUTOF10: 5
PAINLEVEL_OUTOF10: 5

## 2017-06-15 ASSESSMENT — LIFESTYLE VARIABLES: SUBSTANCE_ABUSE: 0

## 2017-06-15 NOTE — PROGRESS NOTES
Tech reported bladder scan volume 407ml. Straight cath performed without difficulty with charge AKUA Chery at bedside.

## 2017-06-15 NOTE — CARE PLAN
Problem: Safety  Goal: Will remain free from injury  Safety precautions in place    Problem: Mobility  Goal: Risk for activity intolerance will decrease  Assisted with mobility to commode and chair, patient tolerated well.

## 2017-06-15 NOTE — PROGRESS NOTES
"  Trauma/Surgical Progress Note    Author: Ayse Hafsa Date & Time created: 6/15/2017   11:37 AM     Interval Events:  Remains in the ICU awaiting GSU with telemonitoring bed  Up to chair, doing well, no complaints  BNP trend up, creatinine stable  Restart Lasix  Ongoing therapy needs  Rehab referral pending    Review of Systems   Constitutional: Negative.    HENT: Negative.    Eyes: Positive for blurred vision (Minimal to left eye).   Respiratory: Negative.    Cardiovascular: Positive for leg swelling. Negative for chest pain.   Gastrointestinal: Negative.         BM 6/15   Genitourinary: Negative.         Voiding  Hesitancy overnight   Musculoskeletal: Positive for back pain. Negative for myalgias, joint pain and neck pain.   Skin: Negative.    Neurological: Negative.    Psychiatric/Behavioral: Negative for substance abuse.     Hemodynamics:  Blood pressure 103/52, pulse 75, temperature 37 °C (98.6 °F), resp. rate 33, height 1.778 m (5' 10\"), weight 126.2 kg (278 lb 3.5 oz), SpO2 98 %.     Respiratory:    Respiration: (!) 33, Pulse Oximetry: 98 %     Work Of Breathing / Effort: Mild;Tachypnea  RUL Breath Sounds: Clear, RML Breath Sounds: Diminished, RLL Breath Sounds: Diminished, ABIGAIL Breath Sounds: Clear, LLL Breath Sounds: Diminished  Fluids:    Intake/Output Summary (Last 24 hours) at 06/15/17 1137  Last data filed at 06/15/17 1000   Gross per 24 hour   Intake    737 ml   Output   1340 ml   Net   -603 ml     Admit Weight: 117.935 kg (260 lb)  Current Weight: (!) 126.2 kg (278 lb 3.5 oz)    Physical Exam   Constitutional: He is oriented to person, place, and time. He appears well-developed. No distress.   HENT:   Head: Normocephalic.   Eyes: No scleral icterus.   Left eye premorbid ptosis   Neck: Neck supple. No JVD present. No tracheal deviation present.   Cardiovascular: Normal rate.    Pulmonary/Chest: Effort normal. No respiratory distress.   Chest wound site without erythema   Musculoskeletal: Normal range " of motion.   Left flank ecchymosis extending down to buttock   Neurological: He is alert and oriented to person, place, and time.   Skin: Skin is warm and dry.   Psychiatric: He has a normal mood and affect. His behavior is normal.   Nursing note and vitals reviewed.      Medical Decision Making/Problem List:    Active Hospital Problems    Diagnosis   • Chronic deep vein thrombosis (DVT) of popliteal vein (CMS-HCC) [I82.539]     Priority: High     Present on admission, takes outpatient coumadin.  Old clot in left popliteal vein.  6/4 - Prophylactic Lovenox initiated.  6/3 - Trauma screening bilateral lower extremity venous duplex positive for chronic DVT of the left popliteal vein; no acute process in either leg.  6/6 - Therapeutic Lovenox started.  6/8 - Stopped upon ICU transfer.  6/11 - Trauma screening bilateral lower extremity venous duplex positive for partially occlusive chronic DVT seen in the left popliteal vein as  membrane-like structure with response to compression and good venous flow. No evidence of acute DVT seen in the left lower extremity.  6/12 - Prophylactic Lovenox resumed.     • Obesity (BMI 30-39.9) [E66.9]     Priority: Medium     BMI -  38.8     • Chronic congestive heart failure (CMS-HCC) [I50.9]     Priority: Medium     6/6 - Home lasix and potassium started.  6/8 - Lasix stopped upon ICU transfer.  6/11- BNP low / restart lasix as BLE edema.  6/12 - BP low - Lasix and potassium on hold.  6/15 - BNP elevated. Resume Lasix.     • CAD (coronary artery disease) [I25.10]     Priority: Medium     6/6 - Home metoprolol restarted.  Held on transfer to ICU.  6/11 - Resumed.     • AV block, Mobitz 1 [I44.1]     Priority: Medium     Observation only.  6/8 - EKG changes with frequent PVC's. Troponin < 0.02. Metoprolol held on transfer to ICU.  6/11 - Restart metoprolol  Cruz Vargas MD. Cardiology     • Acute blood loss anemia [D62]     Priority: Medium     1L of blood loss reported on scene.  Large  paraspinous, posterior hematoma.  6/2 - Transfused 2 units PRBC.  6/10 - Transfused 1 unit PRBC. Iron replaced per pharmacy kinetics.  Transfuse 1 unit PRBC if Hb < 7.0.     • Penetrating back wound [S21.239A]     Priority: Medium     Large left paraspinous, posterior hematoma.  No active extravasation.  6/8 - CT chest showed slightly larger SQ fluid than on admission. No discrete drainable hematoma.  Ancef x 24hrs.     • History of pulmonary embolus (PE) [Z86.711]     Priority: Medium     With DVT.  On warfarin as an outpatient.  IVC filter present.  6/12 - Prophylactic Lovenox resumed.  Restart anticoagulation as outpatient.     • Warfarin-induced coagulopathy (CMS-HCC) [T45.511A, D68.9]     Priority: Medium     Coumadin for h/o PE.  INR 2 on arrival, given 1 mg Factor VII and Vitamin K.  Trend INR, correct as clinically warranted.  6/12 - Prophylactic Lovenox resumed.  Restart coumadin on discharge.     • Cancer (CMS-HCC) [C80.1]     Priority: Low     Premorbid.  Squamous cell carinoma left eye.  Home eye drops resumed.     • Acute pain of left knee [M25.562]     Priority: Low     Prior TKR.  6/11 - Imaging negative for left knee fracture or malalignment.       Core Measures & Quality Metrics:  Labs reviewed, Medications reviewed and Radiology images reviewed  Castaneda catheter: No Castaneda      DVT Prophylaxis: Enoxaparin (Lovenox)  DVT prophylaxis - mechanical: SCDs  Ulcer prophylaxis: Not indicated    Assessed for rehab: Patient was assess for and/or received rehabilitation services during this hospitalization    Total Score: 8    ETOH Screening     Intervention complete date: 6/13/2017  Patient response to intervention: Denies substance abuse - no further intervention indicated .       Discussed patient condition with Family, RN, Patient and trauma surgery. Dr. Link    Seen on rounds  Clinically stable  Home lasix dose reordered  Awaiting floor bed  Discussed with RN, RT, pharmacy, and Ayse Lucas  Fariba MCKENZIE

## 2017-06-15 NOTE — CARE PLAN
Problem: Pain Management  Goal: Pain level will decrease to patient’s comfort goal  Intervention: Follow pain managment plan developed in collaboration with patient and Interdisciplinary Team  Assess for pain and discomfort q2h and prn

## 2017-06-15 NOTE — CARE PLAN
Problem: Safety  Goal: Will remain free from injury  Intervention: Provide assistance with mobility  Pt ambulated back to bed from chair using walker and x 2 hand held assist with x 2 RN.  Pt with unsteady gait tolerated fair

## 2017-06-15 NOTE — PROGRESS NOTES
Report from Harman RN; pt transferred from POD 1 to POD 5 (room S-124); GSU with tele orders. VSS.

## 2017-06-16 ENCOUNTER — APPOINTMENT (OUTPATIENT)
Dept: RADIOLOGY | Facility: MEDICAL CENTER | Age: 77
DRG: 907 | End: 2017-06-16
Attending: NURSE PRACTITIONER
Payer: MEDICARE

## 2017-06-16 LAB
ANION GAP SERPL CALC-SCNC: 5 MMOL/L (ref 0–11.9)
BASOPHILS # BLD AUTO: 0.3 % (ref 0–1.8)
BASOPHILS # BLD: 0.03 K/UL (ref 0–0.12)
BNP SERPL-MCNC: 163 PG/ML (ref 0–100)
BUN SERPL-MCNC: 20 MG/DL (ref 8–22)
CALCIUM SERPL-MCNC: 8.1 MG/DL (ref 8.5–10.5)
CHLORIDE SERPL-SCNC: 107 MMOL/L (ref 96–112)
CO2 SERPL-SCNC: 25 MMOL/L (ref 20–33)
CREAT SERPL-MCNC: 0.59 MG/DL (ref 0.5–1.4)
EOSINOPHIL # BLD AUTO: 0.21 K/UL (ref 0–0.51)
EOSINOPHIL NFR BLD: 2.2 % (ref 0–6.9)
ERYTHROCYTE [DISTWIDTH] IN BLOOD BY AUTOMATED COUNT: 69 FL (ref 35.9–50)
GFR SERPL CREATININE-BSD FRML MDRD: >60 ML/MIN/1.73 M 2
GLUCOSE BLD-MCNC: 116 MG/DL (ref 65–99)
GLUCOSE BLD-MCNC: 118 MG/DL (ref 65–99)
GLUCOSE BLD-MCNC: 119 MG/DL (ref 65–99)
GLUCOSE BLD-MCNC: 151 MG/DL (ref 65–99)
GLUCOSE BLD-MCNC: 155 MG/DL (ref 65–99)
GLUCOSE BLD-MCNC: 160 MG/DL (ref 65–99)
GLUCOSE SERPL-MCNC: 128 MG/DL (ref 65–99)
HCT VFR BLD AUTO: 27.6 % (ref 42–52)
HGB BLD-MCNC: 8.4 G/DL (ref 14–18)
IMM GRANULOCYTES # BLD AUTO: 0.35 K/UL (ref 0–0.11)
IMM GRANULOCYTES NFR BLD AUTO: 3.6 % (ref 0–0.9)
LYMPHOCYTES # BLD AUTO: 0.97 K/UL (ref 1–4.8)
LYMPHOCYTES NFR BLD: 10.1 % (ref 22–41)
MCH RBC QN AUTO: 30.3 PG (ref 27–33)
MCHC RBC AUTO-ENTMCNC: 30.4 G/DL (ref 33.7–35.3)
MCV RBC AUTO: 99.6 FL (ref 81.4–97.8)
MONOCYTES # BLD AUTO: 0.73 K/UL (ref 0–0.85)
MONOCYTES NFR BLD AUTO: 7.6 % (ref 0–13.4)
NEUTROPHILS # BLD AUTO: 7.35 K/UL (ref 1.82–7.42)
NEUTROPHILS NFR BLD: 76.2 % (ref 44–72)
NRBC # BLD AUTO: 0 K/UL
NRBC BLD AUTO-RTO: 0 /100 WBC
PLATELET # BLD AUTO: 277 K/UL (ref 164–446)
PMV BLD AUTO: 10.3 FL (ref 9–12.9)
POTASSIUM SERPL-SCNC: 4.2 MMOL/L (ref 3.6–5.5)
RBC # BLD AUTO: 2.77 M/UL (ref 4.7–6.1)
SODIUM SERPL-SCNC: 137 MMOL/L (ref 135–145)
WBC # BLD AUTO: 9.6 K/UL (ref 4.8–10.8)

## 2017-06-16 PROCEDURE — 85025 COMPLETE CBC W/AUTO DIFF WBC: CPT

## 2017-06-16 PROCEDURE — 80048 BASIC METABOLIC PNL TOTAL CA: CPT

## 2017-06-16 PROCEDURE — A9270 NON-COVERED ITEM OR SERVICE: HCPCS | Performed by: NURSE PRACTITIONER

## 2017-06-16 PROCEDURE — 97116 GAIT TRAINING THERAPY: CPT

## 2017-06-16 PROCEDURE — 700102 HCHG RX REV CODE 250 W/ 637 OVERRIDE(OP): Performed by: NURSE PRACTITIONER

## 2017-06-16 PROCEDURE — 82962 GLUCOSE BLOOD TEST: CPT

## 2017-06-16 PROCEDURE — 700112 HCHG RX REV CODE 229: Performed by: NURSE PRACTITIONER

## 2017-06-16 PROCEDURE — 700102 HCHG RX REV CODE 250 W/ 637 OVERRIDE(OP): Performed by: SURGERY

## 2017-06-16 PROCEDURE — 71010 DX-CHEST-PORTABLE (1 VIEW): CPT

## 2017-06-16 PROCEDURE — A9270 NON-COVERED ITEM OR SERVICE: HCPCS | Performed by: SURGERY

## 2017-06-16 PROCEDURE — 770020 HCHG ROOM/CARE - TELE (206)

## 2017-06-16 PROCEDURE — 97530 THERAPEUTIC ACTIVITIES: CPT

## 2017-06-16 PROCEDURE — 700111 HCHG RX REV CODE 636 W/ 250 OVERRIDE (IP): Performed by: SURGERY

## 2017-06-16 PROCEDURE — 97535 SELF CARE MNGMENT TRAINING: CPT

## 2017-06-16 PROCEDURE — 36415 COLL VENOUS BLD VENIPUNCTURE: CPT

## 2017-06-16 PROCEDURE — 83880 ASSAY OF NATRIURETIC PEPTIDE: CPT

## 2017-06-16 RX ORDER — FUROSEMIDE 20 MG/1
10 TABLET ORAL
Status: DISCONTINUED | OUTPATIENT
Start: 2017-06-16 | End: 2017-06-19

## 2017-06-16 RX ORDER — OMEPRAZOLE 20 MG/1
20 CAPSULE, DELAYED RELEASE ORAL DAILY
Status: DISCONTINUED | OUTPATIENT
Start: 2017-06-16 | End: 2017-06-18

## 2017-06-16 RX ADMIN — FUROSEMIDE 10 MG: 20 TABLET ORAL at 09:39

## 2017-06-16 RX ADMIN — METOPROLOL TARTRATE 25 MG: 25 TABLET, FILM COATED ORAL at 20:17

## 2017-06-16 RX ADMIN — FINASTERIDE 5 MG: 5 TABLET, FILM COATED ORAL at 09:39

## 2017-06-16 RX ADMIN — INSULIN LISPRO 2 UNITS: 100 INJECTION, SOLUTION INTRAVENOUS; SUBCUTANEOUS at 20:25

## 2017-06-16 RX ADMIN — OXYCODONE HYDROCHLORIDE 5 MG: 5 TABLET ORAL at 23:35

## 2017-06-16 RX ADMIN — ERYTHROMYCIN: 5 OINTMENT OPHTHALMIC at 09:50

## 2017-06-16 RX ADMIN — ENOXAPARIN SODIUM 30 MG: 100 INJECTION SUBCUTANEOUS at 20:17

## 2017-06-16 RX ADMIN — DOCUSATE SODIUM 100 MG: 100 CAPSULE ORAL at 20:17

## 2017-06-16 RX ADMIN — INSULIN LISPRO 2 UNITS: 100 INJECTION, SOLUTION INTRAVENOUS; SUBCUTANEOUS at 18:01

## 2017-06-16 RX ADMIN — ENOXAPARIN SODIUM 30 MG: 100 INJECTION SUBCUTANEOUS at 09:40

## 2017-06-16 RX ADMIN — ACETAMINOPHEN 650 MG: 325 TABLET, FILM COATED ORAL at 20:17

## 2017-06-16 RX ADMIN — OMEPRAZOLE 20 MG: 20 CAPSULE, DELAYED RELEASE ORAL at 20:17

## 2017-06-16 RX ADMIN — INSULIN LISPRO 2 UNITS: 100 INJECTION, SOLUTION INTRAVENOUS; SUBCUTANEOUS at 11:32

## 2017-06-16 ASSESSMENT — LIFESTYLE VARIABLES
AVERAGE NUMBER OF DAYS PER WEEK YOU HAVE A DRINK CONTAINING ALCOHOL: .1
TOTAL SCORE: 0
HOW MANY TIMES IN THE PAST YEAR HAVE YOU HAD 5 OR MORE DRINKS IN A DAY: 0
HAVE YOU EVER FELT YOU SHOULD CUT DOWN ON YOUR DRINKING: NO
CONSUMPTION TOTAL: NEGATIVE
TOTAL SCORE: 0
EVER HAD A DRINK FIRST THING IN THE MORNING TO STEADY YOUR NERVES TO GET RID OF A HANGOVER: NO
ON A TYPICAL DAY WHEN YOU DRINK ALCOHOL HOW MANY DRINKS DO YOU HAVE: 1
HAVE PEOPLE ANNOYED YOU BY CRITICIZING YOUR DRINKING: NO
TOTAL SCORE: 0
DO YOU DRINK ALCOHOL: YES
EVER FELT BAD OR GUILTY ABOUT YOUR DRINKING: NO

## 2017-06-16 ASSESSMENT — COGNITIVE AND FUNCTIONAL STATUS - GENERAL
DAILY ACTIVITIY SCORE: 15
HELP NEEDED FOR BATHING: A LOT
TOILETING: A LITTLE
SUGGESTED CMS G CODE MODIFIER DAILY ACTIVITY: CK
EATING MEALS: A LITTLE
DRESSING REGULAR LOWER BODY CLOTHING: TOTAL
PERSONAL GROOMING: A LITTLE
DRESSING REGULAR UPPER BODY CLOTHING: A LITTLE

## 2017-06-16 ASSESSMENT — GAIT ASSESSMENTS
GAIT LEVEL OF ASSIST: MINIMAL ASSIST
ASSISTIVE DEVICE: FRONT WHEEL WALKER
DISTANCE (FEET): 40

## 2017-06-16 ASSESSMENT — PAIN SCALES - GENERAL
PAINLEVEL_OUTOF10: 4
PAINLEVEL_OUTOF10: ASSUMED PAIN PRESENT

## 2017-06-16 NOTE — CARE PLAN
Problem: Skin Integrity  Goal: Risk for impaired skin integrity will decrease  Outcome: PROGRESSING AS EXPECTED  BLE edematous. L great toe are red and swollen. Non blanching spot of bottom of foot. Wound care following.    Problem: Respiratory:  Goal: Respiratory status will improve  Outcome: PROGRESSING AS EXPECTED  Pt's lungs are diminished. Pt on 3 L N/C. Pt encouraged to use IS. Pt mobilized OOB to promote respiratory status. Increased tachypnea and tachycardia with mobilization.

## 2017-06-16 NOTE — WOUND TEAM
"Wound consult placed for evaluation of left foot redness and darkened area on plantar surface.  Removed MARZENA hose which patient relates he had thigh high MARZENA hose on \"a couple of days ago\" and overheard nursing express concern that the tightness of MARZENA hose caused redness.  Fore foot red hot; intact callous medial aspect left foot and ~1cm purple area first met head plantar surface.  Communicated with Danelle ELY who works with Dr. Black as patient reports surgery by him some years ago and hardware is in foot.  Instructed staff RN to keep MARZENA hose left leg/foot and APN to see this afternoon.  No open wound noted.  Photos taken.  "

## 2017-06-16 NOTE — PROGRESS NOTES
"Trauma / Surgical Progress Note  Interval Events:  ICU transfer  Feeling good  BM this morning  Complaining of gas pain in the upper abdomen, CXR report reviewed  Abdomen a bit distended but benign exam  Patient requesting his Lasix dose be decreased    -Daily CXR  -Follow up labs tomorrow  -PT/OT re-evals pending    ROS    Vitals:  Blood pressure 126/59, pulse 94, temperature 36.8 °C (98.3 °F), resp. rate 24, height 1.778 m (5' 10\"), weight 126.2 kg (278 lb 3.5 oz), SpO2 98 %.  Temp (24hrs), Av °C (98.6 °F), Min:36.8 °C (98.2 °F), Max:37.3 °C (99.2 °F)      IO:       Exam:  Respiratory: unlabored respirations, no intercostal retractions or accessory muscle use, 3L O2 by NC  Neuro: no focal deficits noted  Cardiovascular: regular rate and rhythm  GI: abdomen is soft, mildly distended, non-tympanitic    Labs:  Recent Results (from the past 24 hour(s))   ACCU-CHEK GLUCOSE    Collection Time: 06/15/17 12:16 PM   Result Value Ref Range    Glucose - Accu-Ck 116 (H) 65 - 99 mg/dL   ACCU-CHEK GLUCOSE    Collection Time: 06/15/17  5:51 PM   Result Value Ref Range    Glucose - Accu-Ck 119 (H) 65 - 99 mg/dL   ACCU-CHEK GLUCOSE    Collection Time: 06/15/17  9:31 PM   Result Value Ref Range    Glucose - Accu-Ck 218 (H) 65 - 99 mg/dL   CBC WITH DIFFERENTIAL    Collection Time: 17  4:32 AM   Result Value Ref Range    WBC 9.6 4.8 - 10.8 K/uL    RBC 2.77 (L) 4.70 - 6.10 M/uL    Hemoglobin 8.4 (L) 14.0 - 18.0 g/dL    Hematocrit 27.6 (L) 42.0 - 52.0 %    MCV 99.6 (H) 81.4 - 97.8 fL    MCH 30.3 27.0 - 33.0 pg    MCHC 30.4 (L) 33.7 - 35.3 g/dL    RDW 69.0 (H) 35.9 - 50.0 fL    Platelet Count 277 164 - 446 K/uL    MPV 10.3 9.0 - 12.9 fL    Neutrophils-Polys 76.20 (H) 44.00 - 72.00 %    Lymphocytes 10.10 (L) 22.00 - 41.00 %    Monocytes 7.60 0.00 - 13.40 %    Eosinophils 2.20 0.00 - 6.90 %    Basophils 0.30 0.00 - 1.80 %    Immature Granulocytes 3.60 (H) 0.00 - 0.90 %    Nucleated RBC 0.00 /100 WBC    Neutrophils (Absolute) " 7.35 1.82 - 7.42 K/uL    Lymphs (Absolute) 0.97 (L) 1.00 - 4.80 K/uL    Monos (Absolute) 0.73 0.00 - 0.85 K/uL    Eos (Absolute) 0.21 0.00 - 0.51 K/uL    Baso (Absolute) 0.03 0.00 - 0.12 K/uL    Immature Granulocytes (abs) 0.35 (H) 0.00 - 0.11 K/uL    NRBC (Absolute) 0.00 K/uL   BASIC METABOLIC PANEL    Collection Time: 06/16/17  4:32 AM   Result Value Ref Range    Sodium 137 135 - 145 mmol/L    Potassium 4.2 3.6 - 5.5 mmol/L    Chloride 107 96 - 112 mmol/L    Co2 25 20 - 33 mmol/L    Glucose 128 (H) 65 - 99 mg/dL    Bun 20 8 - 22 mg/dL    Creatinine 0.59 0.50 - 1.40 mg/dL    Calcium 8.1 (L) 8.5 - 10.5 mg/dL    Anion Gap 5.0 0.0 - 11.9   BTYPE NATRIURETIC PEPTIDE    Collection Time: 06/16/17  4:32 AM   Result Value Ref Range    B Natriuretic Peptide 163 (H) 0 - 100 pg/mL   ESTIMATED GFR    Collection Time: 06/16/17  4:32 AM   Result Value Ref Range    GFR If African American >60 >60 mL/min/1.73 m 2    GFR If Non African American >60 >60 mL/min/1.73 m 2       Problem and Plan:  Active Hospital Problems    Diagnosis   • Chronic deep vein thrombosis (DVT) of popliteal vein (CMS-[facility]) [I82.539]     Priority: High     Present on admission, takes outpatient coumadin.  Old clot in left popliteal vein.  6/4 - Prophylactic Lovenox initiated.  6/3 - Trauma screening bilateral lower extremity venous duplex positive for chronic DVT of the left popliteal vein; no acute process in either leg.  6/6 - Therapeutic Lovenox started.  6/8 - Stopped upon ICU transfer.  6/11 - Trauma screening bilateral lower extremity venous duplex positive for partially occlusive chronic DVT seen in the left popliteal vein as  membrane-like structure with response to compression and good venous flow. No evidence of acute DVT seen in the left lower extremity.  6/12 - Prophylactic Lovenox resumed.     • Obesity (BMI 30-39.9) [E66.9]     Priority: Medium     BMI -  38.8     • Chronic congestive heart failure (CMS-HCC) [I50.9]     Priority: Medium     6/6 -  Home lasix and potassium started.  6/8 - Lasix stopped upon ICU transfer.  6/11- BNP low / restart lasix as BLE edema.  6/12 - BP low - Lasix and potassium on hold.  6/15 - BNP elevated. Resume Lasix.     • CAD (coronary artery disease) [I25.10]     Priority: Medium     6/6 - Home metoprolol restarted.  Held on transfer to ICU.  6/11 - Resumed.     • AV block, Mobitz 1 [I44.1]     Priority: Medium     Observation only.  6/8 - EKG changes with frequent PVC's. Troponin < 0.02. Metoprolol held on transfer to ICU.  6/11 - Restart metoprolol  Cruz Vargas MD. Cardiology     • Acute blood loss anemia [D62]     Priority: Medium     1L of blood loss reported on scene.  Large paraspinous, posterior hematoma.  6/2 - Transfused 2 units PRBC.  6/10 - Transfused 1 unit PRBC. Iron replaced per pharmacy kinetics.  Transfuse 1 unit PRBC if Hb < 7.0.     • Penetrating back wound [S21.239A]     Priority: Medium     Large left paraspinous, posterior hematoma.  No active extravasation.  6/8 - CT chest showed slightly larger SQ fluid than on admission. No discrete drainable hematoma.  Ancef x 24hrs.     • History of pulmonary embolus (PE) [Z86.711]     Priority: Medium     With DVT.  On warfarin as an outpatient.  IVC filter present.  6/12 - Prophylactic Lovenox resumed.  Restart anticoagulation as outpatient.     • Warfarin-induced coagulopathy (CMS-HCC) [T45.511A, D68.9]     Priority: Medium     Coumadin for h/o PE.  INR 2 on arrival, given 1 mg Factor VII and Vitamin K.  Trend INR, correct as clinically warranted.  6/12 - Prophylactic Lovenox resumed.  Restart coumadin on discharge.     • Cancer (CMS-HCC) [C80.1]     Priority: Low     Premorbid.  Squamous cell carinoma left eye.  Home eye drops resumed.     • Acute pain of left knee [M25.562]     Priority: Low     Prior TKR.  6/11 - Imaging negative for left knee fracture or malalignment.         Core Measures & Quality Metrics:  Medications reviewed, Labs reviewed and Radiology  images reviewed  Castaneda catheter: No Castaneda      DVT Prophylaxis: Enoxaparin (Lovenox)  DVT prophylaxis - mechanical: SCDs  Ulcer prophylaxis: Not indicated           Dhiraj Amin MD    DATE OF SERVICE: 6/16/2017

## 2017-06-16 NOTE — PROGRESS NOTES
Pt tachypneic with wheezing.. Denies dyspnea. RT called to bedside for evaluation and recommendation. Other vital signs stable.

## 2017-06-16 NOTE — THERAPY
"Physical Therapy Treatment completed.   Bed Mobility:  Supine to Sit:  (up in chair)  Transfers: Sit to Stand: Moderate Assist (stuggles with first sit-stand, falls back. )  Gait: Level Of Assist: Minimal Assist with Front-Wheel Walker x 40 feet, but , so pt was assisted to w/c for ride back to room.        Plan of Care: Plan to complete next treatment by 6/19  Discharge Recommendations: Equipment: Will Continue to Assess for Equipment Needs. Post-acute therapy Discharge to a transitional care facility for continued skilled therapy services.     See \"Rehab Therapy-Acute\" Patient Summary Report for complete documentation.       "

## 2017-06-16 NOTE — PROGRESS NOTES
Call from monitor room, HR spiked to 160s during physical therapy. Increased SOB. Pt taken back to room in WC. HR now 103.

## 2017-06-16 NOTE — PROGRESS NOTES
Report received, poc discussed, assumed care of pt.   Call light in reach, hourly rounding in place.   Pt gets up 2 assist with FWW, OT/PT ordered.   DM diet.  + void, incontient at times. LBM 6/16.   Tylenol for pain.  No further needs.

## 2017-06-16 NOTE — PROGRESS NOTES
Refuses BA, educated on risk to fall, verbalizes understanding and still declines. Call light in reach, calls appropriately for assistance.

## 2017-06-16 NOTE — CARE PLAN
Problem: Venous Thromboembolism (VTW)/Deep Vein Thrombosis (DVT) Prevention:  Goal: Patient will participate in Venous Thrombosis (VTE)/Deep Vein Thrombosis (DVT)Prevention Measures  Outcome: PROGRESSING AS EXPECTED  On Lovenox. SCDs and MARZENA hose in place.     Problem: Urinary Elimination:  Goal: Ability to reestablish a normal urinary elimination pattern will improve  Outcome: PROGRESSING SLOWER THAN EXPECTED  On lasix, frequent need to urinate; voids via bedpan and incontinent at times. Pt voiding q shift, will monitor for retention.

## 2017-06-16 NOTE — THERAPY
"Occupational Therapy Treatment completed with focus on ADLs, ADL transfers and patient education.  Functional Status:  Pt seen for OT tx today.  Pt was pleasant and cooperative throughout the session but continues to be limited by weakness, SOB, endurance, and self care.  Pt is mod A for toileting, total assist for LB dressing, and CGA for standing gr/hy.  Pt needs frequent therapeutic rests due to SOB.  Pt completed supine to sit with mod A, sit to stand with CGA, and stand pivot with CGA needing extra time and using FWW.  Pt completed room ambulation using FWW from bed to toilet with CGA.  Pt would benefit from continued inpt OT to increase independence with functional transfers, functional endurance, and ADLs.  Plan of Care: Will benefit from Occupational Therapy 3 times per week  Discharge Recommendations:  Equipment Will Continue to Assess for Equipment Needs. Post-acute therapy Discharge to a transitional care facility for continued skilled therapy services.    See \"Rehab Therapy-Acute\" Patient Summary Report for complete documentation.   "

## 2017-06-17 ENCOUNTER — APPOINTMENT (OUTPATIENT)
Dept: RADIOLOGY | Facility: MEDICAL CENTER | Age: 77
DRG: 907 | End: 2017-06-17
Attending: SURGERY
Payer: MEDICARE

## 2017-06-17 ENCOUNTER — APPOINTMENT (OUTPATIENT)
Dept: RADIOLOGY | Facility: MEDICAL CENTER | Age: 77
DRG: 907 | End: 2017-06-17
Attending: NURSE PRACTITIONER
Payer: MEDICARE

## 2017-06-17 PROBLEM — L03.116 CELLULITIS OF LEFT FOOT: Status: ACTIVE | Noted: 2017-06-17

## 2017-06-17 PROBLEM — M79.675 PAIN OF LEFT GREAT TOE: Status: ACTIVE | Noted: 2017-06-17

## 2017-06-17 LAB
ALBUMIN SERPL BCP-MCNC: 2.7 G/DL (ref 3.2–4.9)
ALBUMIN/GLOB SERPL: 1 G/DL
ALP SERPL-CCNC: 124 U/L (ref 30–99)
ALT SERPL-CCNC: 44 U/L (ref 2–50)
ANION GAP SERPL CALC-SCNC: 5 MMOL/L (ref 0–11.9)
ANION GAP SERPL CALC-SCNC: 8 MMOL/L (ref 0–11.9)
AST SERPL-CCNC: 31 U/L (ref 12–45)
BASOPHILS # BLD AUTO: 0.2 % (ref 0–1.8)
BASOPHILS # BLD: 0.02 K/UL (ref 0–0.12)
BILIRUB SERPL-MCNC: 1 MG/DL (ref 0.1–1.5)
BUN SERPL-MCNC: 20 MG/DL (ref 8–22)
BUN SERPL-MCNC: 21 MG/DL (ref 8–22)
CALCIUM SERPL-MCNC: 8.4 MG/DL (ref 8.5–10.5)
CALCIUM SERPL-MCNC: 8.4 MG/DL (ref 8.5–10.5)
CHLORIDE SERPL-SCNC: 107 MMOL/L (ref 96–112)
CHLORIDE SERPL-SCNC: 108 MMOL/L (ref 96–112)
CO2 SERPL-SCNC: 22 MMOL/L (ref 20–33)
CO2 SERPL-SCNC: 24 MMOL/L (ref 20–33)
CREAT SERPL-MCNC: 0.77 MG/DL (ref 0.5–1.4)
CREAT SERPL-MCNC: 0.78 MG/DL (ref 0.5–1.4)
EKG IMPRESSION: NORMAL
EOSINOPHIL # BLD AUTO: 0.16 K/UL (ref 0–0.51)
EOSINOPHIL NFR BLD: 1.9 % (ref 0–6.9)
ERYTHROCYTE [DISTWIDTH] IN BLOOD BY AUTOMATED COUNT: 73.8 FL (ref 35.9–50)
EST. AVERAGE GLUCOSE BLD GHB EST-MCNC: 108 MG/DL
GFR SERPL CREATININE-BSD FRML MDRD: >60 ML/MIN/1.73 M 2
GFR SERPL CREATININE-BSD FRML MDRD: >60 ML/MIN/1.73 M 2
GLOBULIN SER CALC-MCNC: 2.8 G/DL (ref 1.9–3.5)
GLUCOSE BLD-MCNC: 121 MG/DL (ref 65–99)
GLUCOSE BLD-MCNC: 132 MG/DL (ref 65–99)
GLUCOSE BLD-MCNC: 158 MG/DL (ref 65–99)
GLUCOSE SERPL-MCNC: 111 MG/DL (ref 65–99)
GLUCOSE SERPL-MCNC: 112 MG/DL (ref 65–99)
HBA1C MFR BLD: 5.4 % (ref 0–5.6)
HCT VFR BLD AUTO: 29.7 % (ref 42–52)
HGB BLD-MCNC: 8.9 G/DL (ref 14–18)
IMM GRANULOCYTES # BLD AUTO: 0.19 K/UL (ref 0–0.11)
IMM GRANULOCYTES NFR BLD AUTO: 2.3 % (ref 0–0.9)
LYMPHOCYTES # BLD AUTO: 1.01 K/UL (ref 1–4.8)
LYMPHOCYTES NFR BLD: 12.1 % (ref 22–41)
MAGNESIUM SERPL-MCNC: 2.3 MG/DL (ref 1.5–2.5)
MCH RBC QN AUTO: 30.8 PG (ref 27–33)
MCHC RBC AUTO-ENTMCNC: 30 G/DL (ref 33.7–35.3)
MCV RBC AUTO: 102.8 FL (ref 81.4–97.8)
MONOCYTES # BLD AUTO: 0.8 K/UL (ref 0–0.85)
MONOCYTES NFR BLD AUTO: 9.6 % (ref 0–13.4)
NEUTROPHILS # BLD AUTO: 6.15 K/UL (ref 1.82–7.42)
NEUTROPHILS NFR BLD: 73.9 % (ref 44–72)
NRBC # BLD AUTO: 0 K/UL
NRBC BLD AUTO-RTO: 0 /100 WBC
PLATELET # BLD AUTO: 271 K/UL (ref 164–446)
PMV BLD AUTO: 9.8 FL (ref 9–12.9)
POTASSIUM SERPL-SCNC: 4.5 MMOL/L (ref 3.6–5.5)
POTASSIUM SERPL-SCNC: 4.6 MMOL/L (ref 3.6–5.5)
PROT SERPL-MCNC: 5.5 G/DL (ref 6–8.2)
RBC # BLD AUTO: 2.89 M/UL (ref 4.7–6.1)
SCCMEC + MECA PNL NOSE NAA+PROBE: NEGATIVE
SCCMEC + MECA PNL NOSE NAA+PROBE: NEGATIVE
SODIUM SERPL-SCNC: 137 MMOL/L (ref 135–145)
SODIUM SERPL-SCNC: 137 MMOL/L (ref 135–145)
WBC # BLD AUTO: 8.3 K/UL (ref 4.8–10.8)

## 2017-06-17 PROCEDURE — 36415 COLL VENOUS BLD VENIPUNCTURE: CPT

## 2017-06-17 PROCEDURE — 73630 X-RAY EXAM OF FOOT: CPT | Mod: LT

## 2017-06-17 PROCEDURE — 700102 HCHG RX REV CODE 250 W/ 637 OVERRIDE(OP): Performed by: NURSE PRACTITIONER

## 2017-06-17 PROCEDURE — 700102 HCHG RX REV CODE 250 W/ 637 OVERRIDE(OP): Performed by: SURGERY

## 2017-06-17 PROCEDURE — A9270 NON-COVERED ITEM OR SERVICE: HCPCS | Performed by: SURGERY

## 2017-06-17 PROCEDURE — 80053 COMPREHEN METABOLIC PANEL: CPT

## 2017-06-17 PROCEDURE — 80048 BASIC METABOLIC PNL TOTAL CA: CPT

## 2017-06-17 PROCEDURE — 87641 MR-STAPH DNA AMP PROBE: CPT

## 2017-06-17 PROCEDURE — 770020 HCHG ROOM/CARE - TELE (206)

## 2017-06-17 PROCEDURE — 87640 STAPH A DNA AMP PROBE: CPT

## 2017-06-17 PROCEDURE — 700111 HCHG RX REV CODE 636 W/ 250 OVERRIDE (IP): Performed by: SURGERY

## 2017-06-17 PROCEDURE — 71010 DX-CHEST-PORTABLE (1 VIEW): CPT

## 2017-06-17 PROCEDURE — 85025 COMPLETE CBC W/AUTO DIFF WBC: CPT

## 2017-06-17 PROCEDURE — 83036 HEMOGLOBIN GLYCOSYLATED A1C: CPT

## 2017-06-17 PROCEDURE — 93010 ELECTROCARDIOGRAM REPORT: CPT | Performed by: INTERNAL MEDICINE

## 2017-06-17 PROCEDURE — 83735 ASSAY OF MAGNESIUM: CPT

## 2017-06-17 PROCEDURE — 82962 GLUCOSE BLOOD TEST: CPT | Mod: 91

## 2017-06-17 PROCEDURE — A9270 NON-COVERED ITEM OR SERVICE: HCPCS | Performed by: NURSE PRACTITIONER

## 2017-06-17 PROCEDURE — 93005 ELECTROCARDIOGRAM TRACING: CPT | Performed by: NURSE PRACTITIONER

## 2017-06-17 RX ORDER — CEFAZOLIN SODIUM 2 G/100ML
2 INJECTION, SOLUTION INTRAVENOUS EVERY 8 HOURS
Status: DISCONTINUED | OUTPATIENT
Start: 2017-06-17 | End: 2017-06-27

## 2017-06-17 RX ADMIN — INSULIN LISPRO 2 UNITS: 100 INJECTION, SOLUTION INTRAVENOUS; SUBCUTANEOUS at 11:50

## 2017-06-17 RX ADMIN — FUROSEMIDE 10 MG: 20 TABLET ORAL at 09:46

## 2017-06-17 RX ADMIN — CEFAZOLIN SODIUM 2 G: 2 INJECTION, SOLUTION INTRAVENOUS at 09:47

## 2017-06-17 RX ADMIN — CEFAZOLIN SODIUM 2 G: 2 INJECTION, SOLUTION INTRAVENOUS at 22:00

## 2017-06-17 RX ADMIN — ENOXAPARIN SODIUM 30 MG: 100 INJECTION SUBCUTANEOUS at 20:44

## 2017-06-17 RX ADMIN — ACETAMINOPHEN 650 MG: 325 TABLET, FILM COATED ORAL at 12:00

## 2017-06-17 RX ADMIN — ERYTHROMYCIN: 5 OINTMENT OPHTHALMIC at 09:46

## 2017-06-17 RX ADMIN — FINASTERIDE 5 MG: 5 TABLET, FILM COATED ORAL at 09:46

## 2017-06-17 RX ADMIN — ENOXAPARIN SODIUM 30 MG: 100 INJECTION SUBCUTANEOUS at 09:47

## 2017-06-17 RX ADMIN — OMEPRAZOLE 20 MG: 20 CAPSULE, DELAYED RELEASE ORAL at 09:46

## 2017-06-17 RX ADMIN — CEFAZOLIN SODIUM 2 G: 2 INJECTION, SOLUTION INTRAVENOUS at 14:16

## 2017-06-17 RX ADMIN — INSULIN LISPRO 2 UNITS: 100 INJECTION, SOLUTION INTRAVENOUS; SUBCUTANEOUS at 21:38

## 2017-06-17 ASSESSMENT — ENCOUNTER SYMPTOMS
DOUBLE VISION: 0
SHORTNESS OF BREATH: 1
GASTROINTESTINAL NEGATIVE: 1
NECK PAIN: 0
BLURRED VISION: 1
BACK PAIN: 1
ABDOMINAL PAIN: 0
HEADACHES: 0
PALPITATIONS: 0
COUGH: 0
DIZZINESS: 0
ROS GI COMMENTS: BM 6/16
MYALGIAS: 0
VOMITING: 0
CONSTITUTIONAL NEGATIVE: 1
CONSTIPATION: 0
CHILLS: 0
WHEEZING: 1
FEVER: 0
FOCAL WEAKNESS: 0
NAUSEA: 0
NEUROLOGICAL NEGATIVE: 1

## 2017-06-17 ASSESSMENT — LIFESTYLE VARIABLES: SUBSTANCE_ABUSE: 0

## 2017-06-17 ASSESSMENT — PAIN SCALES - GENERAL
PAINLEVEL_OUTOF10: 0
PAINLEVEL_OUTOF10: ASSUMED PAIN PRESENT
PAINLEVEL_OUTOF10: 4

## 2017-06-17 NOTE — CARE PLAN
Problem: Skin Integrity  Goal: Risk for impaired skin integrity will decrease  Outcome: PROGRESSING AS EXPECTED  Q2H turns in place, barrier cream applied to sacrum, pt mobilized.     Problem: Respiratory:  Goal: Respiratory status will improve  Outcome: PROGRESSING AS EXPECTED  IS use and mobility encouraged.

## 2017-06-17 NOTE — PROGRESS NOTES
Report received, poc discussed, assumed care of pt.   Call light in reach, hourly rounding in place.   Pt gets up 2 assist with FWW, OT/PT following.   DM diet.  + void, incontient at times. LBM 6/16.   No meds required for pain.  No further needs.  Remote tele monitoring. Cardiology to round this AM based on heart block changes overnight.  Family concerned about pt's activity intolerance, tachypnea/tachycardia.

## 2017-06-17 NOTE — PROGRESS NOTES
Tele 8 Charge RN updated this RN regarding new onset Mobitz II rhythm. Celia GAMBLEN, CIC RRT RN, SICU RRT RN at bedside. STAT EKG ordered. Dr. Hughes of Cardiology notified. Pt to remain on the floor and cardiology will round in the morning.

## 2017-06-17 NOTE — PROGRESS NOTES
Pt is A&Ox4.   Reports pain in the head and neck, medicated per MAR.   Generalized weakness, edema throughout; denies new onset numbness and tingling; mobilizes with x 2 assistance and a fww.   On 2-3L O2 NC, does get SOB during activity, denies chest pain at this time. Pt remains on tele monitoring.   Distant, hypoactive BS x 4. + flatus, + BM. Pt is voiding q shift.   PIV SL.   Updated on POC. Belongings and call light within reach. All needs met at this time.   Bed alarm in place.

## 2017-06-17 NOTE — PROGRESS NOTES
Call received from monitor room. Pt continues to transition in and out of first and second degree heart block.

## 2017-06-17 NOTE — PROGRESS NOTES
"  Trauma/Surgical Progress Note    Author: Celia Castaneda Date & Time created: 6/17/2017   6:07 AM     Interval Events:  6 episodes of 2 beats of Mobitz II overnight, asymptomatic.  Discussed with Dr. Pop, Cardiology. Observe for now. Cardiology to round this AM.  Abdominal exam stable, less distended, pain improved. (+)Flatus.   Daily CXR pending  Left greater toe seen by woundcare, Dr. Black to see patient today.  Rehab referral in process  Continue aggressive pulmonary hygiene and mobilization/therapies     Review of Systems   Constitutional: Negative.  Negative for fever and chills.   HENT: Negative.    Eyes: Positive for blurred vision (Minimal to left eye). Negative for double vision.   Respiratory: Positive for shortness of breath and wheezing. Negative for cough.    Cardiovascular: Positive for leg swelling. Negative for chest pain and palpitations.   Gastrointestinal: Negative.  Negative for nausea, vomiting, abdominal pain and constipation.        BM 6/16   Genitourinary: Negative.         Voiding   Musculoskeletal: Positive for back pain. Negative for myalgias, joint pain and neck pain.   Skin: Negative.    Neurological: Negative.  Negative for focal weakness and headaches.   Psychiatric/Behavioral: Negative for substance abuse.   All other systems reviewed and are negative.    Hemodynamics:  Blood pressure 99/51, pulse 85, temperature 36.1 °C (97 °F), resp. rate 24, height 1.778 m (5' 10\"), weight 126.2 kg (278 lb 3.5 oz), SpO2 97 %.     Respiratory:    Respiration: (!) 24 (RN notified), Pulse Oximetry: 97 %     Work Of Breathing / Effort: Mild;Tachypnea;Moderate  RUL Breath Sounds: Clear, RML Breath Sounds: Diminished, RLL Breath Sounds: Diminished, ABIGAIL Breath Sounds: Clear, LLL Breath Sounds: Diminished  Fluids:    Intake/Output Summary (Last 24 hours) at 06/17/17 0607  Last data filed at 06/16/17 1800   Gross per 24 hour   Intake    720 ml   Output    475 ml   Net    245 ml     Admit Weight: 117.935 " kg (260 lb)  Current     Physical Exam   Constitutional: He is oriented to person, place, and time. He appears well-developed. No distress.   HENT:   Head: Normocephalic and atraumatic.   Eyes: No scleral icterus.   Left eye premorbid ptosis   Neck: Neck supple. No JVD present. No tracheal deviation present.   Cardiovascular: Normal rate.    No murmur heard.  Pulmonary/Chest: Effort normal. No respiratory distress. He has wheezes.   Supplemental O2  IS 1500   Abdominal: Soft. Bowel sounds are normal. He exhibits distension. There is tenderness.   Mild midline tenderness, improved   Musculoskeletal: Normal range of motion. He exhibits edema.   3+ edema  Left greater toe redness, edema, warm to touch   Neurological: He is alert and oriented to person, place, and time.   Skin: Skin is warm and dry. He is not diaphoretic.   Psychiatric: He has a normal mood and affect. His behavior is normal.   Nursing note and vitals reviewed.      Medical Decision Making/Problem List:    Active Hospital Problems    Diagnosis   • AV block, Mobitz 1 [I44.1]     Priority: High     Observation only.  6/8 - EKG changes with frequent PVC's. Troponin < 0.02. Metoprolol held on transfer to ICU.  6/11 - Restart metoprolol  6/17 - 6 episodes of 2 beats of Mobitz II  Cruz Vargas MD. Cardiology     • Pain of left great toe [M79.675]     Priority: Medium     Fore foot red hot; intact callous medial aspect left foot and ~1cm purple area first met head plantar surface.  Consult pending  Wound care following  Fran Black MD. - Orthopedic Surgery       • Obesity (BMI 30-39.9) [E66.9]     Priority: Medium     BMI -  38.8     • Chronic congestive heart failure (CMS-HCC) [I50.9]     Priority: Medium     6/6 - Home lasix and potassium started.  6/8 - Lasix stopped upon ICU transfer.  6/11- BNP low / restart lasix as BLE edema.  6/12 - BP low - Lasix and potassium on hold.  6/15 - BNP elevated. Resume Lasix.   Trend diagnostic laboratory studies      • CAD (coronary artery disease) [I25.10]     Priority: Medium     6/6 - Home metoprolol restarted.  Held on transfer to ICU.  6/11 - Resumed.      • Chronic deep vein thrombosis (DVT) of popliteal vein (CMS-HCC) [I82.539]     Priority: Medium     Present on admission, takes outpatient coumadin.  Old clot in left popliteal vein.  6/4 - Prophylactic Lovenox initiated.  6/3 - Trauma screening bilateral lower extremity venous duplex positive for chronic DVT of the left popliteal vein; no acute process in either leg.  6/6 - Therapeutic Lovenox started.  6/8 - Stopped upon ICU transfer.  6/11 - Trauma screening bilateral lower extremity venous duplex positive for partially occlusive chronic DVT seen in the left popliteal vein as  membrane-like structure with response to compression and good venous flow. No evidence of acute DVT seen in the left lower extremity.  6/12 - Prophylactic Lovenox resumed.      • Acute blood loss anemia [D62]     Priority: Medium     1L of blood loss reported on scene.  Large paraspinous, posterior hematoma.  6/2 - Transfused 2 units PRBC.  6/10 - Transfused 1 unit PRBC. Iron replaced per pharmacy kinetics.   Transfuse 1 unit PRBC if Hb < 7.0.     • Penetrating back wound [S21.239A]     Priority: Medium     Large left paraspinous, posterior hematoma.  No active extravasation.  6/8 - CT chest showed slightly larger SQ fluid than on admission. No discrete drainable hematoma.   Ancef x 24hrs.      • History of pulmonary embolus (PE) [Z86.711]     Priority: Medium     With DVT.  On warfarin as an outpatient.  IVC filter present.  6/12 - Prophylactic Lovenox resumed.  Restart anticoagulation as outpatient.      • Warfarin-induced coagulopathy (CMS-HCC) [T45.511A, D68.9]     Priority: Medium     Coumadin for h/o PE.  INR 2 on arrival, given 1 mg Factor VII and Vitamin K.  Trend INR, correct as clinically warranted.  6/12 - Prophylactic Lovenox resumed.  Restart coumadin on discharge.      • Cancer  (CMS-HCC) [C80.1]     Priority: Low     Premorbid.  Squamous cell carinoma left eye.  Home eye drops resumed.      • Acute pain of left knee [M25.562]     Priority: Low     Prior TKR.  6/11 - Imaging negative for left knee fracture or malalignment.        Core Measures & Quality Metrics:  Medications reviewed, Labs reviewed and Radiology images reviewed  Castaneda catheter: No Castaneda      DVT Prophylaxis: Enoxaparin (Lovenox)  DVT prophylaxis - mechanical: SCDs  Ulcer prophylaxis: Yes (Home medication)    Assessed for rehab: Patient was assess for and/or received rehabilitation services during this hospitalization    Total Score: 8  ETOH Screening  CAGE Score: 0  Intervention complete date: 6/13/2017  Patient response to intervention: Denies substance abuse - no further intervention indicated .       Discussed patient condition with RN, Patient and trauma surgery. Dr. Amin

## 2017-06-17 NOTE — CARE PLAN
Problem: Skin Integrity  Goal: Risk for impaired skin integrity will decrease  Outcome: PROGRESSING AS EXPECTED  BLE edematous. L great toe are red and swollen. Non blanching spot of bottom of foot. Wound care following.    Problem: Respiratory:  Goal: Respiratory status will improve  Outcome: PROGRESSING AS EXPECTED  Pt's lungs are diminished. Pt on 0 L when awake. Pt encouraged to use IS. Pt mobilized OOB to promote respiratory status. Increased tachypnea and tachycardia with mobilization, better today than yesterday.

## 2017-06-17 NOTE — PROGRESS NOTES
Monitor Summary  SR - ST 63 - 102  Blocked PVC and PAC, 2nd degree, Jamil down to 39  -/.08/.40  Per monitor room strip summary

## 2017-06-18 ENCOUNTER — APPOINTMENT (OUTPATIENT)
Dept: RADIOLOGY | Facility: MEDICAL CENTER | Age: 77
DRG: 907 | End: 2017-06-18
Attending: NURSE PRACTITIONER
Payer: MEDICARE

## 2017-06-18 PROBLEM — M79.675 PAIN OF LEFT GREAT TOE: Status: RESOLVED | Noted: 2017-06-17 | Resolved: 2017-06-18

## 2017-06-18 LAB
ANION GAP SERPL CALC-SCNC: 4 MMOL/L (ref 0–11.9)
BASOPHILS # BLD AUTO: 0.4 % (ref 0–1.8)
BASOPHILS # BLD: 0.03 K/UL (ref 0–0.12)
BUN SERPL-MCNC: 20 MG/DL (ref 8–22)
CALCIUM SERPL-MCNC: 8 MG/DL (ref 8.5–10.5)
CHLORIDE SERPL-SCNC: 109 MMOL/L (ref 96–112)
CO2 SERPL-SCNC: 25 MMOL/L (ref 20–33)
CREAT SERPL-MCNC: 0.74 MG/DL (ref 0.5–1.4)
EOSINOPHIL # BLD AUTO: 0.21 K/UL (ref 0–0.51)
EOSINOPHIL NFR BLD: 3 % (ref 0–6.9)
ERYTHROCYTE [DISTWIDTH] IN BLOOD BY AUTOMATED COUNT: 68.1 FL (ref 35.9–50)
GFR SERPL CREATININE-BSD FRML MDRD: >60 ML/MIN/1.73 M 2
GLUCOSE BLD-MCNC: 149 MG/DL (ref 65–99)
GLUCOSE BLD-MCNC: 149 MG/DL (ref 65–99)
GLUCOSE BLD-MCNC: 166 MG/DL (ref 65–99)
GLUCOSE SERPL-MCNC: 118 MG/DL (ref 65–99)
HCT VFR BLD AUTO: 28.3 % (ref 42–52)
HGB BLD-MCNC: 8.5 G/DL (ref 14–18)
IMM GRANULOCYTES # BLD AUTO: 0.09 K/UL (ref 0–0.11)
IMM GRANULOCYTES NFR BLD AUTO: 1.3 % (ref 0–0.9)
LYMPHOCYTES # BLD AUTO: 0.9 K/UL (ref 1–4.8)
LYMPHOCYTES NFR BLD: 13.1 % (ref 22–41)
MCH RBC QN AUTO: 30.1 PG (ref 27–33)
MCHC RBC AUTO-ENTMCNC: 30 G/DL (ref 33.7–35.3)
MCV RBC AUTO: 100.4 FL (ref 81.4–97.8)
MONOCYTES # BLD AUTO: 0.56 K/UL (ref 0–0.85)
MONOCYTES NFR BLD AUTO: 8.1 % (ref 0–13.4)
NEUTROPHILS # BLD AUTO: 5.1 K/UL (ref 1.82–7.42)
NEUTROPHILS NFR BLD: 74.1 % (ref 44–72)
NRBC # BLD AUTO: 0 K/UL
NRBC BLD AUTO-RTO: 0 /100 WBC
PLATELET # BLD AUTO: 283 K/UL (ref 164–446)
PMV BLD AUTO: 9.8 FL (ref 9–12.9)
POTASSIUM SERPL-SCNC: 3.9 MMOL/L (ref 3.6–5.5)
RBC # BLD AUTO: 2.82 M/UL (ref 4.7–6.1)
SODIUM SERPL-SCNC: 138 MMOL/L (ref 135–145)
WBC # BLD AUTO: 6.9 K/UL (ref 4.8–10.8)

## 2017-06-18 PROCEDURE — 700102 HCHG RX REV CODE 250 W/ 637 OVERRIDE(OP): Performed by: SURGERY

## 2017-06-18 PROCEDURE — 700102 HCHG RX REV CODE 250 W/ 637 OVERRIDE(OP): Performed by: NURSE PRACTITIONER

## 2017-06-18 PROCEDURE — A9270 NON-COVERED ITEM OR SERVICE: HCPCS | Performed by: SURGERY

## 2017-06-18 PROCEDURE — 700112 HCHG RX REV CODE 229: Performed by: NURSE PRACTITIONER

## 2017-06-18 PROCEDURE — 82962 GLUCOSE BLOOD TEST: CPT | Mod: 91

## 2017-06-18 PROCEDURE — 770020 HCHG ROOM/CARE - TELE (206)

## 2017-06-18 PROCEDURE — 700111 HCHG RX REV CODE 636 W/ 250 OVERRIDE (IP): Performed by: SURGERY

## 2017-06-18 PROCEDURE — A9270 NON-COVERED ITEM OR SERVICE: HCPCS | Performed by: NURSE PRACTITIONER

## 2017-06-18 PROCEDURE — 80048 BASIC METABOLIC PNL TOTAL CA: CPT

## 2017-06-18 PROCEDURE — 85025 COMPLETE CBC W/AUTO DIFF WBC: CPT

## 2017-06-18 PROCEDURE — 71010 DX-CHEST-PORTABLE (1 VIEW): CPT

## 2017-06-18 PROCEDURE — 36415 COLL VENOUS BLD VENIPUNCTURE: CPT

## 2017-06-18 RX ORDER — ACETAMINOPHEN 325 MG/1
TABLET ORAL
Status: ACTIVE
Start: 2017-06-18 | End: 2017-06-18

## 2017-06-18 RX ORDER — OMEPRAZOLE 20 MG/1
20 CAPSULE, DELAYED RELEASE ORAL 2 TIMES DAILY
Status: DISCONTINUED | OUTPATIENT
Start: 2017-06-18 | End: 2017-06-22

## 2017-06-18 RX ADMIN — FUROSEMIDE 10 MG: 20 TABLET ORAL at 08:50

## 2017-06-18 RX ADMIN — ENOXAPARIN SODIUM 30 MG: 100 INJECTION SUBCUTANEOUS at 21:36

## 2017-06-18 RX ADMIN — CEFAZOLIN SODIUM 2 G: 2 INJECTION, SOLUTION INTRAVENOUS at 21:36

## 2017-06-18 RX ADMIN — OMEPRAZOLE 20 MG: 20 CAPSULE, DELAYED RELEASE ORAL at 21:36

## 2017-06-18 RX ADMIN — DOCUSATE SODIUM 100 MG: 100 CAPSULE ORAL at 08:50

## 2017-06-18 RX ADMIN — ERYTHROMYCIN: 5 OINTMENT OPHTHALMIC at 08:51

## 2017-06-18 RX ADMIN — FINASTERIDE 5 MG: 5 TABLET, FILM COATED ORAL at 08:50

## 2017-06-18 RX ADMIN — CEFAZOLIN SODIUM 2 G: 2 INJECTION, SOLUTION INTRAVENOUS at 10:04

## 2017-06-18 RX ADMIN — CEFAZOLIN SODIUM 2 G: 2 INJECTION, SOLUTION INTRAVENOUS at 16:31

## 2017-06-18 RX ADMIN — ENOXAPARIN SODIUM 30 MG: 100 INJECTION SUBCUTANEOUS at 08:50

## 2017-06-18 RX ADMIN — ACETAMINOPHEN 650 MG: 325 TABLET, FILM COATED ORAL at 02:48

## 2017-06-18 RX ADMIN — OMEPRAZOLE 20 MG: 20 CAPSULE, DELAYED RELEASE ORAL at 08:50

## 2017-06-18 ASSESSMENT — COPD QUESTIONNAIRES
DO YOU EVER COUGH UP ANY MUCUS OR PHLEGM?: NO/ONLY WITH OCCASIONAL COLDS OR INFECTIONS
DURING THE PAST 4 WEEKS HOW MUCH DID YOU FEEL SHORT OF BREATH: NONE/LITTLE OF THE TIME
HAVE YOU SMOKED AT LEAST 100 CIGARETTES IN YOUR ENTIRE LIFE: NO/DON'T KNOW
COPD SCREENING SCORE: 2

## 2017-06-18 ASSESSMENT — PAIN SCALES - GENERAL
PAINLEVEL_OUTOF10: 0
PAINLEVEL_OUTOF10: 0
PAINLEVEL_OUTOF10: 3
PAINLEVEL_OUTOF10: ASSUMED PAIN PRESENT

## 2017-06-18 ASSESSMENT — ENCOUNTER SYMPTOMS
DIZZINESS: 0
PALPITATIONS: 0
SHORTNESS OF BREATH: 1

## 2017-06-18 NOTE — PROGRESS NOTES
"Cardiology Progress Note               Author: Last Michele Date & Time created: 6/18/2017  2:44 PM     Interval History:  6/17: /52. Pulse 84. No cardiac symptoms.  Asked by hospitalist service to reevaluate the patient for \"type II AV block\"occurring last night  Chart reviewed.  Last seen by cardiology 6/9 assessment of type I AV block. No beta blockers recommended.  Metoprolol started 6/11.  Given intermittently because of low blood pressure last given last night.  Patient states that he saw Dr. Darlene Fernando, [place] cardiologist sometime in the past for edema.  Patient reports that after bilateral knee replacements by Dr. Last Duenas,he had DVT and pulmonary emboli requiring a [place] filter.  The patient states that he had his orthopedic surgery at [facility] however there is no records of that and I suspect that he either had it at in fact [facility].  6/18: /70. Pulse 88. No specific cardiac symptoms. Some abdominal discomfort and distention.    Review of Systems   Respiratory: Positive for shortness of breath.    Cardiovascular: Negative for chest pain and palpitations.   Neurological: Negative for dizziness.       Physical Exam   Constitutional: He is oriented to person, place, and time. No distress.   Neck:   Normal jugular venous pressure.   Cardiovascular: Normal rate, regular rhythm, S1 normal, S2 normal and normal heart sounds.  Exam reveals no gallop and no friction rub.    No murmur heard.  Pulses:       Radial pulses are 2+ on the right side.   Pulmonary/Chest: Effort normal. He has decreased breath sounds. He has no wheezes. He has no rhonchi. He has no rales.   Increased AP diameter.   Abdominal:   Protuberant.   Musculoskeletal: He exhibits edema.   Neurological: He is alert and oriented to person, place, and time.   Skin: Skin is warm and dry.   Psychiatric: He has a normal mood and affect. His behavior is normal. "       Hemodynamics:  Temp (24hrs), Av.8 °C (98.2 °F), Min:36.2 °C (97.1 °F), Max:37.2 °C (99 °F)  Temperature: 37.1 °C (98.8 °F)  Pulse  Av.8  Min: 53  Max: 160   Blood Pressure : 116/70 mmHg     Respiratory:    Respiration: 20, Pulse Oximetry: 94 %     Work Of Breathing / Effort: Moderate;Shallow  RUL Breath Sounds: Clear, RML Breath Sounds: Clear, RLL Breath Sounds: Diminished;Clear, ABIGAIL Breath Sounds: Clear, LLL Breath Sounds: Diminished;Clear  Fluids:  Date 17 07 - 17 0659   Shift 3119-5793 7248-7436 7057-2526 24 Hour Total   I  N  T  A  K  E   P.O. 300   300    Shift Total 300   300   O  U  T  P  U  T   Urine 350 150  500    Shift Total 350 150  500   Weight (kg) 126.2 126.2 126.2 126.2         GI/Nutrition:  Orders Placed This Encounter   Procedures   • DIET ORDER     Standing Status: Standing      Number of Occurrences: 1      Standing Expiration Date:      Order Specific Question:  Diet:     Answer:  Diabetic [3]     Lab Results:  Recent Labs      17   WBC  9.6  8.3  6.9   RBC  2.77*  2.89*  2.82*   HEMOGLOBIN  8.4*  8.9*  8.5*   HEMATOCRIT  27.6*  29.7*  28.3*   MCV  99.6*  102.8*  100.4*   MCH  30.3  30.8  30.1   MCHC  30.4*  30.0*  30.0*   RDW  69.0*  73.8*  68.1*   PLATELETCT  277  271  283   MPV  10.3  9.8  9.8     Recent Labs      17   SODIUM  137  137  137  138   POTASSIUM  4.2  4.6  4.5  3.9   CHLORIDE  107  107  108  109   CO2  25  22  24  25   GLUCOSE  128*  112*  111*  118*   BUN  20  20  21  20   CREATININE  0.59  0.78  0.77  0.74   CALCIUM  8.1*  8.4*  8.4*  8.0*         Recent Labs      06/16/17   0432   BNPBTYPENAT  163*     Recent Labs      17   0432   BNPBTYPENAT  163*           2017 ECHOCARDIOGRAM  No prior study is available for comparison.   Technically difficult study - adequate information is obtained.   Hyperdynamic left ventricular systolic function.   Left ventricular   ejection fraction is visually estimated to be greater than 75%.  No evidence of valvular abnormality based on blind Doppler evaluation.     Medical Decision Making, by Problem:  Active Hospital Problems    Diagnosis   • Cellulitis of left foot [L03.116]   • AV block, Mobitz 1 [I44.1]   • Pain of left great toe [M79.675]   • Obesity (BMI 30-39.9) [E66.9]   • Chronic congestive heart failure (CMS-HCC) [I50.9]   • CAD (coronary artery disease) [I25.10]   • Chronic deep vein thrombosis (DVT) of popliteal vein (CMS-HCC) [I82.539]   • Acute blood loss anemia [D62]   • Penetrating back wound [S21.239A]   • History of pulmonary embolus (PE) [Z86.711]   • Warfarin-induced coagulopathy (CMS-HCC) [T45.511A, D68.9]   • Cancer (CMS-HCC) [C80.1]   • Acute pain of left knee [M25.562]       Assessment and Plan:  Type 1 second-degree AV block Aidee. Asymptomatic.    Ventricular premature complexes. Isolated.    Plan/Discussion  I reviewed all of the available rhythm strips from this morning 6/18 which all demonstrate Type I second-degree AV block.  No ventricular ectopy.  May need more diuretic therapy to manage his edema.  Will continue to follow    EKG reviewed, Labs reviewed and Medications reviewed

## 2017-06-18 NOTE — DISCHARGE PLANNING
TCC following for medical clearance. Would appreciate updated [place] evals for physiatry review once close to medical clearance.

## 2017-06-18 NOTE — PROGRESS NOTES
Seen and examined.  Left foot swelling, mild erythema, improving on ABX.  Consult dictated.    Plan:  - Non-invasive vascular studies  - Empiric antibiotics, recommend ID consult  - LPS consult  - Non-operative management for now, will discuss with treating surgeon, Dr. Black

## 2017-06-18 NOTE — PROGRESS NOTES
Monitor summary: SB-SR 68-96, [place] -, QRS 08, QT 40, with 2nd degree type 1 and type 2 beats per strip from monitor room.

## 2017-06-18 NOTE — PROGRESS NOTES
"Cardiology Progress Note               Author: Last Michele Date & Time created: 6/17/2017  5:03 PM     Interval History:  6/17: /52. Pulse 84. No cardiac symptoms.  Asked by hospitalist service to reevaluate the patient for \"type II AV block\"occurring last night  Chart reviewed.  Last seen by cardiology 6/9 assessment of type I AV block. No beta blockers recommended.  Metoprolol started 6/11.  Given intermittently because of low blood pressure last given last night.  Patient states that he saw Dr. Darlene Fernando, [facility] cardiologist sometime in the past for edema.  Patient reports that after bilateral knee replacements by Dr. Last Duenas,he had DVT and pulmonary emboli requiring a [place] filter.  The patient states that he had his orthopedic surgery at [facility] however there is no records of that and I suspect that he either had it at in fact [facility].    Review of Systems   Respiratory: Positive for shortness of breath.    Cardiovascular: Negative for chest pain and palpitations.   Neurological: Negative for dizziness.       Physical Exam   Constitutional: He is oriented to person, place, and time. No distress.   HENT:   Head: Normocephalic.   Eyes: EOM are normal. No scleral icterus.   Neck:   Normal jugular venous pressure.   Cardiovascular: Normal rate, regular rhythm, S1 normal, S2 normal and normal heart sounds.  Exam reveals no gallop and no friction rub.    No murmur heard.  Pulses:       Radial pulses are 2+ on the right side.   Pulmonary/Chest: Effort normal. He has decreased breath sounds. He has no wheezes. He has no rhonchi. He has no rales.   Increased AP diameter.   Abdominal:   Protuberant.   Musculoskeletal: He exhibits edema.   Neurological: He is alert and oriented to person, place, and time.   Skin: Skin is warm and dry.   Psychiatric: He has a normal mood and affect. His behavior is normal.       Hemodynamics:  Temp " (24hrs), Av.5 °C (97.7 °F), Min:36.1 °C (96.9 °F), Max:37.2 °C (99 °F)  Temperature: 37.2 °C (99 °F)  Pulse  Av.9  Min: 53  Max: 160   Blood Pressure : 106/52 mmHg     Respiratory:    Respiration: 20, Pulse Oximetry: 99 %     Work Of Breathing / Effort: Mild;Shallow;Tachypnea  RUL Breath Sounds: Clear, RML Breath Sounds: Diminished, RLL Breath Sounds: Diminished, ABIGAIL Breath Sounds: Clear, LLL Breath Sounds: Diminished  Fluids:  Date 17 0700 - 17 0659   Shift 9305-1445 7574-3091 3099-8275 24 Hour Total   I  N  T  A  K  E   P.O. 300   300    Shift Total 300   300   O  U  T  P  U  T   Urine 350 150  500    Shift Total 350 150  500   Weight (kg) 126.2 126.2 126.2 126.2         GI/Nutrition:  Orders Placed This Encounter   Procedures   • DIET ORDER     Standing Status: Standing      Number of Occurrences: 1      Standing Expiration Date:      Order Specific Question:  Diet:     Answer:  Diabetic [3]     Lab Results:  Recent Labs      06/15/17   0331  06/16/17   0432  17   0524   WBC  11.1*  9.6  8.3   RBC  2.70*  2.77*  2.89*   HEMOGLOBIN  8.1*  8.4*  8.9*   HEMATOCRIT  26.8*  27.6*  29.7*   MCV  99.3*  99.6*  102.8*   MCH  30.0  30.3  30.8   MCHC  30.2*  30.4*  30.0*   RDW  69.0*  69.0*  73.8*   PLATELETCT  269  277  271   MPV  10.0  10.3  9.8     Recent Labs      06/15/17   0331  06/16/17   0432  17   0524   SODIUM  138  137  137  137   POTASSIUM  4.0  4.2  4.6  4.5   CHLORIDE  107  107  107  108   CO2  23  25  22  24   GLUCOSE  149*  128*  112*  111*   BUN  20  20  20  21   CREATININE  0.69  0.59  0.78  0.77   CALCIUM  8.0*  8.1*  8.4*  8.4*         Recent Labs      06/15/17   0331  06/16/17   0432   BNPBTYPENAT  217*  163*     Recent Labs      06/15/17   0331  17   0432   BNPBTYPENAT  217*  163*           2017 ECHOCARDIOGRAM  No prior study is available for comparison.   Technically difficult study - adequate information is obtained.   Hyperdynamic left ventricular  systolic function.  Left ventricular   ejection fraction is visually estimated to be greater than 75%.  No evidence of valvular abnormality based on blind Doppler evaluation.     Medical Decision Making, by Problem:  Active Hospital Problems    Diagnosis   • Cellulitis of left foot [L03.116]   • AV block, Mobitz 1 [I44.1]   • Pain of left great toe [M79.675]   • Obesity (BMI 30-39.9) [E66.9]   • Chronic congestive heart failure (CMS-HCC) [I50.9]   • CAD (coronary artery disease) [I25.10]   • Chronic deep vein thrombosis (DVT) of popliteal vein (CMS-HCC) [I82.539]   • Acute blood loss anemia [D62]   • Penetrating back wound [S21.239A]   • History of pulmonary embolus (PE) [Z86.711]   • Warfarin-induced coagulopathy (CMS-HCC) [T45.511A, D68.9]   • Cancer (CMS-HCC) [C80.1]   • Acute pain of left knee [M25.562]       Assessment and Plan:  Type 1 AV block Aidee. Asymptomatic.    Ventricular premature complexes. Isolated.    Plan/Discussion  I reviewed the patient's rhythm strips which show episodes of type I AV block with episodes of 2-1 AV block but unlikely to represent Type II AV block. There is also a strip demonstrating a tachycardia rate of 110 bpm but it's unclear as to the mechanism of the tachycardia.  Discontinue beta blockers  Continue telemetry monitoring.  Attempt to get previous medical records.  We will follow    EKG reviewed, Radiology images reviewed, Labs reviewed and Medications reviewed

## 2017-06-18 NOTE — PROGRESS NOTES
Updated Celia regarding pt status with rhythm at second degree Type 1-2 per monitor room, vitals T:98.7 FL:83 RR:16 BP:160/80, SpO2: 95%, pt asymptomatic/ denies any chest pain. Will continue to monitor.

## 2017-06-18 NOTE — PROGRESS NOTES
"Trauma / Surgical Progress Note  Interval Events:  No issues overnight  No abdominal pain unless the epigastrum is palpated  WBC normal  Left foot erythema 50% improved    -Daily CXR, labs  -Continue Ancef    ROS    Vitals:  Blood pressure 96/60, pulse 83, temperature 36.5 °C (97.7 °F), resp. rate 18, height 1.778 m (5' 10\"), weight 126.2 kg (278 lb 3.5 oz), SpO2 93 %.  Temp (24hrs), Av.7 °C (98 °F), Min:36.2 °C (97.1 °F), Max:37.2 °C (99 °F)      IO:       Exam:  Respiratory: unlabored respirations, no intercostal retractions or accessory muscle use, 3L O2 by NC  Neuro: no focal deficits noted  Cardiovascular: regular rate and rhythm  GI: abdomen is soft, mildly distended, tympanitic  EXT: Left foot with pitting edema and decreased cellulitis, N/V intact    Labs:  Recent Results (from the past 24 hour(s))   ACCU-CHEK GLUCOSE    Collection Time: 17 11:49 AM   Result Value Ref Range    Glucose - Accu-Ck 158 (H) 65 - 99 mg/dL   ACCU-CHEK GLUCOSE    Collection Time: 17  5:05 PM   Result Value Ref Range    Glucose - Accu-Ck 132 (H) 65 - 99 mg/dL   S. AUREUS BY PCR, NASAL COMPLETE    Collection Time: 17  5:07 PM   Result Value Ref Range    Staph aureus by PCR Negative Negative    MRSA by PCR Negative Negative   ACCU-CHEK GLUCOSE    Collection Time: 17  8:42 PM   Result Value Ref Range    Glucose - Accu-Ck 166 (H) 65 - 99 mg/dL   CBC WITH DIFFERENTIAL    Collection Time: 17  4:45 AM   Result Value Ref Range    WBC 6.9 4.8 - 10.8 K/uL    RBC 2.82 (L) 4.70 - 6.10 M/uL    Hemoglobin 8.5 (L) 14.0 - 18.0 g/dL    Hematocrit 28.3 (L) 42.0 - 52.0 %    .4 (H) 81.4 - 97.8 fL    MCH 30.1 27.0 - 33.0 pg    MCHC 30.0 (L) 33.7 - 35.3 g/dL    RDW 68.1 (H) 35.9 - 50.0 fL    Platelet Count 283 164 - 446 K/uL    MPV 9.8 9.0 - 12.9 fL    Neutrophils-Polys 74.10 (H) 44.00 - 72.00 %    Lymphocytes 13.10 (L) 22.00 - 41.00 %    Monocytes 8.10 0.00 - 13.40 %    Eosinophils 3.00 0.00 - 6.90 %    Basophils " 0.40 0.00 - 1.80 %    Immature Granulocytes 1.30 (H) 0.00 - 0.90 %    Nucleated RBC 0.00 /100 WBC    Neutrophils (Absolute) 5.10 1.82 - 7.42 K/uL    Lymphs (Absolute) 0.90 (L) 1.00 - 4.80 K/uL    Monos (Absolute) 0.56 0.00 - 0.85 K/uL    Eos (Absolute) 0.21 0.00 - 0.51 K/uL    Baso (Absolute) 0.03 0.00 - 0.12 K/uL    Immature Granulocytes (abs) 0.09 0.00 - 0.11 K/uL    NRBC (Absolute) 0.00 K/uL   BASIC METABOLIC PANEL    Collection Time: 06/18/17  4:45 AM   Result Value Ref Range    Sodium 138 135 - 145 mmol/L    Potassium 3.9 3.6 - 5.5 mmol/L    Chloride 109 96 - 112 mmol/L    Co2 25 20 - 33 mmol/L    Glucose 118 (H) 65 - 99 mg/dL    Bun 20 8 - 22 mg/dL    Creatinine 0.74 0.50 - 1.40 mg/dL    Calcium 8.0 (L) 8.5 - 10.5 mg/dL    Anion Gap 4.0 0.0 - 11.9   ESTIMATED GFR    Collection Time: 06/18/17  4:45 AM   Result Value Ref Range    GFR If African American >60 >60 mL/min/1.73 m 2    GFR If Non African American >60 >60 mL/min/1.73 m 2       Problem and Plan:  Active Hospital Problems    Diagnosis   • Cellulitis of left foot [L03.116]     Priority: High     No trauma or open wounds  History of instrumentation with implant 2005 - [facility]  Check nasal MRSA, HbA1c  6/17 - Commence Ancef   - 3 view left foot - Extensive dorsal and medial soft tissue swelling primarily involving LEFT great toe with apparent resorption at the medial aspect of the 1st metatarsophalangeal joint concerning for osteomyelitis/septic arthritis.  Zafar Pace MD - Ortho     • AV block, Mobitz 1 [I44.1]     Priority: High     Observation only.  6/8 - EKG changes with frequent PVC's. Troponin < 0.02. Metoprolol held on transfer to ICU.  6/11 - Restart metoprolol  6/17 - 6 episodes of 2 beats of Mobitz II  Cruz Vargas MD. Cardiology     • Obesity (BMI 30-39.9) [E66.9]     Priority: Medium     BMI -  38.8     • Chronic congestive heart failure (CMS-HCC) [I50.9]     Priority: Medium     6/6 - Home lasix and potassium started.  6/8 - Lasix stopped  upon ICU transfer.  6/11- BNP low / restart lasix as BLE edema.  6/12 - BP low - Lasix and potassium on hold.  6/15 - BNP elevated. Resume Lasix.   Trend diagnostic laboratory studies     • CAD (coronary artery disease) [I25.10]     Priority: Medium     6/6 - Home metoprolol restarted.  Held on transfer to ICU.  6/11 - Resumed.      • Chronic deep vein thrombosis (DVT) of popliteal vein (CMS-[facility]) [I82.539]     Priority: Medium     Present on admission, takes outpatient coumadin.  Old clot in left popliteal vein.  6/4 - Prophylactic Lovenox initiated.  6/3 - Trauma screening bilateral lower extremity venous duplex positive for chronic DVT of the left popliteal vein; no acute process in either leg.  6/6 - Therapeutic Lovenox started.  6/8 - Stopped upon ICU transfer.  6/11 - Trauma screening bilateral lower extremity venous duplex positive for partially occlusive chronic DVT seen in the left popliteal vein as  membrane-like structure with response to compression and good venous flow. No evidence of acute DVT seen in the left lower extremity.  6/12 - Prophylactic Lovenox resumed.      • Acute blood loss anemia [D62]     Priority: Medium     1L of blood loss reported on scene.  Large paraspinous, posterior hematoma.  6/2 - Transfused 2 units PRBC.  6/10 - Transfused 1 unit PRBC. Iron replaced per pharmacy kinetics.   Transfuse 1 unit PRBC if Hb < 7.0.     • Penetrating back wound [S21.239A]     Priority: Medium     Large left paraspinous, posterior hematoma.  No active extravasation.  6/8 - CT chest showed slightly larger SQ fluid than on admission. No discrete drainable hematoma.   Ancef x 24hrs.      • History of pulmonary embolus (PE) [Z86.711]     Priority: Medium     With DVT.  On warfarin as an outpatient.  IVC filter present.  6/12 - Prophylactic Lovenox resumed.  Restart anticoagulation as outpatient.      • Warfarin-induced coagulopathy (CMS-[facility]) [T45.511A, D68.9]     Priority: Medium     Coumadin for h/o  PE.  INR 2 on arrival, given 1 mg Factor VII and Vitamin K.  Trend INR, correct as clinically warranted.  6/12 - Prophylactic Lovenox resumed.  Restart coumadin on discharge.      • Cancer (CMS-HCC) [C80.1]     Priority: Low     Premorbid.  Squamous cell carinoma left eye.  Home eye drops resumed.      • Acute pain of left knee [M25.562]     Priority: Low     Prior TKR.  6/11 - Imaging negative for left knee fracture or malalignment.          Core Measures & Quality Metrics:  Medications reviewed, Labs reviewed and Radiology images reviewed  Castaneda catheter: No Castaneda      DVT Prophylaxis: Enoxaparin (Lovenox)  DVT prophylaxis - mechanical: SCDs  Ulcer prophylaxis: Not indicated           Dhiraj Amin MD    DATE OF SERVICE: 6/18/2017

## 2017-06-18 NOTE — PROGRESS NOTES
Received pt resting well in bed, stable with no apparent distress noted, no AMS/A&Ox4, VSS. Denied pain on initial encounter, no PRN was given at this time. Breathing appears shallow with occasional non-prod coughing, noted dim/clear lung sounds to BLL, denied SOB, kept HOB elevated. Abdomen soft/distended, (+)hyperBSx4, (-)N/V, per pt last BM was 5/16, able to pass flatus, refused scheduled laxatives this shift, per pt had been passing loose BM few days ago, bladder continent. SLPIV. Encouraged pt to utilize IS frequently, able to pull 1500. 2-max assist with ADLs/transfer. Care provided. Needs attended. No concerns. Tele monitor in place. Bed alarm in place. Will continue with care.

## 2017-06-18 NOTE — CARE PLAN
Problem: Communication  Goal: The ability to communicate needs accurately and effectively will improve  POC discussed with pt.     Problem: Skin Integrity  Goal: Risk for impaired skin integrity will decrease  Repositioned q2H, waffle cushion in place.

## 2017-06-19 ENCOUNTER — APPOINTMENT (OUTPATIENT)
Dept: RADIOLOGY | Facility: MEDICAL CENTER | Age: 77
DRG: 907 | End: 2017-06-19
Attending: SURGERY
Payer: MEDICARE

## 2017-06-19 ENCOUNTER — APPOINTMENT (OUTPATIENT)
Dept: RADIOLOGY | Facility: MEDICAL CENTER | Age: 77
DRG: 907 | End: 2017-06-19
Attending: NURSE PRACTITIONER
Payer: MEDICARE

## 2017-06-19 LAB
ANION GAP SERPL CALC-SCNC: 4 MMOL/L (ref 0–11.9)
BASOPHILS # BLD AUTO: 0.3 % (ref 0–1.8)
BASOPHILS # BLD: 0.02 K/UL (ref 0–0.12)
BUN SERPL-MCNC: 16 MG/DL (ref 8–22)
CALCIUM SERPL-MCNC: 8.2 MG/DL (ref 8.5–10.5)
CHLORIDE SERPL-SCNC: 109 MMOL/L (ref 96–112)
CO2 SERPL-SCNC: 25 MMOL/L (ref 20–33)
CREAT SERPL-MCNC: 0.65 MG/DL (ref 0.5–1.4)
EOSINOPHIL # BLD AUTO: 0.24 K/UL (ref 0–0.51)
EOSINOPHIL NFR BLD: 3.3 % (ref 0–6.9)
ERYTHROCYTE [DISTWIDTH] IN BLOOD BY AUTOMATED COUNT: 68.3 FL (ref 35.9–50)
GFR SERPL CREATININE-BSD FRML MDRD: >60 ML/MIN/1.73 M 2
GLUCOSE BLD-MCNC: 115 MG/DL (ref 65–99)
GLUCOSE BLD-MCNC: 127 MG/DL (ref 65–99)
GLUCOSE BLD-MCNC: 140 MG/DL (ref 65–99)
GLUCOSE BLD-MCNC: 149 MG/DL (ref 65–99)
GLUCOSE BLD-MCNC: 149 MG/DL (ref 65–99)
GLUCOSE SERPL-MCNC: 136 MG/DL (ref 65–99)
HCT VFR BLD AUTO: 29.9 % (ref 42–52)
HGB BLD-MCNC: 8.9 G/DL (ref 14–18)
IMM GRANULOCYTES # BLD AUTO: 0.05 K/UL (ref 0–0.11)
IMM GRANULOCYTES NFR BLD AUTO: 0.7 % (ref 0–0.9)
LYMPHOCYTES # BLD AUTO: 0.78 K/UL (ref 1–4.8)
LYMPHOCYTES NFR BLD: 10.7 % (ref 22–41)
MCH RBC QN AUTO: 29.8 PG (ref 27–33)
MCHC RBC AUTO-ENTMCNC: 29.8 G/DL (ref 33.7–35.3)
MCV RBC AUTO: 100 FL (ref 81.4–97.8)
MONOCYTES # BLD AUTO: 0.61 K/UL (ref 0–0.85)
MONOCYTES NFR BLD AUTO: 8.4 % (ref 0–13.4)
NEUTROPHILS # BLD AUTO: 5.57 K/UL (ref 1.82–7.42)
NEUTROPHILS NFR BLD: 76.6 % (ref 44–72)
NRBC # BLD AUTO: 0 K/UL
NRBC BLD AUTO-RTO: 0 /100 WBC
PLATELET # BLD AUTO: 295 K/UL (ref 164–446)
PMV BLD AUTO: 9.4 FL (ref 9–12.9)
POTASSIUM SERPL-SCNC: 4.1 MMOL/L (ref 3.6–5.5)
RBC # BLD AUTO: 2.99 M/UL (ref 4.7–6.1)
SODIUM SERPL-SCNC: 138 MMOL/L (ref 135–145)
WBC # BLD AUTO: 7.3 K/UL (ref 4.8–10.8)

## 2017-06-19 PROCEDURE — 700111 HCHG RX REV CODE 636 W/ 250 OVERRIDE (IP): Performed by: SURGERY

## 2017-06-19 PROCEDURE — 36415 COLL VENOUS BLD VENIPUNCTURE: CPT

## 2017-06-19 PROCEDURE — 85025 COMPLETE CBC W/AUTO DIFF WBC: CPT

## 2017-06-19 PROCEDURE — A9270 NON-COVERED ITEM OR SERVICE: HCPCS | Performed by: NURSE PRACTITIONER

## 2017-06-19 PROCEDURE — 97535 SELF CARE MNGMENT TRAINING: CPT

## 2017-06-19 PROCEDURE — 770020 HCHG ROOM/CARE - TELE (206)

## 2017-06-19 PROCEDURE — 80048 BASIC METABOLIC PNL TOTAL CA: CPT

## 2017-06-19 PROCEDURE — 82962 GLUCOSE BLOOD TEST: CPT

## 2017-06-19 PROCEDURE — 73080 X-RAY EXAM OF ELBOW: CPT | Mod: LT

## 2017-06-19 PROCEDURE — 700102 HCHG RX REV CODE 250 W/ 637 OVERRIDE(OP): Performed by: SURGERY

## 2017-06-19 PROCEDURE — A9270 NON-COVERED ITEM OR SERVICE: HCPCS | Performed by: SURGERY

## 2017-06-19 PROCEDURE — 99223 1ST HOSP IP/OBS HIGH 75: CPT | Mod: AI | Performed by: INTERNAL MEDICINE

## 2017-06-19 PROCEDURE — 700112 HCHG RX REV CODE 229: Performed by: NURSE PRACTITIONER

## 2017-06-19 PROCEDURE — 71010 DX-CHEST-PORTABLE (1 VIEW): CPT

## 2017-06-19 RX ORDER — FUROSEMIDE 20 MG/1
20 TABLET ORAL
Status: DISCONTINUED | OUTPATIENT
Start: 2017-06-20 | End: 2017-06-21

## 2017-06-19 RX ADMIN — OXYCODONE HYDROCHLORIDE 5 MG: 5 TABLET ORAL at 14:14

## 2017-06-19 RX ADMIN — FUROSEMIDE 10 MG: 20 TABLET ORAL at 07:48

## 2017-06-19 RX ADMIN — CEFAZOLIN SODIUM 2 G: 2 INJECTION, SOLUTION INTRAVENOUS at 06:05

## 2017-06-19 RX ADMIN — CEFAZOLIN SODIUM 2 G: 2 INJECTION, SOLUTION INTRAVENOUS at 21:45

## 2017-06-19 RX ADMIN — OMEPRAZOLE 20 MG: 20 CAPSULE, DELAYED RELEASE ORAL at 21:45

## 2017-06-19 RX ADMIN — DOCUSATE SODIUM 100 MG: 100 CAPSULE ORAL at 07:48

## 2017-06-19 RX ADMIN — ENOXAPARIN SODIUM 30 MG: 100 INJECTION SUBCUTANEOUS at 07:52

## 2017-06-19 RX ADMIN — ENOXAPARIN SODIUM 30 MG: 100 INJECTION SUBCUTANEOUS at 21:45

## 2017-06-19 RX ADMIN — ERYTHROMYCIN: 5 OINTMENT OPHTHALMIC at 07:53

## 2017-06-19 RX ADMIN — CEFAZOLIN SODIUM 2 G: 2 INJECTION, SOLUTION INTRAVENOUS at 14:19

## 2017-06-19 RX ADMIN — FINASTERIDE 5 MG: 5 TABLET, FILM COATED ORAL at 07:48

## 2017-06-19 RX ADMIN — OMEPRAZOLE 20 MG: 20 CAPSULE, DELAYED RELEASE ORAL at 07:48

## 2017-06-19 ASSESSMENT — COGNITIVE AND FUNCTIONAL STATUS - GENERAL
HELP NEEDED FOR BATHING: A LOT
TOILETING: A LITTLE
PERSONAL GROOMING: A LITTLE
DRESSING REGULAR UPPER BODY CLOTHING: A LITTLE
DRESSING REGULAR LOWER BODY CLOTHING: TOTAL
SUGGESTED CMS G CODE MODIFIER DAILY ACTIVITY: CK
DAILY ACTIVITIY SCORE: 16

## 2017-06-19 ASSESSMENT — ENCOUNTER SYMPTOMS
CONSTIPATION: 0
DOUBLE VISION: 0
VOMITING: 0
COUGH: 0
NECK PAIN: 0
NAUSEA: 0
ORTHOPNEA: 0
WEAKNESS: 0
PALPITATIONS: 0
SPEECH CHANGE: 0
FEVER: 0
FOCAL WEAKNESS: 0
HEADACHES: 0
BACK PAIN: 1
WHEEZING: 0
BLURRED VISION: 1
ABDOMINAL PAIN: 0
DIZZINESS: 0
MYALGIAS: 0
HEMOPTYSIS: 0
CHILLS: 0
SHORTNESS OF BREATH: 1
CLAUDICATION: 0
PND: 0

## 2017-06-19 ASSESSMENT — PAIN SCALES - GENERAL
PAINLEVEL_OUTOF10: 0
PAINLEVEL_OUTOF10: 5
PAINLEVEL_OUTOF10: 0

## 2017-06-19 ASSESSMENT — LIFESTYLE VARIABLES: SUBSTANCE_ABUSE: 0

## 2017-06-19 NOTE — PROGRESS NOTES
Monitor summary: SR-ST   with 2nd degree type 1 beats, AL 20, QRS 08, QT 38, with right and occasional PVCs and bigemy per strip from monitor room.

## 2017-06-19 NOTE — PROGRESS NOTES
A&O x4.  VSS. SpO2 96% on 3L NC.  Tolerating diet.  Declines pain intervention.  Tele monitoring in place.  Expiratory wheezes present.  L flank bruising; L foot swollen and red, pulse heard by doppler.  Last BM 6/18/17 - loose per PT.  Voiding without difficulty.  Bed alarm activated.  Call light and personal belongings within reach.  POC discussed and all questions answered.  No additional needs at this time.

## 2017-06-19 NOTE — PROGRESS NOTES
RN notified by monitor room that patient is in and out of second degree type two and possibly third degree heart block.  Notified MD.  No new orders received.  Will continue to monitor.

## 2017-06-19 NOTE — CONSULTS
Consult dictation :[id number], requested by Dr Amin to assume as primary for continued management of patient during hospital stay.

## 2017-06-19 NOTE — THERAPY
"Occupational Therapy Treatment completed with focus on ADLs, ADL transfers and patient education.  Functional Status:  Pt seen for OT tx today.  Pt was pleasant and cooperative throughout the session but continues to be limited by weakness, endurance, and self care.  Pt completed supine to sit, sit to stand, room ambulation using FWW from bed to toilet with CGA with x2 LOB due to getting up too fast without control with self recovery.  Completed gr/hy seated with supervision able to stand for 3 minutes but SOB during dynamic tasks.  Pt is min A but has AE at home which he uses to get dressed.  Pt was SBA for toileting.  Pt would benefit from continued inpt OT to increase independence with functional transfers, functional endurance, and ADLs however states he wants to go home where he has help from family 24/7.  Pt would benefit from FWW and shower seat as well as continued home health if extra help is confirmed.    Plan of Care: Will benefit from Occupational Therapy 3 times per week  Discharge Recommendations:  Equipment Will Continue to Assess for Equipment Needs. Post-acute therapy Discharge to a transitional care facility for continued skilled therapy services.    See \"Rehab Therapy-Acute\" Patient Summary Report for complete documentation.   "

## 2017-06-19 NOTE — PROGRESS NOTES
Cardiology Progress Note               Author: Saturnino Neumannsrhosseing Date & Time created: 2017  1:25 PM     Interval History:  Denies cardiac complaints  No palpitations or dizziness    Told to have sleep study done by his former cardiologist but didn't follow through    Monitor reviewed do not think he had any third degree, more like type I second degree with occ  escaped beats    Review of Systems   Constitutional: Negative for malaise/fatigue.   Respiratory: Negative for hemoptysis.    Cardiovascular: Positive for leg swelling. Negative for chest pain, palpitations, orthopnea, claudication and PND.   Gastrointestinal: Negative for nausea, vomiting and abdominal pain.   Musculoskeletal: Negative for myalgias.   Neurological: Negative for dizziness, speech change, focal weakness and weakness.       Physical Exam   Constitutional: He is oriented to person, place, and time. No distress.   Obese   HENT:   Mouth/Throat: Mucous membranes are normal.   Neck: No JVD present. No tracheal deviation present. No thyroid mass and no thyromegaly present.   Cardiovascular: Normal rate, regular rhythm and intact distal pulses.  Exam reveals distant heart sounds.    No murmur heard.  Pulmonary/Chest: Effort normal and breath sounds normal. No respiratory distress. He exhibits no tenderness.   Abdominal: Soft. There is no tenderness.   Musculoskeletal: He exhibits edema.   Lt>Rt  Stocking rt leg   Neurological: He is alert and oriented to person, place, and time. He has normal strength. He displays no tremor.   Skin: Skin is warm. He is not diaphoretic.   Stasis change left leg   Psychiatric: He has a normal mood and affect. His behavior is normal.   Vitals reviewed.      Hemodynamics:  Temp (24hrs), Av.4 °C (97.6 °F), Min:36.2 °C (97.1 °F), Max:37 °C (98.6 °F)  Temperature: 36.6 °C (97.9 °F)  Pulse  Av.8  Min: 53  Max: 160   Blood Pressure : 113/61 mmHg     Respiratory:    Respiration: 18, Pulse Oximetry: 98 %      Work Of Breathing / Effort: Moderate;Shallow  RUL Breath Sounds: Clear, RML Breath Sounds: Clear, RLL Breath Sounds: Diminished, ABIGAIL Breath Sounds: Clear, LLL Breath Sounds: Diminished  Fluids:  Date 06/19/17 0700 - 06/20/17 0659   Shift 3314-6406 6739-0535 0775-0337 24 Hour Total   I  N  T  A  K  E   P.O. 360   360    Shift Total 360   360   O  U  T  P  U  T   Urine 300   300    Shift Total 300   300   Weight (kg) 126.2 126.2 126.2 126.2         GI/Nutrition:  Orders Placed This Encounter   Procedures   • DIET ORDER     Standing Status: Standing      Number of Occurrences: 1      Standing Expiration Date:      Order Specific Question:  Diet:     Answer:  Diabetic [3]     Lab Results:  Recent Labs      06/17/17   0524 06/18/17 0445 06/19/17   0506   WBC  8.3  6.9  7.3   RBC  2.89*  2.82*  2.99*   HEMOGLOBIN  8.9*  8.5*  8.9*   HEMATOCRIT  29.7*  28.3*  29.9*   MCV  102.8*  100.4*  100.0*   MCH  30.8  30.1  29.8   MCHC  30.0*  30.0*  29.8*   RDW  73.8*  68.1*  68.3*   PLATELETCT  271  283  295   MPV  9.8  9.8  9.4     Recent Labs      06/17/17   0524 06/18/17   0445 06/19/17   0506   SODIUM  137  137  138  138   POTASSIUM  4.6  4.5  3.9  4.1   CHLORIDE  107  108  109  109   CO2  22  24  25  25   GLUCOSE  112*  111*  118*  136*   BUN  20  21  20  16   CREATININE  0.78  0.77  0.74  0.65   CALCIUM  8.4*  8.4*  8.0*  8.2*                         Medical Decision Making, by Problem:  Active Hospital Problems    Diagnosis   • Cellulitis of left foot [L03.116]   • AV block, Mobitz 1 [I44.1] prob sleep apnea related,no strong indication for PPM   • Penetrating back wound [S21.239A]   • Obesity (BMI 30-39.9) [E66.9]   • Chronic congestive heart failure (CMS-HCC) [I50.9]   • CAD (coronary artery disease) [I25.10]   • Chronic deep vein thrombosis (DVT) of popliteal vein (CMS-HCC) [I82.539]   • History of pulmonary embolus (PE) [Z86.711]   • Warfarin-induced coagulopathy (CMS-HCC) [T45.511A, D68.9]   • Cancer  (CMS-HCC) [C80.1]   • Acute pain of left knee [M25.562]   • Acute blood loss anemia [D62]       Plan:  Avoid rate slowing agent  Need outpt w/u for sleep apnea and f/u  Should lose some weight  Discuss with wife and RN  Will see in a few days if still here  May go to rehab disposition per primary team  Please re-notify if developed >3 second pause or high grade AVB especially while awake        Core Measures

## 2017-06-19 NOTE — PROGRESS NOTES
Spoke to tele charge and charge RN on [facility], as well as trauma APN Celia Castaneda regarding patient's change in rhythm.  Patient asymptomatic, chronic condition.  No transfer orders were received.  Continue to monitor patient.  As long as patient continues to remain stable, transfer to a higher level of care is not necessary.

## 2017-06-19 NOTE — PROGRESS NOTES
LIMB PRESERVATION SERVICE     77-year-old male who was admitted to the hospital on 6/2/17 as a trauma patient after a fall out of his tractor on to a bale hook, which penetrated his left posterior flank.  Developed swelling, redness and pain in his left foot approximately 2 weeks after admission.  History of fusion of the first and second MTPs or Lisfranc fusion by Dr. Black and some form of  calcaneal sliding osteotomy sometime in 2007 or 2008.      Xray done on 6/17, shows surgical changes and soft tissue swelling     Dr. Pace consulted over the weekend, recommended arterial studies and abx, no surgery.  Dr. Pace also stated he would notify Dr. Black    Arterial studies are pending    Dr. Black saw patient this morning.  No surgery.  Patient is to follow up with him at the [facility] after he is discharged.

## 2017-06-19 NOTE — PROGRESS NOTES
Monitor Summary:    Sinus Rhythm: 64-91. Second degree type 1, Second degree type 2, third degree. PVC's. Dropped down to 31 with a 1.8 second pause.     Varies/ .08/ .44    (Dictated by Monitor Tech)

## 2017-06-19 NOTE — PROGRESS NOTES
Received a call from monitor room stating patient had 3 beats of Second Degree Type 2 heart blocked.  Paged on-call cardiologist.  Will continue to monitor.

## 2017-06-19 NOTE — CONSULTS
DATE OF SERVICE:  06/18/2017    CHIEF COMPLAINT:  Left foot pain and swelling.    HISTORY OF PRESENT ILLNESS:  The patient is a 77-year-old male who was   admitted to the hospital as a trauma patient after a fall out of his tractor   on to a bale hook, which penetrated his left posterior flank.  Significant   blood loss was noted at the scene.  He was admitted to trauma surgery for   resuscitation and further workup.  He is in the hospital now for just over 2   weeks, and in the past few days, increased swelling and some discomfort in his   left foot has been noted.  He does not have any consistent pain on that side,   but does notice occasional pain on that side.  Over the past few days, he had   some pain and erythema on the left side.  Radiographs were obtained.  There   was some medial finding on the first metatarsal head, which sparked some   concern.  Orthopedics was subsequently consulted.  He underwent fusion of the   first and second MTPs or Lisfranc fusion by Dr. Black and some form of   calcaneal sliding osteotomy sometime in 2007 or 2008, he is not sure which.    This was done as a realignment procedure after a total knee arthroplasty with   Dr. Marshall.  He did not have any real problems with the foot.  He does have   a history of DVT, PE including DVT in the left lower extremity.  He does get   occasional swelling and sometimes almost as bad as this, but not quite as bad   as this, this typically dissipates as well.  He denies any fevers, chills,   night sweats or other constitutional symptoms.    REVIEW OF SYSTEMS:  As per HPI, otherwise all systems reviewed and negative at   this time.    PAST MEDICAL HISTORY:  1.  DVT, PE with IVC filter placement in 2016.  2.  Hypertension.  3.  Cataracts.  4.  Glaucoma.  5.  BPH.  6.  Squamous cell carcinoma.  7.  Unknown arrhythmia at some point in the past.  8.  Occasional urinary incontinence.  9.  History of pneumonia in 2007.    PAST SURGICAL HISTORY:   Significant for,  1.  Bilateral knee replacements greater than 10 years ago by Dr. Marshall as   well as a left mid foot first and second ray fusions as well as calcaneal   sliding osteotomy by Dr. Black, he believes in 2007 and 2008.  2.  IVC filter placed in 2016.    SOCIAL HISTORY:  Nonsmoker, but a former user of chewing tobacco, quit in the   60s.  Does not consume alcohol or illicit drugs.    FAMILY HISTORY:  Noncontributory to current problem.    ALLERGIES:  No known drug allergies.    MEDICATIONS:  1.  Finasteride.  2.  Oxycodone (hospital medication).  3.  Colace.    PHYSICAL EXAMINATION:  GENERAL:  A well-appearing male, in no distress.  VITAL SIGNS:  Temperature 36.5, T-max 37.2 in the last 24 hours, pulse 83,   respirations 18, pulse oximetry 93% on 1 liter nasal cannula, blood pressure   96/60, height 5 feet 10 inches tall, weight 126 kilograms.  HEENT:  Normocephalic, atraumatic.  Eyes:  Pupils are equal, round and   reactive to light.  NECK:  Pain free range of motion and midline trachea.  CHEST:  Symmetric expansion.  No respiratory distress.  HEART:  Regular rate and rhythm.  ABDOMEN:  Soft, nontender, nondistended, obese.  LYMPHATICS:  Notable lower extremity lymphedema, particularly in the left and   3+ pitting edema in the left including pretibial edema, not isolated to left   foot.  Skin overlying the left foot is remarkable for healed surgical   incisions both dorsally and medial aspect of the foot.  VASCULAR:  I cannot palpate dorsalis pedis pulse in the left, although it can   be dopplered as needed secondary to swelling.  MUSCULOSKELETAL:  He is nontender to palpation of the left foot, he has no   pain with range of motion of the left great toe, nontender to palpation of the   left great toe.  NEUROLOGIC:  Intact light touch sensation throughout the toes on that side   including the sero saphenous, superficial peroneal, deep peroneal, and tibial   nerve distribution.  Intact motor function  in the tibialis anterior,   gastrocsoleus complex, EHL, peroneals.    IMAGING:  My review of radiographs reveals postoperative changes consistent   with fusion of the first and second MTP joints as well as fusion of the medial   cuneiform.  He also has postoperative changes consistent with calcaneus   osteotomy.  There is some slight lucency on the AP foot on the medial aspect   of the first metatarsal head, which could be consistent with an erosion.    There are some midfoot and hindfoot degenerative changes.    IMPRESSION:  1.  Left foot swelling and pain.  2.  Status post left first and second metatarsophalangeal fusion and the   medial cuneiform fusions as well as calcaneus osteotomy.    PLAN:  It is difficult to determine the cause of the swelling, certainly his chronic DVT on that   side could be a contributing factor obviously in a patient with a history of   DVT.  Actually new onset of lower extremity swelling could be concerning for   recurrent DVT.  Vascular surgery about a week ago did not find anything   consistent with new DVT, but did find old chronic occlusive DVT.  He does   have notable soft tissue swelling and a slight bit of erythema, and for this   reason, we will start him prophylactically on some antibiotics.  We would   recommend some noninvasive arterial vascular studies of the left lower   extremity to examine blood flow.  We will advise Dr. Black for the patient   to admit and see if he has recommendations regarding further intervention and   given the presence of the plate and screw construct, I do not think that an   MRI would be of benefit as the images likely would be quite distorted.  I   would also recommend a limb preservation consult at this time.  He has no open   wound, so I do not see any indication for surgery at this time and also, he   has no pain about the MTP, which would be consistent with his septic   arthritis.  I recommend empiric treatment for now with additional  diagnostic   tests most specifically with vascular studies and then consultation with Dr. Black, his surgeon, in addition for the operation.  I discussed my   recommendations.  The patient is in agreement with the plan.       ____________________________________     MD BERENICE Hill / ELDA    DD:  06/18/2017 12:14:09  DT:  06/18/2017 16:27:06    D#:  [id number]  Job#:  [id number]

## 2017-06-19 NOTE — PROGRESS NOTES
"  Trauma/Surgical Progress Note    Author: Madison Mckay Date & Time created: 6/19/2017   1:31 PM     Interval Events:  Feeling well  Erythema to left foot improving - appreciate orthopedics consult  Trauma to sign off, will transfer primary care to medicine team, Dr. Vizcarra  Please call with any further needs or questions  Counseled patient    Review of Systems   Constitutional: Negative for fever and chills.   Eyes: Positive for blurred vision (Minimal to left eye). Negative for double vision.   Respiratory: Positive for shortness of breath. Negative for cough and wheezing.    Cardiovascular: Positive for leg swelling. Negative for chest pain and palpitations.   Gastrointestinal: Negative for nausea, vomiting, abdominal pain and constipation.        6/19 BM   Genitourinary:        Voiding   Musculoskeletal: Positive for back pain. Negative for myalgias, joint pain and neck pain.   Neurological: Negative for focal weakness and headaches.   Psychiatric/Behavioral: Negative for substance abuse.     Hemodynamics:  Blood pressure 113/61, pulse 95, temperature 36.6 °C (97.9 °F), resp. rate 18, height 1.778 m (5' 10\"), weight 126.2 kg (278 lb 3.5 oz), SpO2 98 %.     Respiratory:    Respiration: 18, Pulse Oximetry: 98 %     Work Of Breathing / Effort: Moderate;Shallow  RUL Breath Sounds: Clear, RML Breath Sounds: Clear, RLL Breath Sounds: Diminished, ABIGAIL Breath Sounds: Clear, LLL Breath Sounds: Diminished  Fluids:    Intake/Output Summary (Last 24 hours) at 06/19/17 1331  Last data filed at 06/19/17 0934   Gross per 24 hour   Intake   1000 ml   Output   1200 ml   Net   -200 ml     Admit Weight: 117.935 kg (260 lb)  Current     Physical Exam   Constitutional: He is oriented to person, place, and time. He appears well-developed. No distress.   Up in chair   HENT:   Head: Normocephalic.   Eyes:   Left eye premorbid ptosis   Neck: Neck supple. No JVD present. No tracheal deviation present.   Cardiovascular: Normal rate.  "   Pulmonary/Chest: Effort normal. No respiratory distress. He has no wheezes.   Supplemental O2   Abdominal: Soft. He exhibits no distension. There is tenderness.   Mild midline tenderness, improved   Musculoskeletal: Normal range of motion. He exhibits edema.   3+ edema  Left greater toe redness, edema, warm to touch   Neurological: He is alert and oriented to person, place, and time.   Skin: Skin is warm and dry.   Psychiatric: He has a normal mood and affect. His behavior is normal.   Nursing note and vitals reviewed.      Medical Decision Making/Problem List:    Active Hospital Problems    Diagnosis   • Cellulitis of left foot [L03.116]     Priority: High     No trauma or open wounds  History of instrumentation with implant 2005 - [facility]  6/17 - Commence Ancef   - 3 view left foot - Extensive dorsal and medial soft tissue swelling primarily involving LEFT great toe with apparent resorption at the medial aspect of the 1st metatarsophalangeal joint concerning for osteomyelitis/septic arthritis.  - MRSA nasal swab negative, hemoglobin A1c 5.4  Zafar Pcae MD - Ortho  Fran Black MD.  Orthopedics     • AV block, Mobitz 1 [I44.1]     Priority: High     Observation only.  6/8 - EKG changes with frequent PVC's. Troponin < 0.02. Metoprolol held on transfer to ICU.  6/11 - Restart metoprolol  6/17 - Metoprolol stopped per cardiology recommendations  - Cardiology following  Cruz Vargas MD. Cardiology     • Penetrating back wound [S21.938A]     Priority: High     Large left paraspinous, posterior hematoma.  No active extravasation.  6/8 - CT chest showed slightly larger SQ fluid than on admission. No discrete drainable hematoma.   Ancef x 24hrs (completed 6/3)     • Obesity (BMI 30-39.9) [E66.9]     Priority: Medium     BMI -  38.8     • Chronic congestive heart failure (CMS-HCC) [I50.9]     Priority: Medium     6/6 - Home lasix and potassium started.  6/8 - Lasix stopped upon ICU transfer.  6/11- BNP low /  restart lasix as BLE edema.  6/12 - BP low - Lasix and potassium on hold.  6/15 - BNP elevated. Resume Lasix.   Trend diagnostic laboratory studies      • CAD (coronary artery disease) [I25.10]     Priority: Medium     6/6 - Home metoprolol restarted.  Held on transfer to ICU.  6/11 - Resumed.    6/17 - Metoprolol stopped per cardiology recommendation     • Chronic deep vein thrombosis (DVT) of popliteal vein (CMS-HCC) [I82.539]     Priority: Medium     Present on admission, takes outpatient coumadin.  Old clot in left popliteal vein.  6/4 - Prophylactic Lovenox initiated.  6/3 - Trauma screening bilateral lower extremity venous duplex positive for chronic DVT of the left popliteal vein; no acute process in either leg.  6/6 - Therapeutic Lovenox started.  6/8 - Stopped upon ICU transfer.  6/11 - Trauma screening bilateral lower extremity venous duplex positive for partially occlusive chronic DVT seen in the left popliteal vein as  membrane-like structure with response to compression and good venous flow. No evidence of acute DVT seen in the left lower extremity.  6/12 - Prophylactic Lovenox resumed.       • History of pulmonary embolus (PE) [Z86.711]     Priority: Medium     With DVT.  On warfarin as an outpatient.  IVC filter present.  6/12 - Prophylactic Lovenox resumed.  Restart anticoagulation as outpatient.       • Warfarin-induced coagulopathy (CMS-HCC) [T45.511A, D68.9]     Priority: Medium     Coumadin for h/o PE.  INR 2 on arrival, given 1 mg Factor VII and Vitamin K.  Trend INR, correct as clinically warranted.  6/12 - Prophylactic Lovenox resumed.  Restart coumadin on discharge.       • Cancer (CMS-HCC) [C80.1]     Priority: Low     Premorbid.  Squamous cell carinoma left eye.  Home eye drops resumed.       • Acute pain of left knee [M25.562]     Priority: Low     Prior TKR.  6/11 - Imaging negative for left knee fracture or malalignment.      • Acute blood loss anemia [D62]     Priority: Low     1L of  blood loss reported on scene.  Large paraspinous, posterior hematoma.  6/2 - Transfused 2 units PRBC.  6/10 - Transfused 1 unit PRBC. Iron replaced per pharmacy kinetics.   6/19 - Trend up  Transfuse 1 unit PRBC if Hb < 7.0.       Core Measures & Quality Metrics:  Medications reviewed, Labs reviewed and Radiology images reviewed  Castaneda catheter: No Castaneda      DVT Prophylaxis: Enoxaparin (Lovenox)  DVT prophylaxis - mechanical: SCDs  Ulcer prophylaxis: Not indicated        Total Score: 8    ETOH Screening  CAGE Score: 0  Intervention complete date: 6/13/2017  Patient response to intervention: Denies substance abuse - no further intervention indicated .       Discussed patient condition with RN, Patient and trauma surgery, Dr. Amin.

## 2017-06-20 ENCOUNTER — APPOINTMENT (OUTPATIENT)
Dept: RADIOLOGY | Facility: MEDICAL CENTER | Age: 77
DRG: 907 | End: 2017-06-20
Attending: SURGERY
Payer: MEDICARE

## 2017-06-20 PROBLEM — C69.92: Status: ACTIVE | Noted: 2017-06-14

## 2017-06-20 LAB
ANION GAP SERPL CALC-SCNC: 4 MMOL/L (ref 0–11.9)
BASOPHILS # BLD AUTO: 0.3 % (ref 0–1.8)
BASOPHILS # BLD: 0.02 K/UL (ref 0–0.12)
BUN SERPL-MCNC: 16 MG/DL (ref 8–22)
CALCIUM SERPL-MCNC: 8.1 MG/DL (ref 8.5–10.5)
CHLORIDE SERPL-SCNC: 108 MMOL/L (ref 96–112)
CO2 SERPL-SCNC: 26 MMOL/L (ref 20–33)
CREAT SERPL-MCNC: 0.61 MG/DL (ref 0.5–1.4)
EOSINOPHIL # BLD AUTO: 0.33 K/UL (ref 0–0.51)
EOSINOPHIL NFR BLD: 5.2 % (ref 0–6.9)
ERYTHROCYTE [DISTWIDTH] IN BLOOD BY AUTOMATED COUNT: 67.7 FL (ref 35.9–50)
GFR SERPL CREATININE-BSD FRML MDRD: >60 ML/MIN/1.73 M 2
GLUCOSE BLD-MCNC: 103 MG/DL (ref 65–99)
GLUCOSE BLD-MCNC: 124 MG/DL (ref 65–99)
GLUCOSE BLD-MCNC: 140 MG/DL (ref 65–99)
GLUCOSE BLD-MCNC: 191 MG/DL (ref 65–99)
GLUCOSE SERPL-MCNC: 141 MG/DL (ref 65–99)
HCT VFR BLD AUTO: 30.1 % (ref 42–52)
HGB BLD-MCNC: 8.8 G/DL (ref 14–18)
IMM GRANULOCYTES # BLD AUTO: 0.05 K/UL (ref 0–0.11)
IMM GRANULOCYTES NFR BLD AUTO: 0.8 % (ref 0–0.9)
LYMPHOCYTES # BLD AUTO: 0.83 K/UL (ref 1–4.8)
LYMPHOCYTES NFR BLD: 13.1 % (ref 22–41)
MCH RBC QN AUTO: 29.6 PG (ref 27–33)
MCHC RBC AUTO-ENTMCNC: 29.2 G/DL (ref 33.7–35.3)
MCV RBC AUTO: 101.3 FL (ref 81.4–97.8)
MONOCYTES # BLD AUTO: 0.46 K/UL (ref 0–0.85)
MONOCYTES NFR BLD AUTO: 7.2 % (ref 0–13.4)
NEUTROPHILS # BLD AUTO: 4.66 K/UL (ref 1.82–7.42)
NEUTROPHILS NFR BLD: 73.4 % (ref 44–72)
NRBC # BLD AUTO: 0 K/UL
NRBC BLD AUTO-RTO: 0 /100 WBC
PLATELET # BLD AUTO: 287 K/UL (ref 164–446)
PMV BLD AUTO: 9.7 FL (ref 9–12.9)
POTASSIUM SERPL-SCNC: 4.1 MMOL/L (ref 3.6–5.5)
RBC # BLD AUTO: 2.97 M/UL (ref 4.7–6.1)
SODIUM SERPL-SCNC: 138 MMOL/L (ref 135–145)
WBC # BLD AUTO: 6.4 K/UL (ref 4.8–10.8)

## 2017-06-20 PROCEDURE — 770020 HCHG ROOM/CARE - TELE (206)

## 2017-06-20 PROCEDURE — 71010 DX-CHEST-PORTABLE (1 VIEW): CPT

## 2017-06-20 PROCEDURE — 700102 HCHG RX REV CODE 250 W/ 637 OVERRIDE(OP): Performed by: SURGERY

## 2017-06-20 PROCEDURE — A9270 NON-COVERED ITEM OR SERVICE: HCPCS | Performed by: SURGERY

## 2017-06-20 PROCEDURE — 700102 HCHG RX REV CODE 250 W/ 637 OVERRIDE(OP): Performed by: INTERNAL MEDICINE

## 2017-06-20 PROCEDURE — A9270 NON-COVERED ITEM OR SERVICE: HCPCS | Performed by: NURSE PRACTITIONER

## 2017-06-20 PROCEDURE — A9270 NON-COVERED ITEM OR SERVICE: HCPCS | Performed by: INTERNAL MEDICINE

## 2017-06-20 PROCEDURE — 80048 BASIC METABOLIC PNL TOTAL CA: CPT

## 2017-06-20 PROCEDURE — 700111 HCHG RX REV CODE 636 W/ 250 OVERRIDE (IP): Performed by: SURGERY

## 2017-06-20 PROCEDURE — 85025 COMPLETE CBC W/AUTO DIFF WBC: CPT

## 2017-06-20 PROCEDURE — 74177 CT ABD & PELVIS W/CONTRAST: CPT

## 2017-06-20 PROCEDURE — 700117 HCHG RX CONTRAST REV CODE 255: Performed by: INTERNAL MEDICINE

## 2017-06-20 PROCEDURE — 99233 SBSQ HOSP IP/OBS HIGH 50: CPT | Performed by: INTERNAL MEDICINE

## 2017-06-20 PROCEDURE — 700112 HCHG RX REV CODE 229: Performed by: NURSE PRACTITIONER

## 2017-06-20 PROCEDURE — 82962 GLUCOSE BLOOD TEST: CPT | Mod: 91

## 2017-06-20 PROCEDURE — 700102 HCHG RX REV CODE 250 W/ 637 OVERRIDE(OP): Performed by: NURSE PRACTITIONER

## 2017-06-20 RX ADMIN — OMEPRAZOLE 20 MG: 20 CAPSULE, DELAYED RELEASE ORAL at 07:46

## 2017-06-20 RX ADMIN — ENOXAPARIN SODIUM 30 MG: 100 INJECTION SUBCUTANEOUS at 07:47

## 2017-06-20 RX ADMIN — ENOXAPARIN SODIUM 30 MG: 100 INJECTION SUBCUTANEOUS at 21:14

## 2017-06-20 RX ADMIN — CEFAZOLIN SODIUM 2 G: 2 INJECTION, SOLUTION INTRAVENOUS at 06:12

## 2017-06-20 RX ADMIN — DOCUSATE SODIUM 100 MG: 100 CAPSULE ORAL at 07:46

## 2017-06-20 RX ADMIN — CEFAZOLIN SODIUM 2 G: 2 INJECTION, SOLUTION INTRAVENOUS at 21:18

## 2017-06-20 RX ADMIN — OMEPRAZOLE 20 MG: 20 CAPSULE, DELAYED RELEASE ORAL at 21:13

## 2017-06-20 RX ADMIN — IOHEXOL 100 ML: 350 INJECTION, SOLUTION INTRAVENOUS at 12:16

## 2017-06-20 RX ADMIN — SENNOSIDES AND DOCUSATE SODIUM 1 TABLET: 8.6; 5 TABLET ORAL at 21:14

## 2017-06-20 RX ADMIN — ERYTHROMYCIN: 5 OINTMENT OPHTHALMIC at 07:48

## 2017-06-20 RX ADMIN — FUROSEMIDE 20 MG: 20 TABLET ORAL at 15:49

## 2017-06-20 RX ADMIN — CEFAZOLIN SODIUM 2 G: 2 INJECTION, SOLUTION INTRAVENOUS at 15:41

## 2017-06-20 RX ADMIN — ACETAMINOPHEN 650 MG: 325 TABLET, FILM COATED ORAL at 21:13

## 2017-06-20 RX ADMIN — FINASTERIDE 5 MG: 5 TABLET, FILM COATED ORAL at 07:47

## 2017-06-20 RX ADMIN — INSULIN LISPRO 2 UNITS: 100 INJECTION, SOLUTION INTRAVENOUS; SUBCUTANEOUS at 21:08

## 2017-06-20 RX ADMIN — FUROSEMIDE 20 MG: 20 TABLET ORAL at 06:12

## 2017-06-20 RX ADMIN — DOCUSATE SODIUM 100 MG: 100 CAPSULE ORAL at 21:13

## 2017-06-20 ASSESSMENT — ENCOUNTER SYMPTOMS
VOMITING: 0
SHORTNESS OF BREATH: 1
NAUSEA: 0
INSOMNIA: 0
NERVOUS/ANXIOUS: 0
TREMORS: 0
ABDOMINAL PAIN: 1
DIARRHEA: 0
DIZZINESS: 0
EYE REDNESS: 0
HEMOPTYSIS: 0
BLOOD IN STOOL: 0
CONSTIPATION: 0
LOSS OF CONSCIOUSNESS: 0
FOCAL WEAKNESS: 0
COUGH: 0
FEVER: 0
EYE PAIN: 0
WHEEZING: 0
MYALGIAS: 0
WEAKNESS: 0
FALLS: 0
FLANK PAIN: 1
PALPITATIONS: 0
CHILLS: 0
SEIZURES: 0
HEADACHES: 0

## 2017-06-20 ASSESSMENT — PAIN SCALES - GENERAL
PAINLEVEL_OUTOF10: 0
PAINLEVEL_OUTOF10: 2

## 2017-06-20 NOTE — CARE PLAN
Problem: Respiratory:  Goal: Respiratory status will improve  Outcome: PROGRESSING SLOWER THAN EXPECTED  Expiratory wheezes and moderate work of breathing noted.  SpO2 96% on 3L NC, does not use oxygen baseline.  Dyspnea on exertion.

## 2017-06-20 NOTE — DIETARY
Nutrition Services: update     Pt is on day 18 of admit. He is receiving a DM diet with great po intake of % per ADLs.  He also has boost glucose ordered.  Supplement is not appropriate as pt's current wt is 126.2 kg; BMI 39.9 - obesity Class II.     Con't to encourage po intake of meals and record percentage of meals conusmed in ADLs to help monitor po adequacy.     RD following per dept policy.

## 2017-06-20 NOTE — PROGRESS NOTES
Surgery Follow-Up:    CT reviewed and discussed with radiology.    Continue regular diet, bowel regimen, mobility as tolerated.  Considering repeat non-contrast CT in 2-3 days to re-evaluate the pneumoperitoneum.  No medication changes/additions recommended in regards to this.    Dhiraj Amin MD    DATE OF SERVICE: 6/20/2017

## 2017-06-20 NOTE — CONSULTS
DATE OF SERVICE:  06/19/2017    PHYSICIAN REQUESTING CONSULT:  Wilfred Amin MD    REASON FOR CONSULTATION:  Request of medical management as well as assuming   primary care while the patient is in the hospital after traumatic surgery,   status post surgery with trauma, which has since stabilized.  Patient also   with cellulitis of the left foot, type 1 AV block, status post   hemopneumothorax after penetrating chest wounds, blood loss anemia, history of   PE, had been on anticoagulation.    BRIEF SUMMARY OF HOSPITAL COURSE:  The patient is a 77-year-old male who was   admitted after he fell out of his tractor onto a hay bale hook penetrated left   posterior flank.  Patient had 1 liter blood loss on the scene and on Coumadin   for history of DVT/PE, was triaged and sent urgently to [facility].  He was seen by Dr. Amin, underwent central line placement   as well as left chest tube thoracostomy.  The patient tolerated this well.  He   had been on the medical floor for a few days; however, had increasing   bleeding and therefore went back to the ICU and was again stabilized.  He has   successfully been transitioned out of the intensive care unit; however he had   increase in swelling of the left foot, was placed on Ancef.  Orthopedic   surgery, Dr. Pace was consulted who recommended patient be seen by Dr. Black.  Patient also had bradycardia and was found to have first-degree   heart block and has been seen by cardiology.  This has been stable; however,   cardiology is recommending patient needs further diuresis given his lower   extremity edema.  The patient's anticoagulation is currently on hold given his   penetrating chest wound and bleeding from the wound.  He has been   successfully weaned off the chest tube, ambulating with physical and   occupational therapy.  Dr. Black did see the patient today and will have the   patient followup with him in the outpatient wound care clinic.   No inpatient   intervention is necessary at this time.  Patient evaluated, in no acute   complaints other than some pain related to the injury, lower extremity   swelling and wanting to get back home.    REVIEW OF SYSTEMS:  Negative except as above.    PAST MEDICAL HISTORY:  DVT, PE on anticoagulation, status post IVC filter   placed in 2016, glaucoma, benign prostatic hypertrophy, squamous cell   carcinoma left eye, cardiac arrhythmia, history of congestive heart failure.    PAST SURGICAL HISTORY:  Bilateral knee replacement, left ankle foot repair,   IVC filter placed in 2016 and most recently thoracoscopy.    HOME MEDICATIONS:  Metoprolol 25 mg tablets 2 tabs p.o. daily, potassium 10   mEq p.o. b.i.d., Lasix 20 mg p.o. daily, Proscar 5 mg p.o. daily, Coumadin 1   mg p.o. daily.    ALLERGIES:  None.    SOCIAL HISTORY:  Former smoker, quit in 1967.  No alcohol or illicit drug use.    FAMILY HISTORY:  Noncontributory.    Social history is as above.    PHYSICAL EXAMINATION:  VITAL SIGNS:  Patient is afebrile.  Heart rate 95, respirations 18, blood   pressure 113/61, oxygen saturation 98% on 2.5 liters nasal cannula, had been   89% on room air.  GENERAL:  The patient is in no apparent distress.  A and O x3.  HEENT:  Head is normocephalic and atraumatic.  Pupils are equal and reactive   to light and accommodation.  Extraocular muscles are intact.  LUNGS:  Decreased breath sounds at the bases, otherwise clear to auscultation.    No wheezes, rales or rhonchi.  CARDIOVASCULAR:  Normal sinus rhythm.  No murmurs, rubs or gallops.  ABDOMEN:  Bowel sounds positive, nontender, nondistended, no   hepatosplenomegaly.  NEUROLOGIC:  Cranial nerves II-XII grossly intact.  DTRs are 2+ over   bilaterally.  EXTREMITIES:  Swelling bilaterally.  Left lower extremity is warm to touch;   however, this is reportedly better than it had been in the last 24 hours.    LABORATORY STUDIES:  White count 7.3, hemoglobin 8.9, hematocrit 29.9,    platelets of 295.  Sodium is 138, potassium 4.1, chloride 109, bicarbonate 25,   glucose 136, BUN 16, creatinine 0.65, calcium 8.2.    IMAGING:  Chest x-ray shows decreased subdiaphragmatic free air, unchanged   left basilar opacity likely basis of contusion.  Most recent EKG on 06/17/2017   shows a second-degree AV block, Mobitz type 1.  Blood cultures from   06/08/2017 showed no growth.  There is no external wound for culture of the   left lower extremity swelling, likely has cutaneous cellulitis.  Patient has   been on Ancef and is responding well.  This will be continued at this point.    ASSESSMENT AND PLAN:  1.  Left lower extremity cellulitis.  This was discussed with limb   preservation, Camille Galeas, nurse practitioner, recommended that he follow up   with Dr. Black as outpatient, no changes are necessary at this time.    Vascular studies have been ordered with the lower extremity arterial   evaluation.  This will be done prior to patient's discharge.  Patient is seen   by physical and occupational therapy and looking to be transitioning over to   rehabilitation.  His cardiovascular status is stable at this point.  He is not   to be on anticoagulation at this point given his penetrating chest trauma and   needs to continue to heal.  The patient is appropriate for transition to   acute rehab after the vascular studies have been completed and continue on   antibiotics.  The patient on intravenous Ancef at this point, can likely be   transitioned to an oral antibiotic in the next few days.  Infectious disease   has not been consulted and at this point unless the patient starts to show   resistance to the current treatment, likely does not need any infectious   diseases consultation.  2.  History of congestive heart failure.  Patient currently on Lasix 10 mg   daily since the 16th, we will increase this to 20 mg b.i.d. to help reduce the   edema that he is currently having and I will continue to watch  potassium with   this change.  He is receiving daily metabolic and CBCs.  Patient likely ready   to be transitioned to acute rehabilitation in the next 3-4 days depending on   his respiratory status and the improvement of the left lower extremity edema.    We appreciate this consult.  We will assume primary care of the patient from   this point forward.  If any surgical issues arise, we will continue to discuss   with Dr. Amin and patient to continue to be seen by cardiology   intermittently.       ____________________________________     DO LEANN Reyes / ELDA    DD:  06/19/2017 16:37:29  DT:  06/19/2017 20:34:06    D#:  [id number]  Job#:  [id number]

## 2017-06-20 NOTE — PROGRESS NOTES
Call received from Simone in monitor room.  Possible change in rhythm to second degree type II heart block. Patient sleeping, stable, SpO1 96%, HR 62. Strip printed and on top of chart.

## 2017-06-20 NOTE — PROGRESS NOTES
Monitor Summary:    Sinus Rhythm: 68-83 with runs of second degree type 1, occasional PVC's, trigeminy, and dropped down to 42 at 0000.    .28/.08/.36    (As dictated by monitor room tech)

## 2017-06-20 NOTE — PROGRESS NOTES
"Trauma / Surgical Progress Note  Interval Events:  No issues overnight  States he feels good  No abdominal pain, continues to tolerate PO diet  CXR again with finding suspicious for air under the right aminata-diaphragm, though the patient has not developed clear signs of a free intestinal perforation clinically or by lab studies since the abnormality was first noted on .    -Follow up CT abd/pel with contrast, d/w Dr. Shahla BUSBY    Vitals:  Blood pressure 100/62, pulse 67, temperature 36.2 °C (97.2 °F), resp. rate 20, height 1.778 m (5' 10\"), weight 126.2 kg (278 lb 3.5 oz), SpO2 96 %.  Temp (24hrs), Av.4 °C (97.5 °F), Min:36.2 °C (97.2 °F), Max:36.6 °C (97.9 °F)      IO:       Exam:  Respiratory: unlabored respirations, no intercostal retractions or accessory muscle use, 2.5L O2 by NC  Neuro: no focal deficits noted  Cardiovascular: regular rate and rhythm  GI: abdomen is soft, mildly distended, tympanitic, non-tender x4 quadrants  EXT: Left foot with pitting edema and decreased cellulitis, N/V intact    Labs:  Recent Results (from the past 24 hour(s))   ACCU-CHEK GLUCOSE    Collection Time: 17 11:04 AM   Result Value Ref Range    Glucose - Accu-Ck 149 (H) 65 - 99 mg/dL   ACCU-CHEK GLUCOSE    Collection Time: 17  5:39 PM   Result Value Ref Range    Glucose - Accu-Ck 115 (H) 65 - 99 mg/dL   ACCU-CHEK GLUCOSE    Collection Time: 17  9:37 PM   Result Value Ref Range    Glucose - Accu-Ck 140 (H) 65 - 99 mg/dL   CBC WITH DIFFERENTIAL    Collection Time: 17  4:38 AM   Result Value Ref Range    WBC 6.4 4.8 - 10.8 K/uL    RBC 2.97 (L) 4.70 - 6.10 M/uL    Hemoglobin 8.8 (L) 14.0 - 18.0 g/dL    Hematocrit 30.1 (L) 42.0 - 52.0 %    .3 (H) 81.4 - 97.8 fL    MCH 29.6 27.0 - 33.0 pg    MCHC 29.2 (L) 33.7 - 35.3 g/dL    RDW 67.7 (H) 35.9 - 50.0 fL    Platelet Count 287 164 - 446 K/uL    MPV 9.7 9.0 - 12.9 fL    Neutrophils-Polys 73.40 (H) 44.00 - 72.00 %    Lymphocytes 13.10 (L) 22.00 - " 41.00 %    Monocytes 7.20 0.00 - 13.40 %    Eosinophils 5.20 0.00 - 6.90 %    Basophils 0.30 0.00 - 1.80 %    Immature Granulocytes 0.80 0.00 - 0.90 %    Nucleated RBC 0.00 /100 WBC    Neutrophils (Absolute) 4.66 1.82 - 7.42 K/uL    Lymphs (Absolute) 0.83 (L) 1.00 - 4.80 K/uL    Monos (Absolute) 0.46 0.00 - 0.85 K/uL    Eos (Absolute) 0.33 0.00 - 0.51 K/uL    Baso (Absolute) 0.02 0.00 - 0.12 K/uL    Immature Granulocytes (abs) 0.05 0.00 - 0.11 K/uL    NRBC (Absolute) 0.00 K/uL   BASIC METABOLIC PANEL    Collection Time: 06/20/17  4:38 AM   Result Value Ref Range    Sodium 138 135 - 145 mmol/L    Potassium 4.1 3.6 - 5.5 mmol/L    Chloride 108 96 - 112 mmol/L    Co2 26 20 - 33 mmol/L    Glucose 141 (H) 65 - 99 mg/dL    Bun 16 8 - 22 mg/dL    Creatinine 0.61 0.50 - 1.40 mg/dL    Calcium 8.1 (L) 8.5 - 10.5 mg/dL    Anion Gap 4.0 0.0 - 11.9   ESTIMATED GFR    Collection Time: 06/20/17  4:38 AM   Result Value Ref Range    GFR If African American >60 >60 mL/min/1.73 m 2    GFR If Non African American >60 >60 mL/min/1.73 m 2   ACCU-CHEK GLUCOSE    Collection Time: 06/20/17  6:11 AM   Result Value Ref Range    Glucose - Accu-Ck 124 (H) 65 - 99 mg/dL       Problem and Plan:  Active Hospital Problems    Diagnosis   • Cellulitis of left foot [L03.116]     Priority: High     No trauma or open wounds  History of instrumentation with implant 2005 - [facility]  6/17 - Commence Ancef   - 3 view left foot - Extensive dorsal and medial soft tissue swelling primarily involving LEFT great toe with apparent resorption at the medial aspect of the 1st metatarsophalangeal joint concerning for osteomyelitis/septic arthritis.  - MRSA nasal swab negative, hemoglobin A1c 5.4  Zafar Pace MD - Ortho  Fran Black MD.  Orthopedics     • AV block, Mobitz 1 [I44.1]     Priority: High     Observation only.  6/8 - EKG changes with frequent PVC's. Troponin < 0.02. Metoprolol held on transfer to ICU.  6/11 - Restart metoprolol  6/17 - Metoprolol  stopped per cardiology recommendations  - Cardiology following  Cruz Vargas MD. Cardiology     • Penetrating back wound [S21.435A]     Priority: High     Large left paraspinous, posterior hematoma.  No active extravasation.  6/8 - CT chest showed slightly larger SQ fluid than on admission. No discrete drainable hematoma.   Ancef x 24hrs (completed 6/3)     • Obesity (BMI 30-39.9) [E66.9]     Priority: Medium     BMI -  38.8     • Chronic congestive heart failure (CMS-HCC) [I50.9]     Priority: Medium     6/6 - Home lasix and potassium started.  6/8 - Lasix stopped upon ICU transfer.  6/11- BNP low / restart lasix as BLE edema.  6/12 - BP low - Lasix and potassium on hold.  6/15 - BNP elevated. Resume Lasix.   Trend diagnostic laboratory studies      • CAD (coronary artery disease) [I25.10]     Priority: Medium     6/6 - Home metoprolol restarted.  Held on transfer to ICU.  6/11 - Resumed.    6/17 - Metoprolol stopped per cardiology recommendation     • Chronic deep vein thrombosis (DVT) of popliteal vein (CMS-HCC) [I82.539]     Priority: Medium     Present on admission, takes outpatient coumadin.  Old clot in left popliteal vein.  6/4 - Prophylactic Lovenox initiated.  6/3 - Trauma screening bilateral lower extremity venous duplex positive for chronic DVT of the left popliteal vein; no acute process in either leg.  6/6 - Therapeutic Lovenox started.  6/8 - Stopped upon ICU transfer.  6/11 - Trauma screening bilateral lower extremity venous duplex positive for partially occlusive chronic DVT seen in the left popliteal vein as  membrane-like structure with response to compression and good venous flow. No evidence of acute DVT seen in the left lower extremity.  6/12 - Prophylactic Lovenox resumed.       • History of pulmonary embolus (PE) [Z86.711]     Priority: Medium     With DVT.  On warfarin as an outpatient.  IVC filter present.  6/12 - Prophylactic Lovenox resumed.  Restart anticoagulation as outpatient.       •  Warfarin-induced coagulopathy (CMS-HCC) [T45.511A, D68.9]     Priority: Medium     Coumadin for h/o PE.  INR 2 on arrival, given 1 mg Factor VII and Vitamin K.  Trend INR, correct as clinically warranted.  6/12 - Prophylactic Lovenox resumed.  Restart coumadin on discharge.       • Cancer (CMS-[facility]) [C80.1]     Priority: Low     Premorbid.  Squamous cell carinoma left eye.  Home eye drops resumed.       • Acute pain of left knee [M25.562]     Priority: Low     Prior TKR.  6/11 - Imaging negative for left knee fracture or malalignment.      • Acute blood loss anemia [D62]     Priority: Low     1L of blood loss reported on scene.  Large paraspinous, posterior hematoma.  6/2 - Transfused 2 units PRBC.  6/10 - Transfused 1 unit PRBC. Iron replaced per pharmacy kinetics.   6/19 - Trend up  Transfuse 1 unit PRBC if Hb < 7.0.         Core Measures & Quality Metrics:  Medications reviewed, Labs reviewed and Radiology images reviewed  Castaneda catheter: No Castaneda      DVT Prophylaxis: Enoxaparin (Lovenox)  DVT prophylaxis - mechanical: SCDs  Ulcer prophylaxis: Not indicated           Dhiraj Amin MD    DATE OF SERVICE: 6/20/2017

## 2017-06-21 ENCOUNTER — APPOINTMENT (OUTPATIENT)
Dept: RADIOLOGY | Facility: MEDICAL CENTER | Age: 77
DRG: 907 | End: 2017-06-21
Attending: SURGERY
Payer: MEDICARE

## 2017-06-21 PROBLEM — K66.8 PNEUMOPERITONEUM: Status: ACTIVE | Noted: 2017-06-21

## 2017-06-21 PROBLEM — J94.2 HEMOTHORAX ON LEFT: Status: ACTIVE | Noted: 2017-06-21

## 2017-06-21 LAB
ANION GAP SERPL CALC-SCNC: 6 MMOL/L (ref 0–11.9)
ANISOCYTOSIS BLD QL SMEAR: ABNORMAL
BASOPHILS # BLD AUTO: 0 % (ref 0–1.8)
BASOPHILS # BLD: 0 K/UL (ref 0–0.12)
BUN SERPL-MCNC: 17 MG/DL (ref 8–22)
CALCIUM SERPL-MCNC: 8.4 MG/DL (ref 8.5–10.5)
CHLORIDE SERPL-SCNC: 107 MMOL/L (ref 96–112)
CHOLEST SERPL-MCNC: 105 MG/DL (ref 100–199)
CO2 SERPL-SCNC: 25 MMOL/L (ref 20–33)
CREAT SERPL-MCNC: 0.6 MG/DL (ref 0.5–1.4)
EKG IMPRESSION: NORMAL
EOSINOPHIL # BLD AUTO: 0 K/UL (ref 0–0.51)
EOSINOPHIL NFR BLD: 0 % (ref 0–6.9)
ERYTHROCYTE [DISTWIDTH] IN BLOOD BY AUTOMATED COUNT: 67.6 FL (ref 35.9–50)
GFR SERPL CREATININE-BSD FRML MDRD: >60 ML/MIN/1.73 M 2
GLUCOSE BLD-MCNC: 106 MG/DL (ref 65–99)
GLUCOSE BLD-MCNC: 168 MG/DL (ref 65–99)
GLUCOSE BLD-MCNC: 193 MG/DL (ref 65–99)
GLUCOSE SERPL-MCNC: 155 MG/DL (ref 65–99)
HCT VFR BLD AUTO: 35.5 % (ref 42–52)
HDLC SERPL-MCNC: 32 MG/DL
HGB BLD-MCNC: 10.4 G/DL (ref 14–18)
LDLC SERPL CALC-MCNC: 54 MG/DL
LYMPHOCYTES # BLD AUTO: 0.18 K/UL (ref 1–4.8)
LYMPHOCYTES NFR BLD: 1.7 % (ref 22–41)
MACROCYTES BLD QL SMEAR: ABNORMAL
MANUAL DIFF BLD: NORMAL
MCH RBC QN AUTO: 29.9 PG (ref 27–33)
MCHC RBC AUTO-ENTMCNC: 29.3 G/DL (ref 33.7–35.3)
MCV RBC AUTO: 102 FL (ref 81.4–97.8)
MONOCYTES # BLD AUTO: 0.19 K/UL (ref 0–0.85)
MONOCYTES NFR BLD AUTO: 1.8 % (ref 0–13.4)
MORPHOLOGY BLD-IMP: NORMAL
NEUTROPHILS # BLD AUTO: 10.23 K/UL (ref 1.82–7.42)
NEUTROPHILS NFR BLD: 96.5 % (ref 44–72)
NRBC # BLD AUTO: 0 K/UL
NRBC BLD AUTO-RTO: 0 /100 WBC
PLATELET # BLD AUTO: 299 K/UL (ref 164–446)
PLATELET BLD QL SMEAR: NORMAL
PMV BLD AUTO: 9.6 FL (ref 9–12.9)
POIKILOCYTOSIS BLD QL SMEAR: NORMAL
POLYCHROMASIA BLD QL SMEAR: NORMAL
POTASSIUM SERPL-SCNC: 4.2 MMOL/L (ref 3.6–5.5)
RBC # BLD AUTO: 3.48 M/UL (ref 4.7–6.1)
RBC BLD AUTO: PRESENT
SODIUM SERPL-SCNC: 138 MMOL/L (ref 135–145)
TRIGL SERPL-MCNC: 95 MG/DL (ref 0–149)
WBC # BLD AUTO: 10.6 K/UL (ref 4.8–10.8)

## 2017-06-21 PROCEDURE — 160041 HCHG SURGERY MINUTES - EA ADDL 1 MIN LEVEL 4: Performed by: SURGERY

## 2017-06-21 PROCEDURE — 99233 SBSQ HOSP IP/OBS HIGH 50: CPT | Performed by: INTERNAL MEDICINE

## 2017-06-21 PROCEDURE — 700111 HCHG RX REV CODE 636 W/ 250 OVERRIDE (IP)

## 2017-06-21 PROCEDURE — 700112 HCHG RX REV CODE 229: Performed by: SURGERY

## 2017-06-21 PROCEDURE — 71010 DX-CHEST-PORTABLE (1 VIEW): CPT

## 2017-06-21 PROCEDURE — 160029 HCHG SURGERY MINUTES - 1ST 30 MINS LEVEL 4: Performed by: SURGERY

## 2017-06-21 PROCEDURE — 700102 HCHG RX REV CODE 250 W/ 637 OVERRIDE(OP): Performed by: SURGERY

## 2017-06-21 PROCEDURE — A9270 NON-COVERED ITEM OR SERVICE: HCPCS | Performed by: NURSE PRACTITIONER

## 2017-06-21 PROCEDURE — 700101 HCHG RX REV CODE 250

## 2017-06-21 PROCEDURE — 700112 HCHG RX REV CODE 229: Performed by: NURSE PRACTITIONER

## 2017-06-21 PROCEDURE — A9270 NON-COVERED ITEM OR SERVICE: HCPCS | Performed by: SURGERY

## 2017-06-21 PROCEDURE — 93010 ELECTROCARDIOGRAM REPORT: CPT | Performed by: INTERNAL MEDICINE

## 2017-06-21 PROCEDURE — 500655 HCHG GRAFT, MESH SEPRAFILM PROPACK: Performed by: SURGERY

## 2017-06-21 PROCEDURE — 700102 HCHG RX REV CODE 250 W/ 637 OVERRIDE(OP): Performed by: NURSE PRACTITIONER

## 2017-06-21 PROCEDURE — A4314 CATH W/DRAINAGE 2-WAY LATEX: HCPCS | Performed by: SURGERY

## 2017-06-21 PROCEDURE — 83605 ASSAY OF LACTIC ACID: CPT

## 2017-06-21 PROCEDURE — 85027 COMPLETE CBC AUTOMATED: CPT

## 2017-06-21 PROCEDURE — 501445 HCHG STAPLER, SKIN DISP: Performed by: SURGERY

## 2017-06-21 PROCEDURE — 94002 VENT MGMT INPAT INIT DAY: CPT

## 2017-06-21 PROCEDURE — 3E0M05Z INTRODUCTION OF ADHESION BARRIER INTO PERITONEAL CAVITY, OPEN APPROACH: ICD-10-PCS | Performed by: SURGERY

## 2017-06-21 PROCEDURE — 160048 HCHG OR STATISTICAL LEVEL 1-5: Performed by: SURGERY

## 2017-06-21 PROCEDURE — 700111 HCHG RX REV CODE 636 W/ 250 OVERRIDE (IP): Performed by: SURGERY

## 2017-06-21 PROCEDURE — 80061 LIPID PANEL: CPT

## 2017-06-21 PROCEDURE — 3E1M38Z IRRIGATION OF PERITONEAL CAVITY USING IRRIGATING SUBSTANCE, PERCUTANEOUS APPROACH: ICD-10-PCS | Performed by: SURGERY

## 2017-06-21 PROCEDURE — 501838 HCHG SUTURE GENERAL: Performed by: SURGERY

## 2017-06-21 PROCEDURE — A9270 NON-COVERED ITEM OR SERVICE: HCPCS | Performed by: INTERNAL MEDICINE

## 2017-06-21 PROCEDURE — 0WJP0ZZ INSPECTION OF GASTROINTESTINAL TRACT, OPEN APPROACH: ICD-10-PCS | Performed by: SURGERY

## 2017-06-21 PROCEDURE — 97530 THERAPEUTIC ACTIVITIES: CPT

## 2017-06-21 PROCEDURE — 770022 HCHG ROOM/CARE - ICU (200)

## 2017-06-21 PROCEDURE — 80048 BASIC METABOLIC PNL TOTAL CA: CPT

## 2017-06-21 PROCEDURE — 5A1935Z RESPIRATORY VENTILATION, LESS THAN 24 CONSECUTIVE HOURS: ICD-10-PCS | Performed by: ANESTHESIOLOGY

## 2017-06-21 PROCEDURE — 36600 WITHDRAWAL OF ARTERIAL BLOOD: CPT

## 2017-06-21 PROCEDURE — 93922 UPR/L XTREMITY ART 2 LEVELS: CPT

## 2017-06-21 PROCEDURE — 160009 HCHG ANES TIME/MIN: Performed by: SURGERY

## 2017-06-21 PROCEDURE — 93005 ELECTROCARDIOGRAM TRACING: CPT | Performed by: INTERNAL MEDICINE

## 2017-06-21 PROCEDURE — 97116 GAIT TRAINING THERAPY: CPT

## 2017-06-21 PROCEDURE — 82962 GLUCOSE BLOOD TEST: CPT

## 2017-06-21 PROCEDURE — 85007 BL SMEAR W/DIFF WBC COUNT: CPT

## 2017-06-21 PROCEDURE — 502240 HCHG MISC OR SUPPLY RC 0272: Performed by: SURGERY

## 2017-06-21 PROCEDURE — 82803 BLOOD GASES ANY COMBINATION: CPT

## 2017-06-21 PROCEDURE — 700102 HCHG RX REV CODE 250 W/ 637 OVERRIDE(OP): Performed by: INTERNAL MEDICINE

## 2017-06-21 PROCEDURE — 93922 UPR/L XTREMITY ART 2 LEVELS: CPT | Mod: 26 | Performed by: SURGERY

## 2017-06-21 RX ORDER — FAMOTIDINE 20 MG/1
20 TABLET, FILM COATED ORAL 2 TIMES DAILY
Status: DISCONTINUED | OUTPATIENT
Start: 2017-06-21 | End: 2017-06-22

## 2017-06-21 RX ORDER — MIDAZOLAM HYDROCHLORIDE 1 MG/ML
INJECTION INTRAMUSCULAR; INTRAVENOUS
Status: DISPENSED
Start: 2017-06-21 | End: 2017-06-21

## 2017-06-21 RX ORDER — ACETAMINOPHEN 325 MG/1
650 TABLET ORAL 4 TIMES DAILY
Status: DISCONTINUED | OUTPATIENT
Start: 2017-06-21 | End: 2017-06-28 | Stop reason: HOSPADM

## 2017-06-21 RX ORDER — AMOXICILLIN 250 MG
2 CAPSULE ORAL NIGHTLY
Status: DISCONTINUED | OUTPATIENT
Start: 2017-06-21 | End: 2017-06-28 | Stop reason: HOSPADM

## 2017-06-21 RX ORDER — FUROSEMIDE 20 MG/1
20 TABLET ORAL
Status: DISCONTINUED | OUTPATIENT
Start: 2017-06-21 | End: 2017-06-23

## 2017-06-21 RX ORDER — DOCUSATE SODIUM 100 MG/1
200 CAPSULE, LIQUID FILLED ORAL EVERY MORNING
Status: DISCONTINUED | OUTPATIENT
Start: 2017-06-21 | End: 2017-06-28 | Stop reason: HOSPADM

## 2017-06-21 RX ORDER — CHLORHEXIDINE GLUCONATE ORAL RINSE 1.2 MG/ML
15 SOLUTION DENTAL 2 TIMES DAILY
Status: DISCONTINUED | OUTPATIENT
Start: 2017-06-21 | End: 2017-06-24

## 2017-06-21 RX ORDER — OXYCODONE HYDROCHLORIDE 10 MG/1
5-10 TABLET ORAL
Status: DISCONTINUED | OUTPATIENT
Start: 2017-06-21 | End: 2017-06-27

## 2017-06-21 RX ADMIN — ACETAMINOPHEN 650 MG: 325 TABLET, FILM COATED ORAL at 17:03

## 2017-06-21 RX ADMIN — ENOXAPARIN SODIUM 30 MG: 100 INJECTION SUBCUTANEOUS at 08:49

## 2017-06-21 RX ADMIN — MAGNESIUM HYDROXIDE 30 ML: 400 SUSPENSION ORAL at 08:49

## 2017-06-21 RX ADMIN — INSULIN LISPRO 2 UNITS: 100 INJECTION, SOLUTION INTRAVENOUS; SUBCUTANEOUS at 21:03

## 2017-06-21 RX ADMIN — PROPOFOL 40 MCG/KG/MIN: 10 INJECTION, EMULSION INTRAVENOUS at 14:00

## 2017-06-21 RX ADMIN — DOCUSATE SODIUM 200 MG: 100 CAPSULE ORAL at 08:49

## 2017-06-21 RX ADMIN — CEFAZOLIN SODIUM 2 G: 2 INJECTION, SOLUTION INTRAVENOUS at 21:23

## 2017-06-21 RX ADMIN — ERYTHROMYCIN: 5 OINTMENT OPHTHALMIC at 08:49

## 2017-06-21 RX ADMIN — FUROSEMIDE 20 MG: 20 TABLET ORAL at 06:07

## 2017-06-21 RX ADMIN — DOCUSATE SODIUM 100 MG: 100 CAPSULE ORAL at 08:49

## 2017-06-21 RX ADMIN — CEFAZOLIN SODIUM 2 G: 2 INJECTION, SOLUTION INTRAVENOUS at 17:48

## 2017-06-21 RX ADMIN — ENOXAPARIN SODIUM 30 MG: 100 INJECTION SUBCUTANEOUS at 21:02

## 2017-06-21 RX ADMIN — PROPOFOL 25 MCG/KG/MIN: 10 INJECTION, EMULSION INTRAVENOUS at 18:01

## 2017-06-21 RX ADMIN — CHLORHEXIDINE GLUCONATE 15 ML: 1.2 RINSE ORAL at 21:01

## 2017-06-21 RX ADMIN — CHLORHEXIDINE GLUCONATE 15 ML: 1.2 RINSE ORAL at 16:50

## 2017-06-21 RX ADMIN — CEFAZOLIN SODIUM 2 G: 2 INJECTION, SOLUTION INTRAVENOUS at 06:07

## 2017-06-21 RX ADMIN — OMEPRAZOLE 20 MG: 20 CAPSULE, DELAYED RELEASE ORAL at 08:49

## 2017-06-21 RX ADMIN — FINASTERIDE 5 MG: 5 TABLET, FILM COATED ORAL at 08:49

## 2017-06-21 RX ADMIN — POLYETHYLENE GLYCOL (3350) 1 PACKET: 17 POWDER, FOR SOLUTION ORAL at 08:49

## 2017-06-21 ASSESSMENT — ENCOUNTER SYMPTOMS
FEVER: 0
SEIZURES: 0
PALPITATIONS: 0
NERVOUS/ANXIOUS: 0
WHEEZING: 0
DOUBLE VISION: 0
COUGH: 0
MYALGIAS: 0
FALLS: 0
NAUSEA: 0
HEADACHES: 0
VOMITING: 0
BLOOD IN STOOL: 0
LOSS OF CONSCIOUSNESS: 0
HEMOPTYSIS: 0
BACK PAIN: 1
CONSTIPATION: 0
CONSTIPATION: 1
DIARRHEA: 0
BLURRED VISION: 1
DIZZINESS: 0
EYE PAIN: 0
WEAKNESS: 0
SHORTNESS OF BREATH: 1
NECK PAIN: 0
INSOMNIA: 0
TREMORS: 0
FOCAL WEAKNESS: 0
ABDOMINAL PAIN: 1
CHILLS: 0
EYE REDNESS: 0
FLANK PAIN: 1

## 2017-06-21 ASSESSMENT — GAIT ASSESSMENTS
GAIT LEVEL OF ASSIST: CONTACT GUARD ASSIST
ASSISTIVE DEVICE: FRONT WHEEL WALKER
DEVIATION: DECREASED HEEL STRIKE;DECREASED TOE OFF;OTHER (COMMENT)
DISTANCE (FEET): 80

## 2017-06-21 ASSESSMENT — COGNITIVE AND FUNCTIONAL STATUS - GENERAL
SUGGESTED CMS G CODE MODIFIER MOBILITY: CL
STANDING UP FROM CHAIR USING ARMS: A LITTLE
CLIMB 3 TO 5 STEPS WITH RAILING: A LOT
MOBILITY SCORE: 13
MOVING TO AND FROM BED TO CHAIR: UNABLE
WALKING IN HOSPITAL ROOM: A LITTLE
TURNING FROM BACK TO SIDE WHILE IN FLAT BAD: UNABLE
MOVING FROM LYING ON BACK TO SITTING ON SIDE OF FLAT BED: A LITTLE

## 2017-06-21 ASSESSMENT — LIFESTYLE VARIABLES: SUBSTANCE_ABUSE: 0

## 2017-06-21 NOTE — PROGRESS NOTES
Patient had pleasant shift. No PRNs given.  Patient ambulated in blunt and up to chair x2-3.  Family updated.  Patient notified of CT results.

## 2017-06-21 NOTE — PROGRESS NOTES
Cardiology Progress Note               Author: Saturnino Syed Date & Time created: 2017  3:18 PM     Interval History:  Underwent exp lap earlier today due to free air  Post-op per note, having sig secretion issue and some dyspnea required re-intubation    BP 90+  Monitor currently SR, no AV block  Some AV block last night but no long pauses  Negative balance yesterday     Review of Systems   Unable to perform ROS: intubated       Physical Exam   Constitutional: No distress.   Intubated, sedated   HENT:   Mouth/Throat: Mucous membranes are normal.   Eyes: Conjunctivae are normal.   Neck: No JVD present. No tracheal deviation present. No thyroid mass and no thyromegaly present.   Cardiovascular: Normal rate, regular rhythm and intact distal pulses.  Exam reveals distant heart sounds.    No murmur heard.  Pulmonary/Chest: Effort normal and breath sounds normal. No respiratory distress. He exhibits no tenderness.   Abdominal: Soft.   Musculoskeletal: He exhibits edema.   1+ both legs   Neurological: He has normal strength. He displays no tremor.   Skin: Skin is warm and dry. He is not diaphoretic.   Vitals reviewed.      Hemodynamics:  Temp (24hrs), Av.8 °C (98.3 °F), Min:36.4 °C (97.6 °F), Max:37.3 °C (99.2 °F)  Temperature: 36.9 °C (98.5 °F)  Pulse  Av.1  Min: 53  Max: 160Heart Rate (Monitored): (!) 111  Blood Pressure : 119/57 mmHg, NIBP: 123/58 mmHg     Respiratory:  Bear Vent Mode: APVCMV, Rate (breaths/min): 18, PEEP/CPAP: 8, FiO2: 50, P Peak (PIP): 21, P MEAN: 14 Respiration: (!) 25, Pulse Oximetry: 98 %     Work Of Breathing / Effort: Vented  RUL Breath Sounds: Coarse Crackles;Clear After Suction, RML Breath Sounds: Coarse Crackles;Clear After Suction, RLL Breath Sounds: Diminished, ABIGAIL Breath Sounds: Coarse Crackles;Clear After Suction, LLL Breath Sounds: Diminished  Fluids:  Date 17 0700 - 17 0659   Shift 3915-3136 9723-3703 7655-1599 24 Hour Total   I  N  T  A  K  E    Shift Total       O  U  T  P  U  T   Urine 275   275    Shift Total 275   275   Weight (kg) 126.4 126.4 126.4 126.4       Weight: (!) 126.4 kg (278 lb 10.6 oz)  GI/Nutrition:  Orders Placed This Encounter   Procedures   • DIET NPO     Standing Status: Standing      Number of Occurrences: 1      Standing Expiration Date:      Order Specific Question:  Restrict to:     Answer:  With Tube Feed [4]     Lab Results:  Recent Labs      06/19/17   0506  06/20/17   0438  06/21/17   0741   WBC  7.3  6.4  10.6   RBC  2.99*  2.97*  3.48*   HEMOGLOBIN  8.9*  8.8*  10.4*   HEMATOCRIT  29.9*  30.1*  35.5*   MCV  100.0*  101.3*  102.0*   MCH  29.8  29.6  29.9   MCHC  29.8*  29.2*  29.3*   RDW  68.3*  67.7*  67.6*   PLATELETCT  295  287  299   MPV  9.4  9.7  9.6     Recent Labs      06/19/17   0506  06/20/17   0438  06/21/17   0741   SODIUM  138  138  138   POTASSIUM  4.1  4.1  4.2   CHLORIDE  109  108  107   CO2  25  26  25   GLUCOSE  136*  141*  155*   BUN  16  16  17   CREATININE  0.65  0.61  0.60   CALCIUM  8.2*  8.1*  8.4*                 Recent Labs      06/21/17   0741   TRIGLYCERIDE  95   HDL  32*   LDL  54         Medical Decision Making, by Problem:  Active Hospital Problems    Diagnosis   • *Penetrating back wound [S21.239A]   • Pneumoperitoneum [K66.8   • Hemothorax on left [J94.2]   • Cellulitis of left foot [L03.116]   • Obesity (BMI 30-39.9) [E66.9]   • Chronic congestive heart failure (CMS-[facility]) [I50.9]   • CAD (coronary artery disease) [I25.10]   • Chronic deep vein thrombosis (DVT) of popliteal vein (CMS-[facility]) [I82.539]   • AV block, Mobitz 1 [I44.1]   • History of pulmonary embolus (PE) [Z86.711]   • Hemorrhagic disorder due to extrinsic circulating anticoagulants (CMS-[facility]) [D68.32]   • 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

## 2017-06-21 NOTE — ASSESSMENT & PLAN NOTE
Not on aspirin or statin when I inherited his care. Get a lipid panel. If no contraindication, will start low dose aspirin.

## 2017-06-21 NOTE — ASSESSMENT & PLAN NOTE
Present on admission, takes outpatient coumadin.  Old clot in left popliteal vein.  6/4 - Prophylactic Lovenox initiated.  6/3 - Trauma screening bilateral lower extremity venous duplex positive for chronic DVT of the left popliteal vein; no acute process in either leg.  Discussed with Dr Mely Moser to resume AC from his  Standpoint  Increase lovenox to therapeutic dose and resume warfarin

## 2017-06-21 NOTE — DISCHARGE PLANNING
Rehab TCC monitoring for potential admission. Will continue to follow for medical clearance/readiness for acute rehab. Pt not medically cleared at this time.

## 2017-06-21 NOTE — PROGRESS NOTES
[facility] Hospitalist Progress Note    Date of Service: 2017    Chief Complaint  77 y.o. male admitted 2017 who fell from tractor, sustained penetrating injury    Interval Problem Update  Overnight he complained of epigastric pain. Trauma service reviewed the case and opted for surgical intervention of his pneumoperitoneum. Wife at bedside who understood the plan. Discussed with Dr. Boone and he  Planned to transfer him to the ICU after the procedure.    Consultants/Specialty  Dr. Boone Trauma  Dr. Marie Vargas, Cardiology  Dr. Pace, Orthopedics    Disposition  Eval for rehab        Review of Systems   Constitutional: Negative for fever and chills.   HENT: Negative for congestion, hearing loss and nosebleeds.    Eyes: Negative for pain and redness.   Respiratory: Positive for shortness of breath (Resolving on O2). Negative for cough, hemoptysis and wheezing.    Cardiovascular: Negative for chest pain and palpitations.   Gastrointestinal: Positive for abdominal pain (Epigastric tenderness). Negative for nausea, vomiting, diarrhea, constipation and blood in stool.   Genitourinary: Positive for flank pain (resolved). Negative for dysuria, frequency and hematuria.   Musculoskeletal: Negative for myalgias, joint pain and falls.   Skin: Negative for rash.   Neurological: Negative for dizziness, tremors, focal weakness, seizures, loss of consciousness, weakness and headaches.   Psychiatric/Behavioral: The patient is not nervous/anxious and does not have insomnia.    All other systems reviewed and are negative.     Physical Exam  Laboratory/Imaging   Hemodynamics  Temp (24hrs), Av.8 °C (98.2 °F), Min:36.3 °C (97.3 °F), Max:37.3 °C (99.2 °F)   Temperature: 36.8 °C (98.3 °F)  Pulse  Av  Min: 53  Max: 160    Blood Pressure : 119/57 mmHg      Respiratory      Respiration: 20, Pulse Oximetry: 97 %     Work Of Breathing / Effort: Moderate;Shallow  RUL Breath Sounds: Clear, RML Breath Sounds: Clear, RLL Breath  Sounds: Diminished;Expiratory Wheezes, ABIGAIL Breath Sounds: Clear, LLL Breath Sounds: Diminished;Expiratory Wheezes    Fluids    Intake/Output Summary (Last 24 hours) at 06/21/17 1339  Last data filed at 06/21/17 1118   Gross per 24 hour   Intake    700 ml   Output   2500 ml   Net  -1800 ml       Nutrition  Orders Placed This Encounter   Procedures   • DIET ORDER     Standing Status: Standing      Number of Occurrences: 1      Standing Expiration Date:      Order Specific Question:  Diet:     Answer:  Clear Liquid [10]     Physical Exam   Constitutional: He appears well-developed and well-nourished.   HENT:   Head: Normocephalic and atraumatic.   Eyes: Conjunctivae and EOM are normal. No scleral icterus.   Neck: Normal range of motion. Neck supple.   Cardiovascular: Normal rate and regular rhythm.  Exam reveals no gallop and no friction rub.    No murmur heard.  Pulmonary/Chest: Effort normal and breath sounds normal. No respiratory distress. He has no wheezes. He has no rales.   Somewhat diminished at bases   Abdominal: Soft. Bowel sounds are normal. He exhibits no distension. There is tenderness (Epigastric tenderness; may have a little bit of guarding).   Musculoskeletal: He exhibits no edema or tenderness.   Neurological: He is alert.   Skin: Skin is warm.   Psychiatric: He has a normal mood and affect. His behavior is normal.       Recent Labs      06/19/17   0506  06/20/17   0438  06/21/17   0741   WBC  7.3  6.4  10.6   RBC  2.99*  2.97*  3.48*   HEMOGLOBIN  8.9*  8.8*  10.4*   HEMATOCRIT  29.9*  30.1*  35.5*   MCV  100.0*  101.3*  102.0*   MCH  29.8  29.6  29.9   MCHC  29.8*  29.2*  29.3*   RDW  68.3*  67.7*  67.6*   PLATELETCT  295  287  299   MPV  9.4  9.7  9.6     Recent Labs      06/19/17   0506  06/20/17   0438  06/21/17   0741   SODIUM  138  138  138   POTASSIUM  4.1  4.1  4.2   CHLORIDE  109  108  107   CO2  25  26  25   GLUCOSE  136*  141*  155*   BUN  16  16  17   CREATININE  0.65  0.61  0.60   CALCIUM   8.2*  8.1*  8.4*             Recent Labs      06/21/17   0741   TRIGLYCERIDE  95   HDL  32*   LDL  54          Assessment/Plan     * Penetrating back wound (present on admission)  Assessment & Plan  Fell out of his tractor onto a hay bale hook which penetrated his left posterior flank sustaining pneumoperitoneum and large L paraspinous posterior hematoma but no extravasation and was admitted to trauma services, Dr. Boone following. Stable other than pneumoperitoneum on scans. Did complete Ancef empiric antibiotics (Ancef stopped at 6/3) but then restarted for cellulitis (see below)    Pneumoperitoneum  Assessment & Plan  Worsening on CXR. Became symptomatic. Trauma srvice to do surgical intervention 6/21. Transfer to ICU postoperatively.    Hemothorax on left  Assessment & Plan  Noted per Dr. Boone, Trauma Service, surgical intervention planned 6/21    History of pulmonary embolus (PE) (present on admission)  Assessment & Plan  And DVT. On warfarin as an outpatient.  IVC filter present.  6/12 - Prophylactic Lovenox resumed.  Plan to restart anticoagulation as an outpatient unless cleared earlier by Trauma Surgery    Hemorrhagic disorder due to extrinsic circulating anticoagulants (CMS-HCC) (present on admission)  Assessment & Plan  As above, had been reversed and IVC filter placed because of trauma, now on prophylactic dose; restart full anticoagulation at discharge; earlier if ok with Trauma Surgery    AV block, Mobitz 1 (present on admission)  Assessment & Plan  Cardiology, Dr. Cruz Vargas followed. Metoprolol was stopped.    Chronic deep vein thrombosis (DVT) of popliteal vein (CMS-HCC) (present on admission)  Assessment & Plan  Present on admission, takes outpatient coumadin.  Old clot in left popliteal vein.  6/4 - Prophylactic Lovenox initiated.  6/3 - Trauma screening bilateral lower extremity venous duplex positive for chronic DVT of the left popliteal vein; no acute process in either leg.  6/6 -  Therapeutic Lovenox started.  6/8 - Stopped upon ICU transfer.  6/11 - Trauma screening bilateral lower extremity venous duplex positive for partially occlusive chronic DVT seen in the left popliteal vein as  membrane-like structure with response to compression and good venous flow. No evidence of acute DVT seen in the left lower extremity.  6/12 - Prophylactic Lovenox resumed.        Chronic congestive heart failure (CMS-HCC) (present on admission)  Assessment & Plan  Does not appear to be exacerbating. Lasix restarted.    CAD (coronary artery disease) (present on admission)  Assessment & Plan  Not on aspirin or statin when I inherited his care. Get a lipid panel. If no contraindication, will start low dose aspirin.    Obesity (BMI 30-39.9) (present on admission)  Assessment & Plan  Encourage weight loss    Cellulitis of left foot (present on admission)  Assessment & Plan  No trauma or open wounds  History of instrumentation with implant 2005 - [facility]  6/17. Started Ancef.   - 3 view left foot - Extensive dorsal and medial soft tissue swelling primarily involving LEFT great toe with apparent resorption at the medial aspect of the 1st metatarsophalangeal joint concerning for osteomyelitis/septic arthritis.  - MRSA nasal swab negative, hemoglobin A1c 5.4  Zafar Pace MD - Ortho consulted.  Fran Black MD.  Orthopedics also following.    Acute blood loss anemia (present on admission)  Assessment & Plan  Received up to 3 transfusions. Hb remains 8-9. No bleeding currently. Full anticoagulation to restart at discharge.    Acute pain of left knee (present on admission)  Assessment & Plan  Improved. L knee Xray negative for fx or malalignment.    Squamous cell carcinoma of left eye (CMS-[facility]) (present on admission)  Assessment & Plan  Home eye drops resumed.    I spent 39 minutes, reviewing the chart, notes, vitals, labs, imaging, ordering labs, evaluating Kiran Eason for assessment, enacting the plan above. Time  was devoted to counseling and coordinating care including review of records, pertinent lab data and studies, as well as discussing diagnostic evaluation and work up, planned therapeutic interventions and future disposition of care. Where indicated, the assessment and plan reflect discussion of patient with consultants, other healthcare providers, family members, and additional research needed to obtain further information in formulating the plan of care for Kiran Eason.             DVT Prophylaxis: Enoxaparin (Lovenox)

## 2017-06-21 NOTE — THERAPY
Attempted tx earlier in day; pt asking PT to come later as able as has many visitors in room; will re-attempt tx as able/appropriate

## 2017-06-21 NOTE — PROGRESS NOTES
[facility] Hospitalist Progress Note    Date of Service: 2017    Chief Complaint  77 y.o. male admitted 2017 who fell from tractor, sustained penetrating injury    Interval Problem Update  On O2. He denies chest pain, shortness of breath currently. His CXR however showed worsening pneumoperitoneum. Trauma service, Dr. Boone ordered CT/Ab to further evaluate.    Consultants/Specialty  Dr. Boone Trauma  Dr. Marie Vargas, Cardiology  Dr. Pace, Orthopedics    Disposition  Eval for rehab        Review of Systems   Constitutional: Negative for fever and chills.   HENT: Negative for congestion, hearing loss and nosebleeds.    Eyes: Negative for pain and redness.   Respiratory: Positive for shortness of breath (Resolving on O2). Negative for cough, hemoptysis and wheezing.    Cardiovascular: Negative for chest pain and palpitations.   Gastrointestinal: Positive for abdominal pain (Resolved). Negative for nausea, vomiting, diarrhea, constipation and blood in stool.   Genitourinary: Positive for flank pain (resolved). Negative for dysuria, frequency and hematuria.   Musculoskeletal: Negative for myalgias, joint pain and falls.   Skin: Negative for rash.   Neurological: Negative for dizziness, tremors, focal weakness, seizures, loss of consciousness, weakness and headaches.   Psychiatric/Behavioral: The patient is not nervous/anxious and does not have insomnia.    All other systems reviewed and are negative.     Physical Exam  Laboratory/Imaging   Hemodynamics  Temp (24hrs), Av.4 °C (97.5 °F), Min:36.1 °C (96.9 °F), Max:36.9 °C (98.4 °F)   Temperature: 36.9 °C (98.4 °F)  Pulse  Av.7  Min: 53  Max: 160    Blood Pressure : 126/57 mmHg      Respiratory      Respiration: 16, Pulse Oximetry: 97 %     Work Of Breathing / Effort: Moderate;Shallow  RUL Breath Sounds: Clear, RML Breath Sounds: Clear, RLL Breath Sounds: Diminished;Expiratory Wheezes, ABIGAIL Breath Sounds: Clear, LLL Breath Sounds: Diminished;Expiratory  Wheezes    Fluids    Intake/Output Summary (Last 24 hours) at 06/20/17 2154  Last data filed at 06/20/17 2100   Gross per 24 hour   Intake    400 ml   Output   2525 ml   Net  -2125 ml       Nutrition  Orders Placed This Encounter   Procedures   • DIET ORDER     Standing Status: Standing      Number of Occurrences: 1      Standing Expiration Date:      Order Specific Question:  Diet:     Answer:  Diabetic [3]     Physical Exam   Constitutional: He appears well-developed and well-nourished.   HENT:   Head: Normocephalic and atraumatic.   Eyes: Conjunctivae and EOM are normal. No scleral icterus.   Neck: Normal range of motion. Neck supple.   Cardiovascular: Normal rate and regular rhythm.  Exam reveals no gallop and no friction rub.    No murmur heard.  Pulmonary/Chest: Effort normal and breath sounds normal. No respiratory distress. He has no wheezes. He has no rales.   Somewhat diminished at bases   Abdominal: Soft. Bowel sounds are normal. He exhibits no distension. There is no tenderness. There is no rebound and no guarding.   Musculoskeletal: He exhibits no edema or tenderness.   Neurological: He is alert.   Skin: Skin is warm.   Psychiatric: He has a normal mood and affect. His behavior is normal.       Recent Labs      06/18/17   0445  06/19/17   0506  06/20/17   0438   WBC  6.9  7.3  6.4   RBC  2.82*  2.99*  2.97*   HEMOGLOBIN  8.5*  8.9*  8.8*   HEMATOCRIT  28.3*  29.9*  30.1*   MCV  100.4*  100.0*  101.3*   MCH  30.1  29.8  29.6   MCHC  30.0*  29.8*  29.2*   RDW  68.1*  68.3*  67.7*   PLATELETCT  283  295  287   MPV  9.8  9.4  9.7     Recent Labs      06/18/17   0445  06/19/17   0506  06/20/17   0438   SODIUM  138  138  138   POTASSIUM  3.9  4.1  4.1   CHLORIDE  109  109  108   CO2  25  25  26   GLUCOSE  118*  136*  141*   BUN  20  16  16   CREATININE  0.74  0.65  0.61   CALCIUM  8.0*  8.2*  8.1*                      Assessment/Plan     Penetrating back wound (present on admission)  Assessment & Plan  Fell  out of his tractor onto a hay bale hook which penetrated his left posterior flank sustaining pneumoperitoneum and large L paraspinous posterior hematoma but no extravasation and was admitted to trauma services, Dr. Boone following. Stable other than pneumoperitoneum on scans. Did complete Ancef empiric antibiotics (Ancef stopped at 6/3) but then restarted for cellulitis (see below)    AV block, Mobitz 1 (present on admission)  Assessment & Plan  Cardiology, Dr. Cruz Vargas followed. Metoprolol was stopped.    Cellulitis of left foot (present on admission)  Assessment & Plan  No trauma or open wounds  History of instrumentation with implant 2005 - [facility]  6/17. Started Ancef.   - 3 view left foot - Extensive dorsal and medial soft tissue swelling primarily involving LEFT great toe with apparent resorption at the medial aspect of the 1st metatarsophalangeal joint concerning for osteomyelitis/septic arthritis.  - MRSA nasal swab negative, hemoglobin A1c 5.4  Zafar Pace MD - Ortho consulted.  Fran Black MD.  Orthopedics also following.    History of pulmonary embolus (PE) (present on admission)  Assessment & Plan  And DVT. On warfarin as an outpatient.  IVC filter present.  6/12 - Prophylactic Lovenox resumed.  Plan to restart anticoagulation as an outpatient unless cleared earlier by Trauma Surgery    Hemorrhagic disorder due to extrinsic circulating anticoagulants (CMS-HCC) (present on admission)  Assessment & Plan  As above, had been reversed and IVC filter placed because of trauma, now on prophylactic dose; restart full anticoagulation at discharge; earlier if ok with Trauma Surgery    Chronic deep vein thrombosis (DVT) of popliteal vein (CMS-HCC) (present on admission)  Assessment & Plan  Present on admission, takes outpatient coumadin.  Old clot in left popliteal vein.  6/4 - Prophylactic Lovenox initiated.  6/3 - Trauma screening bilateral lower extremity venous duplex positive for chronic DVT of the  left popliteal vein; no acute process in either leg.  6/6 - Therapeutic Lovenox started.  6/8 - Stopped upon ICU transfer.  6/11 - Trauma screening bilateral lower extremity venous duplex positive for partially occlusive chronic DVT seen in the left popliteal vein as  membrane-like structure with response to compression and good venous flow. No evidence of acute DVT seen in the left lower extremity.  6/12 - Prophylactic Lovenox resumed.        Chronic congestive heart failure (CMS-[facility]) (present on admission)  Assessment & Plan  Does not appear to be exacerbating. Lasix restarted.    CAD (coronary artery disease) (present on admission)  Assessment & Plan  Not on aspirin or statin when I inherited his care. Get a lipid panel. If no contraindication, will start low dose aspirin.    Obesity (BMI 30-39.9) (present on admission)  Assessment & Plan  Encourage weight loss    Acute blood loss anemia (present on admission)  Assessment & Plan  Received up to 3 transfusions. Hb remains 8-9. No bleeding currently. Full anticoagulation to restart at discharge.    Acute pain of left knee (present on admission)  Assessment & Plan  Improved. L knee Xray negative for fx or malalignment.    Squamous cell carcinoma of left eye (CMS-[facility]) (present on admission)  Assessment & Plan  Home eye drops resumed.  I spent 37 minutes, reviewing the chart, notes, vitals, labs, imaging, ordering labs, evaluating Kiran Eason for assessment, enacting the plan above. Time was devoted to counseling and coordinating care including review of records, pertinent lab data and studies, as well as discussing diagnostic evaluation and work up, planned therapeutic interventions and future disposition of care. Where indicated, the assessment and plan reflect discussion of patient with consultants, other healthcare providers, family members, and additional research needed to obtain further information in formulating the plan of care for Kiran Eason.             DVT  Prophylaxis: Enoxaparin (Lovenox)

## 2017-06-21 NOTE — PROGRESS NOTES
"  Trauma/Surgical Progress Note    Author: Celia Castaneda Date & Time created: 6/21/2017   6:15 AM     Interval Events:  Abdominal distention with \"gas pains\"  Mag Citrate now  Increased work of breathing, AM lasix and RT treatment  AM CXR and labs pending  Will discuss findings with attending      Review of Systems   Constitutional: Negative for fever and chills.   Eyes: Positive for blurred vision (Minimal to left eye). Negative for double vision.   Respiratory: Positive for shortness of breath. Negative for cough and wheezing.    Cardiovascular: Positive for leg swelling. Negative for chest pain and palpitations.   Gastrointestinal: Positive for abdominal pain and constipation. Negative for nausea and vomiting.        6/19 BM  Gas pain and distention   Genitourinary: Negative for dysuria, urgency and frequency.        Voiding   Musculoskeletal: Positive for back pain. Negative for myalgias, joint pain and neck pain.   Neurological: Negative for focal weakness and headaches.   Psychiatric/Behavioral: Negative for substance abuse.     Hemodynamics:  Blood pressure 122/65, pulse 101, temperature 36.4 °C (97.6 °F), resp. rate 20, height 1.778 m (5' 10\"), weight 126.2 kg (278 lb 3.5 oz), SpO2 94 %.     Respiratory:    Respiration: 20, Pulse Oximetry: 94 %     Work Of Breathing / Effort: Moderate;Shallow  RUL Breath Sounds: Clear, RML Breath Sounds: Clear, RLL Breath Sounds: Diminished;Expiratory Wheezes, ABIGAIL Breath Sounds: Clear, LLL Breath Sounds: Diminished;Expiratory Wheezes  Fluids:    Intake/Output Summary (Last 24 hours) at 06/21/17 0615  Last data filed at 06/21/17 0600   Gross per 24 hour   Intake    240 ml   Output   2225 ml   Net  -1985 ml     Admit Weight: 117.935 kg (260 lb)  Current     Physical Exam   Constitutional: He is oriented to person, place, and time. He appears well-developed. No distress.   Up in chair   HENT:   Head: Normocephalic.   Eyes:   Left eye premorbid ptosis   Neck: Neck supple. No JVD " present. No tracheal deviation present.   Cardiovascular: Normal rate.    Pulmonary/Chest: No respiratory distress. He has wheezes.   Supplemental O2  Increased work of breathing, wheezes   Abdominal: Bowel sounds are normal. He exhibits distension. There is tenderness.   Right upper and middle abdominal tenderness and increase in distention   Musculoskeletal: Normal range of motion. He exhibits edema.   3+ edema  Left greater toe redness and edema improved   Neurological: He is alert and oriented to person, place, and time.   Skin: Skin is warm and dry. He is not diaphoretic.   Psychiatric: He has a normal mood and affect. His behavior is normal.   Nursing note and vitals reviewed.      Medical Decision Making/Problem List:    Active Hospital Problems    Diagnosis   • Pneumoperitoneum [K66.8]     Priority: High     6/20 - CT shows pneumoperitoneum, concerning for perforated viscus. The area of perforation is probably in the cecum given the wall thickening. Other areas of potentially perforation are the sigmoid colon given the diverticula and stomach (although no thickening wall seen in the stomach). No free fluid seen.   - Continue regular diet, bowel regimen, mobility as tolerated.   - Considering repeat non-contrast CT in 2-3 days to re-evaluate.     • Cellulitis of left foot [L03.116]     Priority: High     No trauma or open wounds  History of instrumentation with implant 2005 - [place]  6/17 - Commence Ancef   - 3 view left foot - Extensive dorsal and medial soft tissue swelling primarily involving LEFT great toe with apparent resorption at the medial aspect of the 1st metatarsophalangeal joint concerning for osteomyelitis/septic arthritis.  - MRSA nasal swab negative, hemoglobin A1c 5.4  Zafar Pace MD - Ortho  Fran Black MD.  Orthopedics     • AV block, Mobitz 1 [I44.1]     Priority: High     Observation only.  6/8 - EKG changes with frequent PVC's. Troponin < 0.02. Metoprolol held on transfer to  ICU.  6/11 - Restart metoprolol  6/17 - Metoprolol stopped per cardiology recommendations  - Cardiology following  Cruz Vargas MD. Cardiology     • Penetrating back wound [S21.633A]     Priority: High     Large left paraspinous, posterior hematoma.  No active extravasation.  6/8 - CT chest showed slightly larger SQ fluid than on admission. No discrete drainable hematoma.   Ancef x 24hrs (completed 6/3)      • Obesity (BMI 30-39.9) [E66.9]     Priority: Medium     BMI -  38.8     • Chronic congestive heart failure (CMS-[facility]) [I50.9]     Priority: Medium     6/6 - Home lasix and potassium started.  6/8 - Lasix stopped upon ICU transfer.  6/11- BNP low / restart lasix as BLE edema.  6/12 - BP low - Lasix and potassium on hold.  6/15 - BNP elevated. Resume Lasix.   Trend diagnostic laboratory studies      • CAD (coronary artery disease) [I25.10]     Priority: Medium     6/6 - Home metoprolol restarted.  Held on transfer to ICU.  6/11 - Resumed.    6/17 - Metoprolol stopped per cardiology recommendation     • Chronic deep vein thrombosis (DVT) of popliteal vein (CMS-[facility]) [I82.539]     Priority: Medium     Present on admission, takes outpatient coumadin.  Old clot in left popliteal vein.  6/4 - Prophylactic Lovenox initiated.  6/3 - Trauma screening bilateral lower extremity venous duplex positive for chronic DVT of the left popliteal vein; no acute process in either leg.  6/6 - Therapeutic Lovenox started.  6/8 - Stopped upon ICU transfer.  6/11 - Trauma screening bilateral lower extremity venous duplex positive for partially occlusive chronic DVT seen in the left popliteal vein as  membrane-like structure with response to compression and good venous flow. No evidence of acute DVT seen in the left lower extremity.  6/12 - Prophylactic Lovenox resumed.       • History of pulmonary embolus (PE) [Z86.711]     Priority: Medium     With DVT.  On warfarin as an outpatient.  IVC filter present.  6/12 - Prophylactic Lovenox  resumed.  Restart anticoagulation as outpatient.       • Hemorrhagic disorder due to extrinsic circulating anticoagulants (CMS-[facility]) [D68.32]     Priority: Medium     Coumadin for h/o PE.  INR 2 on arrival, given 1 mg Factor VII and Vitamin K.  Trend INR, correct as clinically warranted.  6/12 - Prophylactic Lovenox resumed.  Restart coumadin on discharge.       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  Squamous cell carinoma left eye.  Home eye drops resumed.       • Acute pain of left knee [M25.562]     Priority: Low     Prior TKR.  6/11 - Imaging negative for left knee fracture or malalignment.      • Acute blood loss anemia [D62]     Priority: Low     1L of blood loss reported on scene.  Large paraspinous, posterior hematoma.  6/2 - Transfused 2 units PRBC.  6/10 - Transfused 1 unit PRBC. Iron replaced per pharmacy kinetics.   6/19 - Trend up  Transfuse 1 unit PRBC if Hb < 7.0.       Core Measures & Quality Metrics:  Medications reviewed, Labs reviewed and Radiology images reviewed  Castaneda catheter: No Castaneda      DVT Prophylaxis: Enoxaparin (Lovenox)  DVT prophylaxis - mechanical: SCDs  Ulcer prophylaxis: Not indicated        Total Score: 8  ETOH Screening  CAGE Score: 0  Intervention complete date: 6/13/2017  Patient response to intervention: Denies substance abuse - no further intervention indicated .          Discussed patient condition with RN, Patient and trauma surgery. Dr. Amin

## 2017-06-21 NOTE — ASSESSMENT & PLAN NOTE
Worsening on CXR. Became symptomatic. Trauma srvice to do surgical intervention 6/21. Transfer to ICU postoperatively.

## 2017-06-21 NOTE — OP REPORT
DATE OF SERVICE:  06/21/2017    PREOPERATIVE DIAGNOSIS:  Free air.    POSTOPERATIVE DIAGNOSES:  Free air.    PROCEDURE PERFORMED:  Exploratory laparotomy.    PRIMARY SURGEON:  Wilfred Amin MD    ASSISTANT:  IFEOMA Flores    ANESTHESIOLOGIST:  Macey Rodríguez MD    ANESTHESIA:  General endotracheal.    ESTIMATED BLOOD LOSS:  Minimal.    COMPLICATIONS:  None immediately apparent.    SPECIMENS:  None.    OPERATIVE FINDINGS:  The only notable intraabdominal finding was some mild   thickening of the cecum and a few areas covered with a thin inflammatory   exudate.  There was no clear signs of perforation.  Overall, the cecum   appeared quite healthy and viable with intact serosa.    DESCRIPTION OF PROCEDURE:  The patient was taken back to the operating room   and placed in the supine position.  General anesthesia was induced and the   patient was intubated.  Bilateral SCD devices were placed.  Appropriate IV   antibiotics were given.  All bony prominences were carefully protected.  A   Castaneda was placed.  The abdomen was widely prepped and draped in sterile   fashion.  A time-out was performed.  The patient and procedure, both verified.    An upper midline abdominal incision was made.  Subcutaneous tissues were   divided down to level of the fascia.  The fascia opened along the entire   length of the incision and abdominal compartment was entered.  The patient's   preoperative pneumoperitoneum was released.  There was no foul odor within the   abdomen.  There was no suspicious or concerning free fluid in any of the 4   quadrants or in the pelvis.  Small intestine was run from the ligament of   Treitz down to the cecum.  All parts of small intestine appeared to be healthy   and viable and free from inflammation.  The visible intraabdominal portions   of the transverse colon and sigmoid colon were then inspected.  These portions   of the colon were noted to be soft, pliable and thin walled and without any    signs of inflammation.  I then evaluated the cecum.  The appendix was   identified and noted to be normal in appearance.  There is no inflammatory   change at the terminal ileum.  The cecum was somewhat thickened within its   wall, perhaps 3-4 mm at best.  I was able to visualize all portions of the   cecum and there did not appear to be any acute inflammation.  There were a few   areas of inflammatory exudate noted in a few areas; however, with   manipulation of the cecum, I could not reproduce any clear sign of   perforation.  Overall, the cecum appeared to be rather healthy with intact   serosa over it.  The abdominal compartment was then irrigated with 2 liters of   warm saline, 6 pieces of Seprafilm were then placed circumferentially around   the abdomen, but not anywhere near the cecum.  All abdominal contents were   then placed back into their anatomic location.  The midline fascia was closed   with interrupted #1 Vicryl sutures.  Once tied down, the fascia was noted to   be tight and well approximated.  Subcutaneous tissues were then irrigated and   suctioned.  The wound was approximated with interrupted 3-0 Vicryl deep dermal   sutures.  The skin was closed with staples.  The abdomen was cleaned, and a   sterile OR dressing was applied.    Overall, the patient tolerated this procedure well.  He was extubated in the   OR initially; however, he developed tachypnea and he was subsequently   reintubated.  The patient was taken to the [facility] intensive care unit   postoperatively in stable condition as was the plan prior to surgery.  The   patient had a fair chance of needing mechanical ventilation in the   immediate postoperative period and this was discussed with the patient as well   as his wife, Emma, prior to surgery.  All sharps and sponge counts were   correct by the end of the case on 2 occasions.    ASA SCORE:  4 emergency.    WOUND CLASSIFICATION:  Clean, contaminated.        ____________________________________     MD SHIREEN Deleon    DD:  06/21/2017 13:54:58  DT:  06/21/2017 14:08:13    D#:  [id number]  Job#:  [id number]

## 2017-06-21 NOTE — ASSESSMENT & PLAN NOTE
Fell out of his tractor onto a hay bale hook which penetrated his left posterior flank sustaining pneumoperitoneum and large L paraspinous posterior hematoma but no extravasation and was admitted to trauma services, Dr. Boone following. Pt clinically Stable

## 2017-06-21 NOTE — PROGRESS NOTES
IMMEDIATE POST-OPERATIVE NOTE    PREOPERATIVE DIAGNOSIS: Free air    POSTOPERATIVE DIAGNOSIS: Same    PROCEDURE PERFORMED: Ex Lap     SURGEON: Wilfred Amin MD    ASSISTANT: Tiffanie DIA    ANESTHESIOLOGIST:  Macey Rodríguez MD., MD    ANESTHESIA: GETA    FINDINGS:  Refer to my dictated operative note.     SPECIMEN: None     ESTIMATED BLOOD LOSS: 2 mL      ____________________________________   Dhiraj IYER / ELDA     DD: 6/21/2017   DT: 1:13 PM

## 2017-06-21 NOTE — PROGRESS NOTES
Monitor Summary:     Sinus Rhythm: 67-87 with rare PVC's and rare PAC's and rare couplets, Bigeminy, first degree type one and second degree type two.     .18-.32/.10/.36

## 2017-06-21 NOTE — THERAPY
"Physical Therapy Treatment completed.   Bed Mobility:  Supine to Sit: Moderate Assist  Transfers: Sit to Stand: Contact Guard Assist  Gait: Level Of Assist: Contact Guard Assist with Front-Wheel Walker       Plan of Care: Will benefit from Physical Therapy 3 times per week  Discharge Recommendations: Equipment: Will Continue to Assess for Equipment Needs. See below    Pt progressing as expected. He is able to demonstrate improving mechanics and balance with sit<>stands though requires increased assist for bed mobiltiy. He is limited during ambulation with FWW 2' to moderate KING and concerns with elevated HR, increased WOB, and increased time for functional recovery. Pt will benefit from continued skilled acute PT while here with strong recommendation of continued skilled PT/placement prior to medical d/c to home given current objective findings, age, co-morbidities, I PLOF, enviornmental barriers [place] and limited social support to assist with current needs. Will conitnue to visit.    See \"Rehab Therapy-Acute\" Patient Summary Report for complete documentation.       "

## 2017-06-21 NOTE — PROGRESS NOTES
"Patient reporting increasing abdominal pain in the RLQ, RUQ and mid epigastric region.  Describes pain as \"sharp\" and \"constant\", pain level 6/10.  Abdomen obese, rounded and semi-firm.  Notified trauma APN - will round on patient.  "

## 2017-06-21 NOTE — PROGRESS NOTES
A&O x4; intermittent confusion.  Reorients appropriately.  VSS; SpO2 96% on 2L NC.  Tolerating diet.  C/o pain - medicated per MAR.  Tele monitoring in place; Second degree type I heart block, patient stable.  Will continue to monitor.  Lung sounds diminished; denies SOB at rest but c/o dyspnea on exertion.    Bowel sounds hypoactive, denies abdominal pain, abdomen obese, rounded and semi-firm.  Last BM 6/20/17.  Voiding with frequency and urgency.  Bed alarm activated.  Call light and personal belongings within reach.  Hourly rounding and fall precautions in place. No additional needs at this time.

## 2017-06-21 NOTE — PROGRESS NOTES
Cortrak Placement    Tube Team verified patient name and medical record number prior to tube placement.  Cortrak tube (55 inches, 10 Swazi) placed at 100 cm in left nare.  Per Cortrak picture, tube appears to be in the small bowel.  Nursing Instructions: Awaiting KUB to confirm placement before use for medications or feeding. Once placement confirmed, flush tube with 30 ml of water, and then remove and save stylet, in patient medication drawer.

## 2017-06-21 NOTE — ASSESSMENT & PLAN NOTE
No trauma or open wounds  History of instrumentation with implant 2005 - [place]  On ancef through today the po keflex per ID

## 2017-06-22 ENCOUNTER — APPOINTMENT (OUTPATIENT)
Dept: RADIOLOGY | Facility: MEDICAL CENTER | Age: 77
DRG: 907 | End: 2017-06-22
Attending: SURGERY
Payer: MEDICARE

## 2017-06-22 PROBLEM — J95.821 RESPIRATORY FAILURE FOLLOWING TRAUMA AND SURGERY (HCC): Status: ACTIVE | Noted: 2017-06-22

## 2017-06-22 LAB
ANION GAP SERPL CALC-SCNC: 5 MMOL/L (ref 0–11.9)
BASOPHILS # BLD AUTO: 0.2 % (ref 0–1.8)
BASOPHILS # BLD: 0.02 K/UL (ref 0–0.12)
BUN SERPL-MCNC: 22 MG/DL (ref 8–22)
CALCIUM SERPL-MCNC: 8.3 MG/DL (ref 8.5–10.5)
CHLORIDE SERPL-SCNC: 106 MMOL/L (ref 96–112)
CO2 SERPL-SCNC: 27 MMOL/L (ref 20–33)
CREAT SERPL-MCNC: 0.83 MG/DL (ref 0.5–1.4)
EKG IMPRESSION: NORMAL
EOSINOPHIL # BLD AUTO: 0.13 K/UL (ref 0–0.51)
EOSINOPHIL NFR BLD: 1.6 % (ref 0–6.9)
ERYTHROCYTE [DISTWIDTH] IN BLOOD BY AUTOMATED COUNT: 65.9 FL (ref 35.9–50)
GFR SERPL CREATININE-BSD FRML MDRD: >60 ML/MIN/1.73 M 2
GLUCOSE BLD-MCNC: 132 MG/DL (ref 65–99)
GLUCOSE BLD-MCNC: 139 MG/DL (ref 65–99)
GLUCOSE BLD-MCNC: 140 MG/DL (ref 65–99)
GLUCOSE BLD-MCNC: 150 MG/DL (ref 65–99)
GLUCOSE SERPL-MCNC: 145 MG/DL (ref 65–99)
HCT VFR BLD AUTO: 34.2 % (ref 42–52)
HGB BLD-MCNC: 10.1 G/DL (ref 14–18)
IMM GRANULOCYTES # BLD AUTO: 0.06 K/UL (ref 0–0.11)
IMM GRANULOCYTES NFR BLD AUTO: 0.7 % (ref 0–0.9)
LYMPHOCYTES # BLD AUTO: 0.7 K/UL (ref 1–4.8)
LYMPHOCYTES NFR BLD: 8.7 % (ref 22–41)
MCH RBC QN AUTO: 29.4 PG (ref 27–33)
MCHC RBC AUTO-ENTMCNC: 29.5 G/DL (ref 33.7–35.3)
MCV RBC AUTO: 99.7 FL (ref 81.4–97.8)
MONOCYTES # BLD AUTO: 0.63 K/UL (ref 0–0.85)
MONOCYTES NFR BLD AUTO: 7.8 % (ref 0–13.4)
NEUTROPHILS # BLD AUTO: 6.51 K/UL (ref 1.82–7.42)
NEUTROPHILS NFR BLD: 81 % (ref 44–72)
NRBC # BLD AUTO: 0 K/UL
NRBC BLD AUTO-RTO: 0 /100 WBC
PLATELET # BLD AUTO: 283 K/UL (ref 164–446)
PMV BLD AUTO: 9.7 FL (ref 9–12.9)
POTASSIUM SERPL-SCNC: 4.7 MMOL/L (ref 3.6–5.5)
RBC # BLD AUTO: 3.43 M/UL (ref 4.7–6.1)
SODIUM SERPL-SCNC: 138 MMOL/L (ref 135–145)
WBC # BLD AUTO: 8.1 K/UL (ref 4.8–10.8)

## 2017-06-22 PROCEDURE — 700111 HCHG RX REV CODE 636 W/ 250 OVERRIDE (IP): Performed by: SURGERY

## 2017-06-22 PROCEDURE — 700102 HCHG RX REV CODE 250 W/ 637 OVERRIDE(OP): Performed by: SURGERY

## 2017-06-22 PROCEDURE — 85025 COMPLETE CBC W/AUTO DIFF WBC: CPT

## 2017-06-22 PROCEDURE — 80048 BASIC METABOLIC PNL TOTAL CA: CPT

## 2017-06-22 PROCEDURE — 82962 GLUCOSE BLOOD TEST: CPT | Mod: 91

## 2017-06-22 PROCEDURE — 94669 MECHANICAL CHEST WALL OSCILL: CPT

## 2017-06-22 PROCEDURE — 99291 CRITICAL CARE FIRST HOUR: CPT | Performed by: SURGERY

## 2017-06-22 PROCEDURE — A9270 NON-COVERED ITEM OR SERVICE: HCPCS | Performed by: NURSE PRACTITIONER

## 2017-06-22 PROCEDURE — 770022 HCHG ROOM/CARE - ICU (200)

## 2017-06-22 PROCEDURE — A9270 NON-COVERED ITEM OR SERVICE: HCPCS | Performed by: INTERNAL MEDICINE

## 2017-06-22 PROCEDURE — 94668 MNPJ CHEST WALL SBSQ: CPT

## 2017-06-22 PROCEDURE — 71010 DX-CHEST-PORTABLE (1 VIEW): CPT

## 2017-06-22 PROCEDURE — 93010 ELECTROCARDIOGRAM REPORT: CPT | Performed by: SURGERY

## 2017-06-22 PROCEDURE — 700102 HCHG RX REV CODE 250 W/ 637 OVERRIDE(OP): Performed by: INTERNAL MEDICINE

## 2017-06-22 PROCEDURE — A9270 NON-COVERED ITEM OR SERVICE: HCPCS | Performed by: SURGERY

## 2017-06-22 PROCEDURE — 93005 ELECTROCARDIOGRAM TRACING: CPT | Performed by: SURGERY

## 2017-06-22 PROCEDURE — 94003 VENT MGMT INPAT SUBQ DAY: CPT

## 2017-06-22 PROCEDURE — 94667 MNPJ CHEST WALL 1ST: CPT

## 2017-06-22 PROCEDURE — 700102 HCHG RX REV CODE 250 W/ 637 OVERRIDE(OP): Performed by: NURSE PRACTITIONER

## 2017-06-22 RX ORDER — OMEPRAZOLE 20 MG/1
20 CAPSULE, DELAYED RELEASE ORAL 2 TIMES DAILY
Status: DISCONTINUED | OUTPATIENT
Start: 2017-06-22 | End: 2017-06-22

## 2017-06-22 RX ORDER — OMEPRAZOLE 20 MG/1
20 CAPSULE, DELAYED RELEASE ORAL DAILY
Status: DISCONTINUED | OUTPATIENT
Start: 2017-06-23 | End: 2017-06-28 | Stop reason: HOSPADM

## 2017-06-22 RX ADMIN — ERYTHROMYCIN: 5 OINTMENT OPHTHALMIC at 09:00

## 2017-06-22 RX ADMIN — OXYCODONE HYDROCHLORIDE 10 MG: 10 TABLET ORAL at 05:25

## 2017-06-22 RX ADMIN — CEFAZOLIN SODIUM 2 G: 2 INJECTION, SOLUTION INTRAVENOUS at 05:19

## 2017-06-22 RX ADMIN — FUROSEMIDE 20 MG: 20 TABLET ORAL at 05:19

## 2017-06-22 RX ADMIN — ACETAMINOPHEN 650 MG: 325 TABLET, FILM COATED ORAL at 12:58

## 2017-06-22 RX ADMIN — OXYCODONE HYDROCHLORIDE 10 MG: 10 TABLET ORAL at 17:22

## 2017-06-22 RX ADMIN — OXYCODONE HYDROCHLORIDE 10 MG: 10 TABLET ORAL at 10:19

## 2017-06-22 RX ADMIN — OMEPRAZOLE 20 MG: 20 CAPSULE, DELAYED RELEASE ORAL at 02:51

## 2017-06-22 RX ADMIN — ACETAMINOPHEN 650 MG: 325 TABLET, FILM COATED ORAL at 08:53

## 2017-06-22 RX ADMIN — FAMOTIDINE 20 MG: 20 TABLET, FILM COATED ORAL at 02:51

## 2017-06-22 RX ADMIN — ENOXAPARIN SODIUM 30 MG: 100 INJECTION SUBCUTANEOUS at 21:15

## 2017-06-22 RX ADMIN — ACETAMINOPHEN 650 MG: 325 TABLET, FILM COATED ORAL at 17:22

## 2017-06-22 RX ADMIN — CEFAZOLIN SODIUM 2 G: 2 INJECTION, SOLUTION INTRAVENOUS at 21:15

## 2017-06-22 RX ADMIN — ENOXAPARIN SODIUM 30 MG: 100 INJECTION SUBCUTANEOUS at 08:53

## 2017-06-22 RX ADMIN — POLYETHYLENE GLYCOL (3350) 1 PACKET: 17 POWDER, FOR SOLUTION ORAL at 21:15

## 2017-06-22 RX ADMIN — FAMOTIDINE 20 MG: 20 TABLET, FILM COATED ORAL at 08:53

## 2017-06-22 RX ADMIN — ACETAMINOPHEN 650 MG: 325 TABLET, FILM COATED ORAL at 21:15

## 2017-06-22 RX ADMIN — CEFAZOLIN SODIUM 2 G: 2 INJECTION, SOLUTION INTRAVENOUS at 12:57

## 2017-06-22 RX ADMIN — OMEPRAZOLE 20 MG: 20 CAPSULE, DELAYED RELEASE ORAL at 08:53

## 2017-06-22 RX ADMIN — CHLORHEXIDINE GLUCONATE 15 ML: 1.2 RINSE ORAL at 08:53

## 2017-06-22 RX ADMIN — OXYCODONE HYDROCHLORIDE 10 MG: 10 TABLET ORAL at 02:45

## 2017-06-22 RX ADMIN — ACETAMINOPHEN 650 MG: 325 TABLET, FILM COATED ORAL at 02:52

## 2017-06-22 RX ADMIN — FUROSEMIDE 20 MG: 20 TABLET ORAL at 17:22

## 2017-06-22 ASSESSMENT — ENCOUNTER SYMPTOMS
SHORTNESS OF BREATH: 0
CLAUDICATION: 0
VOMITING: 0
ORTHOPNEA: 0
PALPITATIONS: 0
FEVER: 0
PND: 0
HEMOPTYSIS: 0
DIZZINESS: 0
NAUSEA: 0
ABDOMINAL PAIN: 0
FOCAL WEAKNESS: 1

## 2017-06-22 ASSESSMENT — PAIN SCALES - GENERAL
PAINLEVEL_OUTOF10: 1
PAINLEVEL_OUTOF10: 0
PAINLEVEL_OUTOF10: 4
PAINLEVEL_OUTOF10: 1
PAINLEVEL_OUTOF10: 1
PAINLEVEL_OUTOF10: 0
PAINLEVEL_OUTOF10: 4
PAINLEVEL_OUTOF10: 0

## 2017-06-22 ASSESSMENT — LIFESTYLE VARIABLES: REASON UNABLE TO ASSESS: INTUBATED

## 2017-06-22 NOTE — DIETARY
"Nutrition Support Assessment - Male    Kiran Eason is a 77 y.o. male with admitting DX of Penetrating back wound, Hemothorax on left, Acute blood loss anemia    Pertinent History: DVT/PE, IVC filter replacement   Allergies:  Review of patient's allergies indicates no known allergies.  Height: 177.8 cm (5' 10\")  Weight: (!) 126.4 kg (278 lb 10.6 oz)  Weight to Use in Calculations: 122.4 kg (269 lb 13.5 oz)  Body mass index is 39.98 kg/(m^2).     Pertinent Labs: glucose: 145 Na: 138 K: 4.7     Last BM: 17  Pertinent Medications: colace, pepcid, lasix, insulin lispro, miralax, prilosec, zofran   Pertinent Fluids: n/a   Surgery / Procedures: s/p ex lap, L tube thoracostomy   Skin: surgical incision and wound to L flank per RN documentation     Estimated Needs: REE per Obesity guidelines + [facility] 2003 Equation = 2074kcals (70% REE = 1452kcals)  Total Calories / day: 1350 - 1750  (Calories / k - 14)  Total Grams Protein / day: 113 - 190  (Grams Protein / k.5 - 2.5) of IBW - 75.5kg   Total Fluids ml / day: 3000 ml         Assessment / Evaluation: 76y/o M admit 2' fall off tractor onto a hay bale hook which penetrated his L posterior flank with known past medical hx significant for DVT/PE and IVC filter replacement per H&P. Pt on vent 2' dyspnea and significant secretions during ex lap procedure POD #1. Therefore, pt now NPO and consult for enteral nutrition recommendations received.     Plan / Recommendation:   1) TF to start with Peptamen Intense VHP @70ml/hr = 1680kcals, 156g protein, and 1411ml free water (13.7cals/kg of admit wt - 122.4kg; ~2.07g of pro/kg of IBW - 75.5kg). Pt with fluctuating weight status since admit; most likely related to fluid 2' pt currently on lasix tx at this time.   2) RD to monitor TF at goal/tolerance, wt, and labs to adjust TF accordingly.  3) Fluids per MD.         "

## 2017-06-22 NOTE — CARE PLAN
Problem: Ventilation Defect:  Goal: Ability to achieve and maintain unassisted ventilation or tolerate decreased levels of ventilator support  Intervention: Support and monitor invasive and noninvasive mechanical ventilation  Adult Ventilation Update    Total Vent Days: 1    APVCMV    18  440  +8  40%      Patient Lines/Drains/Airways Status    Active Airway      Name: Placement date: Placement time: Site: Days:     Airway Group ET Tube Oral 8.0 06/21/17  1300  Oral  less than 1                    Plateau Pressure (Q Shift): 14 (06/21/17 1335)  Static Compliance (ml / cm H2O): 54 (06/21/17 1348)    Cough: Productive (06/21/17 1335)  Sputum Amount: Moderate (06/21/17 1335)  Sputum Color: Yellow;Tan (06/21/17 1335)  Sputum Consistency: Thin;Thick (06/21/17 1335)    Mobility Group  Activity Performed: Other (comment) (pt incontinence) (06/21/17 1120)  Time Activity Tolerated: 10 min (06/21/17 1120)  Distance Per Occurrence (ft.): 0 feet (06/21/17 1120)  # of Times Distance was Traveled: 0 (06/21/17 1120)  Assistance / Tolerance: Assistance of One (06/21/17 1120)  Pt Calls for Assistance: Yes (06/21/17 1120)  Staff Present for Mobilization: CNA (06/21/17 1120)  Gait: Shuffle (06/21/17 0936)  Assistive Devices: Rails (06/21/17 1120)    Events/Summary/Plan: Pt received from OR. Placed on APVCMV settings. (see above) ABG drawn, post vent, fio2 titrated to 40%, no other changes made.

## 2017-06-22 NOTE — PROGRESS NOTES
RT at bedside to perform extubation, pt transitioned to 2L via silicone NC in no distress. Tube feedings stopped prior to and restarted following extubation--per Dr. Amin, keep Cortrak in for today and TF's running to advance to goal rate per RD plan.

## 2017-06-22 NOTE — CARE PLAN
Problem: Skin Integrity  Goal: Risk for impaired skin integrity will decrease  Outcome: PROGRESSING AS EXPECTED  Turned pt Q2. Assessed risk factors for impaired skin integrity and pressure ulcers. Implemented precautions to protect skin integrety including heel float boots, waffle bed, waffle pillow, pillows for support and comfort. Interventions in place per protocol.           Problem: Pain Management  Goal: Pain level will decrease to patient’s comfort goal  Outcome: PROGRESSING AS EXPECTED  Assessed pt for pain using appropriate pain scale. Administered pain medications as needed per MAR. Educated pt on nonpharmacologic measures for pain managment and assisted pt in these measures.

## 2017-06-22 NOTE — PROGRESS NOTES
"  Trauma/Surgical Progress Note          Interval Events:  Stable overnight  Cardiology recs reviewed  Work towards extubation    Hemodynamics:  Blood pressure 119/57, pulse 96, temperature 36.3 °C (97.3 °F), resp. rate 32, height 1.778 m (5' 10\"), weight 126.4 kg (278 lb 10.6 oz), SpO2 93 %.     Respiratory:  Bear Vent Mode: Spont, Rate (breaths/min): 18, PEEP/CPAP: 5, FiO2: 30, P Peak (PIP): 13, P MEAN: 7 Respiration: (!) 32, Pulse Oximetry: 93 %     Work Of Breathing / Effort: Vented  RUL Breath Sounds: Clear After Suction, RML Breath Sounds: Clear After Suction, RLL Breath Sounds: Diminished, ABIGAIL Breath Sounds: Clear After Suction, LLL Breath Sounds: Diminished  Fluids:    Intake/Output Summary (Last 24 hours) at 06/22/17 1434  Last data filed at 06/22/17 1400   Gross per 24 hour   Intake 1934.63 ml   Output   1065 ml   Net 869.63 ml     Admit Weight: 117.935 kg (260 lb)  Current     Physical Exam   Constitutional: He is oriented to person, place, and time. He appears well-developed. No distress.   Up in chair   HENT:   Head: Normocephalic.   Eyes:   Left eye premorbid ptosis   Neck: Neck supple. No JVD present. No tracheal deviation present.   Cardiovascular: Normal rate.    Pulmonary/Chest: No respiratory distress. He has wheezes.   CXR with resolution of sub-diaphragmatic air, bi-basilar atelectasis   Abdominal: Bowel sounds are normal. He exhibits distension. There is generalized tenderness.   Musculoskeletal: Normal range of motion. He exhibits edema.   Left greater toe redness and edema stable   Neurological: He is alert and oriented to person, place, and time.   Skin: Skin is warm and dry. He is not diaphoretic.   Psychiatric: He has a normal mood and affect. His behavior is normal.   Nursing note and vitals reviewed.      Medical Decision Making/Problem List:    Active Hospital Problems    Diagnosis   • Respiratory failure following trauma and surgery (CMS-[facility]) [J95.822]     Priority: High     Left " intubated post-op  Trauma weaning and ventilator protocol ordered     • Pneumoperitoneum [K66.8]     Priority: High     6/16 - CXR suggests free air under right aminata-diaphragm, clinically stable  6/20 - CT shows pneumoperitoneum, concerning for perforated viscus. The area of perforation is probably in the cecum given the wall thickening. No free fluid seen.  6/21 - Ex lap for worsening clinical picture, negative  Dhiraj Amin MD     • Cellulitis of left foot [L03.116]     Priority: Medium     No trauma or open wounds  History of instrumentation with implant 2005 - [place]  6/17 - Commence Ancef   - 3 view left foot - Extensive dorsal and medial soft tissue swelling primarily involving LEFT great toe with apparent resorption at the medial aspect of the 1st metatarsophalangeal joint concerning for osteomyelitis/septic arthritis.  - MRSA nasal swab negative, hemoglobin A1c 5.4  6/22 - Cefazolin day 6.  Zafar Pace MD - Ortho  Fran Black MD.  Orthopedics     • Obesity (BMI 30-39.9) [E66.9]     Priority: Medium     BMI -  38.8     • Chronic congestive heart failure (CMS-[facility]) [I50.9]     Priority: Medium     6/6 - Home lasix and potassium started.  6/8 - Lasix stopped upon ICU transfer.  6/11- BNP low / restart lasix as BLE edema.  6/12 - BP low - Lasix and potassium on hold.  6/15 - BNP elevated. Resume Lasix.   Trend diagnostic laboratory studies      • CAD (coronary artery disease) [I25.10]     Priority: Medium     6/6 - Home metoprolol restarted.  Held on transfer to ICU.  6/11 - Resumed.    6/17 - Metoprolol stopped per cardiology recommendation     • Chronic deep vein thrombosis (DVT) of popliteal vein (CMS-[facility]) [I82.539]     Priority: Medium     Present on admission, takes outpatient coumadin.  Old clot in left popliteal vein.  6/4 - Prophylactic Lovenox initiated.  6/3 - Trauma screening bilateral lower extremity venous duplex positive for chronic DVT of the left popliteal vein; no acute process in either  leg.  6/6 - Therapeutic Lovenox started.  6/8 - Stopped upon ICU transfer.  6/11 - Trauma screening bilateral lower extremity venous duplex positive for partially occlusive chronic DVT seen in the left popliteal vein as  membrane-like structure with response to compression and good venous flow. No evidence of acute DVT seen in the left lower extremity.  6/12 - Prophylactic Lovenox resumed.       • AV block, Mobitz 1 [I44.1]     Priority: Medium     Observation only.  6/8 - EKG changes with frequent PVC's. Troponin < 0.02. Metoprolol held on transfer to ICU.  6/11 - Restart metoprolol  6/17 - Metoprolol stopped per cardiology recommendations  - Cardiology following  Cruz Vargas MD. Cardiology     • Penetrating back wound [S21.062A]     Priority: Medium     Large left paraspinous, posterior hematoma.  No active extravasation.  6/8 - CT chest showed slightly larger SQ fluid than on admission. No discrete drainable hematoma.   Ancef x 24hrs (completed 6/3).     • History of pulmonary embolus (PE) [Z86.711]     Priority: Medium     With DVT.  On warfarin as an outpatient.  IVC filter present.  6/12 - Prophylactic Lovenox resumed.  Restart anticoagulation as outpatient.       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  INR 2 on arrival, given 1 mg Factor VII and Vitamin K.  Trend INR, correct as clinically warranted.  6/12 - Prophylactic Lovenox resumed.  Restart coumadin on discharge.       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  Squamous cell carinoma left eye.  Home eye drops resumed.       • Acute pain of left knee [M25.562]     Priority: Low     Prior TKR.  6/11 - Imaging negative for left knee fracture or malalignment.      • Acute blood loss anemia [D62]     Priority: Low     1L of blood loss reported on scene.  Large paraspinous, posterior hematoma.  6/2 - Transfused 2 units  PRBC.  6/10 - Transfused 1 unit PRBC. Iron replaced per pharmacy kinetics.   6/19 - Trend up  Transfuse 1 unit PRBC if Hb < 7.0.       Core Measures & Quality Metrics:  Medications reviewed, Labs reviewed and Radiology images reviewed  Castaneda catheter: Critically Ill - Requiring Accurate Measurement of Urinary Output      DVT Prophylaxis: Enoxaparin (Lovenox)  DVT prophylaxis - mechanical: SCDs  Ulcer prophylaxis: Yes        HERBIE Score    I personally discussed the patient condition and daily plan with available nursing staff, dieticians, social workers, pharmacists on rounds.    I provided the following critical care services: Ventilator management.    Critical care time spent exclusive of procedures :45 minutes    Dhiraj Amin MD    DATE OF SERVICE: 6/22/2017

## 2017-06-22 NOTE — PROGRESS NOTES
STAT 12-ld EKG ordered for abnormal rhythm and suspected block, reading A-Fib on monitor, very apparently irregular. EKG in chart, read sinus rhythm/unknown rhythm on subsequent EKG. Strip printed on ICU monitor, 2nd degree type 1 suspected, Cardiology paged. Dr. Syed to bedside, no new orders at this time as the pt has a history of this block this hospitalization.

## 2017-06-22 NOTE — PROGRESS NOTES
Cardiology Progress Note               Author: Saturnino Neumannsritong Date & Time created: 2017  2:09 PM     Interval History:  Extubated earlier  No AV block overnight while intubated  Having intermittent type I AV block currently but no long pauses  He denies any cardaic symptoms  EKG yesterday no new change  EKG just now type I AV block but no other change  +    Review of Systems   Constitutional: Negative for fever.   Respiratory: Negative for hemoptysis and shortness of breath.    Cardiovascular: Positive for leg swelling. Negative for chest pain, palpitations, orthopnea, claudication and PND.   Gastrointestinal: Negative for nausea, vomiting and abdominal pain.   Neurological: Positive for focal weakness. Negative for dizziness.       Physical Exam   Constitutional: He is oriented to person, place, and time. No distress.   HENT:   Mouth/Throat: Mucous membranes are normal.   Eyes: Conjunctivae and EOM are normal.   Neck: No JVD present. No tracheal deviation present. No thyroid mass and no thyromegaly present.   Cardiovascular: Normal rate and intact distal pulses.  An irregular rhythm present. Exam reveals distant heart sounds.    No murmur heard.  Pulmonary/Chest: Effort normal. No respiratory distress. He has decreased breath sounds in the left lower field. He exhibits no tenderness.   Abdominal: Soft. He exhibits distension.   Musculoskeletal: He exhibits edema.   Neurological: He is alert and oriented to person, place, and time. He has normal strength. He displays no tremor.   Skin: Skin is warm and dry. He is not diaphoretic.   Vitals reviewed.      Hemodynamics:  Temp (24hrs), Av.6 °C (97.8 °F), Min:36.3 °C (97.3 °F), Max:37.6 °C (99.6 °F)  Temperature: 36.3 °C (97.3 °F)  Pulse  Av.2  Min: 53  Max: 160Heart Rate (Monitored): 95  NIBP: (!) 92/47 mmHg     Respiratory:  Bear Vent Mode: Spont, Rate (breaths/min): 18, PEEP/CPAP: 5, FiO2: 30, P Peak (PIP): 13, P MEAN: 7 Respiration: (!)  32, Pulse Oximetry: 93 %     Work Of Breathing / Effort: Vented  RUL Breath Sounds: Clear After Suction, RML Breath Sounds: Clear After Suction, RLL Breath Sounds: Diminished, ABIGAIL Breath Sounds: Clear After Suction, LLL Breath Sounds: Diminished  Fluids:  Date 06/22/17 0700 - 06/23/17 0659   Shift 1834-1663 2755-2801 6998-0356 24 Hour Total   I  N  T  A  K  E   I.V. 180   180    Other 220   220    Enteral 440   440    Shift Total 840   840   O  U  T  P  U  T   Urine 405   405    Shift Total 405   405   Weight (kg) 126.4 126.4 126.4 126.4         GI/Nutrition:  Orders Placed This Encounter   Procedures   • DIET NPO     Standing Status: Standing      Number of Occurrences: 1      Standing Expiration Date:      Order Specific Question:  Restrict to:     Answer:  With Tube Feed [4]     Lab Results:  Recent Labs      06/20/17   0438  06/21/17   0741  06/22/17   0517   WBC  6.4  10.6  8.1   RBC  2.97*  3.48*  3.43*   HEMOGLOBIN  8.8*  10.4*  10.1*   HEMATOCRIT  30.1*  35.5*  34.2*   MCV  101.3*  102.0*  99.7*   MCH  29.6  29.9  29.4   MCHC  29.2*  29.3*  29.5*   RDW  67.7*  67.6*  65.9*   PLATELETCT  287  299  283   MPV  9.7  9.6  9.7     Recent Labs      06/20/17   0438  06/21/17   0741  06/22/17   0517   SODIUM  138  138  138   POTASSIUM  4.1  4.2  4.7   CHLORIDE  108  107  106   CO2  26  25  27   GLUCOSE  141*  155*  145*   BUN  16  17  22   CREATININE  0.61  0.60  0.83   CALCIUM  8.1*  8.4*  8.3*                 Recent Labs      06/21/17   0741   TRIGLYCERIDE  95   HDL  32*   LDL  54         Medical Decision Making, by Problem:  Active Hospital Problems    Diagnosis   • *Penetrating back wound [S21.239A]   • Pneumoperitoneum [K66.8]   • Hemothorax on left [J94.2]   •    • Obesity (BMI 30-39.9) [E66.9]   • Chronic congestive heart failure (CMS-HCC) [I50.9]   • CAD (coronary artery disease) [I25.10]   • Chronic deep vein thrombosis (DVT) of popliteal vein (CMS-HCC) [I82.539]   • AV block, Mobitz 1 [I44.1] asymptomatic,  likely in part related to sleep apnea with some worsening by increased vagal tone from GI issue   • History of pulmonary embolus (PE) [Z86.711]   •    • 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

## 2017-06-22 NOTE — PROGRESS NOTES
Dr. Amin at bedside, discussed patient's urine output, order received to hold lasix for now.  Discussed vital signs and plan of care, no other new orders at this time.

## 2017-06-22 NOTE — RESPIRATORY CARE
Extubation    Cuff leak noted yes  Stridor present no        O2 (LPM): 3 (06/21/17 1144)  O2 Daily Delivery Respiratory : Silicone Nasal Cannula (06/11/17 1545)  Patient toleration yes  RCP Complete? yes  Events/Summary/Plan: spont 5/8 (06/22/17 0700)      PT extubated, placed on 2 liter nasal cannula.

## 2017-06-22 NOTE — CARE PLAN
Problem: Nutritional:  Goal: Achieve adequate nutritional intake  Patient will consume >50% of meal/supplements.   Outcome: NOT MET  Pt re-intubated and TF to start; therefore, NPO at this time.

## 2017-06-22 NOTE — CARE PLAN
Problem: Skin Integrity  Goal: Risk for impaired skin integrity will decrease  Patient turned every two hours, full bed waffle cushion in use, heel float boots to bilateral lower extremities.     Problem: Respiratory:  Goal: Respiratory status will improve  ETT repositioned every two hours, collaborated with RT to suction airway as needed.

## 2017-06-23 ENCOUNTER — APPOINTMENT (OUTPATIENT)
Dept: RADIOLOGY | Facility: MEDICAL CENTER | Age: 77
DRG: 907 | End: 2017-06-23
Attending: SURGERY
Payer: MEDICARE

## 2017-06-23 LAB
ANION GAP SERPL CALC-SCNC: 4 MMOL/L (ref 0–11.9)
BASOPHILS # BLD AUTO: 0.3 % (ref 0–1.8)
BASOPHILS # BLD: 0.02 K/UL (ref 0–0.12)
BUN SERPL-MCNC: 21 MG/DL (ref 8–22)
CALCIUM SERPL-MCNC: 8.3 MG/DL (ref 8.5–10.5)
CHLORIDE SERPL-SCNC: 106 MMOL/L (ref 96–112)
CO2 SERPL-SCNC: 27 MMOL/L (ref 20–33)
CREAT SERPL-MCNC: 0.63 MG/DL (ref 0.5–1.4)
EOSINOPHIL # BLD AUTO: 0.33 K/UL (ref 0–0.51)
EOSINOPHIL NFR BLD: 4.3 % (ref 0–6.9)
ERYTHROCYTE [DISTWIDTH] IN BLOOD BY AUTOMATED COUNT: 65 FL (ref 35.9–50)
GFR SERPL CREATININE-BSD FRML MDRD: >60 ML/MIN/1.73 M 2
GLUCOSE BLD-MCNC: 126 MG/DL (ref 65–99)
GLUCOSE BLD-MCNC: 128 MG/DL (ref 65–99)
GLUCOSE BLD-MCNC: 130 MG/DL (ref 65–99)
GLUCOSE BLD-MCNC: 162 MG/DL (ref 65–99)
GLUCOSE SERPL-MCNC: 149 MG/DL (ref 65–99)
HCT VFR BLD AUTO: 32 % (ref 42–52)
HGB BLD-MCNC: 9.3 G/DL (ref 14–18)
IMM GRANULOCYTES # BLD AUTO: 0.05 K/UL (ref 0–0.11)
IMM GRANULOCYTES NFR BLD AUTO: 0.6 % (ref 0–0.9)
LYMPHOCYTES # BLD AUTO: 0.69 K/UL (ref 1–4.8)
LYMPHOCYTES NFR BLD: 8.9 % (ref 22–41)
MCH RBC QN AUTO: 29.2 PG (ref 27–33)
MCHC RBC AUTO-ENTMCNC: 29.1 G/DL (ref 33.7–35.3)
MCV RBC AUTO: 100.6 FL (ref 81.4–97.8)
MONOCYTES # BLD AUTO: 0.58 K/UL (ref 0–0.85)
MONOCYTES NFR BLD AUTO: 7.5 % (ref 0–13.4)
NEUTROPHILS # BLD AUTO: 6.08 K/UL (ref 1.82–7.42)
NEUTROPHILS NFR BLD: 78.4 % (ref 44–72)
NRBC # BLD AUTO: 0 K/UL
NRBC BLD AUTO-RTO: 0 /100 WBC
PLATELET # BLD AUTO: 242 K/UL (ref 164–446)
PMV BLD AUTO: 9.6 FL (ref 9–12.9)
POTASSIUM SERPL-SCNC: 4.5 MMOL/L (ref 3.6–5.5)
RBC # BLD AUTO: 3.18 M/UL (ref 4.7–6.1)
SODIUM SERPL-SCNC: 137 MMOL/L (ref 135–145)
WBC # BLD AUTO: 7.8 K/UL (ref 4.8–10.8)

## 2017-06-23 PROCEDURE — 700111 HCHG RX REV CODE 636 W/ 250 OVERRIDE (IP): Performed by: NURSE PRACTITIONER

## 2017-06-23 PROCEDURE — 80048 BASIC METABOLIC PNL TOTAL CA: CPT

## 2017-06-23 PROCEDURE — A9270 NON-COVERED ITEM OR SERVICE: HCPCS | Performed by: SURGERY

## 2017-06-23 PROCEDURE — 700102 HCHG RX REV CODE 250 W/ 637 OVERRIDE(OP): Performed by: SURGERY

## 2017-06-23 PROCEDURE — 82962 GLUCOSE BLOOD TEST: CPT | Mod: 91

## 2017-06-23 PROCEDURE — 94668 MNPJ CHEST WALL SBSQ: CPT

## 2017-06-23 PROCEDURE — 700102 HCHG RX REV CODE 250 W/ 637 OVERRIDE(OP): Performed by: NURSE PRACTITIONER

## 2017-06-23 PROCEDURE — 700102 HCHG RX REV CODE 250 W/ 637 OVERRIDE(OP): Performed by: INTERNAL MEDICINE

## 2017-06-23 PROCEDURE — A9270 NON-COVERED ITEM OR SERVICE: HCPCS | Performed by: INTERNAL MEDICINE

## 2017-06-23 PROCEDURE — A9270 NON-COVERED ITEM OR SERVICE: HCPCS | Performed by: NURSE PRACTITIONER

## 2017-06-23 PROCEDURE — 99233 SBSQ HOSP IP/OBS HIGH 50: CPT | Performed by: SURGERY

## 2017-06-23 PROCEDURE — 85025 COMPLETE CBC W/AUTO DIFF WBC: CPT

## 2017-06-23 PROCEDURE — 770022 HCHG ROOM/CARE - ICU (200)

## 2017-06-23 PROCEDURE — 700111 HCHG RX REV CODE 636 W/ 250 OVERRIDE (IP): Performed by: SURGERY

## 2017-06-23 PROCEDURE — 71010 DX-CHEST-PORTABLE (1 VIEW): CPT

## 2017-06-23 RX ORDER — FUROSEMIDE 40 MG/1
40 TABLET ORAL EVERY 12 HOURS
Status: DISCONTINUED | OUTPATIENT
Start: 2017-06-23 | End: 2017-06-26

## 2017-06-23 RX ORDER — FUROSEMIDE 20 MG/1
20 TABLET ORAL ONCE
Status: COMPLETED | OUTPATIENT
Start: 2017-06-23 | End: 2017-06-23

## 2017-06-23 RX ADMIN — ENOXAPARIN SODIUM 30 MG: 100 INJECTION SUBCUTANEOUS at 08:19

## 2017-06-23 RX ADMIN — STANDARDIZED SENNA CONCENTRATE AND DOCUSATE SODIUM 1 TABLET: 8.6; 5 TABLET, FILM COATED ORAL at 06:39

## 2017-06-23 RX ADMIN — SENNOSIDES AND DOCUSATE SODIUM 2 TABLET: 8.6; 5 TABLET ORAL at 20:58

## 2017-06-23 RX ADMIN — MAGNESIUM HYDROXIDE 30 ML: 400 SUSPENSION ORAL at 08:19

## 2017-06-23 RX ADMIN — POLYETHYLENE GLYCOL (3350) 1 PACKET: 17 POWDER, FOR SOLUTION ORAL at 08:19

## 2017-06-23 RX ADMIN — ONDANSETRON 4 MG: 2 INJECTION INTRAMUSCULAR; INTRAVENOUS at 16:47

## 2017-06-23 RX ADMIN — ANTACID TABLETS 1000 MG: 500 TABLET, CHEWABLE ORAL at 15:56

## 2017-06-23 RX ADMIN — OXYCODONE HYDROCHLORIDE 10 MG: 10 TABLET ORAL at 16:33

## 2017-06-23 RX ADMIN — POLYETHYLENE GLYCOL (3350) 1 PACKET: 17 POWDER, FOR SOLUTION ORAL at 20:57

## 2017-06-23 RX ADMIN — CEFAZOLIN SODIUM 2 G: 2 INJECTION, SOLUTION INTRAVENOUS at 06:22

## 2017-06-23 RX ADMIN — INSULIN LISPRO 2 UNITS: 100 INJECTION, SOLUTION INTRAVENOUS; SUBCUTANEOUS at 17:52

## 2017-06-23 RX ADMIN — FUROSEMIDE 20 MG: 20 TABLET ORAL at 10:29

## 2017-06-23 RX ADMIN — ANTACID TABLETS 1000 MG: 500 TABLET, CHEWABLE ORAL at 00:43

## 2017-06-23 RX ADMIN — BISACODYL 10 MG: 10 SUPPOSITORY RECTAL at 16:38

## 2017-06-23 RX ADMIN — ENOXAPARIN SODIUM 40 MG: 100 INJECTION SUBCUTANEOUS at 21:05

## 2017-06-23 RX ADMIN — ACETAMINOPHEN 650 MG: 325 TABLET, FILM COATED ORAL at 08:19

## 2017-06-23 RX ADMIN — FUROSEMIDE 40 MG: 40 TABLET ORAL at 21:00

## 2017-06-23 RX ADMIN — ERYTHROMYCIN: 5 OINTMENT OPHTHALMIC at 08:19

## 2017-06-23 RX ADMIN — ACETAMINOPHEN 650 MG: 325 TABLET, FILM COATED ORAL at 11:59

## 2017-06-23 RX ADMIN — OMEPRAZOLE 20 MG: 20 CAPSULE, DELAYED RELEASE ORAL at 08:19

## 2017-06-23 RX ADMIN — CEFAZOLIN SODIUM 2 G: 2 INJECTION, SOLUTION INTRAVENOUS at 21:07

## 2017-06-23 RX ADMIN — CEFAZOLIN SODIUM 2 G: 2 INJECTION, SOLUTION INTRAVENOUS at 15:03

## 2017-06-23 RX ADMIN — OXYCODONE HYDROCHLORIDE 10 MG: 10 TABLET ORAL at 02:58

## 2017-06-23 RX ADMIN — ACETAMINOPHEN 650 MG: 325 TABLET, FILM COATED ORAL at 20:58

## 2017-06-23 RX ADMIN — FUROSEMIDE 20 MG: 20 TABLET ORAL at 06:22

## 2017-06-23 ASSESSMENT — PAIN SCALES - GENERAL
PAINLEVEL_OUTOF10: 1
PAINLEVEL_OUTOF10: 0
PAINLEVEL_OUTOF10: 1
PAINLEVEL_OUTOF10: 0
PAINLEVEL_OUTOF10: 4
PAINLEVEL_OUTOF10: 1
PAINLEVEL_OUTOF10: 1
PAINLEVEL_OUTOF10: 0
PAINLEVEL_OUTOF10: 5
PAINLEVEL_OUTOF10: 2

## 2017-06-23 ASSESSMENT — ENCOUNTER SYMPTOMS
NAUSEA: 0
PALPITATIONS: 0
SHORTNESS OF BREATH: 0
VOMITING: 0
DIZZINESS: 0
FEVER: 0

## 2017-06-23 NOTE — FLOWSHEET NOTE
06/22/17 1504   Events/Summary/Plan   Events/Summary/Plan IS/PEP   Interdisciplinary Plan of Care-Goals (Indications)   Hyperinflation Protocol Indications Chest Trauma (follow Blunt Chest Protocol);Atelectasis Documented by Chest X-Ray   Interdisciplinary Plan of Care-Outcomes    Hyperinflation Protocol Goals/Outcome Greater Than 60% of Predicted I.S. Volume x 24 hrs   Education   Education Yes - Pt. / Family has been Instructed in use of Respiratory Equipment   PEP/CPT Group   PEP/CPT/Airway Clearance Therapy Yes   #PEP/CPT (Manual) Initial Initial   #CPT (Mechanical) Yes   PEP/CPT Method Positive Airway Pressure Device   Pressure 15   Date Disposable Equipment Last Changed 06/22/17   Date Disposable Equipment Next Change Due (Q 30 Days) 07/22/17   Incentive Spirometry Group   Incentive Spirometry Instruction Yes   Breathing Exercises Yes   Incentive Spirometer Volume 1100 mL   Respiratory WDL   Respiratory (WDL) X   Chest Exam   Work Of Breathing / Effort Mild   Respiration 20   Pulse 85   Breath Sounds   RUL Breath Sounds Clear   RML Breath Sounds Clear   RLL Breath Sounds Diminished   ABIGAIL Breath Sounds Clear   LLL Breath Sounds Diminished   Secretions   Cough Productive   How Sputum Obtained Spontaneous   Sputum Amount Unable to Evaluate   Sputum Color Unable to Evaluate   Sputum Consistency Unable to Evaluate   Oximetry   Continuous Oximetry Yes   Oxygen   Pulse Oximetry 95 %   O2 (LPM) 3   O2 Daily Delivery Respiratory  Silicone Nasal Cannula

## 2017-06-23 NOTE — PROGRESS NOTES
Cardiology Progress Note               Author: Saturnino Syed Date & Time created: 2017  4:28 PM     Interval History:  Resting  Per RN no new cardiac issue  Monitor occ Mobitz I AV block  Hemodynamically stable      Review of Systems   Constitutional: Negative for fever.   Respiratory: Negative for shortness of breath.    Cardiovascular: Negative for chest pain and palpitations.   Gastrointestinal: Negative for nausea and vomiting.   Neurological: Negative for dizziness.       Physical Exam   Constitutional: No distress.   HENT:   Mouth/Throat: Mucous membranes are normal.   Neck: No tracheal deviation present. No thyroid mass and no thyromegaly present.   Cardiovascular: Normal rate, regular rhythm and intact distal pulses.  Exam reveals distant heart sounds.    No murmur heard.  Pulmonary/Chest: Effort normal and breath sounds normal. No respiratory distress. He exhibits no tenderness.   Abdominal: Soft. He exhibits distension.   Musculoskeletal: He exhibits edema.   Neurological: He is alert. He has normal strength. He displays no tremor.   Skin: Skin is warm and dry. He is not diaphoretic.   Vitals reviewed.      Hemodynamics:  Temp (24hrs), Av.4 °C (97.6 °F), Min:36.2 °C (97.2 °F), Max:36.7 °C (98 °F)  Temperature: 36.3 °C (97.3 °F)  Pulse  Av  Min: 45  Max: 160Heart Rate (Monitored): 95  NIBP: 120/62 mmHg     Respiratory:    Respiration: (!) 25, Pulse Oximetry: 94 %, O2 Daily Delivery Respiratory : Silicone Nasal Cannula     PEP/CPT Method: Positive Airway Pressure Device, Work Of Breathing / Effort: Mild  RUL Breath Sounds: Clear, RML Breath Sounds: Diminished, RLL Breath Sounds: Diminished, ABIGAIL Breath Sounds: Clear, LLL Breath Sounds: Diminished  Fluids:  Date 17 0700 - 17 0659   Shift 3412-8282 8688-4046 9469-7975 24 Hour Total   I  N  T  A  K  E   P.O. 175 100  275    I.V. 100   100    Other 260   260    Enteral 375 30  405    Shift Total 910 130  1040   O  U  T  P  U  T    Urine 525   525    Shift Total 525   525   Weight (kg) 126.1 126.1 126.1 126.1       Weight: (!) 126.1 kg (278 lb)  GI/Nutrition:  Orders Placed This Encounter   Procedures   • DIET ORDER     Standing Status: Standing      Number of Occurrences: 1      Standing Expiration Date:      Order Specific Question:  Diet:     Answer:  Clear Liquid [10]     Lab Results:  Recent Labs      06/21/17   0741  06/22/17   0517  06/23/17   0500   WBC  10.6  8.1  7.8   RBC  3.48*  3.43*  3.18*   HEMOGLOBIN  10.4*  10.1*  9.3*   HEMATOCRIT  35.5*  34.2*  32.0*   MCV  102.0*  99.7*  100.6*   MCH  29.9  29.4  29.2   MCHC  29.3*  29.5*  29.1*   RDW  67.6*  65.9*  65.0*   PLATELETCT  299  283  242   MPV  9.6  9.7  9.6     Recent Labs      06/21/17   0741  06/22/17   0517  06/23/17   0500   SODIUM  138  138  137   POTASSIUM  4.2  4.7  4.5   CHLORIDE  107  106  106   CO2  25  27  27   GLUCOSE  155*  145*  149*   BUN  17  22  21   CREATININE  0.60  0.83  0.63   CALCIUM  8.4*  8.3*  8.3*                 Recent Labs      06/21/17   0741   TRIGLYCERIDE  95   HDL  32*   LDL  54         Medical Decision Making, by Problem:  Active Hospital Problems    Diagnosis   • *Penetrating back wound [S21.239A]       • Pneumoperitoneum [K66.8]   • Cellulitis of left foot [L03.116]   • Obesity (BMI 30-39.9) [E66.9]   • Chronic congestive heart failure (CMS-[facility]) [I50.9]   • CAD (coronary artery disease) [I25.10]   • Chronic deep vein thrombosis (DVT) of popliteal vein (CMS-[facility]) [I82.539]   • AV block, Mobitz 1 [I44.1]   • History of pulmonary embolus (PE) [Z86.711]   • Hemorrhagic disorder due to extrinsic circulating anticoagulants (CMS-[facility]) [D68.32]   • Squamous cell carcinoma of left eye (CMS-[facility]) [C69.92]   • Acute pain of left knee [M25.562]   • Acute blood loss anemia [D62]   No high grade AV block    Plan:  No new recommendation  Will see every few days  Please notify for any cardaic issue    Core Measures

## 2017-06-23 NOTE — CARE PLAN
Problem: Pain Management  Goal: Pain level will decrease to patient’s comfort goal  Intervention: Follow pain managment plan developed in collaboration with patient and Interdisciplinary Team  Pain continually assessed and managed with nonpharm measures (e.g. Distraction, frequent repositioning) and per MAR pharmacologically, well managed at this time.       Problem: Respiratory:  Goal: Respiratory status will improve  Intervention: Assess and monitor pulmonary status  Pt extubated 9:45 am and respiratory status continually assessed via breath sounds and oxygen saturation. Oxygen delivery method and LPM titrated as needed. Pt stable on 3L silicone NC.

## 2017-06-23 NOTE — PROGRESS NOTES
"  Trauma/Surgical Progress Note          Interval Events:  Stable overnight  Reports minimal abdominal pain  Lasix increased  Will discuss long term treatment of foot cellulitis (if needed) with ID    I personally spent 15 minutes at bedside today with the patient, his wife, and a woman introduced to me as his daughter-in-law providing updates, answering questions, and discussing care up to this point.  Plans for the day were delineated.  Kiran has no doubt had a longer than anticipated hospital stay.  I was asked to defend my decision to operate and the type of surgery I performed.  For the first time that I am aware, his wife expressed deep frustration with what she perceives as infrequent mobilization both by nursing staff and PT/OT when the patient was on GSU.  His wife had aggressive posturing through out our interaction and was clearly upset.  I validated her concerns and apologized.  I offered to have out patient advocate pay everyone a visit.  I closed our interaction by asking if all questions and concerns had been addressed and everyone voiced satisfaction.    Hemodynamics:  Blood pressure 119/57, pulse 103, temperature 36.3 °C (97.3 °F), resp. rate 22, height 1.778 m (5' 10\"), weight 126.1 kg (278 lb), SpO2 96 %.     Respiratory:    Respiration: (!) 22, Pulse Oximetry: 96 %, O2 Daily Delivery Respiratory : Silicone Nasal Cannula     PEP/CPT Method: Positive Airway Pressure Device, Work Of Breathing / Effort: Mild  RUL Breath Sounds: Clear, RML Breath Sounds: Clear, RLL Breath Sounds: Diminished, ABIGAIL Breath Sounds: Clear, LLL Breath Sounds: Diminished  Fluids:    Intake/Output Summary (Last 24 hours) at 06/22/17 1434  Last data filed at 06/22/17 1400   Gross per 24 hour   Intake 1934.63 ml   Output   1065 ml   Net 869.63 ml     Admit Weight: 117.935 kg (260 lb)  Current Weight: (!) 126.1 kg (278 lb)    Physical Exam   Constitutional: He is oriented to person, place, and time. He appears well-developed. No " distress.   Up in chair   HENT:   Head: Normocephalic.   Eyes:   Left eye premorbid ptosis   Neck: Neck supple. No JVD present. No tracheal deviation present.   Cardiovascular: Normal rate.    Pulmonary/Chest: No respiratory distress. He has wheezes.   CXR with stable bi-basilar atelectasis   Abdominal: Bowel sounds are normal. He exhibits distension. There is generalized tenderness.   Midline incision clean and well approximated with staples over.   Musculoskeletal: Normal range of motion. He exhibits edema.   Left greater toe redness and edema stable   Neurological: He is alert and oriented to person, place, and time.   Skin: Skin is warm and dry. He is not diaphoretic.   Psychiatric: He has a normal mood and affect. His behavior is normal.   Nursing note and vitals reviewed.      Medical Decision Making/Problem List:    Active Hospital Problems    Diagnosis   • Respiratory failure following trauma and surgery (CMS-[facility]) [J95.822]     Priority: High     Left intubated post-op  Trauma weaning and ventilator protocol ordered     • Pneumoperitoneum [K66.8]     Priority: High     6/16 - CXR suggests free air under right aminata-diaphragm, clinically stable  6/20 - CT shows pneumoperitoneum, concerning for perforated viscus. The area of perforation is probably in the cecum given the wall thickening. No free fluid seen.  6/21 - Ex lap for worsening clinical picture, negative  Dhiraj Amin MD     • Cellulitis of left foot [L03.116]     Priority: Medium     No trauma or open wounds  History of instrumentation with implant 2005 - [place] 6/17 - Commence Ancef   - 3 view left foot - Extensive dorsal and medial soft tissue swelling primarily involving LEFT great toe with apparent resorption at the medial aspect of the 1st metatarsophalangeal joint concerning for osteomyelitis/septic arthritis.  - MRSA nasal swab negative, hemoglobin A1c 5.4  6/21 - LILY normal  6/22 - Cefazolin day 7.  Ambrose Brown MD - ID  Zafar Pace MD -  Ortho  Fran Black MD.  Orthopedics     • Obesity (BMI 30-39.9) [E66.9]     Priority: Medium     BMI -  38.8     • Chronic congestive heart failure (CMS-[facility]) [I50.9]     Priority: Medium     6/6 - Home lasix and potassium started.  6/8 - Lasix stopped upon ICU transfer.  6/11- BNP low / restart lasix as BLE edema.  6/12 - BP low - Lasix and potassium on hold.  6/15 - BNP elevated. Resume Lasix.   Trend diagnostic laboratory studies      • CAD (coronary artery disease) [I25.10]     Priority: Medium     6/6 - Home metoprolol restarted.  Held on transfer to ICU.  6/11 - Resumed.    6/17 - Metoprolol stopped per cardiology recommendation     • Chronic deep vein thrombosis (DVT) of popliteal vein (CMS-[facility]) [I82.539]     Priority: Medium     Present on admission, takes outpatient coumadin.  Old clot in left popliteal vein.  6/4 - Prophylactic Lovenox initiated.  6/3 - Trauma screening bilateral lower extremity venous duplex positive for chronic DVT of the left popliteal vein; no acute process in either leg.  6/6 - Therapeutic Lovenox started.  6/8 - Stopped upon ICU transfer.  6/11 - Trauma screening bilateral lower extremity venous duplex positive for partially occlusive chronic DVT seen in the left popliteal vein as  membrane-like structure with response to compression and good venous flow. No evidence of acute DVT seen in the left lower extremity.  6/12 - Prophylactic Lovenox resumed.       • AV block, Mobitz 1 [I44.1]     Priority: Medium     Observation only.  6/8 - EKG changes with frequent PVC's. Troponin < 0.02. Metoprolol held on transfer to ICU.  6/11 - Restart metoprolol  6/17 - Metoprolol stopped per cardiology recommendations  - Cardiology following  Cruz Vargas MD. Cardiology     • Penetrating back wound [S21.560A]     Priority: Medium     Large left paraspinous, posterior hematoma.  No active extravasation.  6/8 - CT chest showed slightly larger SQ fluid than on admission. No discrete drainable  hematoma.   Ancef x 24hrs (completed 6/3).     • History of pulmonary embolus (PE) [Z86.711]     Priority: Medium     With DVT.  On warfarin as an outpatient.  IVC filter present.  6/12 - Prophylactic Lovenox resumed.  Restart anticoagulation as outpatient.       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  INR 2 on arrival, given 1 mg Factor VII and Vitamin K.  Trend INR, correct as clinically warranted.  6/12 - Prophylactic Lovenox resumed.  Restart coumadin on discharge.       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  Squamous cell carinoma left eye.  Home eye drops resumed.       • Acute pain of left knee [M25.562]     Priority: Low     Prior TKR.  6/11 - Imaging negative for left knee fracture or malalignment.      • Acute blood loss anemia [D62]     Priority: Low     1L of blood loss reported on scene.  Large paraspinous, posterior hematoma.  6/2 - Transfused 2 units PRBC.  6/10 - Transfused 1 unit PRBC. Iron replaced per pharmacy kinetics.   6/19 - Trend up  Transfuse 1 unit PRBC if Hb < 7.0.       Core Measures & Quality Metrics:  Medications reviewed, Labs reviewed and Radiology images reviewed  Castaneda catheter: Critically Ill - Requiring Accurate Measurement of Urinary Output      DVT Prophylaxis: Enoxaparin (Lovenox)  DVT prophylaxis - mechanical: SCDs  Ulcer prophylaxis: Yes        HERBIE Score    I personally discussed the patient condition and daily plan with available nursing staff, dieticians, social workers, pharmacists on rounds.    Time spent exclusive of procedures :35 minutes    Dhiraj Amin MD    DATE OF SERVICE: 6/23/2017

## 2017-06-23 NOTE — PROGRESS NOTES
Dr. Amin at bedside to discuss POC with pt's daughter-in-law and wife. Concerns related to care on previous unit while hospitalized at [facility] voiced to physician. This RN contacted charge RN as well as pt advocate. Charge to escalate as needed.

## 2017-06-23 NOTE — CARE PLAN
Problem: Oxygenation/Respiratory Function  Goal: Patient will Achieve/Maintain Optimum Respiratory Rate/Effort  Requiring 3L NC post extubation.  Using IS while awake.    Problem: Pain  Goal: Alleviation of Pain or a reduction in pain to the patient’s comfort goal  Minimal pain at this time, denies need for pain medication.  Pt repositioned for comfort.

## 2017-06-24 ENCOUNTER — APPOINTMENT (OUTPATIENT)
Dept: RADIOLOGY | Facility: MEDICAL CENTER | Age: 77
DRG: 907 | End: 2017-06-24
Attending: SURGERY
Payer: MEDICARE

## 2017-06-24 LAB
ANION GAP SERPL CALC-SCNC: 4 MMOL/L (ref 0–11.9)
BASOPHILS # BLD AUTO: 0.4 % (ref 0–1.8)
BASOPHILS # BLD: 0.02 K/UL (ref 0–0.12)
BUN SERPL-MCNC: 19 MG/DL (ref 8–22)
CALCIUM SERPL-MCNC: 8.3 MG/DL (ref 8.5–10.5)
CHLORIDE SERPL-SCNC: 105 MMOL/L (ref 96–112)
CO2 SERPL-SCNC: 28 MMOL/L (ref 20–33)
CREAT SERPL-MCNC: 0.71 MG/DL (ref 0.5–1.4)
EOSINOPHIL # BLD AUTO: 0.12 K/UL (ref 0–0.51)
EOSINOPHIL NFR BLD: 2.4 % (ref 0–6.9)
ERYTHROCYTE [DISTWIDTH] IN BLOOD BY AUTOMATED COUNT: 64.6 FL (ref 35.9–50)
GFR SERPL CREATININE-BSD FRML MDRD: >60 ML/MIN/1.73 M 2
GLUCOSE BLD-MCNC: 118 MG/DL (ref 65–99)
GLUCOSE BLD-MCNC: 124 MG/DL (ref 65–99)
GLUCOSE BLD-MCNC: 156 MG/DL (ref 65–99)
GLUCOSE BLD-MCNC: 177 MG/DL (ref 65–99)
GLUCOSE SERPL-MCNC: 112 MG/DL (ref 65–99)
HCT VFR BLD AUTO: 33.1 % (ref 42–52)
HGB BLD-MCNC: 9.6 G/DL (ref 14–18)
IMM GRANULOCYTES # BLD AUTO: 0.04 K/UL (ref 0–0.11)
IMM GRANULOCYTES NFR BLD AUTO: 0.8 % (ref 0–0.9)
LYMPHOCYTES # BLD AUTO: 0.87 K/UL (ref 1–4.8)
LYMPHOCYTES NFR BLD: 17.8 % (ref 22–41)
MCH RBC QN AUTO: 29.7 PG (ref 27–33)
MCHC RBC AUTO-ENTMCNC: 29 G/DL (ref 33.7–35.3)
MCV RBC AUTO: 102.5 FL (ref 81.4–97.8)
MONOCYTES # BLD AUTO: 0.35 K/UL (ref 0–0.85)
MONOCYTES NFR BLD AUTO: 7.1 % (ref 0–13.4)
NEUTROPHILS # BLD AUTO: 3.5 K/UL (ref 1.82–7.42)
NEUTROPHILS NFR BLD: 71.5 % (ref 44–72)
NRBC # BLD AUTO: 0 K/UL
NRBC BLD AUTO-RTO: 0 /100 WBC
PLATELET # BLD AUTO: 215 K/UL (ref 164–446)
PMV BLD AUTO: 9.9 FL (ref 9–12.9)
POTASSIUM SERPL-SCNC: 4.3 MMOL/L (ref 3.6–5.5)
RBC # BLD AUTO: 3.23 M/UL (ref 4.7–6.1)
SODIUM SERPL-SCNC: 137 MMOL/L (ref 135–145)
WBC # BLD AUTO: 4.9 K/UL (ref 4.8–10.8)

## 2017-06-24 PROCEDURE — 85025 COMPLETE CBC W/AUTO DIFF WBC: CPT

## 2017-06-24 PROCEDURE — 80048 BASIC METABOLIC PNL TOTAL CA: CPT

## 2017-06-24 PROCEDURE — 700111 HCHG RX REV CODE 636 W/ 250 OVERRIDE (IP): Performed by: SURGERY

## 2017-06-24 PROCEDURE — A9270 NON-COVERED ITEM OR SERVICE: HCPCS | Performed by: SURGERY

## 2017-06-24 PROCEDURE — 700102 HCHG RX REV CODE 250 W/ 637 OVERRIDE(OP): Performed by: SURGERY

## 2017-06-24 PROCEDURE — A9270 NON-COVERED ITEM OR SERVICE: HCPCS | Performed by: NURSE PRACTITIONER

## 2017-06-24 PROCEDURE — 99233 SBSQ HOSP IP/OBS HIGH 50: CPT | Performed by: SURGERY

## 2017-06-24 PROCEDURE — 82962 GLUCOSE BLOOD TEST: CPT | Mod: 91

## 2017-06-24 PROCEDURE — 700102 HCHG RX REV CODE 250 W/ 637 OVERRIDE(OP): Performed by: NURSE PRACTITIONER

## 2017-06-24 PROCEDURE — 94669 MECHANICAL CHEST WALL OSCILL: CPT

## 2017-06-24 PROCEDURE — 700112 HCHG RX REV CODE 229: Performed by: SURGERY

## 2017-06-24 PROCEDURE — 94668 MNPJ CHEST WALL SBSQ: CPT

## 2017-06-24 PROCEDURE — 770022 HCHG ROOM/CARE - ICU (200)

## 2017-06-24 PROCEDURE — 71010 DX-CHEST-PORTABLE (1 VIEW): CPT

## 2017-06-24 RX ADMIN — ENOXAPARIN SODIUM 40 MG: 100 INJECTION SUBCUTANEOUS at 09:00

## 2017-06-24 RX ADMIN — OMEPRAZOLE 20 MG: 20 CAPSULE, DELAYED RELEASE ORAL at 08:30

## 2017-06-24 RX ADMIN — FINASTERIDE 5 MG: 5 TABLET, FILM COATED ORAL at 08:30

## 2017-06-24 RX ADMIN — INSULIN LISPRO 2 UNITS: 100 INJECTION, SOLUTION INTRAVENOUS; SUBCUTANEOUS at 18:36

## 2017-06-24 RX ADMIN — FUROSEMIDE 40 MG: 40 TABLET ORAL at 20:34

## 2017-06-24 RX ADMIN — ACETAMINOPHEN 650 MG: 325 TABLET, FILM COATED ORAL at 14:13

## 2017-06-24 RX ADMIN — ACETAMINOPHEN 650 MG: 325 TABLET, FILM COATED ORAL at 20:29

## 2017-06-24 RX ADMIN — DOCUSATE SODIUM 200 MG: 100 CAPSULE ORAL at 08:30

## 2017-06-24 RX ADMIN — ERYTHROMYCIN: 5 OINTMENT OPHTHALMIC at 08:31

## 2017-06-24 RX ADMIN — ACETAMINOPHEN 650 MG: 325 TABLET, FILM COATED ORAL at 08:30

## 2017-06-24 RX ADMIN — CEFAZOLIN SODIUM 2 G: 2 INJECTION, SOLUTION INTRAVENOUS at 14:13

## 2017-06-24 RX ADMIN — CEFAZOLIN SODIUM 2 G: 2 INJECTION, SOLUTION INTRAVENOUS at 05:19

## 2017-06-24 RX ADMIN — FUROSEMIDE 40 MG: 40 TABLET ORAL at 08:30

## 2017-06-24 RX ADMIN — MAGNESIUM HYDROXIDE 30 ML: 400 SUSPENSION ORAL at 08:30

## 2017-06-24 RX ADMIN — ENOXAPARIN SODIUM 40 MG: 100 INJECTION SUBCUTANEOUS at 20:29

## 2017-06-24 RX ADMIN — ENOXAPARIN SODIUM 40 MG: 100 INJECTION SUBCUTANEOUS at 08:50

## 2017-06-24 RX ADMIN — INSULIN LISPRO 2 UNITS: 100 INJECTION, SOLUTION INTRAVENOUS; SUBCUTANEOUS at 14:05

## 2017-06-24 RX ADMIN — CEFAZOLIN SODIUM 2 G: 2 INJECTION, SOLUTION INTRAVENOUS at 20:34

## 2017-06-24 RX ADMIN — ACETAMINOPHEN 650 MG: 325 TABLET, FILM COATED ORAL at 17:02

## 2017-06-24 ASSESSMENT — PAIN SCALES - GENERAL
PAINLEVEL_OUTOF10: 0
PAINLEVEL_OUTOF10: 0
PAINLEVEL_OUTOF10: 1
PAINLEVEL_OUTOF10: 0
PAINLEVEL_OUTOF10: 2
PAINLEVEL_OUTOF10: 0

## 2017-06-24 ASSESSMENT — ENCOUNTER SYMPTOMS
ABDOMINAL PAIN: 0
FEVER: 0
DIARRHEA: 0
SHORTNESS OF BREATH: 0
VOMITING: 0
NAUSEA: 0
COUGH: 0
CHILLS: 0

## 2017-06-24 NOTE — CARE PLAN
"Problem: Bowel/Gastric:  Goal: Normal bowel function is maintained or improved  Intervention: Educate patient and significant other/support system about diet, fluid intake, medications and activity to promote bowel function  Discussed DC'ing tube feeds and cortrak in rounds in favor of a clear liquid diet given recent extubation and tolerance of tube feeds thus far. Diet started and cortrak to remain in place until end of shift to ensure adequate PO tolerance before pulling. Swallow eval performed by this RN at bedside. Pt taking adequate PO intake at this time, cortrak to be discontinued on night shift per MD. Bowel medications given in AM, suppository given in afternoon d/t lack of BM and increasing abdominal discomfort and distension. BM achieved, pt exhibits residual abdominal tenderness, feels \"better\", however.       Problem: Skin Integrity  Goal: Risk for impaired skin integrity will decrease  Intervention: Assess risk factors for impaired skin integrity and/or pressure ulcers  Pt turned/repositonied Q2h to shift pressure off bony prominences and elevate extremities. Pt mobilize x2 to chair and to commode throughout shift, tolerated well. Skin comprehensively assessed and documented upon accordingly, ID at bedside to assess for psossible L foot cellulitic infection. Devices and tubes repositioned with each pt encounter.           "

## 2017-06-24 NOTE — CARE PLAN
Problem: Respiratory:  Goal: Respiratory status will improve  Outcome: PROGRESSING AS EXPECTED  Lung sounds clear, pt demonstrates use of IS correctly. Strong and productive cough, self suctions as needed. NC O2 titrated as needed, O2 sats maintained >95%.    Problem: Mobility  Goal: Risk for activity intolerance will decrease  Outcome: PROGRESSING AS EXPECTED  Repositioned Q2H and as needed. Ambulated back to bed from chair, tolerates well. x2 assist needed. Provided rest periods.

## 2017-06-24 NOTE — DISCHARGE PLANNING
Rehab TCC following for potential admission. Will continue to monitor for medical readiness/clearance for IRF.

## 2017-06-24 NOTE — CARE PLAN
Problem: Skin Integrity  Goal: Risk for impaired skin integrity will decrease  Intervention: Assess risk factors for impaired skin integrity and/or pressure ulcers  Pt turned and repositioned Q2h with the use of pillows to shift pressure off bony prominences. Pillows in use to elevate extremities. Full bed waffle cushion in place beneath pt, and one beneath head as well. Mobility encouraged. Skin assessed beneath devices with each encounter, devices/trubes repositioned.       Problem: Respiratory:  Goal: Respiratory status will improve  Intervention: Assess and monitor pulmonary status  Oxygen LPM titrated to pt response based on pulse oximetry. Breath sounds auscultated and continuous monitoring for signs of respiratory compromise in place. Pt self-suctions using [place], moderate secretions produced (tan/yellow/clear/white).

## 2017-06-24 NOTE — PROGRESS NOTES
"  Trauma/Surgical Progress Note          Interval Events:  Stable overnight  Doing well  Weak, mobility an issue  Continue PO Lasix     Hemodynamics:  Blood pressure 119/57, pulse 89, temperature 36.4 °C (97.6 °F), resp. rate 24, height 1.778 m (5' 10\"), weight 125 kg (275 lb 9.2 oz), SpO2 95 %.     Respiratory:    Respiration: (!) 24, Pulse Oximetry: 95 %, O2 Daily Delivery Respiratory : Silicone Nasal Cannula     PEP/CPT Method: Positive Airway Pressure Device, Work Of Breathing / Effort: Mild  RUL Breath Sounds: Clear, RML Breath Sounds: Clear, RLL Breath Sounds: Diminished, ABIGAIL Breath Sounds: Clear, LLL Breath Sounds: Diminished  Fluids:    Intake/Output Summary (Last 24 hours) at 06/22/17 1434  Last data filed at 06/22/17 1400   Gross per 24 hour   Intake 1934.63 ml   Output   1065 ml   Net 869.63 ml     Admit Weight: 117.935 kg (260 lb)  Current Weight: 125 kg (275 lb 9.2 oz)    Physical Exam   Constitutional: He is oriented to person, place, and time. He appears well-developed. No distress.   Up in chair   HENT:   Head: Normocephalic.   Eyes:   Left eye premorbid ptosis   Neck: Neck supple. No JVD present. No tracheal deviation present.   Cardiovascular: Normal rate.    Pulmonary/Chest: No respiratory distress. He has wheezes.   CXR with stable bi-basilar atelectasis   Abdominal: Bowel sounds are normal. He exhibits distension. There is generalized tenderness.   Midline incision clean and well approximated with staples over.   Musculoskeletal: Normal range of motion. He exhibits edema.   Left greater toe redness and edema stable   Neurological: He is alert and oriented to person, place, and time.   Skin: Skin is warm and dry. He is not diaphoretic.   Psychiatric: He has a normal mood and affect. His behavior is normal.   Nursing note and vitals reviewed.      Medical Decision Making/Problem List:    Active Hospital Problems    Diagnosis   • Pneumoperitoneum [K66.8]     Priority: High     6/16 - CXR suggests " free air under right aminata-diaphragm, clinically stable  6/20 - CT shows pneumoperitoneum, concerning for perforated viscus. The area of perforation is probably in the cecum given the wall thickening. No free fluid seen.  6/21 - Ex lap for worsening clinical picture, negative  Tolerated a regular diet, having bowel movements, no reaccumulation of penumoperitondorita Amin MD     • Respiratory failure following trauma and surgery (CMS-HCC) [J95.822]     Priority: Medium     Left intubated post-op  Trauma weaning and ventilator protocol ordered  Successfully extubated     • Cellulitis of left foot [L03.116]     Priority: Medium     No trauma or open wounds  History of instrumentation with implant 2005 - [place]  6/17 - Commence Ancef   - 3 view left foot - Extensive dorsal and medial soft tissue swelling primarily involving LEFT great toe with apparent resorption at the medial aspect of the 1st metatarsophalangeal joint concerning for osteomyelitis/septic arthritis.  - MRSA nasal swab negative, hemoglobin A1c 5.4  6/21 - LILY normal  6/22 - Cefazolin day 7.  Ambrose Brown MD - ID  Zafar Pace MD - Ortho  Fran Black MD.  Orthopedics     • Obesity (BMI 30-39.9) [E66.9]     Priority: Medium     BMI -  38.8     • Chronic congestive heart failure (CMS-HCC) [I50.9]     Priority: Medium     6/6 - Home lasix and potassium started.  6/8 - Lasix stopped upon ICU transfer.  6/11- BNP low / restart lasix as BLE edema.  6/12 - BP low - Lasix and potassium on hold.  6/15 - BNP elevated. Resume Lasix.   Trend diagnostic laboratory studies      • CAD (coronary artery disease) [I25.10]     Priority: Medium     6/6 - Home metoprolol restarted.  Held on transfer to ICU.  6/11 - Resumed.    6/17 - Metoprolol stopped per cardiology recommendation     • Chronic deep vein thrombosis (DVT) of popliteal vein (CMS-HCC) [I82.539]     Priority: Medium     Present on admission, takes outpatient coumadin.  Old clot in left popliteal  vein.  6/4 - Prophylactic Lovenox initiated.  6/3 - Trauma screening bilateral lower extremity venous duplex positive for chronic DVT of the left popliteal vein; no acute process in either leg.  6/6 - Therapeutic Lovenox started.  6/8 - Stopped upon ICU transfer.  6/11 - Trauma screening bilateral lower extremity venous duplex positive for partially occlusive chronic DVT seen in the left popliteal vein as  membrane-like structure with response to compression and good venous flow. No evidence of acute DVT seen in the left lower extremity.  6/12 - Prophylactic Lovenox resumed.       • AV block, Mobitz 1 [I44.1]     Priority: Medium     Observation only.  6/8 - EKG changes with frequent PVC's. Troponin < 0.02. Metoprolol held on transfer to ICU.  6/11 - Restart metoprolol  6/17 - Metoprolol stopped per cardiology recommendations  - Cardiology following  Cruz Vargas MD. Cardiology     • Penetrating back wound [S21.237A]     Priority: Medium     Large left paraspinous, posterior hematoma.  No active extravasation.  6/8 - CT chest showed slightly larger SQ fluid than on admission. No discrete drainable hematoma.   Ancef x 24hrs (completed 6/3).     • History of pulmonary embolus (PE) [Z86.711]     Priority: Medium     With DVT.  On warfarin as an outpatient.  IVC filter present.  6/12 - Prophylactic Lovenox resumed.  Restart anticoagulation as outpatient.       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  INR 2 on arrival, given 1 mg Factor VII and Vitamin K.  Trend INR, correct as clinically warranted.  6/12 - Prophylactic Lovenox resumed.  Restart coumadin on discharge.       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  Squamous cell carinoma left eye.  Home eye drops resumed.       • Acute pain of left knee [M25.562]     Priority: Low     Prior TKR.  6/11 - Imaging negative for left  knee fracture or malalignment.      • Acute blood loss anemia [D62]     Priority: Low     1L of blood loss reported on scene.  Large paraspinous, posterior hematoma.  6/2 - Transfused 2 units PRBC.  6/10 - Transfused 1 unit PRBC. Iron replaced per pharmacy kinetics.   6/19 - Trend up  Transfuse 1 unit PRBC if Hb < 7.0.       Core Measures & Quality Metrics:  Medications reviewed, Labs reviewed and Radiology images reviewed  Castaneda catheter: Critically Ill - Requiring Accurate Measurement of Urinary Output      DVT Prophylaxis: Enoxaparin (Lovenox)  DVT prophylaxis - mechanical: SCDs  Ulcer prophylaxis: Yes        HERBIE Score    I personally discussed the patient condition and daily plan with available nursing staff, dieticians, social workers, pharmacists on rounds.    Time spent exclusive of procedures :35 minutes    Dhiraj Amin MD    DATE OF SERVICE: 6/24/2017

## 2017-06-24 NOTE — PROGRESS NOTES
Infectious Disease Progress Note    Author: Ambrose Brown Date & Time created: 6/24/2017  1:11 PM     Cefazolin Day # 7    Interval History:  Past 24 hrs reviewed with RN.    Labs Reviewed, Medications Reviewed, Radiology Reviewed, Wound Reviewed, Fluids Reviewed and GI Nutrition Reviewed.    Review of Systems:  Review of Systems   Constitutional: Negative for fever and chills.   Respiratory: Negative for cough and shortness of breath.    Gastrointestinal: Negative for nausea, vomiting, abdominal pain and diarrhea.   Musculoskeletal:        Some left flank pains still.   Skin: Negative for itching and rash.       Physical Exam:  Physical Exam   Constitutional: He is oriented to person, place, and time. He appears well-developed.   HENT:   Head: Normocephalic and atraumatic.   Cardiovascular: Normal rate.    Pulmonary/Chest: Effort normal. No respiratory distress. He has no wheezes.   Abdominal: Soft. He exhibits no distension. There is no tenderness. There is no guarding.   Musculoskeletal:   Left foot without erythema or induration.   Neurological: He is alert and oriented to person, place, and time.   Skin: Skin is dry.   Psychiatric: He has a normal mood and affect. His behavior is normal.   Nursing note and vitals reviewed.      Labs:  Recent Results (from the past 24 hour(s))   ACCU-CHEK GLUCOSE    Collection Time: 06/23/17  5:50 PM   Result Value Ref Range    Glucose - Accu-Ck 162 (H) 65 - 99 mg/dL   ACCU-CHEK GLUCOSE    Collection Time: 06/23/17 11:37 PM   Result Value Ref Range    Glucose - Accu-Ck 124 (H) 65 - 99 mg/dL   ACCU-CHEK GLUCOSE    Collection Time: 06/24/17  5:26 AM   Result Value Ref Range    Glucose - Accu-Ck 118 (H) 65 - 99 mg/dL   CBC WITH DIFFERENTIAL    Collection Time: 06/24/17  6:15 AM   Result Value Ref Range    WBC 4.9 4.8 - 10.8 K/uL    RBC 3.23 (L) 4.70 - 6.10 M/uL    Hemoglobin 9.6 (L) 14.0 - 18.0 g/dL    Hematocrit 33.1 (L) 42.0 - 52.0 %    .5 (H) 81.4 - 97.8 fL    MCH 29.7  27.0 - 33.0 pg    MCHC 29.0 (L) 33.7 - 35.3 g/dL    RDW 64.6 (H) 35.9 - 50.0 fL    Platelet Count 215 164 - 446 K/uL    MPV 9.9 9.0 - 12.9 fL    Neutrophils-Polys 71.50 44.00 - 72.00 %    Lymphocytes 17.80 (L) 22.00 - 41.00 %    Monocytes 7.10 0.00 - 13.40 %    Eosinophils 2.40 0.00 - 6.90 %    Basophils 0.40 0.00 - 1.80 %    Immature Granulocytes 0.80 0.00 - 0.90 %    Nucleated RBC 0.00 /100 WBC    Neutrophils (Absolute) 3.50 1.82 - 7.42 K/uL    Lymphs (Absolute) 0.87 (L) 1.00 - 4.80 K/uL    Monos (Absolute) 0.35 0.00 - 0.85 K/uL    Eos (Absolute) 0.12 0.00 - 0.51 K/uL    Baso (Absolute) 0.02 0.00 - 0.12 K/uL    Immature Granulocytes (abs) 0.04 0.00 - 0.11 K/uL    NRBC (Absolute) 0.00 K/uL   BASIC METABOLIC PANEL    Collection Time: 17  6:15 AM   Result Value Ref Range    Sodium 137 135 - 145 mmol/L    Potassium 4.3 3.6 - 5.5 mmol/L    Chloride 105 96 - 112 mmol/L    Co2 28 20 - 33 mmol/L    Glucose 112 (H) 65 - 99 mg/dL    Bun 19 8 - 22 mg/dL    Creatinine 0.71 0.50 - 1.40 mg/dL    Calcium 8.3 (L) 8.5 - 10.5 mg/dL    Anion Gap 4.0 0.0 - 11.9   ESTIMATED GFR    Collection Time: 17  6:15 AM   Result Value Ref Range    GFR If African American >60 >60 mL/min/1.73 m 2    GFR If Non African American >60 >60 mL/min/1.73 m 2     Results     Procedure Component Value Units Date/Time    S. AUREUS BY PCR, NASAL COMPLETE [[id number]] Collected:  17 1708    Order Status:  Completed Specimen Information:  Nasal from Other Updated:  17     Staph aureus by PCR Negative      MRSA by PCR Negative     Narrative:      Collected By:[id number] MELISSA BURDICK            Hemodynamics:  Temp (24hrs), Av.2 °C (97.1 °F), Min:35.8 °C (96.5 °F), Max:36.4 °C (97.6 °F)  Temperature: 36.4 °C (97.6 °F)  Pulse  Av.9  Min: 45  Max: 160Heart Rate (Monitored): 90  NIBP: 117/62 mmHg     PIV Group Left Wrist 20g Saline Lock (Active)   Line Secured Taped;Transparent 2017  8:00 AM   Site Condition / Description  Assessed;Patent 6/24/2017  8:00 AM   Dressing Type / Description Transparent;Clean;Dry;Intact 6/24/2017  8:00 AM   Dressing Status Observed 6/24/2017  8:00 AM   Saline Locked Yes 6/22/2017  8:00 AM   Infiltration Grading Used by [facility] [place] 6/24/2017  8:00 AM   Phlebitis Scale (Used by [facility]) 0 6/24/2017  8:00 AM   Date Primary Tubing Changed 06/24/17 6/24/2017  8:00 AM   Date Secondary Tubing Changed 06/23/17 6/24/2017  8:00 AM   NEXT Primary Tubing Change  06/27/17 6/24/2017  8:00 AM   NEXT Secondary Tubing Change  06/24/17 6/24/2017  8:00 AM   NEXT Site Change Date 06/09/17 6/9/2017  8:00 AM       PIV Group Right Forearm 20g (Active)   Line Secured Taped;Transparent 6/24/2017  8:00 AM   Site Condition / Description Assessed;Patent 6/24/2017  8:00 AM   Dressing Type / Description Transparent;Clean;Dry;Intact 6/24/2017  8:00 AM   Dressing Status Observed 6/24/2017  8:00 AM   Saline Locked Yes 6/24/2017  8:00 AM   Infiltration Grading Used by [facility] and [facility] 0 6/24/2017  8:00 AM   Phlebitis Scale (Used by [facility]) 0 6/24/2017  8:00 AM   Date Primary Tubing Changed 06/21/17 6/22/2017  8:00 AM   NEXT Primary Tubing Change  06/24/17 6/22/2017  8:00 AM       Wound:  Surgical Incision  Incision Abdomen (Active)   Wound Bed Pink 6/24/2017  8:00 AM   Drainage  None 6/24/2017  8:00 AM   Periwound Skin Pink;Normal;Intact 6/24/2017  8:00 AM   Daily - Wound Closure Approximated;Staples;Open to Air 6/24/2017  8:00 AM   Dressing Options Open to Air 6/24/2017  8:00 AM   Dressing Status / Change Not Applicable 6/24/2017  8:00 AM   Daily - Dressing Change Observed 6/23/2017  8:00 AM       Wound POA Puncture Flank Left (Active)   Wound Bed Pink 6/24/2017  8:00 AM   Drainage  None 6/24/2017  8:00 AM   Periwound Skin Normal;Pink;Intact 6/24/2017  8:00 AM   Cleansing Not Applicable 6/24/2017  8:00 AM   Dressing Options Open to Air 6/24/2017  8:00 AM   Dressing Status / Change Not Applicable 6/24/2017  8:00 AM   Dressing  Cleansing/Solutions Not Applicable 6/24/2017  8:00 AM   Periwound Protectant Not Applicable 6/22/2017  8:00 AM       Wound Skin Tear Face Left (Active)   Wound Bed Pink 6/24/2017  8:00 AM   Drainage  Scant;Serosanguinous 6/24/2017  8:00 AM   Periwound Skin Pink 6/24/2017  8:00 AM   Cleansing Not Applicable 6/23/2017  8:00 AM   Dressing Options Open to Air 6/24/2017  8:00 AM   Dressing Status / Change Not Applicable 6/24/2017  8:00 AM   Dressing Cleansing/Solutions Not Applicable 6/24/2017  8:00 AM   Weekly Photo (Inpatient Only) 06/22/17 6/24/2017  8:00 AM          Fluids:  Intake/Output       06/22/17 0700 - 06/23/17 0659 06/23/17 0700 - 06/24/17 0659 06/24/17 0700 - 06/25/17 0659      9598-3525 9693-0334 Total 1536-1286 8262-4796 Total 0877-1261 3432-5566 Total       Intake    P.O.  --  -- --  375  300 675  75  -- 75    P.O. -- -- -- 375 300 675 75 -- 75    I.V.  220  250 470  220  320 540  40  -- 40    Propofol Volume 0 -- 0 -- -- -- -- -- --    IV Volume (LR) 120 50 170 120 120 240 40 -- 40    IV Piggyback Volume (IV Piggyback) 100 200 300 100 200 300 -- -- --    Other  280  460 740  320  -- 320  120  -- 120    Medications (P.O./ Enteral Liquids) 280 460 740 320 -- 320 120 -- 120    Enteral  710  1020 1730  405  -- 405  30  -- 30    Enteral Volume  315 -- 315 -- -- --    Free Water / Tube Flush 120 180 300 90 -- 90 30 -- 30    Total Intake 1210 1730 2940 8628 015 0866 265 -- 265       Output    Urine  700  760 1460  1100  775 1875  275  -- 275    Indwelling Cathether  4730 390 5516 275 -- 275    Stool  --  -- --  --  -- --  --  -- --    Number of Times Stooled 0 x -- 0 x 1 x 1 x 2 x 0 x -- 0 x    Total Output  5817 363 9866 275 -- 275       Net I/O      220 -155 65 -10 -- -10        Weight: 125 kg (275 lb 9.2 oz)    GI/Nutrition:  Orders Placed This Encounter   Procedures   • DIET ORDER     Standing Status: Standing      Number of Occurrences: 1      Standing  Expiration Date:      Order Specific Question:  Diet:     Answer:  Regular [1]       Medications:  Current Facility-Administered Medications   Medication Last Dose   • furosemide (LASIX) tablet 40 mg 40 mg at 06/24/17 0830   • enoxaparin (LOVENOX) inj 40 mg 40 mg at 06/23/17 2105   • omeprazole (PRILOSEC) capsule 20 mg 20 mg at 06/24/17 0830   • senna-docusate (PERICOLACE or SENOKOT S) 8.6-50 MG per tablet 2 Tab 2 Tab at 06/23/17 2058   • docusate sodium (COLACE) capsule 200 mg 200 mg at 06/24/17 0830   • oxycodone immediate release (ROXICODONE) tablet 5-10 mg 10 mg at 06/23/17 1633   • acetaminophen (TYLENOL) tablet 650 mg 650 mg at 06/24/17 0830   • insulin lispro (HUMALOG) injection 2-9 Units Stopped at 06/24/17 0000   • ceFAZolin (ANCEF) IVPB 2 g 2 g at 06/24/17 0519   • albuterol inhaler 2 Puff     • calcium carbonate (TUMS) chewable tab 1,000 mg 1,000 mg at 06/23/17 1556   • finasteride (PROSCAR) tablet 5 mg 5 mg at 06/24/17 0830   • Respiratory Care per Protocol     • senna-docusate (PERICOLACE or SENOKOT S) 8.6-50 MG per tablet 1 Tab 1 Tab at 06/23/17 0639   • polyethylene glycol/lytes (MIRALAX) PACKET 1 Packet 1 Packet at 06/23/17 2057   • magnesium hydroxide (MILK OF MAGNESIA) suspension 30 mL 30 mL at 06/24/17 0830   • bisacodyl (DULCOLAX) suppository 10 mg 10 mg at 06/23/17 1638   • fleet enema 133 mL     • ondansetron (ZOFRAN) syringe/vial injection 4 mg 4 mg at 06/23/17 1647       Medical Decision Making, by Problem:  Active Hospital Problems    Diagnosis   • *Penetrating back wound [S21.239A]   • Pneumoperitoneum [K66.8]   • Respiratory failure following trauma and surgery (CMS-HCC) [J95.822]   • Cellulitis of left foot [L03.116]   • Obesity (BMI 30-39.9) [E66.9]   • Chronic congestive heart failure (CMS-HCC) [I50.9]   • CAD (coronary artery disease) [I25.10]   • Chronic deep vein thrombosis (DVT) of popliteal vein (CMS-HCC) [I82.539]   • AV block, Mobitz 1 [I44.1]   • History of pulmonary embolus  (PE) [Z86.711]   • 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 We will continue IV therapy to 10 days and then switch to PO therapy. I will check an ESR and CRP out of curiosity if low a very good sign. Reviewed with patient, RN and Dr. Amin ~ 35 min on floor in SICU in direct patient care/counseling/consulting today.

## 2017-06-25 ENCOUNTER — APPOINTMENT (OUTPATIENT)
Dept: RADIOLOGY | Facility: MEDICAL CENTER | Age: 77
DRG: 907 | End: 2017-06-25
Attending: SURGERY
Payer: MEDICARE

## 2017-06-25 LAB
ANION GAP SERPL CALC-SCNC: 6 MMOL/L (ref 0–11.9)
BASOPHILS # BLD AUTO: 0.7 % (ref 0–1.8)
BASOPHILS # BLD: 0.03 K/UL (ref 0–0.12)
BUN SERPL-MCNC: 15 MG/DL (ref 8–22)
CALCIUM SERPL-MCNC: 8.4 MG/DL (ref 8.5–10.5)
CHLORIDE SERPL-SCNC: 105 MMOL/L (ref 96–112)
CO2 SERPL-SCNC: 30 MMOL/L (ref 20–33)
CREAT SERPL-MCNC: 0.64 MG/DL (ref 0.5–1.4)
CRP SERPL HS-MCNC: 7.9 MG/DL (ref 0–0.75)
EOSINOPHIL # BLD AUTO: 0.18 K/UL (ref 0–0.51)
EOSINOPHIL NFR BLD: 4.3 % (ref 0–6.9)
ERYTHROCYTE [DISTWIDTH] IN BLOOD BY AUTOMATED COUNT: 62.4 FL (ref 35.9–50)
GFR SERPL CREATININE-BSD FRML MDRD: >60 ML/MIN/1.73 M 2
GLUCOSE BLD-MCNC: 107 MG/DL (ref 65–99)
GLUCOSE BLD-MCNC: 117 MG/DL (ref 65–99)
GLUCOSE BLD-MCNC: 203 MG/DL (ref 65–99)
GLUCOSE BLD-MCNC: 338 MG/DL (ref 65–99)
GLUCOSE SERPL-MCNC: 105 MG/DL (ref 65–99)
HCT VFR BLD AUTO: 34.5 % (ref 42–52)
HGB BLD-MCNC: 10.1 G/DL (ref 14–18)
IMM GRANULOCYTES # BLD AUTO: 0.03 K/UL (ref 0–0.11)
IMM GRANULOCYTES NFR BLD AUTO: 0.7 % (ref 0–0.9)
LYMPHOCYTES # BLD AUTO: 0.92 K/UL (ref 1–4.8)
LYMPHOCYTES NFR BLD: 22 % (ref 22–41)
MCH RBC QN AUTO: 29.4 PG (ref 27–33)
MCHC RBC AUTO-ENTMCNC: 29.3 G/DL (ref 33.7–35.3)
MCV RBC AUTO: 100.3 FL (ref 81.4–97.8)
MONOCYTES # BLD AUTO: 0.34 K/UL (ref 0–0.85)
MONOCYTES NFR BLD AUTO: 8.1 % (ref 0–13.4)
NEUTROPHILS # BLD AUTO: 2.68 K/UL (ref 1.82–7.42)
NEUTROPHILS NFR BLD: 64.2 % (ref 44–72)
NRBC # BLD AUTO: 0 K/UL
NRBC BLD AUTO-RTO: 0 /100 WBC
PLATELET # BLD AUTO: 261 K/UL (ref 164–446)
PMV BLD AUTO: 9.8 FL (ref 9–12.9)
POTASSIUM SERPL-SCNC: 4.4 MMOL/L (ref 3.6–5.5)
RBC # BLD AUTO: 3.44 M/UL (ref 4.7–6.1)
SODIUM SERPL-SCNC: 141 MMOL/L (ref 135–145)
WBC # BLD AUTO: 4.2 K/UL (ref 4.8–10.8)

## 2017-06-25 PROCEDURE — 700111 HCHG RX REV CODE 636 W/ 250 OVERRIDE (IP): Performed by: SURGERY

## 2017-06-25 PROCEDURE — 85025 COMPLETE CBC W/AUTO DIFF WBC: CPT

## 2017-06-25 PROCEDURE — 71010 DX-CHEST-PORTABLE (1 VIEW): CPT

## 2017-06-25 PROCEDURE — 700102 HCHG RX REV CODE 250 W/ 637 OVERRIDE(OP): Performed by: SURGERY

## 2017-06-25 PROCEDURE — A9270 NON-COVERED ITEM OR SERVICE: HCPCS | Performed by: SURGERY

## 2017-06-25 PROCEDURE — 99233 SBSQ HOSP IP/OBS HIGH 50: CPT | Performed by: SURGERY

## 2017-06-25 PROCEDURE — 770022 HCHG ROOM/CARE - ICU (200)

## 2017-06-25 PROCEDURE — 82962 GLUCOSE BLOOD TEST: CPT

## 2017-06-25 PROCEDURE — 80048 BASIC METABOLIC PNL TOTAL CA: CPT

## 2017-06-25 PROCEDURE — 86140 C-REACTIVE PROTEIN: CPT

## 2017-06-25 PROCEDURE — 700112 HCHG RX REV CODE 229: Performed by: SURGERY

## 2017-06-25 RX ADMIN — ACETAMINOPHEN 650 MG: 325 TABLET, FILM COATED ORAL at 17:53

## 2017-06-25 RX ADMIN — DOCUSATE SODIUM 200 MG: 100 CAPSULE ORAL at 09:08

## 2017-06-25 RX ADMIN — FUROSEMIDE 40 MG: 40 TABLET ORAL at 21:10

## 2017-06-25 RX ADMIN — FUROSEMIDE 40 MG: 40 TABLET ORAL at 09:07

## 2017-06-25 RX ADMIN — CEFAZOLIN SODIUM 2 G: 2 INJECTION, SOLUTION INTRAVENOUS at 20:44

## 2017-06-25 RX ADMIN — OXYCODONE HYDROCHLORIDE 10 MG: 10 TABLET ORAL at 07:54

## 2017-06-25 RX ADMIN — CEFAZOLIN SODIUM 2 G: 2 INJECTION, SOLUTION INTRAVENOUS at 13:50

## 2017-06-25 RX ADMIN — OMEPRAZOLE 20 MG: 20 CAPSULE, DELAYED RELEASE ORAL at 09:08

## 2017-06-25 RX ADMIN — ACETAMINOPHEN 650 MG: 325 TABLET, FILM COATED ORAL at 13:50

## 2017-06-25 RX ADMIN — CEFAZOLIN SODIUM 2 G: 2 INJECTION, SOLUTION INTRAVENOUS at 05:11

## 2017-06-25 RX ADMIN — ENOXAPARIN SODIUM 40 MG: 100 INJECTION SUBCUTANEOUS at 20:42

## 2017-06-25 RX ADMIN — FINASTERIDE 5 MG: 5 TABLET, FILM COATED ORAL at 09:08

## 2017-06-25 RX ADMIN — ENOXAPARIN SODIUM 40 MG: 100 INJECTION SUBCUTANEOUS at 09:08

## 2017-06-25 RX ADMIN — INSULIN LISPRO 3 UNITS: 100 INJECTION, SOLUTION INTRAVENOUS; SUBCUTANEOUS at 17:51

## 2017-06-25 RX ADMIN — ACETAMINOPHEN 650 MG: 325 TABLET, FILM COATED ORAL at 09:07

## 2017-06-25 RX ADMIN — ACETAMINOPHEN 650 MG: 325 TABLET, FILM COATED ORAL at 20:42

## 2017-06-25 RX ADMIN — INSULIN LISPRO 6 UNITS: 100 INJECTION, SOLUTION INTRAVENOUS; SUBCUTANEOUS at 12:41

## 2017-06-25 ASSESSMENT — ENCOUNTER SYMPTOMS
ABDOMINAL PAIN: 0
VOMITING: 0
CHILLS: 0
SHORTNESS OF BREATH: 0
CONSTIPATION: 0
COUGH: 0
FEVER: 0
DIARRHEA: 0
NAUSEA: 0

## 2017-06-25 ASSESSMENT — PAIN SCALES - GENERAL
PAINLEVEL_OUTOF10: 0
PAINLEVEL_OUTOF10: 2
PAINLEVEL_OUTOF10: 0
PAINLEVEL_OUTOF10: 6
PAINLEVEL_OUTOF10: 2
PAINLEVEL_OUTOF10: 0
PAINLEVEL_OUTOF10: 0
PAINLEVEL_OUTOF10: 2
PAINLEVEL_OUTOF10: 5

## 2017-06-25 NOTE — PROGRESS NOTES
"  Trauma/Surgical Progress Note          Interval Events:  No issues ovenright  Continue PO Lasix/Castaneda    Likely ICU transfer tomorrow - was on Hospitalist service prior to ICU re-admission.    Hemodynamics:  Blood pressure 119/57, pulse 89, temperature 36.5 °C (97.7 °F), resp. rate 18, height 1.778 m (5' 10\"), weight 124.1 kg (273 lb 9.5 oz), SpO2 93 %.     Respiratory:    Respiration: 18, Pulse Oximetry: 93 %, O2 Daily Delivery Respiratory : Silicone Nasal Cannula     PEP/CPT Method: Positive Airway Pressure Device, Work Of Breathing / Effort: Mild;Moderate  RUL Breath Sounds: Clear, RML Breath Sounds: Clear, RLL Breath Sounds: Diminished, ABIGAIL Breath Sounds: Clear, LLL Breath Sounds: Diminished  Fluids:    Intake/Output Summary (Last 24 hours) at 06/22/17 1434  Last data filed at 06/22/17 1400   Gross per 24 hour   Intake 1934.63 ml   Output   1065 ml   Net 869.63 ml     Admit Weight: 117.935 kg (260 lb)  Current Weight: 124.1 kg (273 lb 9.5 oz)    Physical Exam   Constitutional: He is oriented to person, place, and time. He appears well-developed. No distress.   Up in chair   HENT:   Head: Normocephalic.   Eyes:   Left eye premorbid ptosis   Neck: Neck supple. No JVD present. No tracheal deviation present.   Cardiovascular: Normal rate.    Pulmonary/Chest: No respiratory distress. He has wheezes.   CXR with improving bi-basilar atelectasis   Abdominal: Bowel sounds are normal. He exhibits distension. There is generalized tenderness.   Midline incision clean and well approximated with staples over.   Musculoskeletal: Normal range of motion. He exhibits edema.   Left greater toe redness nearly resolved and edema stable   Neurological: He is alert and oriented to person, place, and time.   Skin: Skin is warm and dry. He is not diaphoretic.   Psychiatric: He has a normal mood and affect. His behavior is normal.   Nursing note and vitals reviewed.      Medical Decision Making/Problem List:    Active Hospital Problems "    Diagnosis   • Pneumoperitoneum [K66.8]     Priority: High     6/16 - CXR suggests free air under right aminata-diaphragm, clinically stable  6/20 - CT shows pneumoperitoneum, concerning for perforated viscus. The area of perforation is probably in the cecum given the wall thickening. No free fluid seen.  6/21 - Ex lap for worsening clinical picture, negative  Tolerated a regular diet, having bowel movements, no reaccumulation of penumoperitonuem  Dhiraj Amin MD     • Respiratory failure following trauma and surgery (CMS-HCC) [J95.822]     Priority: Medium     Left intubated post-op  Trauma weaning and ventilator protocol ordered  Successfully extubated     • Cellulitis of left foot [L03.116]     Priority: Medium     No trauma or open wounds  History of instrumentation with implant 2005 - [facility]  6/17 - Commence Ancef   - 3 view left foot - Extensive dorsal and medial soft tissue swelling primarily involving LEFT great toe with apparent resorption at the medial aspect of the 1st metatarsophalangeal joint concerning for osteomyelitis/septic arthritis.  - MRSA nasal swab negative, hemoglobin A1c 5.4  6/21 - LILY normal  6/25 - Cefazolin day 10.  Ambrose Brown MD - ID  Zafar Pace MD - Ortho  Fran Black MD.  Orthopedics     • Obesity (BMI 30-39.9) [E66.9]     Priority: Medium     BMI -  38.8     • Chronic congestive heart failure (CMS-HCC) [I50.9]     Priority: Medium     6/6 - Home lasix and potassium started.  6/8 - Lasix stopped upon ICU transfer.  6/11- BNP low / restart lasix as BLE edema.  6/12 - BP low - Lasix and potassium on hold.  6/15 - BNP elevated. Resume Lasix.   6/24 - Lasix increased to 40mg PO BID with good response  Trend diagnostic laboratory studies      • CAD (coronary artery disease) [I25.10]     Priority: Medium     6/6 - Home metoprolol restarted.  Held on transfer to ICU.  6/11 - Resumed.    6/17 - Metoprolol stopped per cardiology recommendation     • Chronic deep vein thrombosis (DVT)  of popliteal vein (CMS-HCC) [I82.539]     Priority: Medium     Present on admission, takes outpatient coumadin.  Old clot in left popliteal vein.  6/4 - Prophylactic Lovenox initiated.  6/3 - Trauma screening bilateral lower extremity venous duplex positive for chronic DVT of the left popliteal vein; no acute process in either leg.  6/6 - Therapeutic Lovenox started.  6/8 - Stopped upon ICU transfer.  6/11 - Trauma screening bilateral lower extremity venous duplex positive for partially occlusive chronic DVT seen in the left popliteal vein as  membrane-like structure with response to compression and good venous flow. No evidence of acute DVT seen in the left lower extremity.  6/12 - Prophylactic Lovenox resumed.       • AV block, Mobitz 1 [I44.1]     Priority: Medium     Observation only.  6/8 - EKG changes with frequent PVC's. Troponin < 0.02. Metoprolol held on transfer to ICU.  6/11 - Restart metoprolol  6/17 - Metoprolol stopped per cardiology recommendations  - Cardiology following  Cruz Vargas MD. Cardiology     • Penetrating back wound [S21.147A]     Priority: Medium     Large left paraspinous, posterior hematoma.  No active extravasation.  6/8 - CT chest showed slightly larger SQ fluid than on admission. No discrete drainable hematoma.   Ancef x 24hrs (completed 6/3).     • History of pulmonary embolus (PE) [Z86.711]     Priority: Medium     With DVT.  On warfarin as an outpatient.  IVC filter present.  6/12 - Prophylactic Lovenox resumed.  Restart anticoagulation as outpatient.       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  INR 2 on arrival, given 1 mg Factor VII and Vitamin K.  Trend INR, correct as clinically warranted.  6/12 - Prophylactic Lovenox resumed.  Restart coumadin on discharge.       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  Squamous cell carinoma left  eye.  PRN wetting drops     • Acute pain of left knee [M25.562]     Priority: Low     Prior TKA.  6/11 - Imaging negative for left knee fracture or malalignment.      • Acute blood loss anemia [D62]     Priority: Low     1L of blood loss reported on scene.  Large paraspinous, posterior hematoma.  6/2 - Transfused 2 units PRBC.  6/10 - Transfused 1 unit PRBC. Iron replaced per pharmacy kinetics.   6/19 - Trend up  Transfuse 1 unit PRBC if Hb < 7.0.       Core Measures & Quality Metrics:  Medications reviewed, Labs reviewed and Radiology images reviewed  Castaneda catheter: Critically Ill - Requiring Accurate Measurement of Urinary Output      DVT Prophylaxis: Enoxaparin (Lovenox)  DVT prophylaxis - mechanical: SCDs  Ulcer prophylaxis: Yes        HERBIE Score    I personally discussed the patient condition and daily plan with available nursing staff, dieticians, social workers, pharmacists on rounds.    Time spent exclusive of procedures :35 minutes    Dhiraj Amin MD    DATE OF SERVICE: 6/25/2017

## 2017-06-25 NOTE — PROGRESS NOTES
Infectious Disease Progress Note    Author: Ambrose Brown Date & Time created: 6/25/2017  12:13 PM     Cefazolin Day #8    Interval History:  Past 24 hrs reviewed with RN.    Labs Reviewed, Medications Reviewed, Radiology Reviewed, Wound Reviewed, Fluids Reviewed and GI Nutrition Reviewed.    Review of Systems:  Review of Systems   Constitutional: Negative for fever and chills.   Respiratory: Negative for cough and shortness of breath.    Cardiovascular: Negative for chest pain.        Left flank still sore.   Gastrointestinal: Negative for nausea, vomiting, abdominal pain, diarrhea and constipation.   Genitourinary: Negative for dysuria.   Musculoskeletal:        Left foot OK.   Skin: Negative for itching and rash.       Physical Exam:  Physical Exam   Constitutional: He is oriented to person, place, and time. He appears well-developed.   HENT:   Head: Normocephalic and atraumatic.   Cardiovascular: Normal rate and regular rhythm.    Pulmonary/Chest: Effort normal. No respiratory distress. He has no wheezes.   Abdominal: Soft. He exhibits no distension. There is no tenderness. There is no guarding.   Musculoskeletal:   Left foot without erythema or induration.   Neurological: He is alert and oriented to person, place, and time.   Skin: Skin is dry.   Psychiatric: He has a normal mood and affect. His behavior is normal.   Nursing note and vitals reviewed.      Labs:  Recent Results (from the past 24 hour(s))   ACCU-CHEK GLUCOSE    Collection Time: 06/24/17  2:08 PM   Result Value Ref Range    Glucose - Accu-Ck 156 (H) 65 - 99 mg/dL   ACCU-CHEK GLUCOSE    Collection Time: 06/24/17  6:34 PM   Result Value Ref Range    Glucose - Accu-Ck 177 (H) 65 - 99 mg/dL   ACCU-CHEK GLUCOSE    Collection Time: 06/24/17 11:54 PM   Result Value Ref Range    Glucose - Accu-Ck 107 (H) 65 - 99 mg/dL   CRP QUANTITIVE (NON-CARDIAC)    Collection Time: 06/25/17  4:29 AM   Result Value Ref Range    Stat C-Reactive Protein 7.90 (H) 0.00 -  0.75 mg/dL   CBC WITH DIFFERENTIAL    Collection Time: 17  4:29 AM   Result Value Ref Range    WBC 4.2 (L) 4.8 - 10.8 K/uL    RBC 3.44 (L) 4.70 - 6.10 M/uL    Hemoglobin 10.1 (L) 14.0 - 18.0 g/dL    Hematocrit 34.5 (L) 42.0 - 52.0 %    .3 (H) 81.4 - 97.8 fL    MCH 29.4 27.0 - 33.0 pg    MCHC 29.3 (L) 33.7 - 35.3 g/dL    RDW 62.4 (H) 35.9 - 50.0 fL    Platelet Count 261 164 - 446 K/uL    MPV 9.8 9.0 - 12.9 fL    Neutrophils-Polys 64.20 44.00 - 72.00 %    Lymphocytes 22.00 22.00 - 41.00 %    Monocytes 8.10 0.00 - 13.40 %    Eosinophils 4.30 0.00 - 6.90 %    Basophils 0.70 0.00 - 1.80 %    Immature Granulocytes 0.70 0.00 - 0.90 %    Nucleated RBC 0.00 /100 WBC    Neutrophils (Absolute) 2.68 1.82 - 7.42 K/uL    Lymphs (Absolute) 0.92 (L) 1.00 - 4.80 K/uL    Monos (Absolute) 0.34 0.00 - 0.85 K/uL    Eos (Absolute) 0.18 0.00 - 0.51 K/uL    Baso (Absolute) 0.03 0.00 - 0.12 K/uL    Immature Granulocytes (abs) 0.03 0.00 - 0.11 K/uL    NRBC (Absolute) 0.00 K/uL   BASIC METABOLIC PANEL    Collection Time: 17  4:29 AM   Result Value Ref Range    Sodium 141 135 - 145 mmol/L    Potassium 4.4 3.6 - 5.5 mmol/L    Chloride 105 96 - 112 mmol/L    Co2 30 20 - 33 mmol/L    Glucose 105 (H) 65 - 99 mg/dL    Bun 15 8 - 22 mg/dL    Creatinine 0.64 0.50 - 1.40 mg/dL    Calcium 8.4 (L) 8.5 - 10.5 mg/dL    Anion Gap 6.0 0.0 - 11.9   ESTIMATED GFR    Collection Time: 17  4:29 AM   Result Value Ref Range    GFR If African American >60 >60 mL/min/1.73 m 2    GFR If Non African American >60 >60 mL/min/1.73 m 2   ACCU-CHEK GLUCOSE    Collection Time: 17  5:13 AM   Result Value Ref Range    Glucose - Accu-Ck 117 (H) 65 - 99 mg/dL     Results     ** No results found for the last 168 hours. **            Hemodynamics:  Temp (24hrs), Av.3 °C (97.4 °F), Min:35.7 °C (96.3 °F), Max:36.7 °C (98 °F)  Temperature: 36.5 °C (97.7 °F)  Pulse  Av.5  Min: 45  Max: 160Heart Rate (Monitored): 94  NIBP: (!) 92/59 mmHg     PIV  Group Left Wrist 20g Saline Lock (Active)   Line Secured Taped;Transparent 6/25/2017  8:00 AM   Site Condition / Description Assessed;Patent 6/25/2017  8:00 AM   Dressing Type / Description Transparent;Clean;Dry;Intact 6/25/2017  8:00 AM   Dressing Status Observed 6/25/2017  8:00 AM   Saline Locked Yes 6/22/2017  8:00 AM   Infiltration Grading Used by [facility] 0 6/25/2017  8:00 AM   Phlebitis Scale (Used by [facility]) 0 6/25/2017  8:00 AM   Date Primary Tubing Changed 06/24/17 6/25/2017  8:00 AM   Date Secondary Tubing Changed 06/25/17 6/25/2017  8:00 AM   NEXT Primary Tubing Change  06/27/17 6/25/2017  8:00 AM   NEXT Secondary Tubing Change  06/26/17 6/25/2017  8:00 AM   NEXT Site Change Date 06/09/17 6/9/2017  8:00 AM       PIV Group Right Forearm 20g (Active)   Line Secured Taped;Transparent 6/25/2017  8:00 AM   Site Condition / Description Assessed;Patent 6/25/2017  8:00 AM   Dressing Type / Description Transparent;Clean;Dry;Intact 6/25/2017  8:00 AM   Dressing Status Observed 6/25/2017  8:00 AM   Saline Locked Yes 6/25/2017  8:00 AM   Infiltration Grading Used by [facility] 0 6/25/2017  8:00 AM   Phlebitis Scale (Used by [facility]) 0 6/25/2017  8:00 AM   Date Primary Tubing Changed 06/21/17 6/22/2017  8:00 AM   NEXT Primary Tubing Change  06/24/17 6/22/2017  8:00 AM       Wound:  Surgical Incision  Incision Abdomen (Active)   Wound Bed Pink 6/25/2017  8:00 AM   Drainage  None 6/25/2017  8:00 AM   Periwound Skin Pink;Normal;Intact 6/25/2017  8:00 AM   Daily - Wound Closure Approximated;Staples;Open to Air 6/25/2017  8:00 AM   Dressing Options Open to Air 6/25/2017  8:00 AM   Dressing Status / Change Not Applicable 6/24/2017  8:00 AM   Daily - Dressing Change Observed 6/23/2017  8:00 AM       Wound POA Puncture Flank Left (Active)   Wound Bed Pink;Red;Purple 6/25/2017  8:00 AM   Drainage  None 6/25/2017  8:00 AM   Periwound Skin Discolored;Normal 6/25/2017  8:00 AM   Cleansing Not Applicable 6/25/2017  8:00  AM   Dressing Options Open to Air 6/25/2017  8:00 AM   Dressing Status / Change Not Applicable 6/25/2017  8:00 AM   Dressing Cleansing/Solutions Not Applicable 6/25/2017  8:00 AM   Periwound Protectant Not Applicable 6/22/2017  8:00 AM       Wound Skin Tear Face Left (Active)   Wound Bed Pink 6/25/2017  8:00 AM   Drainage  None 6/25/2017  8:00 AM   Periwound Skin Pink 6/25/2017  8:00 AM   Cleansing Not Applicable 6/23/2017  8:00 AM   Dressing Options Open to Air 6/25/2017  8:00 AM   Dressing Status / Change Not Applicable 6/25/2017  8:00 AM   Dressing Cleansing/Solutions Not Applicable 6/25/2017  8:00 AM   Weekly Photo (Inpatient Only) 06/22/17 6/24/2017  8:00 AM          Fluids:  Intake/Output       06/23/17 0700 - 06/24/17 0659 06/24/17 0700 - 06/25/17 0659 06/25/17 0700 - 06/26/17 0659      4733-2127 0772-5178 Total 8768-6307 7015-3054 Total 7932-8467 1975-1852 Total       Intake    P.O.  375  300 675  500  360 860  360  -- 360    P.O. 375 300 675 500 360 860 360 -- 360    I.V.  220  320 540  220  220 440  40  -- 40    IV Volume (LR) 120 120 240 120 120 240 40 -- 40    IV Piggyback Volume (IV Piggyback) 100 200 300 100 100 200 -- -- --    Other  320  -- 320  295  -- 295  --  -- --    Medications (P.O./ Enteral Liquids) 320 -- 320 295 -- 295 -- -- --    Enteral  405  -- 405  --  -- --  --  -- --    Enteral Volume 315 -- 315 -- -- -- -- -- --    Free Water / Tube Flush 90 -- 90 -- -- -- -- -- --    Total Intake 8357 689 5702 8764 969 2070 400 -- 400       Output    Urine  1100  775 1875  1150  1500 2650  100  -- 100    Indwelling Cathether 6185 677 8799 1150 1500 2650 100 -- 100    Stool  --  -- --  --  -- --  --  -- --    Number of Times Stooled 1 x 1 x 2 x 1 x 1 x 2 x -- -- --    Total Output 3290 627 2215 1150 1500 2650 100 -- 100       Net I/O     220 -155 65 -076 -516 -1676 300 -- 300        Weight: 124.1 kg (273 lb 9.5 oz)    GI/Nutrition:  Orders Placed This Encounter   Procedures   • DIET ORDER     Standing  Status: Standing      Number of Occurrences: 1      Standing Expiration Date:      Order Specific Question:  Diet:     Answer:  Regular [1]       Medications:  Current Facility-Administered Medications   Medication Last Dose   • furosemide (LASIX) tablet 40 mg 40 mg at 06/25/17 0907   • enoxaparin (LOVENOX) inj 40 mg 40 mg at 06/25/17 0908   • omeprazole (PRILOSEC) capsule 20 mg 20 mg at 06/25/17 0908   • senna-docusate (PERICOLACE or SENOKOT S) 8.6-50 MG per tablet 2 Tab Stopped at 06/24/17 2100   • docusate sodium (COLACE) capsule 200 mg 200 mg at 06/25/17 0908   • oxycodone immediate release (ROXICODONE) tablet 5-10 mg 10 mg at 06/25/17 0754   • acetaminophen (TYLENOL) tablet 650 mg 650 mg at 06/25/17 0907   • insulin lispro (HUMALOG) injection 2-9 Units Stopped at 06/25/17 0000   • ceFAZolin (ANCEF) IVPB 2 g 2 g at 06/25/17 0511   • albuterol inhaler 2 Puff     • calcium carbonate (TUMS) chewable tab 1,000 mg 1,000 mg at 06/23/17 1556   • finasteride (PROSCAR) tablet 5 mg 5 mg at 06/25/17 0908   • Respiratory Care per Protocol     • senna-docusate (PERICOLACE or SENOKOT S) 8.6-50 MG per tablet 1 Tab 1 Tab at 06/23/17 0639   • polyethylene glycol/lytes (MIRALAX) PACKET 1 Packet Stopped at 06/24/17 2100   • magnesium hydroxide (MILK OF MAGNESIA) suspension 30 mL 30 mL at 06/24/17 0830   • bisacodyl (DULCOLAX) suppository 10 mg 10 mg at 06/23/17 1638   • fleet enema 133 mL     • ondansetron (ZOFRAN) syringe/vial injection 4 mg 4 mg at 06/23/17 1647       Medical Decision Making, by Problem:  Active Hospital Problems    Diagnosis   • *Penetrating back wound [S21.239A]   • Pneumoperitoneum [K66.8]   • Respiratory failure following trauma and surgery (CMS-HCC) [J95.822]   • Cellulitis of left foot [L03.116]   • Obesity (BMI 30-39.9) [E66.9]   • Chronic congestive heart failure (CMS-HCC) [I50.9]   • CAD (coronary artery disease) [I25.10]   • Chronic deep vein thrombosis (DVT) of popliteal vein (CMS-HCC) [I82.539]   •  AV block, Mobitz 1 [I44.1]   • History of pulmonary embolus (PE) [Z86.711]   • 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 Perhaps out of SICU soon? We will continue IV therapy to 10 days and then switch to PO therapy. Reviewed with patient, family and RN ~ 35 min on floor in SICU in direct patient care/counseling/consulting today.

## 2017-06-25 NOTE — CONSULTS
DATE OF SERVICE:  06/23/2017    CONSULT REQUESTED BY:  Dr. Amin.    REASON FOR CONSULTATION:  Patient with cellulitis involving the left foot.    HISTORY OF PRESENT ILLNESS:  Patient is a 77-year-old male who was admitted to    [facility] on June 2nd.  History is obtained from chart   review as well as consultation with Dr. Amin, the patient, his wife, and   daughter-in-law.    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  He apparently was   working on his ranch when he fell out of his tractor and experienced a   puncture wound in his left flank.  The patient was airlifted to the regional   trauma center here at [facility].    The patient apparently experienced fractures of his 12th and 11th ribs on the   left side.  He also had a hemopneumothorax.  The patient was treated   conservatively with a chest tube.  He began to improve.    It was noted over time that the patient had a pneumoperitoneum.  This is not   resolving.  There was concern that the patient possibly had a perforated   viscus.  The patient was taken to surgery by Dr. Amin on June 21st for an   exploratory laparotomy, which revealed no perforated viscus.    Approximately 2 weeks after being here in the hospital, the patient developed   some redness involving his left foot.  He was seen by Dr. Pace in the   Orthopedic Service to evaluate the left foot.  The patient had previously had   surgery by Dr. Blakc, one of his partners where he had a Lisfranc procedure   performed with fusion of his first and second metatarsal heads.  He also   apparently had an osteotomy done of his calcaneus at that time.  During  Dr. Pace's evaluation, he did not find any obvious surgical need.  He felt   that the changes were consistent with cellulitis and the patient should follow   up with Dr. Black.    The patient was placed on Ancef when he was noted to have some redness   involving his foot initially.   The redness involved the great toe on the   dorsum of the foot.  This erythema is fading somewhat, but there is   recommendation made by Orthopedics that Infectious Disease become involved and   for that reason, an Infectious Disease consult has been requested.    History is as stated above.  The patient states that he has had problems with   his left foot swelling and turning red on numerous occasions since his surgery   back in 2007 or 2008 as the patient recalls.  The patient was seen by Dr. Black in followup because of this.  Dr. Black apparently offered to remove   the hardware stating that maybe the patient is having some reaction to the   hardware.  The patient elected to leave the hardware in at that time.    As mentioned above, the patient has had problems on and off through the years   with his foot turning red and swelling up.  He has treated this by getting off   his foot using elevation.  The patient's wife confirms that.  The patient   denies ever being on antibiotics to resolve his infection involving his foot.    Significant in the patient's past history in regard to his lower extremities   is that he did have a DVT involving the left leg.  This occurred following a   left knee arthroplasty procedure.  The patient also has a right knee   arthroplasty.  Both of these were done approximately 10 years ago.  The   patient states that the knees are doing fine.  He has never had problems with   them.    The patient at this time denies any significant fever or chills.  He has never   had fever, chills or red streaks going up his leg in conjunction with his   foot being swollen in the past he states.  The patient denies any significant   headache at this time or sore throat.  He denies any significant shortness of   breath, although the patient has been diagnosed perhaps with some congestive   heart failure.  According to the patient's wife, he has developed some   exertional dyspnea at 200 plus feet.   The patient denies any nausea or   vomiting at this time.  He denies any new chest pains at this time.  He is   sore on the left side where his left flank injury was.  He states he does have   some slight abdominal discomfort at this time in generalized fashion.  He   denies any other sore joints, muscles, rashes, sores or other problems.    Incidentally _____ from above, the patient does have paresthesias involving   both feet.  He is not sure why he has paresthesias.  The patient states he is   not a diabetic.    PAST MEDICAL HISTORY:    ALLERGIES:  None.    ILLNESSES:  As mentioned above, the patient has had a DVT in his left lower   extremity.  He has also had a PE in conjunction with that.  The patient had an   IVC filter placed in 2016.  The patient had evidence for diverticulosis, but   he has never had diverticulitis.  The patient has a history of hypertension,   benign prostatic hypertrophy.  He was hospitalized for pneumonia in 2007.    There is a recent diagnosis of congestive heart failure.  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  He has had some chronic   back problems.  He also has glaucoma and cataracts.    SURGERIES:  Multiple orthopedic surgeries as mentioned above.  The patient has   also had I believe surgery on his back.    MEDICATIONS:  On admission to the hospital, the patient was on the following   medications:  He was on Proscar 5 mg daily, Lasix 20 mg daily, metoprolol 25   mg 2 daily, potassium 20 mEq daily and warfarin 1 mg daily.    FAMILY HISTORY:  Noncontributory according to the patient.    SOCIAL HISTORY:  The patient is .  He has been living with the same   wife for the past 55 years.  He has never smoked cigarettes.  He did chew   tobacco for approximately 50 years.  He quit ____ in 2007.  He used to use   alcohol on a daily basis, but he gave it up when he was placed on Coumadin.    He denies any IV or recreational drug use.  The patient works  as mentioned   above as a rancher.    REVIEW OF SYSTEMS:  Negative other than those listed above.  CENTRAL NERVOUS SYSTEM:  The patient denies any history of head trauma,   seizures, migraines, cerebrovascular accidents or TIAs.  PULMONARY:  There is a history of a pulmonary emboli as well as   hospitalization for pneumonia.  No history of asthma, tuberculosis or   hemoptysis.  CARDIAC:  There is a history of hypertension, no history of myocardial   infarction or other vascular disease.  GASTROINTESTINAL:  No history of peptic ulcer disease, gastroesophageal reflux   disease, hematochezia, hematemesis, irritable bowel, diverticulosis,   hepatitis or unexplained weight loss.  GENITOURINARY:  No history of kidney stones or bladder problems.    MUSCULOSKELETAL:  There is a history of gout x1.  No history of other   musculoskeletal diseases.    PHYSICAL EXAMINATION:  VITAL SIGNS:  Temperature T-max past 24 hours is 36.7, most recent temperature   is 36.3, respirations are 25, pulse is 96, blood pressure is 120/62.  The   patient is 1.78 meters tall.  He weighs 126.1 kilograms.  GENERAL:  The patient's BMI has not been calculated.  His BSA likewise has not   been calculated.  The patient does appear based on height and weight to be   morbidly obese.  The patient is normally developed.  He appears in no acute   distress at this time, although he is slightly tachypneic.  HEENT:  Head is normocephalic.  Eyes, pupils are equal and round.  Nose and   mouth:  No acute sores or lesions are noted.  The patient does have an   abrasion on his left cheek, which is healing.  NECK:  Appears supple.  LUNGS:  Breath sounds are clear to auscultation.  They are somewhat decreased   on the left side and the right base.  HEART:  At this time is ___ rate.  The patient has been diagnosed recently   with Mobitz type 1 cardiac arrhythmia.  He is being followed by cardiology for   that.  BACK:  No new spine or CVA tenderness.  The patient does  have a large   ecchymosis over the left flank area which is tender.  ABDOMEN:  Midline surgical incision which is intact without drainage, erythema   or crepitance.  The patient's abdomen is soft.  There is no guarding or   rigidity.  The patient does complain of some generalized discomfort.  GENITOURINARY:  No acute sores or lesions are noted.  EXTREMITIES:  With the exception of the lower extremities, no acute sores or   lesions seen.  Regarding his lower extremities, the patient does have a   plantar callus involving the mid right foot.  The left foot and leg are   slightly more edematous than the left.  The left leg has a generous 1 and   perhaps 2+ edema where the right leg has trace to 1+ edema.  There is still   some slight erythema noticed over the dorsum of the right foot and the right   great toe.  This is the area where he has more erythema earlier prior to   starting Ancef as mentioned by Dr. Amin to me.  The patient does not have   any significant pain to pressure over the left foot.  The patient does have   several surgical scars over his left foot, they are intact.  There is no   crepitance or fluctuance.  There is no induration other than generalized   edema.  The patient does move his left foot that is possible without pain he   says at this time.  CENTRAL NERVOUS SYSTEM:  Mental Status:  The patient is oriented x3.  Motor   and sensory are grossly intact at this time.    LABORATORY DATA:  From today, the patient's CBC shows white blood cell count   of 7.8, 78.4% segs, 8.9% lymphs, 7.5% monos, 4.3% eos and 0.3% basos.    Hemoglobin/hematocrit 9.3/32.0 with a platelet count of 242,000.  The   patient's highest white blood cell count was 14.1 on admission.  Also, from   today, BMP shows sodium 137, potassium 4.5, chloride 106, bicarbonate 27,   glucose 149, BUN 20, creatinine 0.63 and calcium is 8.3.  Chem panel from the   17th showed SGOT 31, SGPT 44, alkaline phosphatase 124, total bilirubin 1.0,    total protein 5.5, albumin 2.7 and globulin 2.8.  The only cultures on this   patient are blood cultures on admission which showed no growth.    IMAGING STUDIES:  The patient's chest x-ray from today shows the heart is   somewhat enlarged.  There are some hazy opacities present in the lower lung   fields bilaterally.  He was extubated yesterday and today's chest x-ray shows   the fact that the endotracheal tube has been removed.  CT scan of the abdomen   from the 20th as mentioned above did show free air and there are numerous   sigmoid diverticula noted.  Otherwise, there is no acute abnormality noted.    IMPRESSION:  1.  Cellulitis, left foot.  2.  Status post trauma, left flank with hemopneumothorax.  3.  Anemia of chronic disease.  4.  History of deep venous thrombosis, left leg with pulmonary emboli.  5.  Diverticulosis.  6.  Essential hypertension.  7.  Mobitz type 1 cardiac arrhythmia.  8.  Bilateral knee arthroplasties.  9.  Status post Lisfranc surgical repair, left foot with hardware.  10.  Peripheral neuropathy.  11.  Benign prostatic hypertrophy.  12.  History of glaucoma.  13.  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  14.  History of cataracts.    DISCUSSION:  This patient has had problems with his left foot since his   surgery back in 2007 or 2008.  The patient has been seen by Dr. Black for   this who offered to remove the hardware.  The patient elected to leave the   hardware in as he states he does not have time to be laid up.  I suspect that   the patient overuses his foot on occasion in conjunction with his peripheral   neuropathy and develops redness and swelling as described above.  The patient   always has responded to elevation in getting off of his foot with resolution   of the swelling and erythema.    The patient certainly had adequate time here to potentially develope   another episode of swelling in his left foot.  He may actually have developed   some early cellulitis,  "but that appears to be responding to antibiotic therapy.    The patient has been on Ancef 2 g every 8 hours since June 17th and the   erythema seems to be resolving.  The patient was seen by orthopedics, Dr. Pace on the 18th and he agrees that there does not appear to be any   significant deep infection.  I would continue the patient on Ancef for a   period of time until the erythema is totally gone.  The patient is considering  to follow up with Dr. Menoneen should this become a problem, he wants to deal   With in the future.  He has chosen at this point in time not to deal with it.    I have explained to the patient and his family that this is  potentially   \"fertile soil\" for infection.  I think whenever he overuses his foot, it   significantly swells up and turns red that it is more susceptible at that time  to infection and he needs to watch it closely.  I think the patient needs to be  more attentive to his foot and his activity, so these episodes do not occur   as they have done in the past.    PLAN:  1.  Continue Ancef 2 g intravenously every 8 hours.  2.  Continue observation.  3.  Continue to keep foot elevated and minimize walking.  4.  Push nutrition as appropriate.  5.  Close observation based on the patient's clinical course and progress.  6.  Advanced activity as tolerated, but as mentioned above, watch left foot   for any changes.    Thank you for allowing me to see this interesting and complicated patient in   consultation and assist in his care.  I spent well over 70 minutes reviewing   this patient's old records, discussing his current hospitalization and his   records, obtaining the history from the patient and his family.  I discussed   the case with Dr. Amin after reviewing the case with Jeremias from pharmacy and   preparing this report and making recommendations to the patient and his   family.       ____________________________________     MD DIANE SCOTT / ELDA    DD:  " 06/23/2017 17:30:59  DT:  06/23/2017 22:41:28    D#:  [id number]  Job#:  [id number]    cc: ROBIN GO MD, Wilfred Amin MD, Zafar Pace MD, DOTTIE FONTANEZ MD,   Cruz Vargas MD

## 2017-06-25 NOTE — CARE PLAN
Problem: Bowel/Gastric:  Goal: Normal bowel function is maintained or improved  Outcome: MET Date Met:  06/24/17  Pt's bowel sounds hypoactive but present. Tolerating regular diet. Last BM today. Encouraged oral fluid intake.    Problem: Mobility  Goal: Risk for activity intolerance will decrease  Outcome: PROGRESSING AS EXPECTED  Ambulated pt from chair back to bed with assist x2. Pt tolerates well, but displays general weakness. Rest periods provided.

## 2017-06-25 NOTE — DISCHARGE PLANNING
Rehab [facility] following for medical clearance in anticipation of admission to [facility].

## 2017-06-25 NOTE — PROGRESS NOTES
Not dyspnic at rest  Diuresing well  BP mostly normal, slightly low this AM  Some leg edema  Decreased breath sounds at the bases  Monitor rare AV Wenckebach and occ PVCs but no high grade AV block  CXR low volume with likely some effusion  No indication for PPM at this time  No new recommendation from cardiac standpoint  Will sign off  Please re-notify for further assistance

## 2017-06-25 NOTE — CARE PLAN
Problem: Skin Integrity  Goal: Risk for impaired skin integrity will decrease  Q2H turns in place, heels floated on pillows, pillows for positioning, waffle cushions for chair and for bed    Problem: Pain Management  Goal: Pain level will decrease to patient’s comfort goal  Medicated per MAR for pain

## 2017-06-26 ENCOUNTER — APPOINTMENT (OUTPATIENT)
Dept: RADIOLOGY | Facility: MEDICAL CENTER | Age: 77
DRG: 907 | End: 2017-06-26
Attending: SURGERY
Payer: MEDICARE

## 2017-06-26 PROBLEM — J94.2 HEMOTHORAX ON LEFT: Status: ACTIVE | Noted: 2017-06-26

## 2017-06-26 LAB
ANION GAP SERPL CALC-SCNC: 4 MMOL/L (ref 0–11.9)
BASE EXCESS BLDA CALC-SCNC: 2 MMOL/L (ref -4–3)
BODY TEMPERATURE: ABNORMAL DEGREES
BUN SERPL-MCNC: 14 MG/DL (ref 8–22)
CALCIUM SERPL-MCNC: 8.8 MG/DL (ref 8.5–10.5)
CHLORIDE SERPL-SCNC: 106 MMOL/L (ref 96–112)
CO2 BLDA-SCNC: 29 MMOL/L (ref 20–33)
CO2 SERPL-SCNC: 31 MMOL/L (ref 20–33)
CREAT SERPL-MCNC: 0.7 MG/DL (ref 0.5–1.4)
CRP SERPL HS-MCNC: 3.67 MG/DL (ref 0–0.75)
ERYTHROCYTE [SEDIMENTATION RATE] IN BLOOD BY WESTERGREN METHOD: 39 MM/HOUR (ref 0–20)
GFR SERPL CREATININE-BSD FRML MDRD: >60 ML/MIN/1.73 M 2
GLUCOSE BLD-MCNC: 112 MG/DL (ref 65–99)
GLUCOSE BLD-MCNC: 116 MG/DL (ref 65–99)
GLUCOSE BLD-MCNC: 118 MG/DL (ref 65–99)
GLUCOSE BLD-MCNC: 153 MG/DL (ref 65–99)
GLUCOSE SERPL-MCNC: 107 MG/DL (ref 65–99)
HCO3 BLDA-SCNC: 27.4 MMOL/L (ref 17–25)
LACTATE BLD-SCNC: 1.2 MMOL/L (ref 0.5–2)
O2/TOTAL GAS SETTING VFR VENT: 11 %
PCO2 BLDA: 42.6 MMHG (ref 26–37)
PCO2 TEMP ADJ BLDA: 42.5 MMHG (ref 26–37)
PH BLDA: 7.42 [PH] (ref 7.4–7.5)
PH TEMP ADJ BLDA: 7.42 [PH] (ref 7.4–7.5)
PO2 BLDA: 87 MMHG (ref 64–87)
PO2 TEMP ADJ BLDA: 87 MMHG (ref 64–87)
POTASSIUM SERPL-SCNC: 4.2 MMOL/L (ref 3.6–5.5)
PREALB SERPL-MCNC: 12 MG/DL (ref 18–38)
SAO2 % BLDA: 97 % (ref 93–99)
SODIUM SERPL-SCNC: 141 MMOL/L (ref 135–145)
SPECIMEN DRAWN FROM PATIENT: ABNORMAL

## 2017-06-26 PROCEDURE — 700111 HCHG RX REV CODE 636 W/ 250 OVERRIDE (IP): Performed by: SURGERY

## 2017-06-26 PROCEDURE — 86140 C-REACTIVE PROTEIN: CPT

## 2017-06-26 PROCEDURE — 700102 HCHG RX REV CODE 250 W/ 637 OVERRIDE(OP): Performed by: SURGERY

## 2017-06-26 PROCEDURE — 82962 GLUCOSE BLOOD TEST: CPT

## 2017-06-26 PROCEDURE — 80048 BASIC METABOLIC PNL TOTAL CA: CPT

## 2017-06-26 PROCEDURE — 97530 THERAPEUTIC ACTIVITIES: CPT

## 2017-06-26 PROCEDURE — A9270 NON-COVERED ITEM OR SERVICE: HCPCS | Performed by: SURGERY

## 2017-06-26 PROCEDURE — 84134 ASSAY OF PREALBUMIN: CPT

## 2017-06-26 PROCEDURE — 85652 RBC SED RATE AUTOMATED: CPT

## 2017-06-26 PROCEDURE — 700102 HCHG RX REV CODE 250 W/ 637 OVERRIDE(OP): Performed by: NURSE PRACTITIONER

## 2017-06-26 PROCEDURE — A9270 NON-COVERED ITEM OR SERVICE: HCPCS | Performed by: NURSE PRACTITIONER

## 2017-06-26 PROCEDURE — 71010 DX-CHEST-PORTABLE (1 VIEW): CPT

## 2017-06-26 PROCEDURE — 99233 SBSQ HOSP IP/OBS HIGH 50: CPT | Performed by: SURGERY

## 2017-06-26 PROCEDURE — 770020 HCHG ROOM/CARE - TELE (206)

## 2017-06-26 RX ORDER — FUROSEMIDE 20 MG/1
40 TABLET ORAL
Status: DISCONTINUED | OUTPATIENT
Start: 2017-06-26 | End: 2017-06-28 | Stop reason: HOSPADM

## 2017-06-26 RX ORDER — FUROSEMIDE 40 MG/1
40 TABLET ORAL
Status: DISCONTINUED | OUTPATIENT
Start: 2017-06-26 | End: 2017-06-26

## 2017-06-26 RX ADMIN — OMEPRAZOLE 20 MG: 20 CAPSULE, DELAYED RELEASE ORAL at 09:11

## 2017-06-26 RX ADMIN — FUROSEMIDE 40 MG: 40 TABLET ORAL at 17:00

## 2017-06-26 RX ADMIN — ACETAMINOPHEN 650 MG: 325 TABLET, FILM COATED ORAL at 09:12

## 2017-06-26 RX ADMIN — CEFAZOLIN SODIUM 2 G: 2 INJECTION, SOLUTION INTRAVENOUS at 05:48

## 2017-06-26 RX ADMIN — FINASTERIDE 5 MG: 5 TABLET, FILM COATED ORAL at 09:11

## 2017-06-26 RX ADMIN — INSULIN LISPRO 2 UNITS: 100 INJECTION, SOLUTION INTRAVENOUS; SUBCUTANEOUS at 12:01

## 2017-06-26 RX ADMIN — SENNOSIDES AND DOCUSATE SODIUM 2 TABLET: 8.6; 5 TABLET ORAL at 21:07

## 2017-06-26 RX ADMIN — CEFAZOLIN SODIUM 2 G: 2 INJECTION, SOLUTION INTRAVENOUS at 22:08

## 2017-06-26 RX ADMIN — FUROSEMIDE 40 MG: 40 TABLET ORAL at 09:12

## 2017-06-26 RX ADMIN — ENOXAPARIN SODIUM 40 MG: 100 INJECTION SUBCUTANEOUS at 21:06

## 2017-06-26 RX ADMIN — CEFAZOLIN SODIUM 2 G: 2 INJECTION, SOLUTION INTRAVENOUS at 15:35

## 2017-06-26 RX ADMIN — POLYETHYLENE GLYCOL (3350) 1 PACKET: 17 POWDER, FOR SOLUTION ORAL at 21:07

## 2017-06-26 RX ADMIN — ENOXAPARIN SODIUM 40 MG: 100 INJECTION SUBCUTANEOUS at 09:11

## 2017-06-26 ASSESSMENT — PAIN SCALES - GENERAL
PAINLEVEL_OUTOF10: 0

## 2017-06-26 ASSESSMENT — ENCOUNTER SYMPTOMS
CONSTIPATION: 0
PALPITATIONS: 0
ABDOMINAL PAIN: 1
SHORTNESS OF BREATH: 0
NAUSEA: 0
FEVER: 0
VOMITING: 0
WHEEZING: 0
MYALGIAS: 0
BACK PAIN: 1
DIARRHEA: 0
NECK PAIN: 0
CHILLS: 0
DOUBLE VISION: 0
ABDOMINAL PAIN: 0
CONSTIPATION: 1
HEADACHES: 0
FOCAL WEAKNESS: 0
COUGH: 0
BLURRED VISION: 1

## 2017-06-26 ASSESSMENT — COGNITIVE AND FUNCTIONAL STATUS - GENERAL
PERSONAL GROOMING: A LITTLE
DRESSING REGULAR UPPER BODY CLOTHING: A LITTLE
DAILY ACTIVITIY SCORE: 19
DRESSING REGULAR LOWER BODY CLOTHING: A LITTLE
HELP NEEDED FOR BATHING: A LITTLE
SUGGESTED CMS G CODE MODIFIER DAILY ACTIVITY: CK
TOILETING: A LITTLE

## 2017-06-26 ASSESSMENT — LIFESTYLE VARIABLES: SUBSTANCE_ABUSE: 0

## 2017-06-26 NOTE — PROGRESS NOTES
Infectious Disease Progress Note    Author: Ambrose Brown Date & Time created: 6/26/2017  7:45 AM     Cefazolin Day #9    Interval History:  Last 24 hrs reviewed with RN.    Labs Reviewed, Medications Reviewed, Radiology Reviewed, Wound Reviewed, Fluids Reviewed and GI Nutrition Reviewed.    Review of Systems:  Review of Systems   Constitutional: Negative for fever and chills.   Respiratory: Negative for cough and shortness of breath.    Cardiovascular: Negative for chest pain.   Gastrointestinal: Negative for nausea, vomiting, abdominal pain, diarrhea and constipation.   Musculoskeletal:        Left foot OK.   Skin: Negative for itching and rash.       Physical Exam:  Physical Exam   Constitutional: He is oriented to person, place, and time. He appears well-developed.   HENT:   Head: Normocephalic and atraumatic.   Cardiovascular: Regular rhythm.    Pulmonary/Chest: Effort normal. No respiratory distress. He has no wheezes.   Abdominal: Soft. He exhibits no distension. There is no tenderness. There is no guarding.   Musculoskeletal:   Left foot without erythema, induration, or pain.   Neurological: He is alert and oriented to person, place, and time.   Psychiatric: He has a normal mood and affect. His behavior is normal.   Nursing note and vitals reviewed.      Labs:  Recent Results (from the past 24 hour(s))   ACCU-CHEK GLUCOSE    Collection Time: 06/25/17 12:40 PM   Result Value Ref Range    Glucose - Accu-Ck 338 (H) 65 - 99 mg/dL   ACCU-CHEK GLUCOSE    Collection Time: 06/25/17  5:49 PM   Result Value Ref Range    Glucose - Accu-Ck 203 (H) 65 - 99 mg/dL   ACCU-CHEK GLUCOSE    Collection Time: 06/25/17 11:48 PM   Result Value Ref Range    Glucose - Accu-Ck 112 (H) 65 - 99 mg/dL   ACCU-CHEK GLUCOSE    Collection Time: 06/26/17  5:52 AM   Result Value Ref Range    Glucose - Accu-Ck 116 (H) 65 - 99 mg/dL   CRP Quantitative (Non-Cardiac)    Collection Time: 06/26/17  6:00 AM   Result Value Ref Range    Stat  C-Reactive Protein 3.67 (H) 0.00 - 0.75 mg/dL   Prealbumin    Collection Time: 17  6:00 AM   Result Value Ref Range    Pre-Albumin 12.0 (L) 18.0 - 38.0 mg/dL   BASIC METABOLIC PANEL    Collection Time: 17  6:00 AM   Result Value Ref Range    Sodium 141 135 - 145 mmol/L    Potassium 4.2 3.6 - 5.5 mmol/L    Chloride 106 96 - 112 mmol/L    Co2 31 20 - 33 mmol/L    Glucose 107 (H) 65 - 99 mg/dL    Bun 14 8 - 22 mg/dL    Creatinine 0.70 0.50 - 1.40 mg/dL    Calcium 8.8 8.5 - 10.5 mg/dL    Anion Gap 4.0 0.0 - 11.9   ESTIMATED GFR    Collection Time: 17  6:00 AM   Result Value Ref Range    GFR If African American >60 >60 mL/min/1.73 m 2    GFR If Non African American >60 >60 mL/min/1.73 m 2     Results     ** No results found for the last 168 hours. **            Hemodynamics:  Temp (24hrs), Av.6 °C (97.8 °F), Min:36.2 °C (97.2 °F), Max:37.1 °C (98.7 °F)  Temperature: 36.2 °C (97.2 °F)  Pulse  Av.8  Min: 45  Max: 160Heart Rate (Monitored): 76  NIBP: 131/58 mmHg     PIV Group Left Wrist 20g Saline Lock (Active)   Line Secured Taped;Transparent 2017  8:00 PM   Site Condition / Description Assessed;Patent 2017  8:00 PM   Dressing Type / Description Transparent;Clean;Dry;Intact 2017  8:00 PM   Dressing Status Observed 2017  8:00 PM   Saline Locked Yes 2017  8:00 AM   Infiltration Grading Used by [facility] 0 2017  8:00 PM   Phlebitis Scale (Used by [facility]) 0 2017  8:00 PM   Date Primary Tubing Changed 17  8:00 PM   Date Secondary Tubing Changed 17  8:00 PM   NEXT Primary Tubing Change  17  8:00 PM   NEXT Secondary Tubing Change  17  8:00 PM   NEXT Site Change Date 17  8:00 AM       PIV Group Right Forearm 20g (Active)   Line Secured Taped;Transparent 2017  8:00 PM   Site Condition / Description Assessed;Patent 2017  8:00 PM   Dressing Type / Description  Transparent;Clean;Dry;Intact 6/25/2017  8:00 PM   Dressing Status Observed 6/25/2017  8:00 PM   Saline Locked Yes 6/25/2017  8:00 PM   Infiltration Grading Used by [facility] 0 6/25/2017  8:00 PM   Phlebitis Scale (Used by [facility]) 0 6/25/2017  8:00 PM   Date Primary Tubing Changed 06/21/17 6/22/2017  8:00 AM   NEXT Primary Tubing Change  06/24/17 6/22/2017  8:00 AM       Wound:  Surgical Incision  Incision Abdomen (Active)   Wound Bed Pink 6/25/2017  8:00 PM   Drainage  None 6/25/2017  8:00 PM   Periwound Skin Pink;Normal;Intact 6/25/2017  8:00 PM   Daily - Wound Closure Staples;Open to Air;Approximated 6/25/2017  8:00 PM   Dressing Options Open to Air 6/25/2017  8:00 PM   Dressing Status / Change Not Applicable 6/24/2017  8:00 AM   Daily - Dressing Change Observed 6/23/2017  8:00 AM       Wound POA Puncture Flank Left (Active)   Wound Bed Pink;Red;Purple 6/25/2017  8:00 PM   Drainage  None 6/25/2017  8:00 PM   Periwound Skin Discolored;Normal 6/25/2017  8:00 PM   Cleansing Not Applicable 6/25/2017  8:00 PM   Dressing Options Open to Air 6/25/2017  8:00 PM   Dressing Status / Change Not Applicable 6/25/2017  8:00 PM   Dressing Cleansing/Solutions Not Applicable 6/25/2017  8:00 PM   Periwound Protectant Not Applicable 6/22/2017  8:00 AM       Wound Skin Tear Face Left (Active)   Wound Bed Pink 6/25/2017  8:00 PM   Drainage  None 6/25/2017  8:00 PM   Periwound Skin Pink 6/25/2017  8:00 PM   Cleansing Not Applicable 6/23/2017  8:00 AM   Dressing Options Open to Air 6/25/2017  8:00 PM   Dressing Status / Change Not Applicable 6/25/2017  8:00 PM   Dressing Cleansing/Solutions Not Applicable 6/25/2017  8:00 PM   Weekly Photo (Inpatient Only) 06/22/17 6/24/2017  8:00 AM   Time Spent with Patient (mins) 30 6/25/2017  6:00 PM          Fluids:  Intake/Output       06/24/17 0700 - 06/25/17 0659 06/25/17 0700 - 06/26/17 0659 06/26/17 0700 - 06/27/17 0659 0700-1859 1900-0659 Total 0700-1859 1900-0659 Total 0700-1859  8398-5848 Total       Intake    P.O.  500  360 860  660  120 780  --  -- --    P.O. 500 360 860 660 120 780 -- -- --    I.V.  220  220 440  220  320 540  --  -- --    IV Volume (LR) 120 120 240 120 120 240 -- -- --    IV Piggyback Volume (IV Piggyback) 100 100 200 100 200 300 -- -- --    Other  295  -- 295  --  -- --  --  -- --    Medications (P.O./ Enteral Liquids) 295 -- 295 -- -- -- -- -- --    Total Intake 5002 258 1354  -- -- --       Output    Urine  1150  1500 2650  750  1700 2450  --  -- --    Indwelling Cathether 1150 1500 2650 750 1700 2450 -- -- --    Stool  --  -- --  --  -- --  --  -- --    Number of Times Stooled 1 x 1 x 2 x -- -- -- -- -- --    Total Output 1150 1500 2650 750 1700 2450 -- -- --       Net I/O     -135 -340 -2532 130 -1260 -1130 -- -- --        Weight: 124 kg (273 lb 5.9 oz)    GI/Nutrition:  Orders Placed This Encounter   Procedures   • DIET ORDER     Standing Status: Standing      Number of Occurrences: 1      Standing Expiration Date:      Order Specific Question:  Diet:     Answer:  Regular [1]       Medications:  Current Facility-Administered Medications   Medication Last Dose   • furosemide (LASIX) tablet 40 mg 40 mg at 06/25/17 2110   • enoxaparin (LOVENOX) inj 40 mg 40 mg at 06/25/17 2042   • omeprazole (PRILOSEC) capsule 20 mg 20 mg at 06/25/17 0908   • senna-docusate (PERICOLACE or SENOKOT S) 8.6-50 MG per tablet 2 Tab Stopped at 06/24/17 2100   • docusate sodium (COLACE) capsule 200 mg 200 mg at 06/25/17 0908   • oxycodone immediate release (ROXICODONE) tablet 5-10 mg 10 mg at 06/25/17 0754   • acetaminophen (TYLENOL) tablet 650 mg 650 mg at 06/25/17 2042   • insulin lispro (HUMALOG) injection 2-9 Units Stopped at 06/26/17 0000   • ceFAZolin (ANCEF) IVPB 2 g 2 g at 06/26/17 0548   • albuterol inhaler 2 Puff     • calcium carbonate (TUMS) chewable tab 1,000 mg 1,000 mg at 06/23/17 1556   • finasteride (PROSCAR) tablet 5 mg 5 mg at 06/25/17 0908   • Respiratory Care  per Protocol     • senna-docusate (PERICOLACE or SENOKOT S) 8.6-50 MG per tablet 1 Tab 1 Tab at 06/23/17 0639   • polyethylene glycol/lytes (MIRALAX) PACKET 1 Packet Stopped at 06/24/17 2100   • magnesium hydroxide (MILK OF MAGNESIA) suspension 30 mL 30 mL at 06/24/17 0830   • bisacodyl (DULCOLAX) suppository 10 mg 10 mg at 06/23/17 1638   • fleet enema 133 mL     • ondansetron (ZOFRAN) syringe/vial injection 4 mg 4 mg at 06/23/17 1647       Medical Decision Making, by Problem:  Active Hospital Problems    Diagnosis   • *Penetrating back wound [S21.239A]   • Pneumoperitoneum [K66.8]   • Respiratory failure following trauma and surgery (CMS-HCC) [J95.822]   • Cellulitis of left foot [L03.116]   • Obesity (BMI 30-39.9) [E66.9]   • Chronic congestive heart failure (CMS-HCC) [I50.9]   • CAD (coronary artery disease) [I25.10]   • Chronic deep vein thrombosis (DVT) of popliteal vein (CMS-HCC) [I82.539]   • AV block, Mobitz 1 [I44.1]   • History of pulmonary embolus (PE) [Z86.711]   • Hemorrhagic disorder due to extrinsic circulating anticoagulants (CMS-HCC) [D68.32]   • 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 He will F/U as an outpatient with Dr. Black as needed should his foot flair up off of antibiotics. Case and treatment reviewed with patient, micro and RN ~ 35+ min on floor in SICU in direct patient care/counseling today.

## 2017-06-26 NOTE — PROGRESS NOTES
LIMB PRESERVATION SERVICE     77-year-old male who was admitted to the hospital on 6/2/17 as a trauma patient after a fall out of his tractor on to a bale hook, which penetrated his left posterior flank.  Developed swelling, redness and pain in his left foot approximately 2 weeks after admission.  History of fusion of the first and second MTPs or Lisfranc fusion by Dr. Black and some form of calcaneal sliding osteotomy sometime in 2007 or 2008.      Xray done on 6/17, shows surgical changes and soft tissue swelling     Dr. Pace consulted recommended arterial studies and abx, no surgery. Dr. Black aware. Will see pt as outpt.     L great toe: erythema, edema improved. Denies pain. ROM intact    Arterial studies  Normal resting bilateral lower extremity arterial perfusion.  Feet warm, pitting edema present to BLE    [facility] ID involved  Planning IV abx for total 10 days followed by PO    PLAN  F/U with Dr. Black at [facility] once discharged  F/U with [facility] once discharged to  orthotic shoes  F/U with outpt wound clinic for foot and nail care regularly       No other needs. LPS to sign off

## 2017-06-26 NOTE — PROGRESS NOTES
"  Trauma/Surgical Progress Note    Author: Carrington Armando Date & Time created: 6/26/2017   10:44 AM     Interval Events:  Increased lasix   Cellulitis improved   Tolerating   PO   resp Ok     Review of Systems   Constitutional: Negative for fever and chills.   Eyes: Positive for blurred vision (Minimal to left eye). Negative for double vision.   Respiratory: Negative for cough, shortness of breath and wheezing.    Cardiovascular: Positive for leg swelling. Negative for chest pain and palpitations.   Gastrointestinal: Positive for abdominal pain and constipation. Negative for nausea and vomiting.        6/19 BM  Gas pain and distention   Genitourinary: Negative for dysuria, urgency and frequency.        Voiding   Musculoskeletal: Positive for back pain. Negative for myalgias, joint pain and neck pain.   Neurological: Negative for focal weakness and headaches.   Psychiatric/Behavioral: Negative for substance abuse.   All other systems reviewed and are negative.    Hemodynamics:  Blood pressure 119/57, pulse 87, temperature 35.9 °C (96.6 °F), resp. rate 32, height 1.778 m (5' 10\"), weight 124 kg (273 lb 5.9 oz), SpO2 92 %.     Respiratory:    Respiration: (!) 32, Pulse Oximetry: 92 %     Work Of Breathing / Effort: Mild  RUL Breath Sounds: Clear, RML Breath Sounds: Clear, RLL Breath Sounds: Diminished, ABIGAIL Breath Sounds: Clear, LLL Breath Sounds: Diminished  Fluids:    Intake/Output Summary (Last 24 hours) at 06/26/17 1044  Last data filed at 06/26/17 0900   Gross per 24 hour   Intake   1460 ml   Output   2150 ml   Net   -690 ml     Admit Weight: 117.935 kg (260 lb)  Current Weight: 124 kg (273 lb 5.9 oz)    Physical Exam   Constitutional: He is oriented to person, place, and time. He appears well-developed. No distress.   Up in chair   HENT:   Head: Normocephalic.   Eyes:   Left eye premorbid ptosis   Neck: Neck supple. No JVD present. No tracheal deviation present.   Cardiovascular: Normal rate and regular rhythm.  "   Pulmonary/Chest: No respiratory distress. He has wheezes.   CXR with improving bi-basilar atelectasis   Abdominal: Bowel sounds are normal. He exhibits distension. There is generalized tenderness.   Midline incision clean and well approximated with staples over.   Musculoskeletal: Normal range of motion. He exhibits edema.   Left greater toe redness nearly resolved and edema stable   Neurological: He is alert and oriented to person, place, and time.   Skin: Skin is warm and dry. He is not diaphoretic.   Psychiatric: He has a normal mood and affect. His behavior is normal.   Nursing note and vitals reviewed.      Medical Decision Making/Problem List:    Active Hospital Problems    Diagnosis   • Pneumoperitoneum [K66.8]     Priority: High     6/16 - CXR suggests free air under right aminata-diaphragm, clinically stable  6/20 - CT shows pneumoperitoneum, concerning for perforated viscus. The area of perforation is probably in the cecum given the wall thickening. No free fluid seen.  6/21 - Ex lap for worsening clinical picture, negative  Tolerated a regular diet, having bowel movements, no reaccumulation of penumoperitonuem  Dhiraj Amin MD     • Respiratory failure following trauma and surgery (CMS-[facility]) [J95.822]     Priority: Medium     Left intubated post-op  Trauma weaning and ventilator protocol ordered  Successfully extubated  O2  At 2l/min/NC     • Cellulitis of left foot [L03.116]     Priority: Medium     No trauma or open wounds  History of instrumentation with implant 2005 - [place] 6/17 - Commence Ancef   - 3 view left foot - Extensive dorsal and medial soft tissue swelling primarily involving LEFT great toe with apparent resorption at the medial aspect of the 1st metatarsophalangeal joint concerning for osteomyelitis/septic arthritis.  - MRSA nasal swab negative, hemoglobin A1c 5.4  6/21 - LILY normal  6/26 - Cefazolin day 11, transition to cephalexin 6/27 per Dr Kevin Brown MD - ID  Zafar Pace  MD - Ortho  Fran Black MD.  Orthopedics     • Obesity (BMI 30-39.9) [E66.9]     Priority: Medium     BMI -  38.8     • Chronic congestive heart failure (CMS-HCC) [I50.9]     Priority: Medium     6/6 - Home lasix and potassium started.  6/8 - Lasix stopped upon ICU transfer.  6/11- BNP low / restart lasix as BLE edema.  6/12 - BP low - Lasix and potassium on hold.  6/15 - BNP elevated. Resume Lasix.   6/26 - Lasix increased to 40mg PO BID with good response  Trend diagnostic laboratory studies      • CAD (coronary artery disease) [I25.10]     Priority: Medium     6/6 - Home metoprolol restarted.  Held on transfer to ICU.  6/11 - Resumed.    6/17 - Metoprolol stopped per cardiology recommendation     • Chronic deep vein thrombosis (DVT) of popliteal vein (CMS-HCC) [I82.539]     Priority: Medium     Present on admission, takes outpatient coumadin.  Old clot in left popliteal vein.  6/4 - Prophylactic Lovenox initiated.  6/3 - Trauma screening bilateral lower extremity venous duplex positive for chronic DVT of the left popliteal vein; no acute process in either leg.  6/6 - Therapeutic Lovenox started.  6/8 - Stopped upon ICU transfer.  6/11 - Trauma screening bilateral lower extremity venous duplex positive for partially occlusive chronic DVT seen in the left popliteal vein as  membrane-like structure with response to compression and good venous flow. No evidence of acute DVT seen in the left lower extremity.  6/12 - Prophylactic Lovenox resumed.       • AV block, Mobitz 1 [I44.1]     Priority: Medium     Observation only.  6/8 - EKG changes with frequent PVC's. Troponin < 0.02. Metoprolol held on transfer to ICU.  6/11 - Restart metoprolol  6/17 - Metoprolol stopped per cardiology recommendations  - Cardiology following  Cruz Vargas MD. Cardiology     • Penetrating back wound [S21.621A]     Priority: Medium     Large left paraspinous, posterior hematoma.  No active extravasation.  6/8 - CT chest showed slightly  larger SQ fluid than on admission. No discrete drainable hematoma.   Ancef x 24hrs (completed 6/3).  healing     • History of pulmonary embolus (PE) [Z86.711]     Priority: Medium     With DVT.  On warfarin as an outpatient.  IVC filter present.  6/12 - Prophylactic Lovenox resumed.  Restart anticoagulation as outpatient.       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  INR 2 on arrival, given 1 mg Factor VII and Vitamin K.  Trend INR, correct as clinically warranted.  6/12 - Prophylactic Lovenox resumed.  Restart coumadin on discharge.       • Squamous cell carcinoma of left eye (CMS-[facility]) [C69.92]     Priority: Low     Premorbid.  Squamous cell carinoma left eye.  PRN wetting drops     • Acute pain of left knee [M25.562]     Priority: Low     Prior TKA.  6/11 - Imaging negative for left knee fracture or malalignment.      • Acute blood loss anemia [D62]     Priority: Low     1L of blood loss reported on scene.  Large paraspinous, posterior hematoma.  6/2 - Transfused 2 units PRBC.  6/10 - Transfused 1 unit PRBC. Iron replaced per pharmacy kinetics.   6/19 - Trend up  Transfuse 1 unit PRBC if Hb < 7.0.  6/26 10.1       Core Measures & Quality Metrics:  Medications reviewed, Labs reviewed and Radiology images reviewed  Castaneda catheter: Critically Ill - Requiring Accurate Measurement of Urinary Output      DVT Prophylaxis: Enoxaparin (Lovenox)  DVT prophylaxis - mechanical: SCDs  Ulcer prophylaxis: Yes        HERBIE Score  Discussed patient condition with RN, RT, Therapies and .  CRITICAL CARE TIME EXCLUDING PROCEDURES: 41    minutes

## 2017-06-26 NOTE — CARE PLAN
Problem: Nutritional:  Goal: Nutrition support tolerated and meeting greater than 85% of estimated needs  Outcome: DISCHARGED-GOAL NOT MET Date Met:  06/26/17  TF d/c and Cortrak removed; pt started on liberalized diet.

## 2017-06-26 NOTE — CARE PLAN
Problem: Knowledge Deficit  Goal: Knowledge of disease process/condition, treatment plan, diagnostic tests, and medications will improve  Outcome: PROGRESSING AS EXPECTED  Assessed pt's knowledge of condition. Able to verbalize reason for hospital stay. Updated about current status and treatment plans.     Problem: Skin Integrity  Goal: Risk for impaired skin integrity will decrease  Outcome: PROGRESSING AS EXPECTED  Assessed skin at beginning of shift. Repositioned pt Q2H and as needed, ambulated as tolerated, No changes in skin integrity.

## 2017-06-26 NOTE — CARE PLAN
Problem: Nutritional:  Goal: Achieve adequate nutritional intake  Patient will consume >50% of meal/supplements.   Outcome: MET Date Met:  06/26/17  Pt advanced to liberalized diet with consistent good PO of >75% intake. Therefore, enteral nutrition not indicated at this time, and pt appears well nourished with good PO. Continue current POC as tolerated by pt. RD available PRN.

## 2017-06-27 ENCOUNTER — APPOINTMENT (OUTPATIENT)
Dept: RADIOLOGY | Facility: MEDICAL CENTER | Age: 77
End: 2017-06-27
Attending: RADIOLOGY
Payer: MEDICARE

## 2017-06-27 PROBLEM — K66.8 PNEUMOPERITONEUM: Status: RESOLVED | Noted: 2017-06-21 | Resolved: 2017-06-27

## 2017-06-27 PROBLEM — M25.562 ACUTE PAIN OF LEFT KNEE: Status: RESOLVED | Noted: 2017-06-11 | Resolved: 2017-06-27

## 2017-06-27 PROBLEM — K35.32 PERFORATION OF CECUM: Status: ACTIVE | Noted: 2017-06-27

## 2017-06-27 PROBLEM — K66.8 PNEUMOPERITONEUM: Status: ACTIVE | Noted: 2017-06-27

## 2017-06-27 LAB
ANION GAP SERPL CALC-SCNC: 6 MMOL/L (ref 0–11.9)
BUN SERPL-MCNC: 14 MG/DL (ref 8–22)
CALCIUM SERPL-MCNC: 8.7 MG/DL (ref 8.5–10.5)
CHLORIDE SERPL-SCNC: 105 MMOL/L (ref 96–112)
CO2 SERPL-SCNC: 31 MMOL/L (ref 20–33)
CREAT SERPL-MCNC: 0.74 MG/DL (ref 0.5–1.4)
CRP SERPL HS-MCNC: 2.51 MG/DL (ref 0–0.75)
GFR SERPL CREATININE-BSD FRML MDRD: >60 ML/MIN/1.73 M 2
GLUCOSE BLD-MCNC: 117 MG/DL (ref 65–99)
GLUCOSE BLD-MCNC: 124 MG/DL (ref 65–99)
GLUCOSE BLD-MCNC: 132 MG/DL (ref 65–99)
GLUCOSE BLD-MCNC: 139 MG/DL (ref 65–99)
GLUCOSE SERPL-MCNC: 100 MG/DL (ref 65–99)
INR PPP: 1.05 (ref 0.87–1.13)
POTASSIUM SERPL-SCNC: 4.1 MMOL/L (ref 3.6–5.5)
PROTHROMBIN TIME: 14 SEC (ref 12–14.6)
SODIUM SERPL-SCNC: 142 MMOL/L (ref 135–145)

## 2017-06-27 PROCEDURE — A9270 NON-COVERED ITEM OR SERVICE: HCPCS | Performed by: SURGERY

## 2017-06-27 PROCEDURE — 36415 COLL VENOUS BLD VENIPUNCTURE: CPT

## 2017-06-27 PROCEDURE — 700102 HCHG RX REV CODE 250 W/ 637 OVERRIDE(OP): Performed by: SURGERY

## 2017-06-27 PROCEDURE — 97535 SELF CARE MNGMENT TRAINING: CPT

## 2017-06-27 PROCEDURE — 97530 THERAPEUTIC ACTIVITIES: CPT

## 2017-06-27 PROCEDURE — 86140 C-REACTIVE PROTEIN: CPT

## 2017-06-27 PROCEDURE — A9270 NON-COVERED ITEM OR SERVICE: HCPCS

## 2017-06-27 PROCEDURE — 97116 GAIT TRAINING THERAPY: CPT

## 2017-06-27 PROCEDURE — 700112 HCHG RX REV CODE 229: Performed by: SURGERY

## 2017-06-27 PROCEDURE — 82962 GLUCOSE BLOOD TEST: CPT

## 2017-06-27 PROCEDURE — 85610 PROTHROMBIN TIME: CPT

## 2017-06-27 PROCEDURE — 700102 HCHG RX REV CODE 250 W/ 637 OVERRIDE(OP): Performed by: INTERNAL MEDICINE

## 2017-06-27 PROCEDURE — 97112 NEUROMUSCULAR REEDUCATION: CPT

## 2017-06-27 PROCEDURE — A9270 NON-COVERED ITEM OR SERVICE: HCPCS | Performed by: INTERNAL MEDICINE

## 2017-06-27 PROCEDURE — 700111 HCHG RX REV CODE 636 W/ 250 OVERRIDE (IP): Performed by: HOSPITALIST

## 2017-06-27 PROCEDURE — 99232 SBSQ HOSP IP/OBS MODERATE 35: CPT | Performed by: HOSPITALIST

## 2017-06-27 PROCEDURE — 700111 HCHG RX REV CODE 636 W/ 250 OVERRIDE (IP): Performed by: SURGERY

## 2017-06-27 PROCEDURE — 770020 HCHG ROOM/CARE - TELE (206)

## 2017-06-27 PROCEDURE — 700102 HCHG RX REV CODE 250 W/ 637 OVERRIDE(OP)

## 2017-06-27 PROCEDURE — 80048 BASIC METABOLIC PNL TOTAL CA: CPT

## 2017-06-27 RX ORDER — OXYCODONE HYDROCHLORIDE 10 MG/1
10 TABLET ORAL
Status: DISCONTINUED | OUTPATIENT
Start: 2017-06-27 | End: 2017-06-28 | Stop reason: HOSPADM

## 2017-06-27 RX ORDER — CEPHALEXIN 500 MG/1
500 CAPSULE ORAL 2 TIMES DAILY
Status: DISCONTINUED | OUTPATIENT
Start: 2017-06-27 | End: 2017-06-28

## 2017-06-27 RX ORDER — OXYCODONE HYDROCHLORIDE 5 MG/1
5 TABLET ORAL
Status: DISCONTINUED | OUTPATIENT
Start: 2017-06-27 | End: 2017-06-28 | Stop reason: HOSPADM

## 2017-06-27 RX ORDER — WARFARIN SODIUM 2 MG/1
2 TABLET ORAL
Status: COMPLETED | OUTPATIENT
Start: 2017-06-27 | End: 2017-06-27

## 2017-06-27 RX ADMIN — FINASTERIDE 5 MG: 5 TABLET, FILM COATED ORAL at 09:05

## 2017-06-27 RX ADMIN — WARFARIN SODIUM 2 MG: 2 TABLET ORAL at 19:31

## 2017-06-27 RX ADMIN — CEPHALEXIN 500 MG: 500 CAPSULE ORAL at 20:46

## 2017-06-27 RX ADMIN — ENOXAPARIN SODIUM 120 MG: 150 INJECTION SUBCUTANEOUS at 20:45

## 2017-06-27 RX ADMIN — FUROSEMIDE 40 MG: 40 TABLET ORAL at 16:52

## 2017-06-27 RX ADMIN — DOCUSATE SODIUM 200 MG: 100 CAPSULE ORAL at 09:05

## 2017-06-27 RX ADMIN — CEPHALEXIN 500 MG: 500 CAPSULE ORAL at 11:56

## 2017-06-27 RX ADMIN — ENOXAPARIN SODIUM 120 MG: 150 INJECTION SUBCUTANEOUS at 10:09

## 2017-06-27 RX ADMIN — CEFAZOLIN SODIUM 2 G: 2 INJECTION, SOLUTION INTRAVENOUS at 05:19

## 2017-06-27 RX ADMIN — OMEPRAZOLE 20 MG: 20 CAPSULE, DELAYED RELEASE ORAL at 09:04

## 2017-06-27 RX ADMIN — FUROSEMIDE 40 MG: 40 TABLET ORAL at 05:26

## 2017-06-27 ASSESSMENT — GAIT ASSESSMENTS
GAIT LEVEL OF ASSIST: STAND BY ASSIST
DISTANCE (FEET): 100
ASSISTIVE DEVICE: FRONT WHEEL WALKER
DEVIATION: DECREASED BASE OF SUPPORT;DECREASED HEEL STRIKE;DECREASED TOE OFF

## 2017-06-27 ASSESSMENT — PAIN SCALES - GENERAL
PAINLEVEL_OUTOF10: 0
PAINLEVEL_OUTOF10: 0

## 2017-06-27 ASSESSMENT — COGNITIVE AND FUNCTIONAL STATUS - GENERAL
DAILY ACTIVITIY SCORE: 20
HELP NEEDED FOR BATHING: A LITTLE
DRESSING REGULAR LOWER BODY CLOTHING: A LITTLE
DRESSING REGULAR UPPER BODY CLOTHING: A LITTLE
SUGGESTED CMS G CODE MODIFIER DAILY ACTIVITY: CJ
TOILETING: A LITTLE

## 2017-06-27 ASSESSMENT — ENCOUNTER SYMPTOMS
SPEECH CHANGE: 0
ABDOMINAL PAIN: 0
MEMORY LOSS: 0
PALPITATIONS: 0
HALLUCINATIONS: 0
EYE DISCHARGE: 0
STRIDOR: 0
LOSS OF CONSCIOUSNESS: 0
FALLS: 0
SPUTUM PRODUCTION: 0
HEMOPTYSIS: 0
CONSTIPATION: 0
DIARRHEA: 0
VOMITING: 0
EYE REDNESS: 0
FOCAL WEAKNESS: 0
FLANK PAIN: 0
NERVOUS/ANXIOUS: 0
NECK PAIN: 0
COUGH: 0
FEVER: 0
WEAKNESS: 0
SEIZURES: 0
CHILLS: 0
HEADACHES: 0
NAUSEA: 0
BACK PAIN: 0
SHORTNESS OF BREATH: 0
ORTHOPNEA: 0

## 2017-06-27 ASSESSMENT — LIFESTYLE VARIABLES: SUBSTANCE_ABUSE: 0

## 2017-06-27 NOTE — PROGRESS NOTES
"BP 98/44 mmHg  Pulse 93  Temp(Src) 36.7 °C (98 °F)  Resp 18  Ht 1.778 m (5' 10\")  Wt 124 kg (273 lb 5.9 oz)  BMI 39.22 kg/m2  SpO2 96%    PT/OT notes reviewed  Wife has had rails installed as well as some of the furniture raised. Additionally she has some help scheduled at home  Home health PT ordered  Walking home oxygen eval to be complete - then will order home oxygen   Liz  aware of current plan   "

## 2017-06-27 NOTE — DISCHARGE PLANNING
Care Transition Team Discharge Planning    Anticipated Discharge Information  Anticipated discharge disposition: HHC, DME  Discharge Address: [address] [place], [place]  Discharge Contact Phone Number: [phone]    Care Transition Team Interventions  Were Resources Provided?: Yes  Community Referrals: Consulted with medical staff  Medical Referrals: DME referral, Home Health referral         Discharge Plan:  Discussed pt's case with bedside RN and APN. Pt may be ready for home in the next one to two days and MD will write orders for HHC and home 02. Met with pt, wife Emma Eason and marielar Jayla at bedside and discussed d/c planning. Pt wants to return home with his family. PT/OT evals completed today and recommendation is for pt to have HHC or OP therapy services and 24/7 help at home. Pt lives in [place], [place] with his wife and has a supportive family. Pt and family agreeable to [facility]. Family chose [facility]. Discussed home 02 and pt agreeable, pt and family chose [facility].     Pt states his PCP is Dr. Jesus Craft (MD in [place], [place]). Discussed with pt that potential barrier is that pt's PCP is based in [place] and not in [place] therefore will need to confirm with [facility] that they can accept pt's PCP. Called Dr. Craft's office and he is not licensed in [place] but office relayed he does write HHC orders for [facility] agencies in [place]. Called and left a message for [facility].     Provided choice forms to [facility] to process referrals.     Needs: Awaiting a call back from [facility] and acceptance from [facility] (will need to confirm Dr. Craft can write  HHC orders for [facility])      02 to be ordered once 02 order placed (family chose [facility], choice form provided to [facility])     Please communicate with pt's wife Emma #[phone] with any updates on d/c plan

## 2017-06-27 NOTE — PROGRESS NOTES
Monitor Summary:    Second degree heart block: 69-81.    Frequent PAC's, occasional Bigem, frequent PVC's, rare/ occasional couplets, rare triplets, with a nicole down to 37.     - / .10 / .44

## 2017-06-27 NOTE — PROGRESS NOTES
[facility] Hospitalist Progress Note    Date of Service: 2017    Chief Complaint  77 y.o. male admitted 2017 with Penetrating chest trauma with left hemothorax after fall from tractor    Interval Problem Update  No overnight events, pin is controlled    Consultants/Specialty  Trauma  ID    Disposition  Pending PT/OT re-eval        Review of Systems   Constitutional: Negative for fever and malaise/fatigue.   HENT: Negative for congestion, ear discharge and nosebleeds.    Eyes: Negative for discharge and redness.   Respiratory: Negative for cough, hemoptysis, sputum production, shortness of breath and stridor.    Cardiovascular: Positive for leg swelling. Negative for chest pain, palpitations and orthopnea.   Gastrointestinal: Negative for nausea, vomiting, abdominal pain, diarrhea and melena.   Genitourinary: Negative for hematuria and flank pain.   Musculoskeletal: Positive for joint pain. Negative for back pain, falls and neck pain.   Skin: Negative.    Neurological: Negative for speech change, focal weakness, seizures, loss of consciousness, weakness and headaches.   Psychiatric/Behavioral: Negative for hallucinations, memory loss and substance abuse. The patient is not nervous/anxious.    All other systems reviewed and are negative.     Physical Exam  Laboratory/Imaging   Hemodynamics  Temp (24hrs), Av.8 °C (98.2 °F), Min:36.4 °C (97.6 °F), Max:37.2 °C (98.9 °F)   Temperature: 36.9 °C (98.4 °F)  Pulse  Av.5  Min: 45  Max: 160 Heart Rate (Monitored): 60  Blood Pressure : 127/65 mmHg, NIBP: 126/58 mmHg      Respiratory      Respiration: 18, Pulse Oximetry: 96 %     Work Of Breathing / Effort: Mild  RUL Breath Sounds: Clear, RML Breath Sounds: Clear, RLL Breath Sounds: Diminished, ABIGAIL Breath Sounds: Clear, LLL Breath Sounds: Diminished    Fluids    Intake/Output Summary (Last 24 hours) at 17 0835  Last data filed at 17 0800   Gross per 24 hour   Intake    980 ml   Output   3975 ml   Net   -2995 ml       Nutrition  Orders Placed This Encounter   Procedures   • DIET ORDER     Standing Status: Standing      Number of Occurrences: 1      Standing Expiration Date:      Order Specific Question:  Diet:     Answer:  Regular [1]     Physical Exam   Constitutional: He is oriented to person, place, and time. He appears well-developed.   HENT:   Head: Normocephalic and atraumatic.   Mouth/Throat: Oropharynx is clear and moist. No oropharyngeal exudate.   Eyes: Conjunctivae are normal. Pupils are equal, round, and reactive to light. Right eye exhibits no discharge. Left eye exhibits no discharge. No scleral icterus.   Neck: Neck supple. No JVD present. No tracheal deviation present.   Cardiovascular: Normal rate and regular rhythm.  Exam reveals no gallop and no friction rub.    No murmur heard.  Pulmonary/Chest: Effort normal. No stridor. No respiratory distress. He has decreased breath sounds. He has no wheezes. He exhibits no tenderness.   Abdominal: Soft. Bowel sounds are normal. He exhibits no distension. There is no tenderness. There is no rebound.   Wound with Davis in place, no drainage or erythema   Musculoskeletal: He exhibits edema. He exhibits no tenderness.   Neurological: He is alert and oriented to person, place, and time. No cranial nerve deficit. He exhibits normal muscle tone.   Skin: Skin is warm and dry. He is not diaphoretic. No cyanosis or erythema. Nails show no clubbing.   Psychiatric: He has a normal mood and affect. His behavior is normal.   Nursing note and vitals reviewed.      Recent Labs      06/25/17   0429   WBC  4.2*   RBC  3.44*   HEMOGLOBIN  10.1*   HEMATOCRIT  34.5*   MCV  100.3*   MCH  29.4   MCHC  29.3*   RDW  62.4*   PLATELETCT  261   MPV  9.8     Recent Labs      06/25/17   0429  06/26/17   0600  06/27/17   0518   SODIUM  141  141  142   POTASSIUM  4.4  4.2  4.1   CHLORIDE  105  106  105   CO2  30  31  31   GLUCOSE  105*  107*  100*   BUN  15  14  14   CREATININE  0.64   0.70  0.74   CALCIUM  8.4*  8.8  8.7                      Assessment/Plan     * Penetrating back wound (present on admission)  Assessment & Plan  Fell out of his tractor onto a hay bale hook which penetrated his left posterior flank sustaining pneumoperitoneum and large L paraspinous posterior hematoma but no extravasation and was admitted to trauma services, Dr. Boone following. Pt clinically Stable     History of pulmonary embolus (PE) (present on admission)  Assessment & Plan  AC per above    Hemorrhagic disorder due to extrinsic circulating anticoagulants (CMS-HCC) (present on admission)  Assessment & Plan  Resume AC per above    AV block, Mobitz 1 (present on admission)  Assessment & Plan  Evaluated by Cardiology, . Metoprolol was stopped. No indiction for PPM per cards    Chronic deep vein thrombosis (DVT) of popliteal vein (CMS-HCC) (present on admission)  Assessment & Plan  Present on admission, takes outpatient coumadin.  Old clot in left popliteal vein.  6/4 - Prophylactic Lovenox initiated.  6/3 - Trauma screening bilateral lower extremity venous duplex positive for chronic DVT of the left popliteal vein; no acute process in either leg.  Discussed with Dr Boone  Ok to resume AC from his  Standpoint  Increase lovenox to therapeutic dose and resume warfarin    Chronic congestive heart failure (CMS-HCC) (present on admission)  Assessment & Plan  Continue lasix, diuresing well monitor I/o and electrolytes    CAD (coronary artery disease)  Assessment & Plan  Not on aspirin or statin when I inherited his care. Get a lipid panel. If no contraindication, will start low dose aspirin.    Obesity (BMI 30-39.9)  Assessment & Plan  Encourage weight loss    Cellulitis of left foot (present on admission)  Assessment & Plan  No trauma or open wounds  History of instrumentation with implant 2005 - [facility]  On ancef through today the po keflex per ID    Respiratory failure following trauma and surgery (CMS-HCC) (present  on admission)  Assessment & Plan  Wean off oxygen as tolerated    Pneumoperitoneum  Assessment & Plan  S/p expl lap   abd exam benign tolerating diet    Acute blood loss anemia (present on admission)  Assessment & Plan  Stable post transfusion, monitor with anticoagulation    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    Core Measures

## 2017-06-27 NOTE — CARE PLAN
Problem: Safety  Goal: Will remain free from injury  Treaded socks in place, bed in the lowest position,  call light and belongings within reach, pt call for assistance appropriately    Problem: Venous Thromboembolism (VTW)/Deep Vein Thrombosis (DVT) Prevention:  Goal: Patient will participate in Venous Thrombosis (VTE)/Deep Vein Thrombosis (DVT)Prevention Measures  scds on, receiving lovenox per MAR    Problem: Skin Integrity  Goal: Risk for impaired skin integrity will decrease   piyush risk assessment, pt turns self from side to side    Problem: Pain Management  Goal: Pain level will decrease to patient’s comfort goal  Pt denies any pain at this time, hourly rounding in progress

## 2017-06-27 NOTE — CARE PLAN
Problem: Skin Integrity  Goal: Risk for impaired skin integrity will decrease  Intervention: Assess risk factors for impaired skin integrity and/or pressure ulcers  Pt mobilized frequently 2-3x per shift and repositioned with the use of pillows to shift pressure of sacrum and other bony prominences. Extremities elevated to reduce edema and subsequent breakdown.       Problem: Pain Management  Goal: Pain level will decrease to patient’s comfort goal  Intervention: Follow pain managment plan developed in collaboration with patient and Interdisciplinary Team  Pt continually assessed for pain and medicated per MAR if needed. Pain reassessed Q2h at minimum. Nonpharmacologic measures also in place for pain relief such as distraction and frequent repositioning for pt comfort.

## 2017-06-27 NOTE — PROGRESS NOTES
Report received. Assumed care. Pt in bed awake. A/O x4. VSS. Responds appropriately. Denies pain, SOB. Assessment complete. Abdominal surgical incision with staples in place, krista, approximated, no s/s of infection. Dressing to the back in place, cdi. SCD's on,  in use. On 3 LO2 with sats of 94-97%. Noted abrasion to reght side of face, buttocks red but blanching. Discussed POC, , pt verbalizes understanding. Explained importance of calling before getting OOB. Call light and belongings within reach. Bed alarm on. Bed in the lowest position. Treaded socks in place. Hourly rounding in progress. Will continue to monitor .

## 2017-06-27 NOTE — DISCHARGE PLANNING
Care Transition Team Assessment    Information Source  Orientation : Oriented x 4  Information Given By: Significant Other  Informant's Name: wife, Emma  Who is responsible for making decisions for patient? : Patient    Readmission Evaluation  Is this a readmission?: No    Elopement Risk  Legal Hold: No  Ambulatory or Self Mobile in Wheelchair: No-Not an Elopement Risk  Disoriented: No  Psychiatric Symptoms: None  History of Wandering: No  Elopement this Admit: No  Vocalizing Wanting to Leave: No  Displays Behaviors, Body Language Wanting to Leave: No-Not at Risk for Elopement  Elopement Risk: Not at Risk for Elopement    Interdisciplinary Discharge Planning  Does Admitting Nurse Feel This Could be a Complex Discharge?: No  Primary Care Physician:   Lives with - Patient's Self Care Capacity: Spouse  Patient or legal guardian wants to designate a caregiver (see row info): No  Support Systems: Family Member(s), Friends / Neighbors  Housing / Facility: [address]  Do You Take your Prescribed Medications Regularly: Yes  Able to Return to Previous ADL's: Yes  Mobility Issues: No  Prior Services: None, Home-Independent  Patient Expects to be Discharged to:: home  Assistance Needed: Unknown at this Time  Durable Medical Equipment: Walker, Other - Specify, Home Oxygen (cane, ordering home 02 this admission )  DME Provider / Phone: [facility]     Discharge Preparedness  What is your plan after discharge?: Home health care  What are your discharge supports?: Spouse  Prior Functional Level: Ambulatory, Drives Self, Independent with Activities of Daily Living, Independent with Medication Management  Difficulity with ADLs: None  Difficulity with IADLs: None    Functional Assesment  Prior Functional Level: Ambulatory, Drives Self, Independent with Activities of Daily Living, Independent with Medication Management    Finances  Financial Barriers to Discharge: No  Prescription Coverage: Yes    Vision / Hearing  Impairment  Vision Impairment : Yes  Right Eye Vision: Wears Glasses, Impaired  Left Eye Vision: Wears Glasses, Impaired  Hearing Impairment : No    Values / Beliefs / Concerns  Values / Beliefs Concerns : No  Special Hospitalization Concerns: none    Advance Directive  Advance Directive?: None, Clinically incapacitated / Inappropriate    Domestic Abuse  Have you ever been the victim of abuse or violence?: No  Physical Abuse or Sexual Abuse: No  Verbal Abuse or Emotional Abuse: No  Possible Abuse Reported to:: Not Applicable    Psychological Assessment  History of Substance Abuse: None  History of Psychiatric Problems: No         Anticipated Discharge Information  Anticipated discharge disposition: [facility]  Discharge Address: [address] [place] [address]  Discharge Contact Phone Number: [phone]

## 2017-06-27 NOTE — PROGRESS NOTES
Surgery:    Tolerating PO diabetic diet for several days  Having bowel movements  Incision without signs of infection  Abdomen benign    No further recs at this time  Therapy re-evals in progress  SNF referral placed yesterday    OK for transfer at any time from my perspective.    Dhiraj Amin MD    DATE OF SERVICE: 6/27/2017

## 2017-06-27 NOTE — THERAPY
"Occupational Therapy Treatment completed with focus on ADLs, ADL transfers and caregiver training.  Functional Status:  Pt pleasant, motivated & wife & daughter present.  Pt did require Min A for supine to sit EOB.  Pt plans to sleep in a recliner at home until better able to get out of a bed.  Pt was CGA for sit to stand & amb with FWW & SBA.  Pt's wife plans to assist with LB dressing & bathing as needed.  Family educated on removing all throw rugs to avoid tripping hazard at home & install a night light for toileting after dark.  Family has RTS, tub transfer bench & hand held shower for home use.  Family has also elevated the love seated for ease of getting in/out.  Family will be available to assist as needed upon D/C home.  Plan of Care: Will benefit from Occupational Therapy 3 times per week  Discharge Recommendations:  Equipment Will Continue to Assess for Equipment Needs. Post-acute therapy Discharge to home with outpatient or home health for additional skilled therapy services.    See \"Rehab Therapy-Acute\" Patient Summary Report for complete documentation.   "

## 2017-06-27 NOTE — DISCHARGE PLANNING
Rehab TCC following for potential admission. Would appreciate updated therapy notes - PT/OT -(s/p surgery on 6/21 and recent transfer from ICU), to reflect current functional status. MANUEL Cherry aware. TCC to monitor for updates.

## 2017-06-27 NOTE — THERAPY
"Physical Therapy Treatment completed.   Bed Mobility:  Supine to Sit: Minimal Assist  Transfers: Sit to Stand: Contact Guard Assist (min with sit-stand from lower chair. Family ed on technique.)  Gait: Level Of Assist: Stand by Assist with Front-Wheel Walker       Plan of Care: Plan to complete next treatment by 6/29  Discharge Recommendations: Equipment: already owns a FWW. post-acute therapy Discharge to home with outpatient or home health for additional skilled therapy services.  Pt is eager to go home (prefers not to go post acute). Family is very supportive and reports that they can provide help as needed at home. Pt shows improved endurance with ambulation, tolerating 100 feet today. Pt continues to show shuffling gait which is baseline. Pt transfers best from higher surfaces, and family reports having risers on recliner already at home. Pt will need 24/7 assist and home therapy for safety if he is to d/c home.      See \"Rehab Therapy-Acute\" Patient Summary Report for complete documentation.       "

## 2017-06-27 NOTE — PROGRESS NOTES
1730: Report called to AKUA Nichols on [facility]. All questions answered at this time.   1755: Pt transported via wheelchair to T406 accompanied by transport staff and ACLS RN on ICU monitor with supp. O2 on hand. Pt chart and belongings transported with pt to new room. Pt attached to tele box and ambulated with 1 person assist to new bed. Wife notified of transfer.

## 2017-06-27 NOTE — FACE TO FACE
Face to Face Supporting Documentation - Home Health    The encounter with this patient was in whole or in part the primary reason for home health admission.    Date of encounter:   Patient:                    MRN:                       YOB: 2017  Kiran Eason  [id number]  1940     Home health to see patient for:  Physical Therapy evaluation and treatment and Occupational therapy evaluation and treatment    Skilled need for:  New Onset Medical Diagnosis traumatic penetrating back wound, hemothorax - resolved and Recent Deterioration of Health Status debility    Skilled nursing interventions to include:  Comment: strength and mobility     Homebound status evidenced by:  Need the aid of supportive devices such as crutches, canes, wheelchairs or walkers. Leaving home requires a considerable and taxing effort. There is a normal inability to leave the home.    Community Physician to provide follow up care: No primary care provider on file.     Optional Interventions? No      I certify the face to face encounter for this home health care referral meets the CMS requirements and the encounter/clinical assessment with the patient was, in whole, or in part, for the medical condition(s) listed above, which is the primary reason for home health care. Based on my clinical findings: the service(s) are medically necessary, support the need for home health care, and the homebound criteria are met.  I certify that this patient has had a face to face encounter by myself.  BETO Dixon. - NPI: [id number]

## 2017-06-27 NOTE — PROGRESS NOTES
Inpatient Anticoagulation Service Note    Date: 6/27/2017  Reason for Anticoagulation: Deep Vein Thrombosis  Hemoglobin Value: 10.1  Hematocrit Value: 34.5  Lab Platelet Value: 261  Target INR: 2.0 to 3.0  INR from last 7 days     Date/Time INR Value    06/27/17 0936 1.05        Dose from last 7 days     Date/Time Dose (mg)    06/27/17 1100 2        Home dose = 1 mg daily  Significant Interactions: Antibiotics, Proton Pump Inhibitor  Bridge Therapy: Yes  Bridge Therapy Start Date: 06/27/17  Days of Overlap Therapy: 0     Comments: ICU transfer. Patient is on warfarin 1 mg at home. Warfarin was stopped as patient fell on a tractor blade. Will give the patient a 2 mg boost tonight and start 1 mg daily tomorrow. INR is now at baseline due to holding. H&H stable. No s/s of bleeding but will dose cautiously to prevent bleeding.     Plan:  2 mg tonight, INR for AM  Education Material Provided?: No (Chronic coagulation patient)  Pharmacist suggested discharge dosing: warfarin 1 mg daily. Recommend bridge therapy for at least full 5 days with 2 consecutive days having INRs >2. Recommend patient have INR checked 2-3 days after discharge.     Anjali Brambila, PharmD

## 2017-06-27 NOTE — PROGRESS NOTES
Infectious Disease Progress Note    Author: Ambrose Brown Date & Time created: 6/27/2017  10:12 AM     Cefazolin Day #10    Interval History:  Transferred out of trauma ICU.   Afebrile since 6/8/17.  Last WBC 4200 6/25, ESR 39 6/26.  Labs Reviewed, Medications Reviewed, Radiology Reviewed, Wound Reviewed, Fluids Reviewed and GI Nutrition Reviewed.    Review of Systems:  Review of Systems   Constitutional: Negative for fever and chills.   Respiratory: Negative for cough and shortness of breath.    Cardiovascular: Negative for chest pain.   Gastrointestinal: Negative for nausea, vomiting, abdominal pain, diarrhea and constipation.   Musculoskeletal:        Left foot OK.   Skin: Negative for itching and rash.     Physical Exam:  Physical Exam   Constitutional: He is oriented to person, place, and time. He appears well-developed.   HENT:   Head: Normocephalic and atraumatic.   Cardiovascular: Regular rhythm.    Pulmonary/Chest: Effort normal. No respiratory distress. He has no wheezes.   Abdominal: Soft. He exhibits no distension. There is no tenderness. There is no guarding.   Musculoskeletal:   2+ BLE edema. Left foot without erythema, induration, or pain. Some violaceousness of dependency.   Neurological: He is alert and oriented to person, place, and time.   Psychiatric: He has a normal mood and affect. His behavior is normal.   Nursing note and vitals reviewed.    Labs:  Recent Results (from the past 24 hour(s))    Collection Time: 06/26/17  6:15 PM   Result Value Ref Range    Glucose - Accu-Ck 118 (H) 65 - 99 mg/dL   ACCU-CHEK GLUCOSE    Collection Time: 06/26/17  9:12 PM   Result Value Ref Range    Glucose - Accu-Ck 124 (H) 65 - 99 mg/dL   CRP QUANTITIVE (NON-CARDIAC)    Collection Time: 06/27/17  5:18 AM   Result Value Ref Range    Stat C-Reactive Protein 2.51 (H) 0.00 - 0.75 mg/dL   BASIC METABOLIC PANEL    Collection Time: 06/27/17  5:18 AM   Result Value Ref Range    Sodium 142 135 - 145 mmol/L    Potassium  4.1 3.6 - 5.5 mmol/L    Chloride 105 96 - 112 mmol/L    Co2 31 20 - 33 mmol/L    Glucose 100 (H) 65 - 99 mg/dL    Bun 14 8 - 22 mg/dL    Creatinine 0.74 0.50 - 1.40 mg/dL    Calcium 8.7 8.5 - 10.5 mg/dL    Anion Gap 6.0 0.0 - 11.9    GFR If African American >60 >60 mL/min/1.73 m 2    GFR If Non African American >60 >60 mL/min/1.73 m 2   ACCU-CHEK GLUCOSE    Collection Time: 17  5:24 AM   Result Value Ref Range    Glucose - Accu-Ck 117 (H) 65 - 99 mg/dL     Hemodynamics:  Temp (24hrs), Av.8 °C (98.2 °F), Min:36.4 °C (97.6 °F), Max:37.2 °C (98.9 °F)  Temperature: 36.8 °C (98.2 °F)  Pulse  Av.5  Min: 45  Max: 160Heart Rate (Monitored): 60  Blood Pressure : 134/61 mmHg, NIBP: 126/58 mmHg     PIV Group Right Forearm 20g (Active)     Wound:  Surgical Incision  Incision Abdomen (Active)   Wound Bed Pink 2017  9:00 PM   Drainage  None 2017  9:00 PM   Daily - Wound Closure Staples;Open to Air;Approximated 2017  9:00 PM       Wound POA Puncture Flank Left (Active)   Wound Bed Pink;Red;Purple 2017  9:00 PM   Drainage  None 2017  9:00 PM       Wound Skin Tear Face Left (Active)   Wound Bed Pink 2017  9:00 PM   Drainage  None 2017  9:00 PM      Fluids:  Intake/Output       17 07 - 17 0659 17 07 - 17 0659 17 - 17 0659      4689-3586 8900-7372 Total 1900-0659 Total 1247-4082 1697-3630 Total       Intake    P.O.  660  120 780  520  860 1380  240  -- 240    I.V.  220  320 540  20  -- 20  --  -- --    Total Intake   240 -- 240       Output    Urine  750  1700 2450  1575  2200 3775  900  -- 900    Total Output 750 1700 2450 1575 2200 3775 900 -- 900       Net I/O     130 -1260 -1130 -1035 -1340 -2375 -660 -- -660         Medications:  • enoxaparin (LOVENOX) inj 120 mg 120 mg at 17 1009   • furosemide (LASIX) tablet 40 mg 40 mg at 17 0526   • omeprazole (PRILOSEC) capsule 20 mg 20 mg at 17  0904   • senna-docusate (PERICOLACE or SENOKOT S) 8.6-50 MG per tablet 2 Tab 2 Tab at 06/26/17 2107   • docusate sodium (COLACE) capsule 200 mg 200 mg at 06/27/17 0905   • oxycodone immediate release (ROXICODONE) tablet 5-10 mg 10 mg at 06/25/17 0754   • insulin lispro (HUMALOG) injection 2-9 Units Stopped at 06/26/17 1800   • ceFAZolin (ANCEF) IVPB 2 g 2 g at 06/27/17 0519   • albuterol inhaler 2 Puff     • calcium carbonate (TUMS) chewable tab 1,000 mg 1,000 mg at 06/23/17 1556   • finasteride (PROSCAR) tablet 5 mg 5 mg at 06/27/17 0905       Medical Decision Making, by Problem:  Active Hospital Problems    Diagnosis   • *Penetrating back wound [S21.239A]   • Pneumoperitoneum [K66.8]   • Respiratory failure following trauma and surgery (CMS-HCC) [J95.822]   • Cellulitis of left foot [L03.116]   • Obesity (BMI 30-39.9) [E66.9]   • Chronic congestive heart failure (CMS-HCC) [I50.9]   • CAD (coronary artery disease) [I25.10]   • Chronic deep vein thrombosis (DVT) of popliteal vein (CMS-HCC) [I82.539]   • AV block, Mobitz 1 [I44.1]   • History of pulmonary embolus (PE) [Z86.711]   • Hemorrhagic disorder due to extrinsic circulating anticoagulants (CMS-HCC) [D68.32]   • 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 He will F/U as an outpatient with Dr. Black, as needed should his foot flair up off of antibiotics.   We will be glad to collaborate with Dr. Black as he sees fit.  Case and treatment reviewed with patient and family. ~ 35+ min on floor in SICU in direct patient care/counseling today.

## 2017-06-27 NOTE — DISCHARGE PLANNING
[facility] received a HH choice form. Per the choice form the referral has been sent to [facility]

## 2017-06-28 ENCOUNTER — APPOINTMENT (OUTPATIENT)
Dept: RADIOLOGY | Facility: MEDICAL CENTER | Age: 77
DRG: 907 | End: 2017-06-28
Attending: SURGERY
Payer: MEDICARE

## 2017-06-28 VITALS
HEIGHT: 70 IN | BODY MASS INDEX: 39.14 KG/M2 | TEMPERATURE: 98.6 F | DIASTOLIC BLOOD PRESSURE: 53 MMHG | SYSTOLIC BLOOD PRESSURE: 116 MMHG | RESPIRATION RATE: 18 BRPM | WEIGHT: 273.37 LBS | OXYGEN SATURATION: 94 % | HEART RATE: 60 BPM

## 2017-06-28 PROBLEM — K66.8 PNEUMOPERITONEUM: Status: RESOLVED | Noted: 2017-06-27 | Resolved: 2017-06-28

## 2017-06-28 LAB
ANION GAP SERPL CALC-SCNC: 7 MMOL/L (ref 0–11.9)
ANISOCYTOSIS BLD QL SMEAR: ABNORMAL
BASOPHILS # BLD AUTO: 0.5 % (ref 0–1.8)
BASOPHILS # BLD: 0.04 K/UL (ref 0–0.12)
BUN SERPL-MCNC: 14 MG/DL (ref 8–22)
CALCIUM SERPL-MCNC: 8.8 MG/DL (ref 8.5–10.5)
CHLORIDE SERPL-SCNC: 104 MMOL/L (ref 96–112)
CO2 SERPL-SCNC: 27 MMOL/L (ref 20–33)
COMMENT 1642: NORMAL
CREAT SERPL-MCNC: 0.7 MG/DL (ref 0.5–1.4)
CRP SERPL HS-MCNC: 2.83 MG/DL (ref 0–0.75)
EOSINOPHIL # BLD AUTO: 0.1 K/UL (ref 0–0.51)
EOSINOPHIL NFR BLD: 1.2 % (ref 0–6.9)
ERYTHROCYTE [DISTWIDTH] IN BLOOD BY AUTOMATED COUNT: 64.4 FL (ref 35.9–50)
GFR SERPL CREATININE-BSD FRML MDRD: >60 ML/MIN/1.73 M 2
GLUCOSE BLD-MCNC: 101 MG/DL (ref 65–99)
GLUCOSE BLD-MCNC: 109 MG/DL (ref 65–99)
GLUCOSE BLD-MCNC: 126 MG/DL (ref 65–99)
GLUCOSE SERPL-MCNC: 115 MG/DL (ref 65–99)
HCT VFR BLD AUTO: 35.5 % (ref 42–52)
HGB BLD-MCNC: 10.6 G/DL (ref 14–18)
HYPOCHROMIA BLD QL SMEAR: ABNORMAL
IMM GRANULOCYTES # BLD AUTO: 0.11 K/UL (ref 0–0.11)
IMM GRANULOCYTES NFR BLD AUTO: 1.4 % (ref 0–0.9)
INR PPP: 1.13 (ref 0.87–1.13)
LYMPHOCYTES # BLD AUTO: 0.9 K/UL (ref 1–4.8)
LYMPHOCYTES NFR BLD: 11.2 % (ref 22–41)
MCH RBC QN AUTO: 29.5 PG (ref 27–33)
MCHC RBC AUTO-ENTMCNC: 29.9 G/DL (ref 33.7–35.3)
MCV RBC AUTO: 98.9 FL (ref 81.4–97.8)
MONOCYTES # BLD AUTO: 0.69 K/UL (ref 0–0.85)
MONOCYTES NFR BLD AUTO: 8.6 % (ref 0–13.4)
MORPHOLOGY BLD-IMP: NORMAL
NEUTROPHILS # BLD AUTO: 6.21 K/UL (ref 1.82–7.42)
NEUTROPHILS NFR BLD: 77.1 % (ref 44–72)
NRBC # BLD AUTO: 0 K/UL
NRBC BLD AUTO-RTO: 0 /100 WBC
PLATELET # BLD AUTO: 236 K/UL (ref 164–446)
PLATELET BLD QL SMEAR: NORMAL
PMV BLD AUTO: 9.7 FL (ref 9–12.9)
POTASSIUM SERPL-SCNC: 3.8 MMOL/L (ref 3.6–5.5)
PROTHROMBIN TIME: 14.9 SEC (ref 12–14.6)
RBC # BLD AUTO: 3.59 M/UL (ref 4.7–6.1)
RBC BLD AUTO: PRESENT
SODIUM SERPL-SCNC: 138 MMOL/L (ref 135–145)
WBC # BLD AUTO: 8.1 K/UL (ref 4.8–10.8)

## 2017-06-28 PROCEDURE — A9270 NON-COVERED ITEM OR SERVICE: HCPCS | Performed by: INTERNAL MEDICINE

## 2017-06-28 PROCEDURE — A9270 NON-COVERED ITEM OR SERVICE: HCPCS

## 2017-06-28 PROCEDURE — 700102 HCHG RX REV CODE 250 W/ 637 OVERRIDE(OP): Performed by: SURGERY

## 2017-06-28 PROCEDURE — 99239 HOSP IP/OBS DSCHRG MGMT >30: CPT | Performed by: HOSPITALIST

## 2017-06-28 PROCEDURE — 71010 DX-CHEST-PORTABLE (1 VIEW): CPT

## 2017-06-28 PROCEDURE — A9270 NON-COVERED ITEM OR SERVICE: HCPCS | Performed by: SURGERY

## 2017-06-28 PROCEDURE — 85025 COMPLETE CBC W/AUTO DIFF WBC: CPT

## 2017-06-28 PROCEDURE — 700102 HCHG RX REV CODE 250 W/ 637 OVERRIDE(OP)

## 2017-06-28 PROCEDURE — 82962 GLUCOSE BLOOD TEST: CPT | Mod: 91

## 2017-06-28 PROCEDURE — 700102 HCHG RX REV CODE 250 W/ 637 OVERRIDE(OP): Performed by: INTERNAL MEDICINE

## 2017-06-28 PROCEDURE — 85610 PROTHROMBIN TIME: CPT

## 2017-06-28 PROCEDURE — 700111 HCHG RX REV CODE 636 W/ 250 OVERRIDE (IP): Performed by: HOSPITALIST

## 2017-06-28 PROCEDURE — 86140 C-REACTIVE PROTEIN: CPT

## 2017-06-28 PROCEDURE — 97535 SELF CARE MNGMENT TRAINING: CPT

## 2017-06-28 PROCEDURE — 80048 BASIC METABOLIC PNL TOTAL CA: CPT

## 2017-06-28 PROCEDURE — 36415 COLL VENOUS BLD VENIPUNCTURE: CPT

## 2017-06-28 RX ORDER — WARFARIN SODIUM 1 MG/1
1 TABLET ORAL
Status: DISCONTINUED | OUTPATIENT
Start: 2017-06-28 | End: 2017-06-28 | Stop reason: HOSPADM

## 2017-06-28 RX ORDER — POTASSIUM CHLORIDE 20 MEQ/1
20 TABLET, EXTENDED RELEASE ORAL DAILY
Qty: 30 TAB | Refills: 0 | Status: SHIPPED | OUTPATIENT
Start: 2017-06-28 | End: 2017-12-13

## 2017-06-28 RX ORDER — FUROSEMIDE 40 MG/1
40 TABLET ORAL DAILY
Qty: 30 TAB | Refills: 0 | Status: SHIPPED | OUTPATIENT
Start: 2017-06-28 | End: 2018-03-20

## 2017-06-28 RX ORDER — ALBUTEROL SULFATE 90 UG/1
2 AEROSOL, METERED RESPIRATORY (INHALATION) EVERY 4 HOURS PRN
Qty: 8.5 G | Refills: 0 | Status: SHIPPED | OUTPATIENT
Start: 2017-06-28 | End: 2017-12-13

## 2017-06-28 RX ORDER — OXYCODONE HYDROCHLORIDE 5 MG/1
5 TABLET ORAL EVERY 6 HOURS PRN
Qty: 10 TAB | Refills: 0 | Status: SHIPPED | OUTPATIENT
Start: 2017-06-28 | End: 2017-12-13

## 2017-06-28 RX ORDER — CEPHALEXIN 500 MG/1
500 CAPSULE ORAL DAILY
Status: DISCONTINUED | OUTPATIENT
Start: 2017-06-28 | End: 2017-06-28 | Stop reason: HOSPADM

## 2017-06-28 RX ORDER — FUROSEMIDE 40 MG/1
40 TABLET ORAL DAILY
Qty: 30 TAB | Refills: 0 | Status: SHIPPED | OUTPATIENT
Start: 2017-06-28 | End: 2017-06-28

## 2017-06-28 RX ORDER — ACETAMINOPHEN 325 MG/1
650 TABLET ORAL 4 TIMES DAILY
Qty: 30 TAB | Refills: 0
Start: 2017-06-28 | End: 2017-12-13

## 2017-06-28 RX ADMIN — WARFARIN SODIUM 1 MG: 1 TABLET ORAL at 17:01

## 2017-06-28 RX ADMIN — FUROSEMIDE 40 MG: 40 TABLET ORAL at 17:03

## 2017-06-28 RX ADMIN — ACETAMINOPHEN 650 MG: 325 TABLET, FILM COATED ORAL at 17:01

## 2017-06-28 RX ADMIN — ACETAMINOPHEN 650 MG: 325 TABLET, FILM COATED ORAL at 12:46

## 2017-06-28 RX ADMIN — CEPHALEXIN 500 MG: 500 CAPSULE ORAL at 09:30

## 2017-06-28 RX ADMIN — FUROSEMIDE 40 MG: 40 TABLET ORAL at 05:42

## 2017-06-28 RX ADMIN — ACETAMINOPHEN 650 MG: 325 TABLET, FILM COATED ORAL at 09:31

## 2017-06-28 RX ADMIN — FINASTERIDE 5 MG: 5 TABLET, FILM COATED ORAL at 09:29

## 2017-06-28 RX ADMIN — ENOXAPARIN SODIUM 120 MG: 150 INJECTION SUBCUTANEOUS at 09:32

## 2017-06-28 RX ADMIN — OMEPRAZOLE 20 MG: 20 CAPSULE, DELAYED RELEASE ORAL at 09:29

## 2017-06-28 ASSESSMENT — ENCOUNTER SYMPTOMS
FEVER: 0
COUGH: 0
NAUSEA: 0
ABDOMINAL PAIN: 0
SHORTNESS OF BREATH: 0
VOMITING: 0
CHILLS: 0
CONSTIPATION: 0

## 2017-06-28 ASSESSMENT — COGNITIVE AND FUNCTIONAL STATUS - GENERAL
DAILY ACTIVITIY SCORE: 20
DRESSING REGULAR LOWER BODY CLOTHING: A LITTLE
DRESSING REGULAR UPPER BODY CLOTHING: A LITTLE
HELP NEEDED FOR BATHING: A LITTLE
TOILETING: A LITTLE
SUGGESTED CMS G CODE MODIFIER DAILY ACTIVITY: CJ

## 2017-06-28 ASSESSMENT — PAIN SCALES - GENERAL
PAINLEVEL_OUTOF10: 0

## 2017-06-28 NOTE — DISCHARGE SUMMARY
CHIEF COMPLAINT ON ADMISSION  No chief complaint on file.      CODE STATUS  Full Code    HPI & HOSPITAL COURSE  This is a 77 y.o. male here with The patient is a 77-year-old male who was    admitted after he fell out of his tractor onto a hay bale hook penetrated left   posterior flank.  Patient had 1 liter blood loss on the scene and on Coumadin   for history of DVT/PE, was triaged and sent urgently to [facility].  He was seen by Dr. Amin, underwent central line placement    as well as left chest tube thoracostomy.  The patient tolerated this well.  He   had been on the medical floor for a few days; however, had increasing    bleeding and therefore went back to the ICU and was again stabilized.  He has    successfully been transitioned out of the intensive care unit; however he had    increase in swelling of the left foot, was placed on Ancef.  Orthopedic    surgery, Dr. Pace was consulted who recommended patient be seen by Dr. Black.  Patient also had bradycardia and was found to have first-degree    heart block and has been seen by cardiology his metoprolol was stopped and no pacemaker was recommended  he was diuresed for his LE edema. He had  Abdominal free air and had Exploratory laparotomy with no acute findings. He has gradually improved and is stable for discharge with home health, his anticoagulation has been restarted yesterday with lovenox and warfarin and he was instructed on lovenox self injections. He completed his antibiotic treatment and his cellulitis has resolved, if it recurrs he will need follow up with Dr Black to consider removal of hardware.    Therefore, he is discharged in good and stable condition with close outpatient follow-up.    SPECIFIC OUTPATIENT FOLLOW-UP  Dr Boone  PCP Dr Craft for recheck on edema     DISCHARGE PROBLEM LIST  Principal Problem:    Penetrating back wound POA: Yes      Overview: Large left paraspinous, posterior hematoma.      No  active extravasation.      6/8 - CT chest showed slightly larger SQ fluid than on admission. No       discrete drainable hematoma.       Ancef x 24hrs (completed 6/3).      Healing  Active Problems:    History of pulmonary embolus (PE) POA: Yes      Overview: With DVT.      On warfarin as an outpatient.      IVC filter present.      6/12 - Prophylactic Lovenox resumed.      Restart anticoagulation as outpatient.      Hemorrhagic disorder due to extrinsic circulating anticoagulants (CMS-[facility]) POA: Yes      Overview: Coumadin for h/o PE.      INR 2 on arrival, given 1 mg Factor VII and Vitamin K.      Trend INR, correct as clinically warranted.      6/12 - Prophylactic Lovenox resumed.      Restart coumadin on discharge.      AV block, Mobitz 1 POA: Yes      Overview: Observation only.      6/8 - EKG changes with frequent PVC's. Troponin < 0.02. Metoprolol held on       transfer to ICU.      6/11 - Restart metoprolol      6/17 - Metoprolol stopped per cardiology recommendations      - Cardiology following      Cruz Vargas MD. Cardiology    Chronic deep vein thrombosis (DVT) of popliteal vein (CMS-HCC) POA: Yes      Overview: Present on admission, takes outpatient coumadin.      Old clot in left popliteal vein.      6/4 - Prophylactic Lovenox initiated.      6/3 - Trauma screening bilateral lower extremity venous duplex positive       for chronic DVT of the left popliteal vein; no acute process in either       leg.      6/6 - Therapeutic Lovenox started.      6/8 - Stopped upon ICU transfer.      6/11 - Trauma screening bilateral lower extremity venous duplex positive       for partially occlusive chronic DVT seen in the left popliteal vein as        membrane-like structure with response to compression and good venous       flow. No evidence of acute DVT seen in the left lower extremity.      6/12 - Prophylactic Lovenox resumed.      Chronic congestive heart failure (CMS-[facility]) POA: Yes      Overview: 6/6 - Home lasix  and potassium started.      6/8 - Lasix stopped upon ICU transfer.      6/11- BNP low / restart lasix as BLE edema.      6/12 - BP low - Lasix and potassium on hold.      6/15 - BNP elevated. Resume Lasix.       6/26 - Lasix increased to 40mg PO BID with good response      Trend diagnostic laboratory studies     Cellulitis of left foot POA: Yes      Overview: No trauma or open wounds      History of instrumentation with implant 2005 - Sofia      6/17 - Commence Ancef       - 3 view left foot - Extensive dorsal and medial soft tissue swelling       primarily involving LEFT great toe with apparent resorption at the medial       aspect of the 1st metatarsophalangeal joint concerning for       osteomyelitis/septic arthritis.      - MRSA nasal swab negative, hemoglobin A1c 5.4      6/21 - LILY normal      6/26 - Cefazolin day 11, transition to cephalexin 6/27 per Dr Kevin Brown MD - ID      Zafar Pace MD - Ortho      Fran Black MD.  Orthopedics    Respiratory failure following trauma and surgery (CMS-HCC) POA: Yes      Overview: Left intubated post-op      Trauma weaning and ventilator protocol ordered      Successfully extubated      O2  At 2l/min/NC    Acute blood loss anemia POA: Yes      Overview: 1L of blood loss reported on scene.      Large paraspinous, posterior hematoma.      6/2 - Transfused 2 units PRBC.      6/10 - Transfused 1 unit PRBC. Iron replaced per pharmacy kinetics.       6/19 - Trend up      Transfuse 1 unit PRBC if Hb < 7.0.      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      Squamous cell carinoma left eye.      PRN wetting drops    Hemothorax on left POA: Unknown  Resolved Problems:    Pneumoperitoneum POA: No      Overview: 6/16 - CXR suggests free air under right aminata-diaphragm, clinically stable      6/20 - CT shows pneumoperitoneum, concerning for perforated viscus. The       area of perforation is probably in the  cecum given the wall thickening. No       free fluid seen.      6/21 - Ex lap for worsening clinical picture, negative      Tolerated a regular diet, having bowel movements, no reaccumulation of       penumoperitonuem      Dhiraj Amin MD    Hemothorax on left POA: Yes      Overview: 300cc evacuated initially.      24F chest tube placed.      6/6 - Water sealed.      6/7 - Interval removal of chest tube      6/8 - No pneumothorax post chest tube removal.      Serial CXR      Acute pain of left knee POA: Yes      Overview: Prior TKA.      6/11 - Imaging negative for left knee fracture or malalignment.     Pneumoperitoneum POA: Unknown      FOLLOW UP  No future appointments.  Wilfred Amin M.D.  [address]  [address]  [place] [address] [phone]    Schedule an appointment as soon as possible for a visit on 7/10/2017      Jesus Craft M.D.  [address] [facility]  [address]  [facility] [id number] [phone]    In 1 week        MEDICATIONS ON DISCHARGE   Kiran Eason   Home Medication Instructions SANDRA:[id number]    Printed on:06/28/17 1312   Medication Information                      acetaminophen (TYLENOL) 325 MG Tab  Take 2 Tabs by mouth 4 times a day.             albuterol 108 (90 BASE) MCG/ACT Aero Soln inhalation aerosol  Inhale 2 Puffs by mouth every four hours as needed.             enoxaparin (LOVENOX) 120 MG/0.8ML Solution inj  Inject 120 mg as instructed every 12 hours.             finasteride (PROSCAR) 5 MG Tab  Take 5 mg by mouth every day.             furosemide (LASIX) 40 MG Tab  Take 1 Tab by mouth every day.             oxycodone immediate-release (ROXICODONE) 5 MG Tab  Take 1 Tab by mouth every 6 hours as needed for Severe Pain.             potassium chloride (KLOR-CON) 20 MEQ Pack  Take 10 mEq by mouth 2 times a day.             potassium chloride SA (KDUR) 20 MEQ Tab CR  Take 1 Tab by mouth every day.             warfarin (COUMADIN) 1 MG Tab  Take 1 mg by mouth every day.                  DIET  Orders Placed This Encounter   Procedures   • DIET ORDER     Standing Status: Standing      Number of Occurrences: 1      Standing Expiration Date:      Order Specific Question:  Diet:     Answer:  Regular [1]       ACTIVITY  As tolerated.  Weight bearing as tolerated      CONSULTATIONS  Surgery  ID Dr Brown  Ortho Dr hinkle    PROCEDURES  Exploratory laparotomy  Central line placement    Left tube thoracostomy  LABORATORY  Lab Results   Component Value Date/Time    SODIUM 138 06/28/2017 04:44 AM    POTASSIUM 3.8 06/28/2017 04:44 AM    CHLORIDE 104 06/28/2017 04:44 AM    CO2 27 06/28/2017 04:44 AM    GLUCOSE 115* 06/28/2017 04:44 AM    BUN 14 06/28/2017 04:44 AM    CREATININE 0.70 06/28/2017 04:44 AM        Lab Results   Component Value Date/Time    WBC 8.1 06/28/2017 04:44 AM    HEMOGLOBIN 10.6* 06/28/2017 04:44 AM    HEMATOCRIT 35.5* 06/28/2017 04:44 AM    PLATELET COUNT 236 06/28/2017 04:44 AM        Total time of the discharge process exceeds 38 minutes

## 2017-06-28 NOTE — DISCHARGE PLANNING
CCS spoke to Rickie At [facility], the referral can be accepted if the patient signs an ABN. AKUA Nash was notified. Per Charity the patient's Wife would like the referral sent to [facility]. Referral has been forwarded to [facility]

## 2017-06-28 NOTE — PROGRESS NOTES
Received report from day shift RN. Pt A&O x 4. Pt denies pain, nausea, vomiting, chest pain, shortness of breath at this time. Pt on 1L of O2 via NC, saturation in low to mid 90s. Tele monitor in place. Midline incision with staples, JOSY, no infection sign noted. +BM. +hyperactive BS x 4. +void. Pt ambulates with 1 assist and FWW. Pt tolerating regular diet. Plan of care discussed with pt. All needs are met at this time. Call light within reach. Bed locked and in lowest position.

## 2017-06-28 NOTE — PROGRESS NOTES
Monitor Summary:    Second degree heart block: 70; Sinus rhythm- sinus tach:   Frequent PVCs, rare bigem, and rare couplets  .16/.10/.32

## 2017-06-28 NOTE — DISCHARGE PLANNING
CCS received a call from Bryanna at [facility] referral has been accepted.

## 2017-06-28 NOTE — FACE TO FACE
Face to Face Note  -  Durable Medical Equipment    NIA Dixon - NPI: [id number]  I certify that this patient is under my care and that they had a durable medical equipment(DME)face to face encounter by myself that meets the physician DME face-to-face encounter requirements with this patient on:    Date of encounter:   Patient:                    MRN:                       YOB: 2017  Kiran Eason  [id number]  1940     The encounter with the patient was in whole, or in part, for the following medical condition, which is the primary reason for durable medical equipment:  Post-Op Surgery    I certify that, based on my findings, the following durable medical equipment is medically necessary:  Oxygen.    HOME O2 Saturation Measurements:(Values must be present for Home Oxygen orders)  Room air sat at rest: 90  Room air sat with amb: 78  With liters of O2: 1, O2 sat at rest with O2: 97  With Liters of O2: 5, O2 sat with amb with O2 : 92  Is the patient mobile?: Yes    My Clinical findings support the need for the above equipment due to:  Hypoxia

## 2017-06-28 NOTE — PROGRESS NOTES
Monitor Summary:    Sinus Rhythm: 75-92 with second degree heart block.     Frequent PVC's, rare couplets, rare triplets, rare blocked PAC's, with a nicole down to 45.      - / .10 / .36

## 2017-06-28 NOTE — PROGRESS NOTES
Infectious Disease Progress Note    Author: Ambrose Brown Date & Time created: 6/28/2017  7:07 AM     Ancef for 10 days changed to suppressive Keflex on 6/27.    Interval History:  Past 24 hrs reviewed with RN.    Labs Reviewed, Medications Reviewed, Radiology Reviewed, Wound Reviewed, Fluids Reviewed and GI Nutrition Reviewed.    Review of Systems:  Review of Systems   Constitutional: Negative for fever and chills.   Respiratory: Negative for cough and shortness of breath.    Gastrointestinal: Negative for nausea, vomiting, abdominal pain and constipation.   Genitourinary: Negative for dysuria, urgency and frequency.   Musculoskeletal:        Left foot OK.   Skin: Negative for itching and rash.       Physical Exam:  Physical Exam   Constitutional: He is oriented to person, place, and time. He appears well-developed.   HENT:   Head: Normocephalic and atraumatic.   Cardiovascular: Normal rate.    Rare AV Wenckebach.   Pulmonary/Chest: Effort normal. No respiratory distress. He has no wheezes.   Abdominal: Soft. He exhibits no distension. There is no tenderness. There is no guarding.   Musculoskeletal:   Left foot without erythema, induration, increased warmth or pain.   Neurological: He is alert and oriented to person, place, and time.   Skin: Skin is dry.   Psychiatric: He has a normal mood and affect.   Nursing note and vitals reviewed.      Labs:  Recent Results (from the past 24 hour(s))   PROTHROMBIN TIME    Collection Time: 06/27/17  9:36 AM   Result Value Ref Range    PT 14.0 12.0 - 14.6 sec    INR 1.05 0.87 - 1.13   ACCU-CHEK GLUCOSE    Collection Time: 06/27/17 10:57 AM   Result Value Ref Range    Glucose - Accu-Ck 132 (H) 65 - 99 mg/dL   ACCU-CHEK GLUCOSE    Collection Time: 06/27/17  4:54 PM   Result Value Ref Range    Glucose - Accu-Ck 139 (H) 65 - 99 mg/dL   ACCU-CHEK GLUCOSE    Collection Time: 06/28/17 12:43 AM   Result Value Ref Range    Glucose - Accu-Ck 109 (H) 65 - 99 mg/dL   CRP QUANTITIVE  (NON-CARDIAC)    Collection Time: 06/28/17  4:44 AM   Result Value Ref Range    Stat C-Reactive Protein 2.83 (H) 0.00 - 0.75 mg/dL   CBC WITH DIFFERENTIAL    Collection Time: 06/28/17  4:44 AM   Result Value Ref Range    WBC 8.1 4.8 - 10.8 K/uL    RBC 3.59 (L) 4.70 - 6.10 M/uL    Hemoglobin 10.6 (L) 14.0 - 18.0 g/dL    Hematocrit 35.5 (L) 42.0 - 52.0 %    MCV 98.9 (H) 81.4 - 97.8 fL    MCH 29.5 27.0 - 33.0 pg    MCHC 29.9 (L) 33.7 - 35.3 g/dL    RDW 64.4 (H) 35.9 - 50.0 fL    Platelet Count 236 164 - 446 K/uL    MPV 9.7 9.0 - 12.9 fL    Nucleated RBC 0.00 /100 WBC    NRBC (Absolute) 0.00 K/uL    Neutrophils-Polys 77.10 (H) 44.00 - 72.00 %    Lymphocytes 11.20 (L) 22.00 - 41.00 %    Monocytes 8.60 0.00 - 13.40 %    Eosinophils 1.20 0.00 - 6.90 %    Basophils 0.50 0.00 - 1.80 %    Immature Granulocytes 1.40 (H) 0.00 - 0.90 %    Lymphs (Absolute) 0.90 (L) 1.00 - 4.80 K/uL    Monos (Absolute) 0.69 0.00 - 0.85 K/uL    Eos (Absolute) 0.10 0.00 - 0.51 K/uL    Baso (Absolute) 0.04 0.00 - 0.12 K/uL    Immature Granulocytes (abs) 0.11 0.00 - 0.11 K/uL    Neutrophils (Absolute) 6.21 1.82 - 7.42 K/uL    Hypochromia 1+     Anisocytosis 1+    PROTHROMBIN TIME    Collection Time: 06/28/17  4:44 AM   Result Value Ref Range    PT 14.9 (H) 12.0 - 14.6 sec    INR 1.13 0.87 - 1.13   BASIC METABOLIC PANEL    Collection Time: 06/28/17  4:44 AM   Result Value Ref Range    Sodium 138 135 - 145 mmol/L    Potassium 3.8 3.6 - 5.5 mmol/L    Chloride 104 96 - 112 mmol/L    Co2 27 20 - 33 mmol/L    Glucose 115 (H) 65 - 99 mg/dL    Bun 14 8 - 22 mg/dL    Creatinine 0.70 0.50 - 1.40 mg/dL    Calcium 8.8 8.5 - 10.5 mg/dL    Anion Gap 7.0 0.0 - 11.9   ESTIMATED GFR    Collection Time: 06/28/17  4:44 AM   Result Value Ref Range    GFR If African American >60 >60 mL/min/1.73 m 2    GFR If Non African American >60 >60 mL/min/1.73 m 2   PERIPHERAL SMEAR REVIEW    Collection Time: 06/28/17  4:44 AM   Result Value Ref Range    Peripheral Smear Review see  below    PLATELET ESTIMATE    Collection Time: 17  4:44 AM   Result Value Ref Range    Plt Estimation Normal    MORPHOLOGY    Collection Time: 17  4:44 AM   Result Value Ref Range    RBC Morphology Present    DIFFERENTIAL COMMENT    Collection Time: 17  4:44 AM   Result Value Ref Range    Comments-Diff see below    ACCU-CHEK GLUCOSE    Collection Time: 17  5:38 AM   Result Value Ref Range    Glucose - Accu-Ck 126 (H) 65 - 99 mg/dL     Results     ** No results found for the last 168 hours. **            Hemodynamics:  Temp (24hrs), Av.7 °C (98 °F), Min:36.1 °C (97 °F), Max:36.9 °C (98.4 °F)  Temperature: 36.1 °C (97 °F)  Pulse  Av.5  Min: 45  Max: 160   Blood Pressure : 129/72 mmHg     PIV Group Right Forearm 20g (Active)   Line Secured Transparent;Taped 2017  8:00 PM   Site Condition / Description Assessed;Patent 2017  8:00 PM   Dressing Type / Description Transparent;Clean;Dry;Intact 2017  8:00 PM   Dressing Status Observed 2017  8:00 PM   Saline Locked Yes 2017  8:00 PM   Infiltration Grading Used by [facility] 0 2017  8:00 PM   Phlebitis Scale (Used by [facility]) 0 2017  8:00 PM   Date Primary Tubing Changed 17  7:45 AM   NEXT Primary Tubing Change  17  7:45 AM       Wound:  Surgical Incision  Incision Abdomen (Active)   Wound Bed Pink 2017  8:00 PM   Drainage  None 2017  8:00 PM   Periwound Skin Pink;Normal;Intact 2017  8:00 PM   Daily - Wound Closure Staples;Open to Air;Approximated 2017  8:00 PM   Dressing Options Open to Air 2017  8:00 PM   Dressing Status / Change Not Applicable 2017  8:00 AM   Daily - Dressing Change Observed 2017  8:00 AM       Wound POA Puncture Flank Left (Active)   Wound Bed Pink;Red;Purple 2017  8:00 PM   Drainage  None 2017  8:00 PM   Periwound Skin Discolored;Normal 2017  8:00 PM   Cleansing Not Applicable 2017  8:00 PM    Dressing Options Open to Air 6/27/2017  8:00 PM   Dressing Status / Change Not Applicable 6/27/2017  8:00 PM   Dressing Cleansing/Solutions Not Applicable 6/27/2017  8:00 PM   Periwound Protectant Not Applicable 6/22/2017  8:00 AM       Wound Skin Tear Face Left (Active)   Wound Bed Pink 6/27/2017  8:00 PM   Drainage  None 6/27/2017  8:00 PM   Periwound Skin Pink 6/27/2017  8:00 PM   Cleansing Not Applicable 6/23/2017  8:00 AM   Dressing Options Open to Air 6/27/2017  8:00 PM   Dressing Status / Change Not Applicable 6/25/2017  8:00 PM   Dressing Cleansing/Solutions Not Applicable 6/25/2017  8:00 PM   Weekly Photo (Inpatient Only) 06/22/17 6/24/2017  8:00 AM   Time Spent with Patient (mins) 30 6/25/2017  6:00 PM          Fluids:  Intake/Output       06/26/17 0700 - 06/27/17 0659 06/27/17 0700 - 06/28/17 0659 06/28/17 0700 - 06/29/17 0659      8131-5626 8063-1405 Total 2438-6290 3216-5852 Total 7026-1634 5759-9055 Total       Intake    P.O.  520  860 1380  660  598 1258  --  -- --    P.O.   -- -- --    I.V.  20  -- 20  --  -- --  --  -- --    IV Volume (LR) 20 -- 20 -- -- -- -- -- --    Total Intake   -- -- --       Output    Urine  1575  2200 3775  1600  1320 2920  --  -- --    Number of Times Voided 6 x 6 x 12 x -- 3 x 3 x -- -- --    Indwelling Cathether 400 -- 400 -- -- -- -- -- --    Void (ml) 1175 2200 3375 1600 1320 2920 -- -- --    Stool  --  -- --  --  -- --  --  -- --    Number of Times Stooled 1 x 0 x 1 x 1 x 1 x 2 x -- -- --    Total Output 0379 5113 5919 1600 1320 2920 -- -- --       Net I/O     -1035 -1340 -2375 -940 -722 -1662 -- -- --             GI/Nutrition:  Orders Placed This Encounter   Procedures   • DIET ORDER     Standing Status: Standing      Number of Occurrences: 1      Standing Expiration Date:      Order Specific Question:  Diet:     Answer:  Regular [1]       Medications:  Current Facility-Administered Medications   Medication Last Dose    • enoxaparin (LOVENOX) inj 120 mg 120 mg at 06/27/17 2045   • MD ALERT... warfarin (COUMADIN) per pharmacy protocol     • cephALEXin (KEFLEX) capsule 500 mg 500 mg at 06/27/17 2046   • oxycodone immediate-release (ROXICODONE) tablet 5 mg      Or   • oxycodone immediate release (ROXICODONE) tablet 10 mg     • furosemide (LASIX) tablet 40 mg 40 mg at 06/28/17 0542   • omeprazole (PRILOSEC) capsule 20 mg 20 mg at 06/27/17 0904   • senna-docusate (PERICOLACE or SENOKOT S) 8.6-50 MG per tablet 2 Tab 2 Tab at 06/26/17 2107   • docusate sodium (COLACE) capsule 200 mg 200 mg at 06/27/17 0905   • acetaminophen (TYLENOL) tablet 650 mg Stopped at 06/26/17 1700   • insulin lispro (HUMALOG) injection 2-9 Units Stopped at 06/26/17 1800   • albuterol inhaler 2 Puff     • calcium carbonate (TUMS) chewable tab 1,000 mg 1,000 mg at 06/23/17 1556   • finasteride (PROSCAR) tablet 5 mg 5 mg at 06/27/17 0905   • Respiratory Care per Protocol     • senna-docusate (PERICOLACE or SENOKOT S) 8.6-50 MG per tablet 1 Tab 1 Tab at 06/23/17 0639   • polyethylene glycol/lytes (MIRALAX) PACKET 1 Packet 1 Packet at 06/26/17 2107   • magnesium hydroxide (MILK OF MAGNESIA) suspension 30 mL Stopped at 06/26/17 0900   • bisacodyl (DULCOLAX) suppository 10 mg 10 mg at 06/23/17 1638   • fleet enema 133 mL     • ondansetron (ZOFRAN) syringe/vial injection 4 mg 4 mg at 06/23/17 1647       Medical Decision Making, by Problem:  Active Hospital Problems    Diagnosis   • *Penetrating back wound [S21.239A]   • Respiratory failure following trauma and surgery (CMS-[facility]) [J95.822]   • Cellulitis of left foot [L03.116]   • Chronic congestive heart failure (CMS-HCC) [I50.9]   • Chronic deep vein thrombosis (DVT) of popliteal vein (CMS-HCC) [I82.539]   • AV block, Mobitz 1 [I44.1]   • History of pulmonary embolus (PE) [Z86.711]   • Hemorrhagic disorder due to extrinsic circulating anticoagulants (CMS-HCC) [D68.32]   • 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 If his foot turns red again as outpatient he is to call Dr. Black his orthopedic surgeon for possible hardware removal or consider life long antibiotic suppression. Case and treatment reviewed with patient and RN ~ 30 min on floor in direct patient care/counseling and D/C planning today.

## 2017-06-28 NOTE — DISCHARGE PLANNING
The patient will discharge home today and will be followed by [facility] for home health.  They will do the next INR draw on Thursday, 6/29.  INR results will be called to the [facility].  [facility] has this information and have agreed to it.  The patient has oxygen ordered and [employer] requires cash payment because the patient's diagnosis is not acceptable for Medicare to pay for it.  Therefore, the patient's wife has chosen [employer] because it costs less and she will pay for it upon delivery to the patient's room.  I gave the choice form to Taylor and she sent the referral to [employer].  At this time that is what we are waiting for.

## 2017-06-28 NOTE — DISCHARGE PLANNING
CCS received a DME choice from per the choice form the referral has been sent to [facility]

## 2017-06-28 NOTE — THERAPY
"Occupational Therapy Treatment completed with focus on ADLs, ADL transfers and patient education.  Functional Status:  Pt seen for OT tx today.  Pt was pleasant and cooperative throughout the session but continues to be limited by weakness, endurance, and self care.  Pt completed supine to sit min A at times but can CGA, sit to stand with min A from low surface and CGA from higher, room ambulation using FWW from bed to toilet with CGA.  Completed LB dressing with max A but will have assistance from family 24/7 once home and they stated will help him.  Pt needed mod A for shower unable to reach LB but educated on the use of AE.  Pt will be returning home with 24/7 care per dtr for all needed ADL activities.    Plan of Care: Will benefit from Occupational Therapy 3 times per week  Discharge Recommendations:  Equipment Will Continue to Assess for Equipment Needs. Post-acute therapy Discharge to home with outpatient or home health for additional skilled therapy services.    See \"Rehab Therapy-Acute\" Patient Summary Report for complete documentation.   "

## 2017-06-28 NOTE — CARE PLAN
Problem: Safety  Goal: Will remain free from falls  Outcome: PROGRESSING AS EXPECTED  Pt educated regarding fall risk and intervention. Pt verbalized understanding and still refusing bed alarm. Pt A&O x4. Pt demonstrates appropriate use of call light to call for assistance. Hourly rounding in place.    Problem: Venous Thromboembolism (VTW)/Deep Vein Thrombosis (DVT) Prevention:  Goal: Patient will participate in Venous Thrombosis (VTE)/Deep Vein Thrombosis (DVT)Prevention Measures  Outcome: PROGRESSING AS EXPECTED  SCDs in place. Lovenox given per MAR.

## 2017-06-28 NOTE — PROGRESS NOTES
"Surgery Note:    /65 mmHg  Pulse 89  Temp(Src) 37.1 °C (98.8 °F)  Resp 18  Ht 1.778 m (5' 10\")  Wt 124 kg (273 lb 5.9 oz)  BMI 39.22 kg/m2  SpO2 93%    Continues to tolerate diet  (+)BM x2 today  Incision approximated with staples, no s/s of infection  Remove staples today  Medically cleared for post-acute services - medicine team arranging home health and home O2  Likely discharge this afternoon  Follow up with Dr. Amin on July 10, 2017  "

## 2017-06-28 NOTE — PROGRESS NOTES
Inpatient Anticoagulation Service Note    Date: 6/28/2017  Reason for Anticoagulation: Deep Vein Thrombosis  Hemoglobin Value: 10.6  Hematocrit Value: 35.5  Lab Platelet Value: 236  Target INR: 2.0 to 3.0  INR from last 7 days     Date/Time INR Value    06/28/17 0444 1.13    06/27/17 0936 1.05        Dose from last 7 days     Date/Time Dose (mg)    06/28/17 0900 1    06/27/17 1100 2        Home dose = 1 mg daily  Significant Interactions: Antibiotics, Proton Pump Inhibitor  Bridge Therapy: Yes  Bridge Therapy Start Date: 06/27/17  Days of Overlap Therapy: 1    Comments: INR up a little. I will start the patient on his home dosing of 1 mg daily and trend a few INRs. As the patient only requires such a small home dose, I am afriad to boost him with too much. No s/s of bleeding noted and H&H is stable. Continue with bridge therapy    Plan:  1 mg tonight, INR for AM  Education Material Provided?: No, chronic coagulation patient  Pharmacist suggested discharge dosing: warfarin 1 mg daily. Recommend bridge therapy for at least full 5 days with 2 consecutive days having INRs >2. Recommend patient have INR checked 2-3 days after discharge.     Anjali Brambila, PharmD

## 2017-06-28 NOTE — PROGRESS NOTES
AO x 4, up to chair for meals, ambulated in hallway with sba and fww (has own fww at home)  Supplemental O2 in use.  IS in use, achieves 1500.  Staples removed from midline incision, remains well approximated with steri strips.  Dressing changed to back chest tube (hook) site.  Wound c/d/i.  Remote tele monitor in use.  Pain level 0/10.  Plan of care discussed, questions answered, verbalized understanding.

## 2017-06-29 ENCOUNTER — TELEPHONE (OUTPATIENT)
Dept: VASCULAR LAB | Facility: MEDICAL CENTER | Age: 77
End: 2017-06-29

## 2017-06-29 NOTE — PROGRESS NOTES
Monitor Summary: SR-ST  with second degree heart block  Frequent pvc's, rare couplets, rare triplets,   .26/.10/.30  Per tele monitor flow sheet.

## 2017-06-29 NOTE — PROGRESS NOTES
Discharging Patient home per physician order.  Discharged with wife.  Demonstrated understanding of discharge instructions, follow up appointments, home medications, prescriptions, home care for surgical wound, and nursing care instructions for ex lap, chest tube aftercare, coumadin use at home, home lovenox injections, home O2, IS.  Ambulating with sb assistance, voiding without difficulty, pain well controlled, tolerating oral medications, oxygen saturation greater than 90% , tolerating diet.   Educational handouts given and discussed.  CHF handouts given and teaching with teach back provided. Discussed home meds and times next doses due. Verbalized understanding of discharge instructions and educational handouts.  Demonstrated understanding of home Lovenox use. All questions answered.  Patient able to state several reasons why to return to the ED or seek medical attention. Belongings with patient at time of discharge.

## 2017-06-29 NOTE — TELEPHONE ENCOUNTER
[facility]    Called pt to establish with [facility].  Pt and his wife state they have an MD (Dr Craft) who monitors warfarin therapy.  They would like to resume care with Dr Craft.  Instructed pt to contact MD today and to contact us in the future if they need help dosing warfarin in the future.    Pt discharged from [facility]    Colt Jordan, PHARMD  CC: Dr Becky Vaughn, Dr Michael Bloch

## 2017-06-29 NOTE — DISCHARGE INSTRUCTIONS
Discharge Instructions    Discharged to home by car with relative. Discharged via wheelchair, hospital escort: Yes.  Special equipment needed: Oxygen    Be sure to schedule a follow-up appointment with your primary care doctor or any specialists as instructed.     Discharge Plan:   Diet Plan: Discussed  Activity Level: Discussed  Confirmed Follow up Appointment: Patient to Call and Schedule Appointment  Confirmed Symptoms Management: Discussed  Medication Reconciliation Updated: Yes  Influenza Vaccine Indication: Patient Refuses    I understand that a diet low in cholesterol, fat, and sodium is recommended for good health. Unless I have been given specific instructions below for another diet, I accept this instruction as my diet prescription.   Other diet: cardiac    Special Instructions: None    · Is patient discharged on Warfarin / Coumadin?   Yes    You are receiving the drug warfarin. Please understand the importance of monitoring warfarin with scheduled PT/INR blood draws.  Follow-up with a call to your personal Doctor's office in 3 days to schedule a PT/INR. Home health RN to draw INR on Friday    IMPORTANT: HOW TO USE THIS INFORMATION:  This is a summary and does NOT have all possible information about this product. This information does not assure that this product is safe, effective, or appropriate for you. This information is not individual medical advice and does not substitute for the advice of your health care professional. Always ask your health care professional for complete information about this product and your specific health needs.      WARFARIN - ORAL (WARF-uh-rin)      COMMON BRAND NAME(S): Coumadin      WARNING:  Warfarin can cause very serious (possibly fatal) bleeding. This is more likely to occur when you first start taking this medication or if you take too much warfarin. To decrease your risk for bleeding, your doctor or other health care provider will monitor you closely and check your  "lab results (INR test) to make sure you are not taking too much warfarin. Keep all medical and laboratory appointments. Tell your doctor right away if you notice any signs of serious bleeding. See also Side Effects section.      USES:  This medication is used to treat blood clots (such as in deep vein thrombosis-DVT or pulmonary embolus-PE) and/or to prevent new clots from forming in your body. Preventing harmful blood clots helps to reduce the risk of a stroke or heart attack. Conditions that increase your risk of developing blood clots include a certain type of irregular heart rhythm (atrial fibrillation), heart valve replacement, recent heart attack, and certain surgeries (such as hip/knee replacement). Warfarin is commonly called a \"blood thinner,\" but the more correct term is \"anticoagulant.\" It helps to keep blood flowing smoothly in your body by decreasing the amount of certain substances (clotting proteins) in your blood.      HOW TO USE:  Read the Medication Guide provided by your pharmacist before you start taking warfarin and each time you get a refill. If you have any questions, ask your doctor or pharmacist. Take this medication by mouth with or without food as directed by your doctor or other health care professional, usually once a day. It is very important to take it exactly as directed. Do not increase the dose, take it more frequently, or stop using it unless directed by your doctor. Dosage is based on your medical condition, laboratory tests (such as INR), and response to treatment. Your doctor or other health care provider will monitor you closely while you are taking this medication to determine the right dose for you. Use this medication regularly to get the most benefit from it. To help you remember, take it at the same time each day. It is important to eat a balanced, consistent diet while taking warfarin. Some foods can affect how warfarin works in your body and may affect your treatment and " dose. Avoid sudden large increases or decreases in your intake of foods high in vitamin K (such as broccoli, cauliflower, cabbage, brussels sprouts, kale, spinach, and other green leafy vegetables, liver, green tea, certain vitamin supplements). If you are trying to lose weight, check with your doctor before you try to go on a diet. Cranberry products may also affect how your warfarin works. Limit the amount of cranberry juice (16 ounces/480 milliliters a day) or other cranberry products you may drink or eat.      SIDE EFFECTS:  Nausea, loss of appetite, or stomach/abdominal pain may occur. If any of these effects persist or worsen, tell your doctor or pharmacist promptly. Remember that your doctor has prescribed this medication because he or she has judged that the benefit to you is greater than the risk of side effects. Many people using this medication do not have serious side effects. This medication can cause serious bleeding if it affects your blood clotting proteins too much (shown by unusually high INR lab results). Even if your doctor stops your medication, this risk of bleeding can continue for up to a week. Tell your doctor right away if you have any signs of serious bleeding, including: unusual pain/swelling/discomfort, unusual/easy bruising, prolonged bleeding from cuts or gums, persistent/frequent nosebleeds, unusually heavy/prolonged menstrual flow, pink/dark urine, coughing up blood, vomit that is bloody or looks like coffee grounds, severe headache, dizziness/fainting, unusual or persistent tiredness/weakness, bloody/black/tarry stools, chest pain, shortness of breath, difficulty swallowing. Tell your doctor right away if any of these unlikely but serious side effects occur: persistent nausea/vomiting, severe stomach/abdominal pain, yellowing eyes/skin. This drug rarely has caused very serious (possibly fatal) problems if its effects lead to small blood clots (usually at the beginning of treatment).  This can lead to severe skin/tissue damage that may require surgery or amputation if left untreated. Patients with certain blood conditions (protein C or S deficiency) may be at greater risk. Get medical help right away if any of these rare but serious side effects occur: painful/red/purplish patches on the skin (such as on the toe, breast, abdomen), change in the amount of urine, vision changes, confusion, slurred speech, weakness on one side of the body. A very serious allergic reaction to this drug is rare. However, get medical help right away if you notice any symptoms of a serious allergic reaction, including: rash, itching/swelling (especially of the face/tongue/throat), severe dizziness, trouble breathing. This is not a complete list of possible side effects. If you notice other effects not listed above, contact your doctor or pharmacist. In the [place] - Call your doctor for medical advice about side effects. You may report side effects to FDA at 9-007-GJN-2324. In [place] - Call your doctor for medical advice about side effects. You may report side effects to [facility] [place] at 1-451.717.3089.      PRECAUTIONS:  Before taking warfarin, tell your doctor or pharmacist if you are allergic to it; or if you have any other allergies. This product may contain inactive ingredients, which can cause allergic reactions or other problems. Talk to your pharmacist for more details. Before using this medication, tell your doctor or pharmacist your medical history, especially of: blood disorders (such as anemia, hemophilia), bleeding problems (such as bleeding of the stomach/intestines, bleeding in the brain), blood vessel disorders (such as aneurysms), recent major injury/surgery, liver disease, alcohol use, mental/mood disorders (including memory problems), frequent falls/injuries. It is important that all your doctors and dentists know that you take warfarin. Before having surgery or any medical/dental procedures, tell your  doctor or dentist that you are taking this medication and about all the products you use (including prescription drugs, nonprescription drugs, and herbal products). Avoid getting injections into the muscles. If you must have an injection into a muscle (for example, a flu shot), it should be given in the arm. This way, it will be easier to check for bleeding and/or apply pressure bandages. This medication may cause stomach bleeding. Daily use of alcohol while using this medicine will increase your risk for stomach bleeding and may also affect how this medication works. Limit or avoid alcoholic beverages. If you have not been eating well, if you have an illness or infection that causes fever, vomiting, or diarrhea for more than 2 days, or if you start using any antibiotic medications, contact your doctor or pharmacist immediately because these conditions can affect how warfarin works. This medication can cause heavy bleeding. To lower the chance of getting cut, bruised, or injured, use great caution with sharp objects like safety razors and nail cutters. Use an electric razor when shaving and a soft toothbrush when brushing your teeth. Avoid activities such as contact sports. If you fall or injure yourself, especially if you hit your head, call your doctor immediately. Your doctor may need to check you. The Food & Drug Administration has stated that generic warfarin products are interchangeable. However, consult your doctor or pharmacist before switching warfarin products. Be careful not to take more than one medication that contains warfarin unless specifically directed by the doctor or health care provider who is monitoring your warfarin treatment. Older adults may be at greater risk for bleeding while using this drug. This medication is not recommended for use during pregnancy because of serious (possibly fatal) harm to an unborn baby. Discuss the use of reliable forms of birth control with your doctor. If you  "become pregnant or think you may be pregnant, tell your doctor immediately. If you are planning pregnancy, discuss a plan for managing your condition with your doctor before you become pregnant. Your doctor may switch the type of medication you use during pregnancy. Very small amounts of this medication may pass into breast milk but is unlikely to harm a nursing infant. Consult your doctor before breast-feeding.      DRUG INTERACTIONS:  Drug interactions may change how your medications work or increase your risk for serious side effects. This document does not contain all possible drug interactions. Keep a list of all the products you use (including prescription/nonprescription drugs and herbal products) and share it with your doctor and pharmacist. Do not start, stop, or change the dosage of any medicines without your doctor's approval. Warfarin interacts with many prescription, nonprescription, vitamin, and herbal products. This includes medications that are applied to the skin or inside the vagina or rectum. The interactions with warfarin usually result in an increase or decrease in the \"blood-thinning\" (anticoagulant) effect. Your doctor or other health care professional should closely monitor you to prevent serious bleeding or clotting problems. While taking warfarin, it is very important to tell your doctor or pharmacist of any changes in medications, vitamins, or herbal products that you are taking. Some products that may interact with this drug include: capecitabine, imatinib, mifepristone. Aspirin, aspirin-like drugs (salicylates), and nonsteroidal anti-inflammatory drugs (NSAIDs such as ibuprofen, naproxen, celecoxib) may have effects similar to warfarin. These drugs may increase the risk of bleeding problems if taken during treatment with warfarin. Carefully check all prescription/nonprescription product labels (including drugs applied to the skin such as pain-relieving creams) since the products may " contain NSAIDs or salicylates. Talk to your doctor about using a different medication (such as acetaminophen) to treat pain/fever. Low-dose aspirin and related drugs (such as clopidogrel, ticlopidine) should be continued if prescribed by your doctor for specific medical reasons such as heart attack or stroke prevention. Consult your doctor or pharmacist for more details. Many herbal products interact with warfarin. Tell your doctor before taking any herbal products, especially bromelains, coenzyme Q10, cranberry, danshen, dong quai, fenugreek, garlic, ginkgo biloba, ginseng, and [place]verson's wort, among others. This medication may interfere with a certain laboratory test to measure theophylline levels, possibly causing false test results. Make sure laboratory personnel and all your doctors know you use this drug.      OVERDOSE:  If overdose is suspected, contact a poison control center or emergency room immediately. [place] residents can call the [place] Poison Hotline at 1-187.833.4994. [place] residents can call a provincial poison control center. Symptoms of overdose may include: bloody/black/tarry stools, pink/dark urine, unusual/prolonged bleeding.      NOTES:  Do not share this medication with others. Laboratory and/or medical tests (such as INR, complete blood count) must be performed periodically to monitor your progress or check for side effects. Consult your doctor for more details.      MISSED DOSE:  For the best possible benefit, do not miss any doses. If you do miss a dose and remember on the same day, take it as soon as you remember. If you remember on the next day, skip the missed dose and resume your usual dosing schedule. Do not double the dose to catch up because this could increase your risk for bleeding. Keep a record of missed doses to give to your doctor or pharmacist. Contact your doctor or pharmacist if you miss 2 or more doses in a row.      STORAGE:  Store at room temperature away from light  and moisture. Do not store in the bathroom. Keep all medications away from children and pets. Do not flush medications down the toilet or pour them into a drain unless instructed to do so. Properly discard this product when it is  or no longer needed. Consult your pharmacist or local waste disposal company for more details about how to safely discard your product.      MEDICAL ALERT:  Your condition and medication can cause complications in a medical emergency. For information about enrolling in MedicAlert, call 1-271.812.2148 ([place]) or 1-118.428.1035 ([place]).      Information last revised 2010 Copyright(c)  [employer]             · Is patient Post Blood Transfusion?  Yes  POST BLOOD TRANSFUSION INFORMATION (ADULT)    The purpose of blood transfusion is to correct anemia, low levels of blood clotting factors or to correct acute blood loss.   Blood transfusion is very safe but occasionally unexpected adverse reactions do occur. Most adverse reactions occur during transfusion, within one to two days following transfusion or one to two weeks following transfusion. Some adverse reactions can occur one to six months after transfusion and some even years later.             If any of the symptoms listed below happen to you during your transfusion,                                 please notify your nurse immediately.   · Fever or Chills  · Flushing of the Face  · Hives, rash or itching  · Difficulty in breathing or shortness of breath  · Pain or oozing of blood from the IV needle site  · Low back pain  · Nausea or vomiting  · Weakness or fainting  · Chest pain  · Blood in the urine  · Decreased frequency of urination    The above symptoms may also occur within 24 to 48 after transfusion; if so, notify your physician.     · Yellowing of the skin    This can happen one to six months after transfusion; if so, notify your physician    Depression / Suicide Risk    As you are discharged from this  [facility], it is important to learn how to keep safe from harming yourself.    Recognize the warning signs:  · Abrupt changes in personality, positive or negative- including increase in energy   · Giving away possessions  · Change in eating patterns- significant weight changes-  positive or negative  · Change in sleeping patterns- unable to sleep or sleeping all the time   · Unwillingness or inability to communicate  · Depression  · Unusual sadness, discouragement and loneliness  · Talk of wanting to die  · Neglect of personal appearance   · Rebelliousness- reckless behavior  · Withdrawal from people/activities they love  · Confusion- inability to concentrate     If you or a loved one observes any of these behaviors or has concerns about self-harm, here's what you can do:  · Talk about it- your feelings and reasons for harming yourself  · Remove any means that you might use to hurt yourself (examples: pills, rope, extension cords, firearm)  · Get professional help from the community (Mental Health, Substance Abuse, psychological counseling)  · Do not be alone:Call your Safe Contact- someone whom you trust who will be there for you.  · Call your local CRISIS HOTLINE 011-2693 or 090-170-3766  · Call your local Children's Mobile Crisis Response Team [place] (544) 124-6414 or www.HiFiKiddo  · Call the toll free National Suicide Prevention Hotlines   · National Suicide Prevention Lifeline 138-528-WDSE (5659)  · National Hope Line Network 800-SUICIDE (842-3793)        1. DIET: Upon discharge from the hospital you may resume your normal preoperative diet. You may wish to stay with a bland diet for the first few days. However, you may advance your diet as quickly as you feel ready.     2. ACTIVITIES: After discharge from the hospital, you may resume full routine activities. Heavy lifting (over 25 pounds) and strenuous activities will make your incision sore and should generally be avoided until  your first post-operative appointment. Routine activities of daily living are acceptable.     3. DRIVING: You may drive whenever you are no longer taking narcotic pain medications and are able to perform the activities needed to drive safely, i.e. turning, bending, twisting, etc.     4. BATHING: You may get the wound wet at any time after leaving the hospital. You may shower, but do not submerge in a bath for at least 48 hours. Dressings may come off after 48 hours.     5. BOWEL FUNCTION: A few patients, after this operation, will develop either frequent or loose stools after meals. This usually corrects itself after a few days, to a few weeks. If this occurs, do not worry; it is not unusual and will likely resolve. Much more common than loose stools, is constipation. The combination of pain medication and decreased activity level can cause constipation in otherwise normal patients. If you feel this is occurring, take a laxative (Milk of Magnesia, Ex-Lax, Senokot, etc.) until the problem has resolved.     6. PAIN MEDICATION: You will be given a prescription for pain medication at discharge. Please take these as directed. It is advisable to take your medication around the clock for the first 24-48 hours. You may continue any non-steroidal antiinflammatory medications such as Advil in the post-operative period. These may and should be taken with your narcotic pain medication. It is important to remember not to take medications on an empty stomach as this may cause nausea. Avoid alcohol consumption while taking pain medication.     7. Call if you have: (1) Fevers to more than 101.0 F, (2) Unusual chest or leg pain, (3) Drainage or fluid from incision that may be foul smelling, increased tenderness or soreness at the wound or the wound edges are no longer together, redness or swelling at the incision site.       If you have any additional questions, please do not hesitate to call the office and speak to my medical  assistant, myself, or the physician on call      Chest Tube, Care After  Refer to this sheet in the next few weeks. These instructions provide you with information on caring for yourself after your procedure. Your health care provider may also give you more specific instructions. Your treatment has been planned according to current medical practices, but problems sometimes occur. Call your health care provider if you have any problems or questions after your procedure.   WHAT TO EXPECT AFTER THE PROCEDURE  After the procedure, you will have a thin tube that passes through the skin between your ribs and into your chest. It is normal to have some pain or discomfort at the site of the chest tube insertion.   HOME CARE INSTRUCTIONS  If you go home with a chest tube still in place, follow these instructions:   · Be careful to avoid any activity that may cause your chest tube to become dislodged.  · Keep two clamps nearby. If any tube gets disconnected, clamp the tubing closest to your body with both clamps. Call your health care provider for directions. You may need to go to the emergency department if the problem cannot be solved.    · Check the tubing often to make sure it is not kinked. When the tubing is kinked, draining will not take place properly, and you may have some trouble breathing.    · Take these steps if the chest tube falls out:    · Do not panic.    · Open a package of gauze coated with petroleum jelly.    · Open a package of dry, square gauze.    · Cover the wound first with the petroleum jelly gauze and then cover that with the dry, square gauze.    · Tape the dry, square gauze in place.    · After you have covered the site, call your health care provider for instructions. Your health care provider will decide if you need to go to the emergency department.    · You may shower with the chest tube in place if your health care provider approves. Cover the area where the chest tube comes out with a small  plastic bag and tape it well. Bring the drainage device to the shower area and leave it outside the shower curtain or door. The unit should never be under the direct flow of water.    · Perform gentle exercise such as walking as directed by your health care provider. Talk to your health care provider about any other activity or exercise you want to do.    · Only take over-the-counter or prescription medicines as directed by your health care provider.    · Follow up with your health care provider as directed.  SEEK MEDICAL CARE IF:  · You have redness or swelling at the incision.    · Your incision has opened (the edges are not staying together).  · You have drainage from the incision area.    · Your drainage from the chest tube changes color or becomes red or bloody.    · Your pain is not controlled with the medicines prescribed.    SEEK IMMEDIATE MEDICAL CARE IF:  · You have a fever.    · You have any new trouble with breathing.    · You develop any chest pain.    · You develop unusual sweating.  · You have weakness.  · You feel lightheaded, or you faint.  · Your connecting tubes become disconnected.  · You develop red streaking of the skin that extends above or below the incision.  · Your chest tube falls out.       This information is not intended to replace advice given to you by your health care provider. Make sure you discuss any questions you have with your health care provider.     Document Released: 10/08/2014 Document Reviewed: 10/08/2014  [employer] Patient Education ©2016 [employer]      Hemothorax  Hemothorax is a buildup of blood between your lung and the wall of your chest (pleural cavity). It is usually caused by an injury that results in bleeding.  Hemothorax can be a dangerous condition. As blood builds up in the pleural cavity, it can press on your lung and make it hard for you to breathe. Your lung may collapse. This means that air leaks from your lung and builds up in your pleural  cavity (pneumothorax). This prevents your lung from expanding.   CAUSES   An injury (trauma) that causes a tear in a lung or in a blood vessel in the chest is the main cause of hemothorax. Other possible causes include:  · Tuberculosis.  · An injury caused by placing a tube into a blood vessel in the chest (central venous catheter).  · Cancer in the chest.  · A blood-clotting problem.  · Blood-thinning medicine.  · Lung or heart surgery.  SIGNS AND SYMPTOMS  Signs and symptoms may include:  · Rapid breathing.  · Difficulty breathing.  · Shortness of breath.  · Feeling light-headed.  · Anxiety.  · Restlessness.  · A rapid heart rate.  · Low blood pressure (hypotension).  · Chest pain.  · Cool, pale, blue, or sweaty skin.  DIAGNOSIS   Your health care provider may suspect a hemothorax from your signs and symptoms, especially if you had a recent injury. Your health care provider will also do a physical exam. This includes checking your breathing. You may also have a chest X-ray. If the cause of the hemothorax is not known, fluid from the pleural space may be removed for testing.  TREATMENT   You may have an IV line started to give you fluids or blood from a donor (transfusion). Treatment usually includes placing a small, flexible tube (chest tube) into the pleural cavity to drain fluid, blood, or extra air. A chest tube can also re-expand your lung if it collapses. If bleeding continues after the chest tube is in place, you may need surgery to open the chest (thoracotomy) and control the bleeding.     This information is not intended to replace advice given to you by your health care provider. Make sure you discuss any questions you have with your health care provider.     Document Released: 09/13/2005 Document Revised: 01/08/2016 Document Reviewed: 09/23/2015  [employer] Interactive Patient Education ©2016 [employer]    Exploratory Laparotomy, Adult, Care After  Refer to this sheet in the next few weeks. These  instructions provide you with information about caring for yourself after your procedure. Your health care provider may also give you more specific instructions. Your treatment has been planned according to current medical practices, but problems sometimes occur. Call your health care provider if you have any problems or questions after your procedure.  WHAT TO EXPECT AFTER THE PROCEDURE  After your procedure, it is typical to have:  · Abdominal soreness.  · Fatigue.  · A sore throat from tubes in your throat.  · A lack of appetite.  HOME CARE INSTRUCTIONS  Medicines  · Take medicines only as directed by your health care provider.  · Do not drive or operate heavy machinery while taking pain medicine.  Incision Care  · There are many different ways to close and cover an incision, including stitches (sutures), skin glue, and adhesive strips. Follow your health care provider's instructions about:  · Incision care.  · Bandage (dressing) changes and removal.  · Incision closure removal.  · Do not take showers or baths until your health care provider says that you can.  · Check your incision area daily for signs of infection. Watch for:  · Redness.  · Tenderness.  · Swelling.  · Drainage.  Activities  · Do not lift anything that is heavier than 10 pounds (4.5 kg) until your health care provider says that it is safe.  · Try to walk a little bit each day if your health care provider says that it is okay.  · Ask your health care provider when you can start to do your usual activities again, such as driving, going back to work, and having sex.  Eating and Drinking  · You may eat what you usually eat. Include lots of whole grains, fruits, and vegetables in your diet. This will help to prevent constipation.  · Drink enough fluid to keep your urine clear or pale yellow.  General Instructions  · Keep all follow-up visits as directed by your health care provider. This is important.  SEEK MEDICAL CARE IF:   · You have a  fever.  · You have chills.  · Your pain medicine is not helping.  · You have constipation or diarrhea.  · You have nausea or vomiting.  · You have drainage, redness, swelling, or pain at your incision site.  SEEK IMMEDIATE MEDICAL CARE IF:  · Your pain is getting worse.  · It has been more than 3 days since you been able to have a bowel movement.  · You have ongoing (persistent) vomiting.  · The edges of your incision open up.  · You have warmth, tenderness, and swelling in your calf.  · You have trouble breathing.  · You have chest pain.     This information is not intended to replace advice given to you by your health care provider. Make sure you discuss any questions you have with your health care provider.     Document Released: 08/01/2005 Document Revised: 01/08/2016 Document Reviewed: 08/05/2015  [employer] Interactive Patient Education ©2016 [employer]    Incentive Spirometer  An incentive spirometer is a tool that can help keep your lungs clear and active. This tool measures how well you are filling your lungs with each breath. Taking long, deep breaths may help reverse or decrease the chance of developing breathing (pulmonary) problems (especially infection) following:  · Surgery of the chest or abdomen.  · Surgery if you have a history of smoking or a lung problem.  · A long period of time when you are unable to move or be active.  BEFORE THE PROCEDURE   · If the spirometer includes an indicator to show your best effort, your nurse or respiratory therapist will set it to a desired goal.  · If possible, sit up straight or lean slightly forward. Try not to slouch.  · Hold the incentive spirometer in an upright position.  INSTRUCTIONS FOR USE   · Sit on the edge of your bed if possible, or sit up as far as you can in bed or on a chair.  · Hold the incentive spirometer in an upright position.  · Breathe out normally.  · Place the mouthpiece in your mouth and seal your lips tightly around it.  · Breathe in  slowly and as deeply as possible, raising the piston or the ball toward the top of the column.  · Hold your breath for 3-5 seconds or for as long as possible. Allow the piston or ball to fall to the bottom of the column.  · Remove the mouthpiece from your mouth and breathe out normally.  · Rest for a few seconds and repeat Steps 1 through 7 at least 10 times every 1-2 hours when you are awake. Take your time and take a few normal breaths between deep breaths.  · The spirometer may include an indicator to show your best effort. Use the indicator as a goal to work toward during each repetition.  · After each set of 10 deep breaths, practice coughing to be sure your lungs are clear. If you have an incision (the cut made at the time of surgery), support your incision when coughing by placing a pillow or rolled-up towels firmly against it.  Once you are able to get out of bed, walk around indoors and cough well. You may stop using the incentive spirometer when instructed by your caregiver.   RISKS AND COMPLICATIONS  · Breathing too quickly may cause dizziness. At an extreme, this could cause you to pass out. Take your time so you do not get dizzy or light-headed.  · If you are in pain, you may need to take or ask for pain medication before doing incentive spirometry. It is harder to take a deep breath if you are having pain.  AFTER USE  · Rest and breathe slowly and easily.  · It can be helpful to keep a log of your progress. Your caregiver can provide you with a simple table to help with this.  If you are using the spirometer at home, follow these instructions:  SEEK MEDICAL CARE IF:   · You are having difficultly using the spirometer.  · You have trouble using the spirometer as often as instructed.  · Your pain medication is not giving enough relief while using the spirometer.  · You develop fever of 100.5°F (38.1°C) or higher.  SEEK IMMEDIATE MEDICAL CARE IF:   · You cough up bloody sputum that had not been present  before.  · You develop fever of 102°F (38.9°C) or greater.  · You develop worsening pain at or near the incision site.  MAKE SURE YOU:   · Understand these instructions.  · Will watch your condition.  · Will get help right away if you are not doing well or get worse.     This information is not intended to replace advice given to you by your health care provider. Make sure you discuss any questions you have with your health care provider.     Document Released: 04/29/2008 Document Revised: 01/08/2016 Document Reviewed: 07/27/2015  [employer] Interactive Patient Education ©2016 [employer]    How and Where to Give Subcutaneous Enoxaparin Injections  Enoxaparin is an injectable medicine. It is used to help prevent blood clots from developing in your veins. Health care providers often use anticoagulants like enoxaparin to prevent clots following surgery. Enoxaparin is also used in combination with other medicines to treat blood clots and heart attacks. If blood clots are left untreated, they can be life threatening.   Enoxaparin comes in single-use syringes. You inject enoxaparin through a syringe into your belly (abdomen). You should change the injection site each time you give yourself a shot. Continue the enoxaparin injections as directed by your health care provider. Your health care provider will use blood clotting test results to decide when you can safely stop using enoxaparin injections. If your health care provider prescribes any additional medicines, use the medicines exactly as directed.  HOW DO I INJECT ENOXAPARIN?   · Wash your hands with soap and water.  · Clean the selected injection site as directed by your health care provider.  · Remove the needle cap by pulling it straight off the syringe.  · Hold the syringe like a pencil using your writing hand.  · Use your other hand to pinch and hold an inch of the cleansed skin.  · Insert the entire needle straight down into the fold of skin.  · Push the plunger  with your thumb until the syringe is empty.  · Pull the needle straight out of your skin.  · Enoxaparin injection prefilled syringes and graduated prefilled syringes are available with a system that shields the needle after injection. After you have completed your injection and removed the needle from your skin, firmly push down on the plunger. The protective sleeve will automatically cover the needle and you will hear a click. The click means the needle is safely covered.  · Place the syringe in the nearest needle box, also called a sharps container. If you do not have a sharps container, you can use a hard-sided plastic container with a secure lid, such as an empty laundry detergent bottle.  WHAT ELSE DO I NEED TO KNOW?  · Do not use enoxaparin if:  · You have allergies to heparin or pork products.  · You have been diagnosed with a condition called thrombocytopenia.  · Do not use the syringe or needle more than one time.  · Use medicines only as directed by your health care provider.  · Changes in medicines, supplements, diet, and illness can affect your anticoagulation therapy. Be sure to inform your health care provider of any of these changes.  · It is important that you tell all of your health care providers and your dentist that you are taking an anticoagulant, especially if you are injured or plan to have any type of procedure.  · While on anticoagulants, you will need to have blood tests done routinely as directed by your health care provider.  · While using this medicine, avoid physical activities or sports that could result in a fall or cause injury.  · Follow up with your laboratory test and health care provider appointments as directed. It is very important to keep your appointments. Not keeping appointments could result in a chronic or permanent injury, pain, or disability.  · Before giving your medicine, you should make sure the injection is a clear and colorless or pale yellow solution. If your  medicine becomes discolored or if there are particles in the syringe, do not use it and notify your health care provider.  · Keep your medicine safely stored at room temperature.  SEEK MEDICAL CARE IF:  · You develop any rashes on your skin.  · You have large areas of bruising on your skin.  · You have any worsening of the condition for which you take Enoxaparin.  · You develop a fever.  SEEK IMMEDIATE MEDICAL CARE IF:  · You develop bleeding problems such as:  · Bleeding from the gums or nose that does not stop quickly.  · Vomiting blood or coughing up blood.  · Blood in your urine.  · Blood in your stool, or stool that has a dark, tarry, or coffee grounds appearance.  · A cut that does not stop bleeding within 10 minutes.  These symptoms may represent a serious problem that is an emergency. Do not wait to see if the symptoms will go away. Get medical help right away. Call your local emergency services (911 in the [place] Do not drive yourself to the hospital.      This information is not intended to replace advice given to you by your health care provider. Make sure you discuss any questions you have with your health care provider.     Document Released: 10/19/2005 Document Revised: 01/08/2016 Document Reviewed: 06/04/2015  [employer] Interactive Patient Education ©2016 [employer]    Oxygen Use at Home  Oxygen can be prescribed for home use. The prescription will show the flow rate. This is how much oxygen is to be used per minute. This will be listed in liters per minute (LPM or L/M). A liter is a metric measurement of volume.  You will use oxygen therapy as directed. It can be used while exercising, sleeping, or at rest. You may need oxygen continuously. Your health care provider may order a blood oxygen test (arterial blood gas or pulse oximetry test) that will show what your oxygen level is. Your health care provider will use these measurements to learn about your needs and follow your progress.  Home oxygen  therapy is commonly used on patients with various lung (pulmonary) related conditions. Some of these conditions include:  · Asthma.  · Lung cancer.  · Pneumonia.  · Emphysema.  · Chronic bronchitis.  · Cystic fibrosis.  · Other lung diseases.  · Pulmonary fibrosis.  · Occupational lung disease.  · Heart failure.  · Chronic obstructive pulmonary disease (COPD).  3 COMMON WAYS OF PROVIDING OXYGEN THERAPY  · Gas: The gas form of oxygen is put into variously sized cylinders or tanks. The cylinders or oxygen tanks contain compressed oxygen. The cylinder is equipped with a regulator that controls the flow rate. Because the flow of oxygen out of the cylinder is constant, an oxygen conserving device may be attached to the system to avoid waste. This device releases the gas only when you inhale and cuts it off when you exhale. Oxygen can be provided in a small cylinder that can be carried with you. Large tanks are heavy and are only for stationary use. After use, empty tanks must be exchanged for full tanks.  · Liquid: The liquid form of oxygen is put into a container similar to a thermos. When released, the liquid converts to a gas and you breathe it in just like the compressed gas. This storage method takes up less space than the compressed gas cylinder, and you can transfer the liquid to a small, portable vessel at home. Liquid oxygen is more expensive than the compressed gas, and the vessel vents when not in use. An oxygen conserving device may be built into the vessel to conserve the oxygen. Liquid oxygen is very cold, around 297° below zero.  · Oxygen concentrator: This medical device filters oxygen from room air and gives almost 100% oxygen to the patient. Oxygen concentrators are powered by electricity. Benefits of this system are:  · It does not need to be resupplied.  · It is not as costly as liquid oxygen.  · Extra tubing permits the user to move around easier.  There are several types of small, portable oxygen  "systems available which can help you remain active and mobile. You must have a cylinder of oxygen as a backup in the event of a power failure. Advise your electric power company that you are on oxygen therapy in order to get priority service when there is a power failure.  OXYGEN DELIVERY DEVICES  There are 3 common ways to deliver oxygen to your body.  · Nasal cannula. This is a 2-pronged device inserted in the nostrils that is connected to tubing carrying the oxygen. The tubing can rest on the ears or be attached to the frame of eyeglasses.  · Mask. People who need a high flow of oxygen generally use a mask.  · Transtracheal catheter. Transtracheal oxygen therapy requires the insertion of a small, flexible tube (catheter) in the windpipe (trachea). This catheter is held in place by a necklace. Since transtracheal oxygen bypasses the mouth, nose, and throat, a humidifier is absolutely required at flow rates of 1 LPM or greater.  OXYGEN USE SAFETY TIPS  · Never smoke while using oxygen. Oxygen does not burn or explode, but flammable materials will burn faster in the presence of oxygen.  · Keep a fire extinguisher close by. Let your fire department know that you have oxygen in your home.  · Warn visitors not to smoke near you when you are using oxygen. Put up \"no smoking\" signs in your home where you most often use the oxygen.  · When you go to a restaurant with your portable oxygen source, ask to be seated in the nonsmoking section.  · Stay at least 5 feet away from gas stoves, candles, lighted fireplaces, or other heat sources.  · Do not use materials that burn easily (flammable) while using your oxygen.  · If you use an oxygen cylinder, make sure it is secured to some fixed object or in a stand. If you use liquid oxygen, make sure the vessel is kept upright to keep the oxygen from pouring out. Liquid oxygen is so cold it can hurt your skin.  · If you use an oxygen concentrator, call your electric company so you " will be given priority service if your power goes out. Avoid using extension cords, if possible.  · Regularly test your smoke detectors at home to make sure they work. If you receive care in your home from a nurse or other health care provider, he or she may also check to make sure your smoke detectors work.  GUIDELINES FOR CLEANING YOUR EQUIPMENT  · Wash the nasal prongs with a liquid soap. Thoroughly rinse them once or twice a week.  · Replace the prongs every 2 to 4 weeks. If you have an infection (cold, pneumonia) change them when you are well.  · Your health care provider will give you instructions on how to clean your transtracheal catheter.  · The humidifier bottle should be washed with soap and warm water and rinsed thoroughly between each refill. Air-dry the bottle before filling it with sterile or distilled water. The bottle and its top should be disinfected after they are cleaned.  · If you use an oxygen concentrator, unplug the unit. Then wipe down the cabinet with a damp cloth and dry it daily. The air filter should be cleaned at least twice a week.  · Follow your home medical equipment and service company's directions for cleaning the compressor filter.  HOME CARE INSTRUCTIONS   · Do not change the flow of oxygen unless directed by your health care provider.  · Do not use alcohol or other sedating drugs unless instructed. They slow your breathing rate.  · Do not use materials that burn easily (flammable) while using your oxygen.  · Always keep a spare tank of oxygen. Plan ahead for holidays when you may not be able to get a prescription filled.  · Use water-based lubricants on your lips or nostrils. Do not use an oil-based product like petroleum jelly.  · To prevent your cheeks or the skin behind your ears from becoming irritated, tuck some gauze under the tubing.  · If you have persistent redness under your nose, call your health care provider.  · When you no longer need oxygen, your doctor will have  the oxygen discontinued. Oxygen is not addicting or habit forming.  · Use the oxygen as instructed. Too much oxygen can be harmful and too little will not give you the benefit you need.  · Shortness of breath is not always from a lack of oxygen. If your oxygen level is not the cause of your shortness of breath, taking oxygen will not help.  SEEK MEDICAL CARE IF:   · You have frequent headaches.  · You have shortness of breath or a lasting cough.  · You have anxiety.  · You are confused.  · You are drowsy or sleepy all the time.  · You develop an illness which aggravates your breathing.  · You cannot exercise.  · You are restless.  · You have blue lips or fingernails.  · You have difficult or irregular breathing and it is getting worse.  · You have a fever.     This information is not intended to replace advice given to you by your health care provider. Make sure you discuss any questions you have with your health care provider.     Document Released: 03/09/2005 Document Revised: 01/08/2016 Document Reviewed: 07/30/2014  [employer] Interactive Patient Education ©2016 [employer]    Heart Failure  Heart failure is a condition in which the heart has trouble pumping blood. This means your heart does not pump blood efficiently for your body to work well. In some cases of heart failure, fluid may back up into your lungs or you may have swelling (edema) in your lower legs. Heart failure is usually a long-term (chronic) condition. It is important for you to take good care of yourself and follow your health care provider's treatment plan.  CAUSES   Some health conditions can cause heart failure. Those health conditions include:  · High blood pressure (hypertension). Hypertension causes the heart muscle to work harder than normal. When pressure in the blood vessels is high, the heart needs to pump (contract) with more force in order to circulate blood throughout the body. High blood pressure eventually causes the heart to  become stiff and weak.  · Coronary artery disease (CAD). CAD is the buildup of cholesterol and fat (plaque) in the arteries of the heart. The blockage in the arteries deprives the heart muscle of oxygen and blood. This can cause chest pain and may lead to a heart attack. High blood pressure can also contribute to CAD.  · Heart attack (myocardial infarction). A heart attack occurs when one or more arteries in the heart become blocked. The loss of oxygen damages the muscle tissue of the heart. When this happens, part of the heart muscle dies. The injured tissue does not contract as well and weakens the heart's ability to pump blood.  · Abnormal heart valves. When the heart valves do not open and close properly, it can cause heart failure. This makes the heart muscle pump harder to keep the blood flowing.  · Heart muscle disease (cardiomyopathy or myocarditis). Heart muscle disease is damage to the heart muscle from a variety of causes. These can include drug or alcohol abuse, infections, or unknown reasons. These can increase the risk of heart failure.  · Lung disease. Lung disease makes the heart work harder because the lungs do not work properly. This can cause a strain on the heart, leading it to fail.  · Diabetes. Diabetes increases the risk of heart failure. High blood sugar contributes to high fat (lipid) levels in the blood. Diabetes can also cause slow damage to tiny blood vessels that carry important nutrients to the heart muscle. When the heart does not get enough oxygen and food, it can cause the heart to become weak and stiff. This leads to a heart that does not contract efficiently.  · Other conditions can contribute to heart failure. These include abnormal heart rhythms, thyroid problems, and low blood counts (anemia).  Certain unhealthy behaviors can increase the risk of heart failure, including:  · Being overweight.  · Smoking or chewing tobacco.  · Eating foods high in fat and cholesterol.  · Abusing  illicit drugs or alcohol.  · Lacking physical activity.  SYMPTOMS   Heart failure symptoms may vary and can be hard to detect. Symptoms may include:  · Shortness of breath with activity, such as climbing stairs.  · Persistent cough.  · Swelling of the feet, ankles, legs, or abdomen.  · Unexplained weight gain.  · Difficulty breathing when lying flat (orthopnea).  · Waking from sleep because of the need to sit up and get more air.  · Rapid heartbeat.  · Fatigue and loss of energy.  · Feeling light-headed, dizzy, or close to fainting.  · Loss of appetite.  · Nausea.  · Increased urination during the night (nocturia).  DIAGNOSIS   A diagnosis of heart failure is based on your history, symptoms, physical examination, and diagnostic tests. Diagnostic tests for heart failure may include:  · Echocardiography.  · Electrocardiography.  · Chest X-ray.  · Blood tests.  · Exercise stress test.  · Cardiac angiography.  · Radionuclide scans.  TREATMENT   Treatment is aimed at managing the symptoms of heart failure. Medicines, behavioral changes, or surgical intervention may be necessary to treat heart failure.  · Medicines to help treat heart failure may include:  ¨ Angiotensin-converting enzyme (ACE) inhibitors. This type of medicine blocks the effects of a blood protein called angiotensin-converting enzyme. ACE inhibitors relax (dilate) the blood vessels and help lower blood pressure.  ¨ Angiotensin receptor blockers (ARBs). This type of medicine blocks the actions of a blood protein called angiotensin. Angiotensin receptor blockers dilate the blood vessels and help lower blood pressure.  ¨ Water pills (diuretics). Diuretics cause the kidneys to remove salt and water from the blood. The extra fluid is removed through urination. This loss of extra fluid lowers the volume of blood the heart pumps.  ¨ Beta blockers. These prevent the heart from beating too fast and improve heart muscle strength.  ¨ Digitalis. This increases the  force of the heartbeat.  · Healthy behavior changes include:  ¨ Obtaining and maintaining a healthy weight.  ¨ Stopping smoking or chewing tobacco.  ¨ Eating heart-healthy foods.  ¨ Limiting or avoiding alcohol.  ¨ Stopping illicit drug use.  ¨ Physical activity as directed by your health care provider.  · Surgical treatment for heart failure may include:  ¨ A procedure to open blocked arteries, repair damaged heart valves, or remove damaged heart muscle tissue.  ¨ A pacemaker to improve heart muscle function and control certain abnormal heart rhythms.  ¨ An internal cardioverter defibrillator to treat certain serious abnormal heart rhythms.  ¨ A left ventricular assist device (LVAD) to assist the pumping ability of the heart.  HOME CARE INSTRUCTIONS   · Take medicines only as directed by your health care provider. Medicines are important in reducing the workload of your heart, slowing the progression of heart failure, and improving your symptoms.  ¨ Do not stop taking your medicine unless directed by your health care provider.  ¨ Do not skip any dose of medicine.  ¨ Refill your prescriptions before you run out of medicine. Your medicines are needed every day.  · Engage in moderate physical activity if directed by your health care provider. Moderate physical activity can benefit some people. The elderly and people with severe heart failure should consult with a health care provider for physical activity recommendations.  · Eat heart-healthy foods. Food choices should be free of trans fat and low in saturated fat, cholesterol, and salt (sodium). Healthy choices include fresh or frozen fruits and vegetables, fish, lean meats, legumes, fat-free or low-fat dairy products, and whole grain or high fiber foods. Talk to a dietitian to learn more about heart-healthy foods.  · Limit sodium if directed by your health care provider. Sodium restriction may reduce symptoms of heart failure in some people. Talk to a dietitian to  learn more about heart-healthy seasonings.  · Use healthy cooking methods. Healthy cooking methods include roasting, grilling, broiling, baking, poaching, steaming, or stir-frying. Talk to a dietitian to learn more about healthy cooking methods.  · Limit fluids if directed by your health care provider. Fluid restriction may reduce symptoms of heart failure in some people.  · Weigh yourself every day. Daily weights are important in the early recognition of excess fluid. You should weigh yourself every morning after you urinate and before you eat breakfast. Wear the same amount of clothing each time you weigh yourself. Record your daily weight. Provide your health care provider with your weight record.  · Monitor and record your blood pressure if directed by your health care provider.  · Check your pulse if directed by your health care provider.  · Lose weight if directed by your health care provider. Weight loss may reduce symptoms of heart failure in some people.  · Stop smoking or chewing tobacco. Nicotine makes your heart work harder by causing your blood vessels to constrict. Do not use nicotine gum or patches before talking to your health care provider.  · Keep all follow-up visits as directed by your health care provider. This is important.  · Limit alcohol intake to no more than 1 drink per day for nonpregnant women and 2 drinks per day for men. One drink equals 12 ounces of beer, 5 ounces of wine, or 1½ ounces of hard liquor. Drinking more than that is harmful to your heart. Tell your health care provider if you drink alcohol several times a week. Talk with your health care provider about whether alcohol is safe for you. If your heart has already been damaged by alcohol or you have severe heart failure, drinking alcohol should be stopped completely.  · Stop illicit drug use.  · Stay up-to-date with immunizations. It is especially important to prevent respiratory infections through current pneumococcal and  influenza immunizations.  · Manage other health conditions such as hypertension, diabetes, thyroid disease, or abnormal heart rhythms as directed by your health care provider.  · Learn to manage stress.  · Plan rest periods when fatigued.  · Learn strategies to manage high temperatures. If the weather is extremely hot:  ¨ Avoid vigorous physical activity.  ¨ Use air conditioning or fans or seek a cooler location.  ¨ Avoid caffeine and alcohol.  ¨ Wear loose-fitting, lightweight, and light-colored clothing.  · Learn strategies to manage cold temperatures. If the weather is extremely cold:  ¨ Avoid vigorous physical activity.  ¨ Layer clothes.  ¨ Wear mittens or gloves, a hat, and a scarf when going outside.  ¨ Avoid alcohol.  · Obtain ongoing education and support as needed.  · Participate in or seek rehabilitation as needed to maintain or improve independence and quality of life.  SEEK MEDICAL CARE IF:   · You have a rapid weight gain.  · You have increasing shortness of breath that is unusual for you.  · You are unable to participate in your usual physical activities.  · You tire easily.  · You cough more than normal, especially with physical activity.  · You have any or more swelling in areas such as your hands, feet, ankles, or abdomen.  · You are unable to sleep because it is hard to breathe.  · You feel like your heart is beating fast (palpitations).  · You become dizzy or light-headed upon standing up.  SEEK IMMEDIATE MEDICAL CARE IF:   · You have difficulty breathing.  · There is a change in mental status such as decreased alertness or difficulty with concentration.  · You have a pain or discomfort in your chest.  · You have an episode of fainting (syncope).  MAKE SURE YOU:   · Understand these instructions.  · Will watch your condition.  · Will get help right away if you are not doing well or get worse.     This information is not intended to replace advice given to you by your health care provider. Make  sure you discuss any questions you have with your health care provider.     Document Released: 12/18/2006 Document Revised: 05/03/2016 Document Reviewed: 01/17/2014  Elsevier Interactive Patient Education ©2016 [employer]

## 2017-06-30 DIAGNOSIS — C44.121 SQUAMOUS CELL CARCINOMA OF SKIN OF LEFT EYELID, INCLUDING CANTHUS: ICD-10-CM

## 2017-07-05 ENCOUNTER — TELEPHONE (OUTPATIENT)
Dept: VASCULAR LAB | Facility: MEDICAL CENTER | Age: 77
End: 2017-07-05

## 2017-07-05 NOTE — TELEPHONE ENCOUNTER
[facility]    Received sub therapeutic from pt's .  Called pt to confirm he has been contacted by his MD who is dosing the warfarin.  Pt confirmed MD is taking care of dosing needs.  Instructed pt to contact clinic for any anticoagulation needs in the future.  Will disregard future results as pt has established with MD for all doing needs.    Colt Jordan, PHARMD

## 2017-07-06 ENCOUNTER — OFFICE VISIT (OUTPATIENT)
Dept: MEDICAL GROUP | Facility: PHYSICIAN GROUP | Age: 77
End: 2017-07-06
Payer: MEDICARE

## 2017-07-06 VITALS
BODY MASS INDEX: 35.79 KG/M2 | SYSTOLIC BLOOD PRESSURE: 108 MMHG | HEART RATE: 87 BPM | WEIGHT: 250 LBS | OXYGEN SATURATION: 96 % | HEIGHT: 70 IN | TEMPERATURE: 98.1 F | DIASTOLIC BLOOD PRESSURE: 58 MMHG

## 2017-07-06 DIAGNOSIS — I10 ESSENTIAL HYPERTENSION: ICD-10-CM

## 2017-07-06 DIAGNOSIS — K21.00 REFLUX ESOPHAGITIS: ICD-10-CM

## 2017-07-06 DIAGNOSIS — I82.402 LEG DVT (DEEP VENOUS THROMBOEMBOLISM), ACUTE, LEFT (HCC): ICD-10-CM

## 2017-07-06 DIAGNOSIS — E66.9 OBESITY (BMI 35.0-39.9 WITHOUT COMORBIDITY): ICD-10-CM

## 2017-07-06 DIAGNOSIS — I26.99 PULMONARY EMBOLISM ON RIGHT (HCC): ICD-10-CM

## 2017-07-06 DIAGNOSIS — N40.1 BENIGN NODULAR PROSTATIC HYPERPLASIA WITH LOWER URINARY TRACT SYMPTOMS: ICD-10-CM

## 2017-07-06 DIAGNOSIS — I10 ESSENTIAL HYPERTENSION, BENIGN: ICD-10-CM

## 2017-07-06 DIAGNOSIS — Z86.79 HISTORY OF CHF (CONGESTIVE HEART FAILURE): ICD-10-CM

## 2017-07-06 DIAGNOSIS — J45.20 RAD (REACTIVE AIRWAY DISEASE), MILD INTERMITTENT, UNCOMPLICATED: ICD-10-CM

## 2017-07-06 DIAGNOSIS — S22.42XD CLOSED FRACTURE OF MULTIPLE RIBS OF LEFT SIDE WITH ROUTINE HEALING, SUBSEQUENT ENCOUNTER: ICD-10-CM

## 2017-07-06 PROCEDURE — 99215 OFFICE O/P EST HI 40 MIN: CPT | Performed by: FAMILY MEDICINE

## 2017-07-06 RX ORDER — WARFARIN SODIUM 1 MG/1
TABLET ORAL
Qty: 180 TAB | Refills: 3 | Status: SHIPPED | OUTPATIENT
Start: 2017-07-06 | End: 2017-07-23 | Stop reason: SDUPTHER

## 2017-07-06 RX ORDER — ESOMEPRAZOLE MAGNESIUM 20 MG/1
1 GRANULE, DELAYED RELEASE ORAL DAILY
Qty: 30 EACH | Refills: 3
Start: 2017-07-06 | End: 2018-03-20

## 2017-07-06 RX ORDER — POTASSIUM CHLORIDE 750 MG/1
10 TABLET, FILM COATED, EXTENDED RELEASE ORAL DAILY
Qty: 90 TAB | Refills: 3 | Status: SHIPPED | OUTPATIENT
Start: 2017-07-06 | End: 2018-11-12

## 2017-07-06 RX ORDER — ESOMEPRAZOLE MAGNESIUM 20 MG/1
GRANULE, DELAYED RELEASE ORAL
COMMUNITY
End: 2017-07-06 | Stop reason: SINTOL

## 2017-07-06 RX ORDER — FUROSEMIDE 40 MG/1
40 TABLET ORAL DAILY
Qty: 90 TAB | Refills: 3 | Status: SHIPPED | OUTPATIENT
Start: 2017-07-06 | End: 2017-12-13

## 2017-07-06 RX ORDER — FINASTERIDE 5 MG/1
TABLET, FILM COATED ORAL
Qty: 90 TAB | Refills: 3 | Status: SHIPPED | OUTPATIENT
Start: 2017-07-06 | End: 2017-12-13

## 2017-07-06 ASSESSMENT — PATIENT HEALTH QUESTIONNAIRE - PHQ9: CLINICAL INTERPRETATION OF PHQ2 SCORE: 0

## 2017-07-06 ASSESSMENT — PAIN SCALES - GENERAL: PAINLEVEL: NO PAIN

## 2017-07-06 NOTE — PATIENT INSTRUCTIONS
Patient given written instructions regarding labs, imaging, medications, referrals, dietary and lifestyle management, and return visit.    Jesus Craft MD

## 2017-07-06 NOTE — MR AVS SNAPSHOT
"        Kiran Flores Yumi   2017 10:20 AM   Office Visit   MRN: [id number]    Department:  [facility]   Dept Phone:  [phone]    Description:  Male : 1940   Provider:  Jesus Craft M.D.           Reason for Visit     Follow-Up / two brkn ribs pucture lung      Allergies as of 2017     Allergen Noted Reactions    Hydrocodone 2016   Vomiting, Nausea    Nexium 2016       Diarrhea        You were diagnosed with     Essential hypertension, benign   [401.1.ICD-9-CM]       Reflux esophagitis   [530.11.ICD-9-CM]       Essential hypertension   [4986251]       Benign nodular prostatic hyperplasia with lower urinary tract symptoms   [8721464]       Pulmonary embolism on right (CMS-HCC)   [392600]       Leg DVT (deep venous thromboembolism), acute, left (CMS-HCC)   [989317]       RAD (reactive airway disease), mild intermittent, uncomplicated   [0750322]       History of CHF (congestive heart failure)   [950367]         Vital Signs     Blood Pressure Pulse Temperature Height Weight Body Mass Index    108/58 mmHg 87 36.7 °C (98.1 °F) 1.778 m (5' 10\") 113.399 kg (250 lb) 35.87 kg/m2    Oxygen Saturation Smoking Status                96% Never Smoker           Basic Information     Date Of Birth Sex Race Ethnicity Preferred Language    1940 Male White Non- English      Your appointments     2017  4:20 PM   Urgent/Same Day with Jesus Craft M.D.   [facility] ([address] [id number] [phone]           You will be receiving a confirmation call a few days before your appointment from our automated call confirmation system.              Problem List              ICD-10-CM Priority Class Noted - Resolved    Essential hypertension, benign I10   2009 - Present    ED (erectile dysfunction) N52.9   2009 - Present    Glaucoma H40.9   2009 - Present    Rosacea L71.9   2009 - Present   " Arthropathy M12.9   5/18/2009 - Present    Reflux esophagitis K21.0   5/18/2009 - Present    Esophageal stricture K22.2   5/18/2009 - Present    Essential hypertension I10   9/18/2009 - Present    Rosacea L71.9   9/18/2009 - Present    Arthropathy of ankle and foot M12.9   9/18/2009 - Present    Glaucoma H40.9   9/18/2009 - Present    Foot pain M79.673   9/28/2010 - Present    BPH (benign prostatic hyperplasia) N40.0   12/30/2010 - Present    Low serum testosterone level E29.1   1/6/2012 - Present    Vitamin D deficiency disease E55.9   1/6/2012 - Present    Carpal tunnel syndrome, bilateral G56.03   2/14/2012 - Present    Chronic gout M1A.9XX0   12/17/2012 - Present    Benign neoplasm of eyelid D23.10   2/11/2015 - Present    Malignant neoplasm of skin of eyelid, 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   4/1/2015 - Present    Diverticulosis of large intestine without hemorrhage K57.30   8/4/2015 - Present    History of colonic polyps Z86.010   8/4/2015 - Present    Pulmonary embolism on right (CMS-HCC) I26.99   11/30/2015 - Present    History of inferior vena caval filter placement Z98.890   7/26/2016 - Present    Generalized edema R60.1   9/16/2016 - Present    Edema R60.9   10/21/2016 - Present    Sleep apnea G47.30   10/21/2016 - Present    Obesity (BMI 30-39.9) E66.9   10/21/2016 - Present    Leg DVT (deep venous thromboembolism), acute (HCC) I82.409   11/9/2016 - Present      Health Maintenance        Date Due Completion Dates    IMM INFLUENZA (1) 9/1/2017 11/30/2015, 11/1/2008    COLONOSCOPY 8/4/2020 8/4/2015    IMM DTaP/Tdap/Td Vaccine (3 - Td) 12/30/2021 12/30/2011, 12/1/2001, 12/1/2001            Current Immunizations     13-VALENT PCV PREVNAR 5/15/2015    Dtap Vaccine 12/1/2001    Influenza Vaccine 11/1/2008    Influenza Vaccine Pediatric 11/1/2008    Influenza Vaccine Quad Inj (Pf) 11/30/2015    Pneumococcal Vaccine (UF)Historical Data 12/1/2001, 12/1/2001    Pneumococcal polysaccharide vaccine (PPSV-23) 12/30/2011     SHINGLES VACCINE 8/31/2009    TD Vaccine 12/1/2001    Tdap Vaccine 12/30/2011      Below and/or attached are the medications your provider expects you to take. Review all of your home medications and newly ordered medications with your provider and/or pharmacist. Follow medication instructions as directed by your provider and/or pharmacist. Please keep your medication list with you and share with your provider. Update the information when medications are discontinued, doses are changed, or new medications (including over-the-counter products) are added; and carry medication information at all times in the event of emergency situations     Allergies:  HYDROCODONE - Vomiting,Nausea     NEXIUM - (reactions not documented)               Medications  Valid as of: July 06, 2017 - 11:19 AM    Generic Name Brand Name Tablet Size Instructions for use    Albuterol Sulfate (Aero Soln) PROAIR  (90 BASE) MCG/ACT INHALE 2 PUFFS BY MOUTH EVERY 6 HOURS AS NEEDED FOR SHORTNESS OF BREATH. PROAIR OKAY        Esomeprazole Magnesium (Pack) Esomeprazole Magnesium 20 MG Take 1 Each by mouth every day.        Finasteride (Tab) PROSCAR 5 MG Take 1 tablet by mouth  every day        Furosemide (Tab) LASIX 40 MG Take 1 Tab by mouth every day.        Ipratropium-Albuterol (Aero Soln) COMBIVENT RESPIMAT  MCG/ACT Inhale 1 Puff by mouth 4 times a day.        Multiple Vitamins-Minerals (Tab) CENTRUM SILVER  Take 2 Tabs by mouth every day.        Omega-3 Fatty Acids (Cap) fish oil 1000 MG Take 1,000 mg by mouth every day.        Potassium Chloride (Tab CR) KLOR-CON 10 MEQ Take 1 Tab by mouth every day.        Warfarin Sodium (Tab) COUMADIN 1 MG 1 mg alt with 2 mg every other day        .                 Medicines prescribed today were sent to:     [id number] [place] - [address]    [address] [place] [address]    Phone: [phone] Fax: [phone]    Open 24 Hours?: No    [facility] - [place], [place]  [address]  [address] [place] [address]    Phone: [phone] Fax: [phone]    Open 24 Hours?: No      Medication refill instructions:       If your prescription bottle indicates you have medication refills left, it is not necessary to call your provider’s office. Please contact your pharmacy and they will refill your medication.    If your prescription bottle indicates you do not have any refills left, you may request refills at any time through one of the following ways: The online [employer] system (except Urgent Care), by calling your provider’s office, or by asking your pharmacy to contact your provider’s office with a refill request. Medication refills are processed only during regular business hours and may not be available until the next business day. Your provider may request additional information or to have a follow-up visit with you prior to refilling your medication.   *Please Note: Medication refills are assigned a new Rx number when refilled electronically. Your pharmacy may indicate that no refills were authorized even though a new prescription for the same medication is available at the pharmacy. Please request the medicine by name with the pharmacy before contacting your provider for a refill.           Postifyhart Status: Patient Declined

## 2017-07-06 NOTE — PROGRESS NOTES
Patient comes in with the amazing story that he fell off his tractor in early June and landed on a hay hook. It perforated his left lung and broke the 11th and 12th ribs. He was impaled on the hook--luckily he was working with another ranch worker who was able to pull him off the hook--- arterial bleeding ensued. 911 was called. The patient was taken immediately to [facility] where he  was admitted and eventually spent time in the ICU. He was transfused. He had an exploratory laparotomy which showed no damage to internal organs and needed any repair. He denies chest pains or shortness of breath now. He says he doesn't really have flank pain now. He feels better and better every day.  He is working on losing weight and I encouraged him to continue doing this.  The patient needs refills of some of his medications.    I reviewed the following    Past Medical History   Diagnosis Date   • Foot fracture 12/07   • Essential hypertension, benign    • Hypertrophy of prostate without urinary obstruction and other lower urinary tract symptoms (LUTS)    • Rosacea    • Reflux esophagitis    • Esophageal stricture 07/2007   • Disorders of bursae and tendons in shoulder region, unspecified    • Arthritis    • Arthropathy, unspecified, site unspecified    • Unspecified glaucoma    • Skin cancer    • Gastric ulcer 07/2007   • Indigestion    • Cancer (CMS-[facility]) 6/06     lower lip skin cancer--   • Pneumonia 2004   • Heart burn    • Pain 2/13/12     2/10 ankles, lower back   • GERD (gastroesophageal reflux disease)    • Bronchitis 12/2014   • Unspecified cataract      ONE REMOVED   • Personal history of venous thrombosis and embolism 11/2008     post knee replacement -took coumadin then        Past Surgical History   Procedure Laterality Date   • Open reduction  12/07     left foot   • Athroplasty  11/18/08     Bilat. TKRs   • Foot surgery  12/2007     left foot    • Knee arthroplasty total  11/18/08     Performed by TISH  MICHEAL BANKS at SURGERY [facility] ORS   • Other       skin cancer removed   • Blepharoplasty  3/6/2012     Performed by JAMESON PATEL at SURGERY SAME DAY [facility] ORS   • Full thickness skin graft  3/6/2012     Performed by JAMESON PATEL at SURGERY SAME DAY [facility] ORS   • Full thickness skin graft  4/3/2012     Performed by JAMESON PATEL at SURGERY SAME DAY [facility] ORS   • Carpal tunnel release  9/2012     left side   • Eye lesion excision  2/11/2015     Performed by Artem Garcia M.D. at SURGERY [facility] ORS   • Flap closure  4/1/2015     Performed by Artem Garcia M.D. at SURGERY [facility] ORS   • Recovery  6/29/2016     Procedure: VASCULAR CASE-ARTHUR-INFERIOR VENA CAVA FILTER PLACEMENT 37191-DEEP VEIN THROMBOSIS I82.401;  Surgeon: Recoveryonly Surgery;  [place] [facility]  Service:        Allergies   Allergen Reactions   • Hydrocodone Vomiting and Nausea   • Nexium      Diarrhea         Current Outpatient Prescriptions   Medication Sig Dispense Refill   • Esomeprazole Magnesium (NEXIUM) 20 MG Pack Take 1 Each by mouth every day. 30 Each 3   • potassium chloride ER (KLOR-CON) 10 MEQ tablet Take 1 Tab by mouth every day. 90 Tab 3   • ipratropium-albuterol (COMBIVENT RESPIMAT)  MCG/ACT Aero Soln Inhale 1 Puff by mouth 4 times a day. 1 Inhaler 3   • finasteride (PROSCAR) 5 MG Tab Take 1 tablet by mouth  every day 90 Tab 3   • warfarin (COUMADIN) 1 MG Tab 1 mg alt with 2 mg every other day 180 Tab 3   • furosemide (LASIX) 40 MG Tab Take 1 Tab by mouth every day. 90 Tab 3   • PROAIR  (90 BASE) MCG/ACT Aero Soln inhalation aerosol INHALE 2 PUFFS BY MOUTH EVERY 6 HOURS AS NEEDED FOR SHORTNESS OF BREATH. PROAIR OKAY 8.5 Inhaler 3   • Omega-3 Fatty Acids (FISH OIL) 1000 MG Cap capsule Take 1,000 mg by mouth every day.     • Multiple Vitamins-Minerals (CENTRUM SILVER) TABS Take 2 Tabs by mouth every day. 100 Tab 3     No current facility-administered medications for  this visit.        History reviewed. No pertinent family history.    Social History     Social History   • Marital Status:      Spouse Name: N/A   • Number of Children: N/A   • Years of Education: N/A     Occupational History   • Not on file.     Social History Main Topics   • Smoking status: Never Smoker    • Smokeless tobacco: Former User     Types: Chew   • Alcohol Use: 3.5 oz/week     7 Glasses of wine per week   • Drug Use: No   • Sexual Activity:     Partners: Female     Birth Control/ Protection: Post-Menopausal     Other Topics Concern   • Not on file     Social History Narrative    ** Merged History Encounter **           I reviewed the records from the patient's recent admission at [facility].     Physical Exam   Constitutional: He is oriented. He appears well-developed and well-nourished. No distress.   HENT:   Head: Normocephalic and atraumatic.   Right Ear: External ear normal. Ear canal and TM normal   Left Ear: External ear normal. Ear canal and TM normal   Nose: Nose normal.   Mouth/Throat: Oropharynx is clear and moist.   Eyes: Conjunctivae and extraocular motions are normal. Pupils are equal, round, and reactive to light.        Fundi benign bilaterally   Neck: No thyromegaly present.   Cardiovascular: Normal rate, regular rhythm, normal heart sounds and intact distal pulses.  Exam reveals no gallop.    No murmur heard.  Pulmonary/Chest: Good air movement bilaterally. Effort normal and breath sounds normal. No respiratory distress. He has no wheezes. He has no rales.   Abdominal: Soft. Bowel sounds are normal. No hepatosplenomegaly. He exhibits no distension. No tenderness. He has no rebound and no guarding.   Musculoskeletal: Normal range of motion. He exhibits no edema and no tenderness.   Lymphadenopathy:     He has no cervical adenopathy.  No supraclavicular adenopathy.   Neurological: He is alert and oriented. He has normal reflexes.        Babinskis downgoing bilaterally   Skin:  Healed puncture wound of the left lower flank--this still has some ecchymosis around it-- Skin is warm and dry. No rash noted. No erythema.   Psychiatric: He has a normal mood and appropriate affect. His behavior is normal. Judgment and thought content normal.     1. Essential hypertension, benign--doing well  furosemide (LASIX) 40 MG Tab   2. Reflux esophagitis --quiescent  Esomeprazole Magnesium (NEXIUM) 20 MG Pack   3. Essential hypertension--doing well  potassium chloride ER (KLOR-CON) 10 MEQ tablet   4. Benign nodular prostatic hyperplasia with lower urinary tract symptoms --stable  finasteride (PROSCAR) 5 MG Tab   5. Pulmonary embolism on right (CMS-HCC)--pro time is a bit low at 1.2  warfarin (COUMADIN) 1 MG Tab--increase dose to 1 mg alternating with 2 mg every other day. Recheck pro time on July 12, 2017    6. Leg DVT (deep venous thromboembolism), acute, left (CMS-[facility])  warfarin (COUMADIN) 1 MG Tab   7. RAD (reactive airway disease), mild intermittent, uncomplicated  ipratropium-albuterol (COMBIVENT RESPIMAT)  MCG/ACT Aero Soln--refill    8. History of CHF (congestive heart failure)   stable    9. Obesity (BMI 35.0-39.9 without comorbidity) ([facility])  Patient identified as having weight management issue.  Appropriate orders and counseling given.   10. Closed fracture of multiple ribs of left side with routine healing, subsequent encounter   improving      Recheck 3 weeks or when necessary. I'm grateful that this patient is alive.    I spent 50 minutes with this patient of which 28 minutes was involved in reviewing his records from his admission at [facility] and also answering his questions about hypertension, esophagitis, BPH, history of DVT and pulmonary embolism, fractured ribs, and CHF.    Please note that this dictation was created using voice recognition software. I have worked with consultants from the vendor as well as technical experts from [facility] to optimize the interface. I have made  every reasonable attempt to correct obvious errors, but I expect that there are errors of grammar and possibly content that I did not discover before finalizing the note.

## 2017-07-10 ENCOUNTER — TELEPHONE (OUTPATIENT)
Dept: MEDICAL GROUP | Facility: PHYSICIAN GROUP | Age: 77
End: 2017-07-10

## 2017-07-10 DIAGNOSIS — Z86.711 HISTORY OF PULMONARY EMBOLUS (PE): ICD-10-CM

## 2017-07-10 DIAGNOSIS — I26.99 PULMONARY EMBOLISM ON RIGHT (HCC): ICD-10-CM

## 2017-07-10 NOTE — TELEPHONE ENCOUNTER
2. SPECIFIC Action To Be Taken: New PT-INR Stamding Order    3. Diagnosis/Clinical Reason for Request:     4. Specialty & Provider Name/Lab/Imaging Location: [facility]     5. Is appointment scheduled for requested order/referral: no    Patient informed they will receive a return phone call from the office ONLY if there are any questions before processing their request. Advised to call back if they haven't received a call from the referral department in 5 days.

## 2017-07-11 ENCOUNTER — ANTICOAGULATION MONITORING (OUTPATIENT)
Dept: VASCULAR LAB | Facility: MEDICAL CENTER | Age: 77
End: 2017-07-11

## 2017-07-11 DIAGNOSIS — I82.539 CHRONIC DEEP VEIN THROMBOSIS (DVT) OF POPLITEAL VEIN, UNSPECIFIED LATERALITY (HCC): ICD-10-CM

## 2017-07-11 NOTE — PROGRESS NOTES
Anticoagulation Summary as of 7/11/2017     INR goal 2.0-3.0   Selected INR No new INR was available at the time of this encounter.   Maintenance plan 2 mg (1 mg x 2) every day   Weekly total 14 mg   Plan last modified Zafar Eubanks PHARMD (7/11/2017)   Next INR check 7/14/2017   Target end date Indefinite    Indications   Chronic deep vein thrombosis (DVT) of popliteal vein (CMS-HCC) [I82.539]         Anticoagulation Episode Summary     INR check location     Preferred lab [facility]    Send INR reminders to     Comments       Anticoagulation Care Providers     Provider Role Specialty Phone number    Jesus Craft M.D. Referring Family Medicine [phone]    [facility] Anticoagulation Services Responsible  [phone]    Zafar Eubanks PHARMD Responsible          Anticoagulation Patient Findings    Confirmed with Dr. Craft that he would like Anticoagulation Clinic to manage warfarin going forward.  Patient denies any signs/symptoms of bruising or bleeding or any changes in diet and medications.  Instructed patient to call clinic with any questions or concerns.  Previous INR was 1.2 from 6-29-17, patient has since increase his dose slightly at direction of Dr. Craft.  Instructed him to increase weekly warfarin regimen to 2mg daily and recheck INR in three days.  Follow up in 3 days.    Zafar Eubanks PHARMD

## 2017-07-13 LAB — INR PPP: 1.7 (ref 2–3.5)

## 2017-07-14 ENCOUNTER — ANTICOAGULATION MONITORING (OUTPATIENT)
Dept: VASCULAR LAB | Facility: MEDICAL CENTER | Age: 77
End: 2017-07-14

## 2017-07-14 DIAGNOSIS — I82.539 CHRONIC DEEP VEIN THROMBOSIS (DVT) OF POPLITEAL VEIN, UNSPECIFIED LATERALITY (HCC): ICD-10-CM

## 2017-07-14 NOTE — PROGRESS NOTES
Anticoagulation Summary as of 7/14/2017     INR goal 2.0-3.0   Selected INR 1.7! (7/13/2017)   Maintenance plan 3 mg (1 mg x 3) on Mon, Fri; 2 mg (1 mg x 2) all other days   Weekly total 16 mg   Plan last modified Zafar Eubanks PHARMD (7/14/2017)   Next INR check 7/20/2017   Target end date Indefinite    Indications   Chronic deep vein thrombosis (DVT) of popliteal vein (CMS-HCC) [I82.539]         Anticoagulation Episode Summary     INR check location     Preferred lab [facility]    Send INR reminders to     Comments       Anticoagulation Care Providers     Provider Role Specialty Phone number    Jesus Craft M.D. Referring Family Medicine [phone]    [facility] Anticoagulation Services Responsible  [phone]    Zafar Eubanks PHARMD Responsible          Anticoagulation Patient Findings   Negatives Missed Doses, Extra Doses, Medication Changes, Antibiotic Use, Diet Changes, Dental/Other Procedures, Hospitalization, Bleeding Gums, Nose Bleeds, Blood in Urine, Blood in Stool, Any Bruising, Other Complaints        Spoke with patient today regarding subtherapeutic INR of 1.7.  Patient denies any signs/symptoms of bruising or bleeding or any changes in diet and medications.  Instructed patient to call clinic with any questions or concerns.  Instructed patient to increase weekly warfarin regimen as detailed above.  Follow up in 1 weeks.    Zafar Eubanks PHARMD

## 2017-07-18 ENCOUNTER — HOSPITAL ENCOUNTER (OUTPATIENT)
Dept: LAB | Facility: MEDICAL CENTER | Age: 77
End: 2017-07-18
Attending: FAMILY MEDICINE
Payer: MEDICARE

## 2017-07-18 DIAGNOSIS — I26.99 PULMONARY EMBOLISM ON RIGHT (HCC): ICD-10-CM

## 2017-07-18 DIAGNOSIS — Z86.711 HISTORY OF PULMONARY EMBOLUS (PE): ICD-10-CM

## 2017-07-18 LAB
INR PPP: 2.82 (ref 0.87–1.13)
PROTHROMBIN TIME: 30.5 SEC (ref 12–14.6)

## 2017-07-18 PROCEDURE — 36415 COLL VENOUS BLD VENIPUNCTURE: CPT

## 2017-07-18 PROCEDURE — 85610 PROTHROMBIN TIME: CPT

## 2017-07-19 ENCOUNTER — ANTICOAGULATION MONITORING (OUTPATIENT)
Dept: VASCULAR LAB | Facility: MEDICAL CENTER | Age: 77
End: 2017-07-19

## 2017-07-19 DIAGNOSIS — I82.539 CHRONIC DEEP VEIN THROMBOSIS (DVT) OF POPLITEAL VEIN, UNSPECIFIED LATERALITY (HCC): ICD-10-CM

## 2017-07-19 NOTE — PROGRESS NOTES
Anticoagulation Summary as of 7/19/2017     INR goal 2.0-3.0   Selected INR 2.82 (7/18/2017)   Maintenance plan 3 mg (1 mg x 3) on Mon; 2 mg (1 mg x 2) all other days   Weekly total 15 mg   Plan last modified Zafar Eubanks PHARMD (7/19/2017)   Next INR check 8/1/2017   Target end date Indefinite    Indications   Chronic deep vein thrombosis (DVT) of popliteal vein (CMS-HCC) [I82.539]         Anticoagulation Episode Summary     INR check location     Preferred lab [facility]    Send INR reminders to     Comments       Anticoagulation Care Providers     Provider Role Specialty Phone number    Jesus Craft M.D. Referring Family Medicine [phone]    [facility] Anticoagulation Services Responsible  [phone]    Zafar Eubanks PHARMD Responsible          Anticoagulation Patient Findings   Negatives Missed Doses, Extra Doses, Medication Changes, Antibiotic Use, Diet Changes, Dental/Other Procedures, Hospitalization, Bleeding Gums, Nose Bleeds, Blood in Urine, Blood in Stool, Any Bruising, Other Complaints        Spoke with patient today regarding therapeutic INR of 2.82.  Patient denies any signs/symptoms of bruising or bleeding or any changes in diet and medications.  Instructed patient to call clinic with any questions or concerns.  INR increased rapidly over last five days.  Instructed patient to decrease weekly warfarin regimen as detailed above.  Follow up in 2 weeks.    Zafar Eubanks PHARMD

## 2017-07-20 ENCOUNTER — TELEPHONE (OUTPATIENT)
Dept: MEDICAL GROUP | Facility: PHYSICIAN GROUP | Age: 77
End: 2017-07-20

## 2017-07-20 NOTE — TELEPHONE ENCOUNTER
1. Caller Name: Suki FATIMA Banner                                         Call Back Number: [phone]      Patient approves a detailed voicemail message: N\A    RN is requesting if we can order a home monitor meter for Anticoagulation Monitoring so the blood draws don't need to be done weekly just be able to do capillary test.

## 2017-07-24 ENCOUNTER — TELEPHONE (OUTPATIENT)
Dept: MEDICAL GROUP | Facility: PHYSICIAN GROUP | Age: 77
End: 2017-07-24

## 2017-07-24 RX ORDER — WARFARIN SODIUM 1 MG/1
TABLET ORAL
Qty: 180 TAB | Refills: 3 | Status: SHIPPED | OUTPATIENT
Start: 2017-07-24 | End: 2018-03-20

## 2017-07-26 ENCOUNTER — OFFICE VISIT (OUTPATIENT)
Dept: MEDICAL GROUP | Facility: PHYSICIAN GROUP | Age: 77
End: 2017-07-26
Payer: MEDICARE

## 2017-07-26 VITALS
DIASTOLIC BLOOD PRESSURE: 70 MMHG | WEIGHT: 255 LBS | OXYGEN SATURATION: 96 % | HEART RATE: 100 BPM | SYSTOLIC BLOOD PRESSURE: 110 MMHG | HEIGHT: 70 IN | BODY MASS INDEX: 36.51 KG/M2 | RESPIRATION RATE: 28 BRPM | TEMPERATURE: 98.6 F

## 2017-07-26 DIAGNOSIS — S21.432D: ICD-10-CM

## 2017-07-26 DIAGNOSIS — I50.9 CHRONIC CONGESTIVE HEART FAILURE, UNSPECIFIED CONGESTIVE HEART FAILURE TYPE: ICD-10-CM

## 2017-07-26 DIAGNOSIS — S22.42XD CLOSED FRACTURE OF MULTIPLE RIBS OF LEFT SIDE WITH ROUTINE HEALING, SUBSEQUENT ENCOUNTER: ICD-10-CM

## 2017-07-26 DIAGNOSIS — C69.92: ICD-10-CM

## 2017-07-26 DIAGNOSIS — J95.821 RESPIRATORY FAILURE FOLLOWING TRAUMA AND SURGERY (HCC): ICD-10-CM

## 2017-07-26 DIAGNOSIS — D68.32 HEMORRHAGIC DISORDER DUE TO EXTRINSIC CIRCULATING ANTICOAGULANTS (HCC): ICD-10-CM

## 2017-07-26 DIAGNOSIS — I10 ESSENTIAL HYPERTENSION, BENIGN: ICD-10-CM

## 2017-07-26 PROCEDURE — 99214 OFFICE O/P EST MOD 30 MIN: CPT | Performed by: FAMILY MEDICINE

## 2017-07-26 NOTE — Clinical Note
July 26, 2017        Patient: Kiran Eason   YOB: 1940   Date of Visit: 7/26/2017           To Whom It May Concern:    It is my medical opinion that Kiran Eason may have his oxygen discontinued. His O2 sat. today was 96% on room air.    If you have any questions or concerns, please don't hesitate to call.        Sincerely,          Jesus Craft M.D.  Electronically Signed    [address] [id number] [phone] (Phone)  [phone] (Fax)

## 2017-07-27 NOTE — PROGRESS NOTES
Patient returns for recheck. He feels much better. His ribs do not hurt. The penetrating wound to his left lower chest is healed. He is eating his bowels are moving well. 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 He says his energy level is gradually getting better and better.  He has not been sleeping with oxygen. He would like to have his oxygen discontinued. His O2 sat today on room air is 96%.    I reviewed the following    Past Medical History   Diagnosis Date   • Back pain age 30     lower back pain   • Blood clotting disorder (CMS-HCC) current 2017     DVT and PE   • Hypertension    • Cataract      cataract and glacoma   • Glaucoma    • BPH (benign prostatic hyperplasia)    • At risk for sleep apnea    • Cancer (CMS-HCC)      squamous cell carinoma left eye    • Arrhythmia      unsure of time. thinks he was in hospital    • Pneumonia      2007   • Urinary incontinence      occasional incontinence    • Chronic congestive heart failure (CMS-HCC) 6/6/2017   • Foot fracture 12/07   • Essential hypertension, benign    • Hypertrophy of prostate without urinary obstruction and other lower urinary tract symptoms (LUTS)    • Rosacea    • Reflux esophagitis    • Esophageal stricture 07/2007   • Disorders of bursae and tendons in shoulder region, unspecified    • Arthritis    • Arthropathy, unspecified, site unspecified    • Unspecified glaucoma    • Skin cancer    • Gastric ulcer 07/2007   • Indigestion    • Cancer (CMS-HCC) 6/06     lower lip skin cancer--   • Pneumonia 2004   • Heart burn    • Pain 2/13/12     2/10 ankles, lower back   • GERD (gastroesophageal reflux disease)    • Bronchitis 12/2014   • Unspecified cataract      ONE REMOVED   • Personal history of venous thrombosis and embolism 11/2008     post knee replacement -took coumadin then        Past Surgical History   Procedure Laterality Date   • Other orthopedic surgery  greater than 10 years ago     bilat knee replacement with hardware   • Other  orthopedic surgery  greater than 10 years     left ankle / foot repair complted 2 years before knee surgery   • Other       IVC filter 2016   • Exploratory laparotomy  6/21/2017     Procedure: EXPLORATORY LAPAROTOMY;  Surgeon: Wilfred Amin M.D.;  Location: SURGERY [facility];  Service:    • Open reduction  12/07     left foot   • Athroplasty  11/18/08     Bilat. TKRs   • Foot surgery  12/2007     left foot    • Knee arthroplasty total  11/18/08     Performed by MICHEAL WINSTON at SURGERY [facility]   • Other       skin cancer removed   • Blepharoplasty  3/6/2012     Performed by JAMESON PATEL at SURGERY SAME DAY [facility] ORS   • Full thickness skin graft  3/6/2012     Performed by JAMESON PATEL at SURGERY SAME DAY [facility] ORS   • Full thickness skin graft  4/3/2012     Performed by JAMESON PATEL at SURGERY SAME DAY [facility] ORS   • Carpal tunnel release  9/2012     left side   • Eye lesion excision  2/11/2015     Performed by Artem Garcia M.D. at SURGERY [facility]   • Flap closure  4/1/2015     Performed by Artem Garcia M.D. at [facility]   • Recovery  6/29/2016     Procedure: VASCULAR CASE-ARTHUR-INFERIOR VENA CAVA FILTER PLACEMENT 37191-DEEP VEIN THROMBOSIS I82.401;  Surgeon: [facility] Surgery;  Location: SURGERY PRE-POST PROC UNIT [facility];  Service:        Allergies   Allergen Reactions   • Hydrocodone Vomiting and Nausea   • Nexium      Diarrhea         Current Outpatient Prescriptions   Medication Sig Dispense Refill   • warfarin (COUMADIN) 1 MG Tab TAKE 2 TABLETS BY MOUTH EVERY DAY AS DIRECTED 180 Tab 3   • potassium chloride ER (KLOR-CON) 10 MEQ tablet Take 1 Tab by mouth every day. 90 Tab 3   • ipratropium-albuterol (COMBIVENT RESPIMAT)  MCG/ACT Aero Soln Inhale 1 Puff by mouth 4 times a day. 1 Inhaler 3   • finasteride (PROSCAR) 5 MG Tab Take 1 tablet by mouth  every day 90 Tab 3   • albuterol 108 (90 BASE) MCG/ACT Aero Soln inhalation aerosol Inhale  2 Puffs by mouth every four hours as needed. 8.5 g 0   • acetaminophen (TYLENOL) 325 MG Tab Take 2 Tabs by mouth 4 times a day. 30 Tab 0   • potassium chloride SA (KDUR) 20 MEQ Tab CR Take 1 Tab by mouth every day. 30 Tab 0   • furosemide (LASIX) 40 MG Tab Take 1 Tab by mouth every day. 30 Tab 0   • PROAIR  (90 BASE) MCG/ACT Aero Soln inhalation aerosol INHALE 2 PUFFS BY MOUTH EVERY 6 HOURS AS NEEDED FOR SHORTNESS OF BREATH. PROAIR OKAY 8.5 Inhaler 3   • Esomeprazole Magnesium (NEXIUM) 20 MG Pack Take 1 Each by mouth every day. 30 Each 3   • furosemide (LASIX) 40 MG Tab Take 1 Tab by mouth every day. 90 Tab 3   • enoxaparin (LOVENOX) 120 MG/0.8ML Solution inj Inject 120 mg as instructed every 12 hours. 10 Syringe 0   • oxycodone immediate-release (ROXICODONE) 5 MG Tab Take 1 Tab by mouth every 6 hours as needed for Severe Pain. 10 Tab 0   • potassium chloride (KLOR-CON) 20 MEQ Pack Take 10 mEq by mouth 2 times a day.     • finasteride (PROSCAR) 5 MG Tab Take 5 mg by mouth every day.     • warfarin (COUMADIN) 1 MG Tab Take 1 mg by mouth every day.     • Omega-3 Fatty Acids (FISH OIL) 1000 MG Cap capsule Take 1,000 mg by mouth every day.     • Multiple Vitamins-Minerals (CENTRUM SILVER) TABS Take 2 Tabs by mouth every day. 100 Tab 3     No current facility-administered medications for this visit.        History reviewed. No pertinent family history.    Social History     Social History   • Marital Status:      Spouse Name: N/A   • Number of Children: N/A   • Years of Education: N/A     Occupational History   • Not on file.     Social History Main Topics   • Smoking status: Never Smoker    • Smokeless tobacco: Former User     Types: Chew     Quit date: 06/03/1967   • Alcohol Use: 0.0 - 0.6 oz/week     0-1 Glasses of wine per week      Comment: 0   • Drug Use: No   • Sexual Activity: No     Other Topics Concern   • Not on file     Social History Narrative    ** Merged History Encounter **         ** Merged  History Encounter **               Physical Exam   Constitutional: He is oriented. He appears well-developed and well-nourished. No distress.   HENT:   Head: Normocephalic and atraumatic.   Right Ear: External ear normal. Ear canal and TM normal   Left Ear: External ear normal. Ear canal and TM normal   Nose: Nose normal.   Mouth/Throat: Oropharynx is clear and moist.   Eyes: No recurrence of squamous cell carcinoma the left eyelid seenConjunctivae and extraocular motions are normal. Pupils are equal, round, and reactive to light.        Fundi benign bilaterally   Neck: No thyromegaly present.   Cardiovascular: Normal rate, regular rhythm, normal heart sounds and intact distal pulses.  Exam reveals no gallop.    No murmur heard.  Pulmonary/Chest: Effort normal and breath sounds normal. No respiratory distress. He has no wheezes. He has no rales.   Abdominal: Soft. Bowel sounds are normal. No hepatosplenomegaly. He exhibits no distension. No tenderness. He has no rebound and no guarding.   Musculoskeletal: Normal range of motion. He exhibits no edema and no tenderness.   Lymphadenopathy:     He has no cervical adenopathy.  No supraclavicular adenopathy.   Neurological: He is alert and oriented. He has normal reflexes.        Babinskis downgoing bilaterally   Skin: Well-healed scar on the left posterior chest from his penetrating wound Skin is warm and dry. No rash noted. No erythema.   Psychiatric: He has a normal mood and appropriate affect. His behavior is normal. Judgment and thought content normal.     1. Closed fracture of multiple ribs of left side with routine healing, subsequent encounter   doing well.    2. Penetrating back wound, left, subsequent encounter   doing well.    3. 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 Pro time is managed by Zafar Eubanks Pharm[facility].    4. Chronic congestive heart failure, unspecified congestive heart failure type (CMS-HCC)   stable     5. Essential hypertension, benign   controlled    6. 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    Please note that this dictation was created using voice recognition software. I have worked with consultants from the vendor as well as technical experts from [facility] to optimize the interface. I have made every reasonable attempt to correct obvious errors, but I expect that there are errors of grammar and possibly content that I did not discover before finalizing the note.

## 2017-08-12 LAB — INR PPP: 3.8 (ref 2–3.5)

## 2017-08-14 ENCOUNTER — ANTICOAGULATION MONITORING (OUTPATIENT)
Dept: VASCULAR LAB | Facility: MEDICAL CENTER | Age: 77
End: 2017-08-14

## 2017-08-14 DIAGNOSIS — I82.539 CHRONIC DEEP VEIN THROMBOSIS (DVT) OF POPLITEAL VEIN, UNSPECIFIED LATERALITY (HCC): ICD-10-CM

## 2017-08-14 NOTE — PROGRESS NOTES
OP Anticoagulation Service Note    Date: 8/14/2017    Anticoagulation Summary as of 8/14/2017     INR goal 2.0-3.0   Selected INR 3.8! (8/12/2017)   Maintenance plan 3 mg (1 mg x 3) on Mon; 2 mg (1 mg x 2) all other days   Weekly total 15 mg   Plan last modified Zafar Eubanks PHARMD (7/19/2017)   Next INR check 8/21/2017   Target end date Indefinite    Indications   Chronic deep vein thrombosis (DVT) of popliteal vein (CMS-HCC) [I82.539]         Anticoagulation Episode Summary     INR check location     Preferred lab [facility]    Send INR reminders to     Comments       Anticoagulation Care Providers     Provider Role Specialty Phone number    Jesus Craft M.D. Referring Family Medicine [phone]    [facility] Anticoagulation Services Responsible  [phone]    Zafar Eubanks PHARMD Responsible          Anticoagulation Patient Findings      Plan:  INR is high today. Spoke with pt on the phone. He reports he has been taking 4 mg on mon then 2 mg row.  Patient denies sign/symptoms of bleeding/clotting. No recent medication changes and patient has been eating a consistent diet. Instructed pt to call clinic with any concerns of bleeding or thrombosis. He has already taken todays dose, instructed pt to HOLD x 1 dose tomorrow then resume usual regimen. Follow up in 1 week(s)      Daniella Chaudhari PHARMD

## 2017-08-15 ENCOUNTER — TELEPHONE (OUTPATIENT)
Dept: URGENT CARE | Facility: PHYSICIAN GROUP | Age: 77
End: 2017-08-15

## 2017-08-15 ENCOUNTER — APPOINTMENT (OUTPATIENT)
Dept: RADIOLOGY | Facility: IMAGING CENTER | Age: 77
End: 2017-08-15
Attending: EMERGENCY MEDICINE
Payer: MEDICARE

## 2017-08-15 ENCOUNTER — HOSPITAL ENCOUNTER (OUTPATIENT)
Dept: LAB | Facility: MEDICAL CENTER | Age: 77
End: 2017-08-15
Attending: EMERGENCY MEDICINE
Payer: MEDICARE

## 2017-08-15 ENCOUNTER — OFFICE VISIT (OUTPATIENT)
Dept: URGENT CARE | Facility: PHYSICIAN GROUP | Age: 77
End: 2017-08-15
Payer: MEDICARE

## 2017-08-15 VITALS
SYSTOLIC BLOOD PRESSURE: 145 MMHG | HEART RATE: 91 BPM | OXYGEN SATURATION: 95 % | TEMPERATURE: 98.8 F | WEIGHT: 255 LBS | HEIGHT: 69 IN | BODY MASS INDEX: 37.77 KG/M2 | DIASTOLIC BLOOD PRESSURE: 70 MMHG | RESPIRATION RATE: 20 BRPM

## 2017-08-15 DIAGNOSIS — L03.119 CELLULITIS OF HAND: ICD-10-CM

## 2017-08-15 LAB
ALBUMIN SERPL BCP-MCNC: 4 G/DL (ref 3.2–4.9)
ALBUMIN/GLOB SERPL: 1.2 G/DL
ALP SERPL-CCNC: 105 U/L (ref 30–99)
ALT SERPL-CCNC: 23 U/L (ref 2–50)
ANION GAP SERPL CALC-SCNC: 11 MMOL/L (ref 0–11.9)
AST SERPL-CCNC: 20 U/L (ref 12–45)
BASOPHILS # BLD AUTO: 0.4 % (ref 0–1.8)
BASOPHILS # BLD: 0.03 K/UL (ref 0–0.12)
BILIRUB SERPL-MCNC: 0.3 MG/DL (ref 0.1–1.5)
BUN SERPL-MCNC: 17 MG/DL (ref 8–22)
CALCIUM SERPL-MCNC: 9.8 MG/DL (ref 8.5–10.5)
CHLORIDE SERPL-SCNC: 103 MMOL/L (ref 96–112)
CO2 SERPL-SCNC: 22 MMOL/L (ref 20–33)
CREAT SERPL-MCNC: 0.9 MG/DL (ref 0.5–1.4)
EOSINOPHIL # BLD AUTO: 0.08 K/UL (ref 0–0.51)
EOSINOPHIL NFR BLD: 1 % (ref 0–6.9)
ERYTHROCYTE [DISTWIDTH] IN BLOOD BY AUTOMATED COUNT: 53.2 FL (ref 35.9–50)
GFR SERPL CREATININE-BSD FRML MDRD: >60 ML/MIN/1.73 M 2
GLOBULIN SER CALC-MCNC: 3.3 G/DL (ref 1.9–3.5)
GLUCOSE SERPL-MCNC: 137 MG/DL (ref 65–99)
HCT VFR BLD AUTO: 50.5 % (ref 42–52)
HGB BLD-MCNC: 15.9 G/DL (ref 14–18)
IMM GRANULOCYTES # BLD AUTO: 0.05 K/UL (ref 0–0.11)
IMM GRANULOCYTES NFR BLD AUTO: 0.6 % (ref 0–0.9)
LYMPHOCYTES # BLD AUTO: 1.35 K/UL (ref 1–4.8)
LYMPHOCYTES NFR BLD: 16.9 % (ref 22–41)
MCH RBC QN AUTO: 29.4 PG (ref 27–33)
MCHC RBC AUTO-ENTMCNC: 31.5 G/DL (ref 33.7–35.3)
MCV RBC AUTO: 93.5 FL (ref 81.4–97.8)
MONOCYTES # BLD AUTO: 0.67 K/UL (ref 0–0.85)
MONOCYTES NFR BLD AUTO: 8.4 % (ref 0–13.4)
NEUTROPHILS # BLD AUTO: 5.82 K/UL (ref 1.82–7.42)
NEUTROPHILS NFR BLD: 72.7 % (ref 44–72)
NRBC # BLD AUTO: 0 K/UL
NRBC BLD AUTO-RTO: 0 /100 WBC
PLATELET # BLD AUTO: 173 K/UL (ref 164–446)
PMV BLD AUTO: 10.8 FL (ref 9–12.9)
POTASSIUM SERPL-SCNC: 4.1 MMOL/L (ref 3.6–5.5)
PROT SERPL-MCNC: 7.3 G/DL (ref 6–8.2)
RBC # BLD AUTO: 5.4 M/UL (ref 4.7–6.1)
SODIUM SERPL-SCNC: 136 MMOL/L (ref 135–145)
WBC # BLD AUTO: 8 K/UL (ref 4.8–10.8)

## 2017-08-15 PROCEDURE — 73130 X-RAY EXAM OF HAND: CPT | Mod: TC,LT | Performed by: EMERGENCY MEDICINE

## 2017-08-15 PROCEDURE — 99214 OFFICE O/P EST MOD 30 MIN: CPT | Performed by: EMERGENCY MEDICINE

## 2017-08-15 PROCEDURE — 80053 COMPREHEN METABOLIC PANEL: CPT

## 2017-08-15 PROCEDURE — 85025 COMPLETE CBC W/AUTO DIFF WBC: CPT

## 2017-08-15 PROCEDURE — A6448 LT COMPRES BAND <3"/YD: HCPCS | Performed by: EMERGENCY MEDICINE

## 2017-08-15 PROCEDURE — L3807 WHFO W/O JOINTS PRE CST: HCPCS | Performed by: EMERGENCY MEDICINE

## 2017-08-15 PROCEDURE — 36415 COLL VENOUS BLD VENIPUNCTURE: CPT

## 2017-08-15 RX ORDER — FINASTERIDE 5 MG/1
TABLET, FILM COATED ORAL
COMMUNITY
Start: 2016-02-11 | End: 2017-12-13

## 2017-08-15 RX ORDER — INDOMETHACIN 50 MG/1
CAPSULE ORAL
COMMUNITY
Start: 2016-08-03 | End: 2018-03-20

## 2017-08-15 RX ORDER — BIMATOPROST 0.1 MG/ML
SOLUTION/ DROPS OPHTHALMIC
COMMUNITY
Start: 2017-07-18 | End: 2018-03-20

## 2017-08-15 RX ORDER — HYDROCHLOROTHIAZIDE 25 MG/1
TABLET ORAL
COMMUNITY
Start: 2016-08-31 | End: 2018-03-20

## 2017-08-15 RX ORDER — CEPHALEXIN 500 MG/1
500 CAPSULE ORAL 4 TIMES DAILY
Qty: 40 CAP | Refills: 0 | Status: SHIPPED | OUTPATIENT
Start: 2017-08-15 | End: 2017-12-13

## 2017-08-15 RX ORDER — LOSARTAN POTASSIUM 50 MG/1
50 TABLET ORAL
COMMUNITY
End: 2018-03-20

## 2017-08-15 RX ORDER — CEPHALEXIN 500 MG/1
500 TABLET ORAL
COMMUNITY
Start: 2016-11-09 | End: 2017-12-13

## 2017-08-15 RX ORDER — POTASSIUM CHLORIDE 750 MG/1
10 TABLET, FILM COATED, EXTENDED RELEASE ORAL
COMMUNITY
Start: 2016-09-12 | End: 2017-12-13

## 2017-08-15 RX ORDER — ERYTHROMYCIN 5 MG/G
OINTMENT OPHTHALMIC
Refills: 2 | COMMUNITY
Start: 2017-05-24 | End: 2017-12-13

## 2017-08-15 RX ORDER — OMEPRAZOLE 20 MG/1
20 CAPSULE, DELAYED RELEASE ORAL
COMMUNITY
End: 2018-03-20

## 2017-08-15 RX ORDER — FUROSEMIDE 40 MG/1
40 TABLET ORAL
COMMUNITY
Start: 2016-09-12 | End: 2018-03-20

## 2017-08-15 RX ORDER — METOPROLOL TARTRATE 50 MG/1
50 TABLET, FILM COATED ORAL
COMMUNITY
End: 2018-03-20

## 2017-08-15 RX ORDER — LATANOPROST 50 UG/ML
SOLUTION/ DROPS OPHTHALMIC
COMMUNITY
End: 2017-12-13

## 2017-08-15 RX ORDER — CHLORAL HYDRATE 500 MG
1000 CAPSULE ORAL
COMMUNITY
End: 2018-03-20

## 2017-08-15 RX ORDER — CEPHALEXIN 500 MG/1
500 CAPSULE ORAL
COMMUNITY
Start: 2016-12-08 | End: 2017-08-15

## 2017-08-15 RX ORDER — SULFAMETHOXAZOLE AND TRIMETHOPRIM 800; 160 MG/1; MG/1
1 TABLET ORAL EVERY 12 HOURS
Qty: 20 TAB | Refills: 0 | Status: SHIPPED | OUTPATIENT
Start: 2017-08-15 | End: 2017-12-13

## 2017-08-15 RX ORDER — NEOMYCIN SULFATE, POLYMYXIN B SULFATE, AND DEXAMETHASONE 3.5; 10000; 1 MG/G; [USP'U]/G; MG/G
OINTMENT OPHTHALMIC
COMMUNITY
Start: 2016-12-23 | End: 2017-12-23

## 2017-08-15 ASSESSMENT — ENCOUNTER SYMPTOMS
NAUSEA: 1
SORE THROAT: 0
BACK PAIN: 0
SENSORY CHANGE: 0
EYE REDNESS: 0
ABDOMINAL PAIN: 0
HEADACHES: 0
PALPITATIONS: 0
JOINT SWELLING: 1
FALLS: 0
CHILLS: 0
EYE DISCHARGE: 0
VOMITING: 0
HEMOPTYSIS: 0
DIARRHEA: 0
FEVER: 0
COUGH: 0

## 2017-08-15 ASSESSMENT — PAIN SCALES - GENERAL: PAINLEVEL: 4=SLIGHT-MODERATE PAIN

## 2017-08-15 NOTE — PROGRESS NOTES
Subjective:      Kiran Eason is a 77 y.o. male who presents with Hand Swelling            Hand Injury  This is a new problem. Episode onset: patient's hand has been swelling for the past. Associated symptoms include joint swelling (patient symptoms entire left hand is swollen no known history of gout.) and nausea. Pertinent negatives include no abdominal pain, chest pain, chills, coughing, fever, headaches, sore throat or vomiting. 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 Dorsum of his left hand is approximately 50% larger than his dominant right hand..      Allergies   Allergen Reactions   • Hydrocodone Vomiting and Nausea   • Nexium      Diarrhea       Social History     Social History   • Marital Status:      Spouse Name: N/A   • Number of Children: N/A   • Years of Education: N/A     Occupational History   • Not on file.     Social History Main Topics   • Smoking status: Never Smoker    • Smokeless tobacco: Former User     Types: Chew     Quit date: 06/03/1967   • Alcohol Use: 0.0 - 0.6 oz/week     0-1 Glasses of wine per week      Comment: 0   • Drug Use: No   • Sexual Activity: No     Other Topics Concern   • Not on file     Social History Narrative    ** Merged History Encounter **         ** Merged History Encounter **        .  Past Medical History   Diagnosis Date   • Back pain age 30     lower back pain   • Blood clotting disorder (CMS-HCC) current 2017     DVT and PE   • Hypertension    • Cataract      cataract and glacoma   • Glaucoma    • BPH (benign prostatic hyperplasia)    • At risk for sleep apnea    • Cancer (CMS-HCC)      squamous cell carinoma left eye    • Arrhythmia      unsure of time. thinks he was in hospital    • Pneumonia      2007   • Urinary incontinence      occasional incontinence    • Chronic congestive heart failure (CMS-HCC) 6/6/2017   • Foot fracture 12/07   • Essential hypertension,  benign    • Hypertrophy of prostate without urinary obstruction and other lower urinary tract symptoms (LUTS)    • Rosacea    • Reflux esophagitis    • Esophageal stricture 07/2007   • Disorders of bursae and tendons in shoulder region, unspecified    • Arthritis    • Arthropathy, unspecified, site unspecified    • Unspecified glaucoma    • Skin cancer    • Gastric ulcer 07/2007   • Indigestion    • Cancer (CMS-HCC) 6/06     lower lip skin cancer--   • Pneumonia 2004   • Heart burn    • Pain 2/13/12     2/10 ankles, lower back   • GERD (gastroesophageal reflux disease)    • Bronchitis 12/2014   • Unspecified cataract      ONE REMOVED   • Personal history of venous thrombosis and embolism 11/2008     post knee replacement -took coumadin then   .m   Review of Systems   Constitutional: Negative for fever and chills.   HENT: Negative for sore throat.    Eyes: Negative for discharge and redness.   Respiratory: Negative for cough and hemoptysis.    Cardiovascular: Negative for chest pain and palpitations.   Gastrointestinal: Positive for nausea. Negative for vomiting, abdominal pain and diarrhea.   Musculoskeletal: Positive for joint pain and joint swelling (patient symptoms entire left hand is swollen no known history of gout.). Negative for back pain and falls.   Skin:        Patient and physiologic second has a very swollen erythematous left hand above the wrist.   Neurological: Negative for sensory change and headaches.     Current Outpatient Prescriptions on File Prior to Visit   Medication Sig Dispense Refill   • warfarin (COUMADIN) 1 MG Tab TAKE 2 TABLETS BY MOUTH EVERY DAY AS DIRECTED 180 Tab 3   • Esomeprazole Magnesium (NEXIUM) 20 MG Pack Take 1 Each by mouth every day. 30 Each 3   • furosemide (LASIX) 40 MG Tab Take 1 Tab by mouth every day. 90 Tab 3   • potassium chloride SA (KDUR) 20 MEQ Tab CR Take 1 Tab by mouth every day. 30 Tab 0   • Multiple Vitamins-Minerals (CENTRUM SILVER) TABS Take 2 Tabs by  "mouth every day. 100 Tab 3   • potassium chloride ER (KLOR-CON) 10 MEQ tablet Take 1 Tab by mouth every day. 90 Tab 3   • ipratropium-albuterol (COMBIVENT RESPIMAT)  MCG/ACT Aero Soln Inhale 1 Puff by mouth 4 times a day. 1 Inhaler 3   • finasteride (PROSCAR) 5 MG Tab Take 1 tablet by mouth  every day 90 Tab 3   • enoxaparin (LOVENOX) 120 MG/0.8ML Solution inj Inject 120 mg as instructed every 12 hours. 10 Syringe 0   • albuterol 108 (90 BASE) MCG/ACT Aero Soln inhalation aerosol Inhale 2 Puffs by mouth every four hours as needed. 8.5 g 0   • acetaminophen (TYLENOL) 325 MG Tab Take 2 Tabs by mouth 4 times a day. 30 Tab 0   • oxycodone immediate-release (ROXICODONE) 5 MG Tab Take 1 Tab by mouth every 6 hours as needed for Severe Pain. 10 Tab 0   • furosemide (LASIX) 40 MG Tab Take 1 Tab by mouth every day. 30 Tab 0   • potassium chloride (KLOR-CON) 20 MEQ Pack Take 10 mEq by mouth 2 times a day.     • finasteride (PROSCAR) 5 MG Tab Take 5 mg by mouth every day.     • warfarin (COUMADIN) 1 MG Tab Take 1 mg by mouth every day.     • PROAIR  (90 BASE) MCG/ACT Aero Soln inhalation aerosol INHALE 2 PUFFS BY MOUTH EVERY 6 HOURS AS NEEDED FOR SHORTNESS OF BREATH. PROAIR OKAY 8.5 Inhaler 3   • Omega-3 Fatty Acids (FISH OIL) 1000 MG Cap capsule Take 1,000 mg by mouth every day.       No current facility-administered medications on file prior to visit.   History reviewed. No pertinent family history.       Objective:     /70 mmHg  Pulse 91  Temp(Src) 37.1 °C (98.8 °F)  Resp 20  Ht 1.753 m (5' 9\")  Wt 115.667 kg (255 lb)  BMI 37.64 kg/m2  SpO2 95%     Physical Exam   Constitutional: Vital signs are normal. He appears well-developed and well-nourished.  Non-toxic appearance. He does not have a sickly appearance. No distress.       HENT:   Head: Atraumatic.   Right Ear: External ear normal.   Eyes: Right eye exhibits no discharge. Left eye exhibits no discharge.   OS:Very inflamed conjunctiva status post " recent squamous cell cancer excision.   Cardiovascular: Normal rate and regular rhythm.    Pulmonary/Chest: Effort normal and breath sounds normal.   Musculoskeletal:   Left hand is swollen particularly on the dorsum no obvious purulent drainage. The hand is red above the wrist see the diagram. The swelling was marked with ink above the wrist with erythema distal one third of the forearm.   Neurological: He is alert.   Skin: Skin is warm. He is not diaphoretic. No erythema.   Psychiatric: He has a normal mood and affect. His behavior is normal.   Vitals reviewed.            Hand x-rays show metallic foreign body probable injury not related to today's issues  Assessment/Plan:     1. Cellulitis of hand    Patient will be placed in a sling and splint will perform warm soaks. If patient's hand the patient's hand as well as his condition worsens I recommend he go immediately to the emergency department for evaluation and possible admission. 3 times a day for 20-30 minutes. Patient will initiate Septra double strength and Keflex. Be reevaluated in 2 days. Last tetanus shot was 2017.    - cephALEXin (KEFLEX) 500 MG Cap; Take 500 mg by mouth.  - DX-HAND 3+ LEFT; Future  - CBC WITH DIFFERENTIAL; Future  - HAND SPLINT  - SLINGS  - sulfamethoxazole-trimethoprim (BACTRIM DS) 800-160 MG tablet; Take 1 Tab by mouth every 12 hours. Stay hydrated  Dispense: 20 Tab; Refill: 0  - cephALEXin (KEFLEX) 500 MG Cap; Take 1 Cap by mouth 4 times a day.  Dispense: 40 Cap; Refill: 0    I called the patient with his lab and reminded him to contact the Coumadin clinic regarding the fact that he is on antibiotics. No white count was within normal limits and alkaline phosphatase was slightly elevated. Again I reiterated that it is hand continues to swell he needs to be reevaluated in the emergency department. Tonight he will soak it keep it elevated and mobilized. If he does not see improvement I recommended he go to the emergency department if  but regardless we need to see him in 2 day

## 2017-08-15 NOTE — MR AVS SNAPSHOT
"        Kiran Flores Yumi   8/15/2017 1:00 PM   Office Visit   MRN: [id number]    Department:  [facility]   Dept Phone:  [phone]    Description:  Male : 1940   Provider:  MEGHANA Lopez M.D.           Reason for Visit     Hand Swelling x3 days. Lt hand swelling, pain, tightness.      Allergies as of 8/15/2017     Allergen Noted Reactions    Hydrocodone 2016   Vomiting, Nausea    Nexium 2016       Diarrhea        You were diagnosed with     Cellulitis of hand   [[id number]]         Vital Signs     Blood Pressure Pulse Temperature Respirations Height Weight    145/70 mmHg 91 37.1 °C (98.8 °F) 20 1.753 m (5' 9\") 115.667 kg (255 lb)    Body Mass Index Oxygen Saturation Smoking Status             37.64 kg/m2 95% Never Smoker          Basic Information     Date Of Birth Sex Race Ethnicity Preferred Language    1940 Male White Non- English      Your appointments     Aug 25, 2017  8:45 AM   MR ORBITS WITH/WITHOUT (60) with [address] MRI 2   [facility] - MRI - [address]    [address]  [place] [address]   [phone]              Problem List              ICD-10-CM Priority Class Noted - Resolved    Essential hypertension, benign I10   2009 - Present    ED (erectile dysfunction) N52.9   2009 - Present    Glaucoma H40.9   2009 - Present    Rosacea L71.9   2009 - Present    Arthropathy M12.9   2009 - Present    Reflux esophagitis K21.0   2009 - Present    Esophageal stricture K22.2   2009 - Present    Essential hypertension I10   2009 - Present    Rosacea L71.9   2009 - Present    Arthropathy of ankle and foot M12.9   2009 - Present    Glaucoma H40.9   2009 - Present    Foot pain M79.673   2010 - Present    BPH (benign prostatic hyperplasia) N40.0   2010 - Present    Low serum testosterone level E29.1   2012 - Present    Vitamin D deficiency disease E55.9   2012 - Present    Carpal tunnel " syndrome, bilateral G56.03   2/14/2012 - Present    Chronic gout M1A.9XX0   12/17/2012 - Present    Benign neoplasm of eyelid D23.10   2/11/2015 - Present    Malignant neoplasm of skin of eyelid, 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   4/1/2015 - Present    Diverticulosis of large intestine without hemorrhage K57.30   8/4/2015 - Present    History of colonic polyps Z86.010   8/4/2015 - Present    Pulmonary embolism on right (CMS-HCC) I26.99   11/30/2015 - Present    History of inferior vena caval filter placement Z98.890   7/26/2016 - Present    Generalized edema R60.1   9/16/2016 - Present    Edema R60.9   10/21/2016 - Present    Sleep apnea G47.30   10/21/2016 - Present    Obesity (BMI 30-39.9) E66.9   10/21/2016 - Present    Leg DVT (deep venous thromboembolism), acute ([facility]) I82.409   11/9/2016 - Present    Acute blood loss anemia D62 Low  6/2/2017 - Present    Penetrating back wound S21.239A Medium  6/2/2017 - Present    History of pulmonary embolus (PE) Z86.711 Medium  6/2/2017 - Present    Hemorrhagic disorder due to extrinsic circulating anticoagulants (CMS-HCC) D68.32 Medium  6/2/2017 - Present    AV block, Mobitz 1 I44.1 Medium  6/3/2017 - Present    Chronic deep vein thrombosis (DVT) of popliteal vein (CMS-HCC) I82.539 Medium  6/4/2017 - Present    Chronic congestive heart failure (CMS-HCC) I50.9 Medium  6/6/2017 - Present    CAD (coronary artery disease) I25.10 Medium  6/6/2017 - Present    Obesity (BMI 30-39.9) E66.9 Medium  6/12/2017 - Present    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 Low  6/14/2017 - Present    Cellulitis of left foot L03.116 Medium  6/17/2017 - Present    Respiratory failure following trauma and surgery (CMS-HCC) J95.822 Medium  6/22/2017 - Present    Hemothorax on left J94.2   6/26/2017 - Present    Obesity (BMI 35.0-39.9 without comorbidity) ([facility]) E66.9   7/6/2017 - Present      Health Maintenance        Date Due Completion Dates    IMM INFLUENZA (1) 9/1/2017 11/30/2015, 11/1/2008     COLONOSCOPY 8/4/2020 8/4/2015    IMM DTaP/Tdap/Td Vaccine (4 - Td) 6/2/2027 6/2/2017, 12/30/2011, 12/1/2001, 12/1/2001            Current Immunizations     13-VALENT PCV PREVNAR 5/15/2015    Dtap Vaccine 12/1/2001    Influenza Vaccine 11/1/2008    Influenza Vaccine Pediatric 11/1/2008    Influenza Vaccine Quad Inj (Pf) 11/30/2015    Pneumococcal Vaccine (UF)Historical Data 11/8/2016, 12/1/2001, 12/1/2001    Pneumococcal polysaccharide vaccine (PPSV-23) 12/30/2011    SHINGLES VACCINE 8/31/2009    TD Vaccine 12/1/2001    Tdap Vaccine 6/2/2017  6:41 PM, 12/30/2011      Below and/or attached are the medications your provider expects you to take. Review all of your home medications and newly ordered medications with your provider and/or pharmacist. Follow medication instructions as directed by your provider and/or pharmacist. Please keep your medication list with you and share with your provider. Update the information when medications are discontinued, doses are changed, or new medications (including over-the-counter products) are added; and carry medication information at all times in the event of emergency situations     Allergies:  HYDROCODONE - Vomiting,Nausea     NEXIUM - (reactions not documented)               Medications  Valid as of: August 15, 2017 -  2:38 PM    Generic Name Brand Name Tablet Size Instructions for use    Acetaminophen (Tab) TYLENOL 325 MG Take 2 Tabs by mouth 4 times a day.        Albuterol Sulfate (Aero Soln) PROAIR  (90 BASE) MCG/ACT INHALE 2 PUFFS BY MOUTH EVERY 6 HOURS AS NEEDED FOR SHORTNESS OF BREATH. PROAIR OKAY        Albuterol Sulfate (Aero Soln) albuterol 108 (90 BASE) MCG/ACT Inhale 2 Puffs by mouth every four hours as needed.        Bimatoprost (Solution) LUMIGAN 0.01 %         Cephalexin (Tab) Cephalexin 500 MG Take 500 mg by mouth.        Cephalexin (Cap) KEFLEX 500 MG Take 1 Cap by mouth 4 times a day.        Enoxaparin Sodium (Solution) LOVENOX 120 MG/0.8ML Inject 120  mg as instructed every 12 hours.        Erythromycin (Ointment) erythromycin 5 MG/GM APPLY RIBBON TWICE A DAY TO THE LOWER LEFT EYE FOR 1 WEEK        Esomeprazole Magnesium (Pack) Esomeprazole Magnesium 20 MG Take 1 Each by mouth every day.        Finasteride (Tab) PROSCAR 5 MG Take 5 mg by mouth every day.        Finasteride (Tab) PROSCAR 5 MG Take 1 tablet by mouth  every day        Finasteride (Tab) PROSCAR 5 MG Take 1 tablet by mouth  every day        Furosemide (Tab) LASIX 40 MG Take 1 Tab by mouth every day.        Furosemide (Tab) LASIX 40 MG Take 1 Tab by mouth every day.        Furosemide (Tab) LASIX 40 MG Take 40 mg by mouth.        HydroCHLOROthiazide (Tab) HYDRODIURIL 25 MG Take 1 tablet by mouth  every day        Indomethacin (Cap) INDOCIN 50 MG Take 1 capsule by mouth  twice daily with meals for  gout flare ups        Ipratropium-Albuterol (Aero Soln) COMBIVENT RESPIMAT  MCG/ACT Inhale 1 Puff by mouth 4 times a day.        Latanoprost (Solution) XALATAN 0.005 % Place 1 Drop in right eye every evening.        Losartan Potassium (Tab) COZAAR 50 MG Take 50 mg by mouth.        Metoprolol Tartrate (Tab) LOPRESSOR 50 MG Take 50 mg by mouth.        Multiple Vitamins-Minerals (Tab) CENTRUM SILVER  Take 2 Tabs by mouth every day.        Multiple Vitamins-Minerals   Take  by mouth.        Neomycin-Polymyxin-Dexameth (Ointment) MAXITROL 3.5-50774-2.1 by Ophthalmic route.        Omega-3 Fatty Acids (Cap) fish oil 1000 MG Take 1,000 mg by mouth every day.        Omega-3 Fatty Acids (Cap) fish oil 1000 MG Take 1,000 mg by mouth.        Omeprazole (CAPSULE DELAYED RELEASE) PRILOSEC 20 MG Take 20 mg by mouth.        OxyCODONE HCl (Tab) ROXICODONE 5 MG Take 1 Tab by mouth every 6 hours as needed for Severe Pain.        Potassium (Tab) Potassium 99 MG Take  by mouth.        Potassium Chloride (Pack) KLOR-CON 20 MEQ Take 10 mEq by mouth 2 times a day.        Potassium Chloride (Tab CR) KLOR-CON 10 MEQ Take 1 Tab by  mouth every day.        Potassium Chloride (Tab CR) KLOR-CON 10 MEQ Take 10 mEq by mouth.        Potassium Chloride Myra CR (Tab CR) Kdur 20 MEQ Take 1 Tab by mouth every day.        Silver Sulfadiazine (Cream) SILVADENE 1 % Apply 1 Units to affected area(s).        Sulfamethoxazole-Trimethoprim (Tab) BACTRIM -160 MG Take 1 Tab by mouth every 12 hours. Stay hydrated        Warfarin Sodium (Tab) COUMADIN 1 MG Take 1 mg by mouth every day.        Warfarin Sodium (Tab) COUMADIN 1 MG TAKE 2 TABLETS BY MOUTH EVERY DAY AS DIRECTED        .                 Medicines prescribed today were sent to:     [id number] - [place], [place] - [address]    [address] [place] [address]    Phone: [phone] Fax: [phone]    Open 24 Hours?: No    [facility] - [address]    [address] [facility] [address] [facility] [address]    Phone: [phone] Fax: [phone]    Open 24 Hours?: No    [facility] [address] [place] [address] [place] - [address] [facility] [place] [address]    [address] [place] [address]    Phone: [phone] Fax: [phone]    Open 24 Hours?: No      Medication refill instructions:       If your prescription bottle indicates you have medication refills left, it is not necessary to call your provider’s office. Please contact your pharmacy and they will refill your medication.    If your prescription bottle indicates you do not have any refills left, you may request refills at any time through one of the following ways: The online [employer] system (except Urgent Care), by calling your provider’s office, or by asking your pharmacy to contact your provider’s office with a refill request. Medication refills are processed only during regular business hours and may not be available until the next business day. Your provider may request additional information or to have a follow-up visit with you prior to refilling your medication.   *Please Note: Medication refills  are assigned a new Rx number when refilled electronically. Your pharmacy may indicate that no refills were authorized even though a new prescription for the same medication is available at the pharmacy. Please request the medicine by name with the pharmacy before contacting your provider for a refill.        Your To Do List     Future Labs/Procedures Complete By Expires    CBC WITH DIFFERENTIAL  As directed 8/15/2018    COMP METABOLIC PANEL  As directed 8/16/2018    DX-HAND 3+ LEFT  As directed 8/15/2018         MyChart Status: Patient Declined

## 2017-08-16 ENCOUNTER — ANTICOAGULATION MONITORING (OUTPATIENT)
Dept: VASCULAR LAB | Facility: MEDICAL CENTER | Age: 77
End: 2017-08-16

## 2017-08-16 ENCOUNTER — TELEPHONE (OUTPATIENT)
Dept: URGENT CARE | Facility: PHYSICIAN GROUP | Age: 77
End: 2017-08-16

## 2017-08-16 DIAGNOSIS — I82.539 CHRONIC DEEP VEIN THROMBOSIS (DVT) OF POPLITEAL VEIN, UNSPECIFIED LATERALITY (HCC): ICD-10-CM

## 2017-08-16 NOTE — PROGRESS NOTES
S/w patient regarding initiation of bactrim and cephalexin for possible cellulitis.  He did not hold dose yesterday as instructed by [facility] staff.  Instructed him to HOLD X 1 tomorrow (already took today), then decrease weekly warfarin regimen by ~27% as detailed in dosing calendar.  Follow up in 5 days.    Zafar Eubanks, GABOD

## 2017-08-16 NOTE — TELEPHONE ENCOUNTER
I contacted the patient he stated that the swelling had decreased, based on that I felt he could stay at home and that I did recontact him but that if his condition end took a turn for the worse that he needed to come into the urgent care or ED with possible need for admission

## 2017-08-17 ENCOUNTER — TELEPHONE (OUTPATIENT)
Dept: URGENT CARE | Facility: PHYSICIAN GROUP | Age: 77
End: 2017-08-17

## 2017-08-22 ENCOUNTER — TELEPHONE (OUTPATIENT)
Dept: VASCULAR LAB | Facility: MEDICAL CENTER | Age: 77
End: 2017-08-22

## 2017-08-22 NOTE — TELEPHONE ENCOUNTER
Spoke with Kiran who reports he was unable to perform home monitor test for INR  Alyssa Aguayo, PHARMD

## 2017-08-23 ENCOUNTER — ANTICOAGULATION MONITORING (OUTPATIENT)
Dept: VASCULAR LAB | Facility: MEDICAL CENTER | Age: 77
End: 2017-08-23

## 2017-08-23 DIAGNOSIS — I82.539 CHRONIC DEEP VEIN THROMBOSIS (DVT) OF POPLITEAL VEIN, UNSPECIFIED LATERALITY (HCC): ICD-10-CM

## 2017-08-23 LAB — INR PPP: 2.3 (ref 2–3.5)

## 2017-08-23 NOTE — PROGRESS NOTES
Anticoagulation Summary as of 8/23/2017     INR goal 2.0-3.0   Selected INR 2.3 (8/23/2017)   Maintenance plan 3 mg (1 mg x 3) on Mon; 2 mg (1 mg x 2) all other days   Weekly total 15 mg   Plan last modified Zafar Eubanks PHARMD (8/16/2017)   Next INR check 8/30/2017   Target end date Indefinite    Indications   Chronic deep vein thrombosis (DVT) of popliteal vein (CMS-HCC) [I82.539]         Anticoagulation Episode Summary     INR check location     Preferred lab [facility] GENERAL    Send INR reminders to     Comments       Anticoagulation Care Providers     Provider Role Specialty Phone number    Jesus Craft M.D. Referring Family Medicine [phone]    [facility] Responsible  [phone]    Zafar Eubanks PHARMD Responsible          Anticoagulation Patient Findings   Negatives Missed Doses, Extra Doses, Medication Changes, Antibiotic Use, Diet Changes, Dental/Other Procedures, Hospitalization, Bleeding Gums, Nose Bleeds, Blood in Urine, Blood in Stool, Any Bruising, Other Complaints        Spoke with patient today regarding therapeutic INR of 2.3.  Patient denies any signs/symptoms of bruising or bleeding or any changes in diet and medications.  Instructed patient to call clinic with any questions or concerns.  Finishes antibiotic course tomorrow.  Pt is to continue with current warfarin dosing regimen.  Follow up in 1 weeks.    Zafar Eubanks PHARMD

## 2017-08-25 ENCOUNTER — HOSPITAL ENCOUNTER (OUTPATIENT)
Dept: RADIOLOGY | Facility: MEDICAL CENTER | Age: 77
End: 2017-08-25
Attending: RADIOLOGY
Payer: MEDICARE

## 2017-08-25 DIAGNOSIS — C44.121 SQUAMOUS CELL CARCINOMA OF SKIN OF LEFT EYELID, INCLUDING CANTHUS: ICD-10-CM

## 2017-08-25 PROCEDURE — 700117 HCHG RX CONTRAST REV CODE 255: Performed by: RADIOLOGY

## 2017-08-25 PROCEDURE — A9579 GAD-BASE MR CONTRAST NOS,1ML: HCPCS | Performed by: RADIOLOGY

## 2017-08-25 PROCEDURE — 70543 MRI ORBT/FAC/NCK W/O &W/DYE: CPT

## 2017-08-25 RX ADMIN — GADODIAMIDE 20 ML: 287 INJECTION INTRAVENOUS at 09:44

## 2017-08-29 ENCOUNTER — ANTICOAGULATION MONITORING (OUTPATIENT)
Dept: VASCULAR LAB | Facility: MEDICAL CENTER | Age: 77
End: 2017-08-29

## 2017-08-29 DIAGNOSIS — I82.539 CHRONIC DEEP VEIN THROMBOSIS (DVT) OF POPLITEAL VEIN, UNSPECIFIED LATERALITY (HCC): ICD-10-CM

## 2017-08-29 LAB — INR PPP: 2.3 (ref 2–3.5)

## 2017-08-29 NOTE — PROGRESS NOTES
Anticoagulation Summary  As of 8/29/2017    INR goal:   2.0-3.0   TTR:   56.2 % (1.1 y)   Today's INR:   2.3   Maintenance plan:   3 mg (1 mg x 3) on Mon; 2 mg (1 mg x 2) all other days   Weekly total:   15 mg   No change documented:   Gayla Palomino Med Ass't   Plan last modified:   Jeannie GarciaD (8/16/2017)   Next INR check:   9/12/2017   Target end date:   Indefinite    Indications    Chronic deep vein thrombosis (DVT) of popliteal vein (CMS-[facility]) [I82.539]             Anticoagulation Episode Summary     INR check location:       Preferred lab:   [facility] GENERAL    Send INR reminders to:       Comments:         Anticoagulation Care Providers     Provider Role Specialty Phone number    Jesus Craft M.D. Referring Family Medicine [phone]    [facility]agulation [facility] Responsible  [phone]    Zafar Eubanks, JeannieD Responsible          Anticoagulation Patient Findings  Patient Findings     Negatives:   Signs/symptoms of thrombosis, Signs/symptoms of bleeding, Laboratory test error suspected, Change in health, Change in alcohol use, Change in activity, Upcoming invasive procedure, Emergency department visit, Upcoming dental procedure, Missed doses, Extra doses, Change in medications, Change in diet/appetite, Hospital admission, Bruising, Other complaints        Spoke with patient to report a therapeutic INR.  Pt instructed to continue with current warfarin dosing regimen. Pt denies any s/s of bleeding, bruising, clotting or any changes to diet or medication.  Will follow up in 2 weeks.    Gayla Palomino, Med Ass't

## 2017-09-11 ENCOUNTER — HOSPITAL ENCOUNTER (OUTPATIENT)
Dept: HOSPITAL 8 - RAD | Age: 77
Discharge: HOME | End: 2017-09-11
Attending: INTERNAL MEDICINE
Payer: MEDICARE

## 2017-09-11 DIAGNOSIS — M79.89: Primary | ICD-10-CM

## 2017-09-11 DIAGNOSIS — I65.21: ICD-10-CM

## 2017-09-12 LAB — INR PPP: 2.3 (ref 2–3.5)

## 2017-09-13 ENCOUNTER — ANTICOAGULATION MONITORING (OUTPATIENT)
Dept: VASCULAR LAB | Facility: MEDICAL CENTER | Age: 77
End: 2017-09-13

## 2017-09-13 DIAGNOSIS — I82.539 CHRONIC DEEP VEIN THROMBOSIS (DVT) OF POPLITEAL VEIN, UNSPECIFIED LATERALITY (HCC): ICD-10-CM

## 2017-09-13 NOTE — PROGRESS NOTES
Anticoagulation Summary  As of 9/13/2017    INR goal:   2.0-3.0   TTR:   57.7 % (1.1 y)   Today's INR:   2.3 (9/12/2017)   Maintenance plan:   3 mg (1 mg x 3) on Mon; 2 mg (1 mg x 2) all other days   Weekly total:   15 mg   Plan last modified:   Zafar Eubanks PharmD (8/16/2017)   Next INR check:   9/26/2017   Target end date:   Indefinite    Indications    Chronic deep vein thrombosis (DVT) of popliteal vein (CMS-[facility]) [I82.539]             Anticoagulation Episode Summary     INR check location:       Preferred lab:   [facility] GENERAL    Send INR reminders to:       Comments:         Anticoagulation Care Providers     Provider Role Specialty Phone number    Jesus Craft M.D. Referring Family Medicine [phone]    [facility] Anticoagulation Services Responsible  [phone]    Zafar Eubanks PharmD Responsible          Anticoagulation Patient Findings  Patient Findings     Negatives:   Signs/symptoms of thrombosis, Signs/symptoms of bleeding, Laboratory test error suspected, Change in health, Change in alcohol use, Change in activity, Upcoming invasive procedure, Emergency department visit, Upcoming dental procedure, Missed doses, Extra doses, Change in medications, Change in diet/appetite, Hospital admission, Bruising, Other complaints        Spoke with patient today regarding therapeutic INR of 2.3.  Patient denies any signs/symptoms of bruising or bleeding or any changes in diet and medications.  Instructed patient to call clinic with any questions or concerns.  Pt is to continue with current warfarin dosing regimen.  Follow up in 2 weeks.    Zafar Eubanks PharmD

## 2017-09-15 ENCOUNTER — HOSPITAL ENCOUNTER (OUTPATIENT)
Dept: HOSPITAL 8 - ROC | Age: 77
Discharge: HOME | End: 2017-09-15
Attending: RADIOLOGY
Payer: MEDICARE

## 2017-09-15 DIAGNOSIS — C44.121: Primary | ICD-10-CM

## 2017-09-15 PROCEDURE — G0463 HOSPITAL OUTPT CLINIC VISIT: HCPCS

## 2017-09-18 ENCOUNTER — TELEPHONE (OUTPATIENT)
Dept: MEDICAL GROUP | Facility: PHYSICIAN GROUP | Age: 77
End: 2017-09-18

## 2017-09-18 DIAGNOSIS — M19.079 ARTHROPATHY OF ANKLE AND FOOT: ICD-10-CM

## 2017-09-18 DIAGNOSIS — M12.9 ARTHROPATHY: ICD-10-CM

## 2017-09-20 ENCOUNTER — OFFICE VISIT (OUTPATIENT)
Dept: MEDICAL GROUP | Facility: PHYSICIAN GROUP | Age: 77
End: 2017-09-20
Payer: MEDICARE

## 2017-09-20 ENCOUNTER — HOSPITAL ENCOUNTER (OUTPATIENT)
Facility: MEDICAL CENTER | Age: 77
End: 2017-09-20
Attending: FAMILY MEDICINE
Payer: MEDICARE

## 2017-09-20 VITALS
SYSTOLIC BLOOD PRESSURE: 132 MMHG | HEART RATE: 101 BPM | WEIGHT: 260 LBS | BODY MASS INDEX: 37.22 KG/M2 | DIASTOLIC BLOOD PRESSURE: 80 MMHG | HEIGHT: 70 IN | TEMPERATURE: 98.8 F | OXYGEN SATURATION: 94 %

## 2017-09-20 DIAGNOSIS — M19.041 ARTHRITIS OF RIGHT HAND: ICD-10-CM

## 2017-09-20 DIAGNOSIS — M25.531 RIGHT WRIST PAIN: ICD-10-CM

## 2017-09-20 DIAGNOSIS — R22.31 LOCALIZED SWELLING ON RIGHT HAND: ICD-10-CM

## 2017-09-20 LAB
ERYTHROCYTE [SEDIMENTATION RATE] IN BLOOD BY WESTERGREN METHOD: 35 MM/HOUR (ref 0–20)
RHEUMATOID FACT SER IA-ACNC: 10 IU/ML (ref 0–14)
URATE SERPL-MCNC: 7.9 MG/DL (ref 2.5–8.3)

## 2017-09-20 PROCEDURE — 36415 COLL VENOUS BLD VENIPUNCTURE: CPT

## 2017-09-20 PROCEDURE — 85652 RBC SED RATE AUTOMATED: CPT

## 2017-09-20 PROCEDURE — 99213 OFFICE O/P EST LOW 20 MIN: CPT | Performed by: FAMILY MEDICINE

## 2017-09-20 PROCEDURE — 86225 DNA ANTIBODY NATIVE: CPT

## 2017-09-20 PROCEDURE — 86431 RHEUMATOID FACTOR QUANT: CPT

## 2017-09-20 PROCEDURE — 86038 ANTINUCLEAR ANTIBODIES: CPT

## 2017-09-20 PROCEDURE — 84550 ASSAY OF BLOOD/URIC ACID: CPT

## 2017-09-20 PROCEDURE — 86235 NUCLEAR ANTIGEN ANTIBODY: CPT | Mod: 91

## 2017-09-20 RX ORDER — ATORVASTATIN CALCIUM 20 MG/1
20 TABLET, FILM COATED ORAL NIGHTLY
COMMUNITY
End: 2017-12-13

## 2017-09-20 RX ORDER — LISINOPRIL 10 MG/1
10 TABLET ORAL DAILY
COMMUNITY
End: 2018-03-20

## 2017-09-20 ASSESSMENT — PAIN SCALES - GENERAL: PAINLEVEL: 8=MODERATE-SEVERE PAIN

## 2017-09-20 NOTE — PROGRESS NOTES
Subjective:   Kiran Eason is a 77 y.o. male here today for  Right wrist and hand pain and swelling    HPI :    Patient is here with complaint of right hand and wrist pain and swelling since last 5 days.He went to [facility] recently.X ray of the hand showed 1st cmc severe arthritis.He has been placed in a cast and was prescribed indomethacin for suspected gout.He has a follow up with , Orthopedics next week.  He has been taking indomethacin which he just started taking.Also he states that since he has been in a splint, the pain has been better.Right now he does not have much pain, but it worsens when he moves his hand.    He had a similar episode in his left hand a month back which has since resolved.  He does not drink alcohol but eats a lot of meat.  No recent uric acid level on chart.    Current medicines (including changes today)  Current Outpatient Prescriptions   Medication Sig Dispense Refill   • lisinopril (PRINIVIL) 10 MG Tab Take 10 mg by mouth every day.     • atorvastatin (LIPITOR) 20 MG Tab Take 20 mg by mouth every evening.     • potassium chloride ER (KLOR-CON) 10 MEQ tablet Take 10 mEq by mouth.     • omeprazole (PRILOSEC) 20 MG delayed-release capsule Take 20 mg by mouth.     • metoprolol (LOPRESSOR) 50 MG Tab Take 50 mg by mouth.     • indomethacin (INDOCIN) 50 MG Cap Take 1 capsule by mouth  twice daily with meals for  gout flare ups     • warfarin (COUMADIN) 1 MG Tab TAKE 2 TABLETS BY MOUTH EVERY DAY AS DIRECTED 180 Tab 3   • albuterol 108 (90 BASE) MCG/ACT Aero Soln inhalation aerosol Inhale 2 Puffs by mouth every four hours as needed. 8.5 g 0   • furosemide (LASIX) 40 MG Tab Take 1 Tab by mouth every day. 30 Tab 0   • erythromycin 5 MG/GM Ointment APPLY RIBBON TWICE A DAY TO THE LOWER LEFT EYE FOR 1 WEEK  2   • LUMIGAN 0.01 % Solution      • Omega-3 Fatty Acids (FISH OIL) 1000 MG Cap capsule Take 1,000 mg by mouth.     • furosemide (LASIX) 40 MG Tab Take 40 mg by mouth.     •  finasteride (PROSCAR) 5 MG Tab Take 1 tablet by mouth  every day     • silver sulfADIAZINE (SILVADENE) 1 % Cream Apply 1 Units to affected area(s).     • Potassium 99 MG Tab Take  by mouth.     • neomycin-polymixin-dexamethasone (MAXITROL) 3.5-16412-9.1 Ointment ophthalmic ointment by Ophthalmic route.     • losartan (COZAAR) 50 MG Tab Take 50 mg by mouth.     • latanoprost (XALATAN) 0.005 % Solution Place 1 Drop in right eye every evening.     • hydrochlorothiazide (HYDRODIURIL) 25 MG Tab Take 1 tablet by mouth  every day     • Multiple Vitamins-Minerals (CENTRUM SILVER PO) Take  by mouth.     • Cephalexin 500 MG Tab Take 500 mg by mouth.     • sulfamethoxazole-trimethoprim (BACTRIM DS) 800-160 MG tablet Take 1 Tab by mouth every 12 hours. Stay hydrated 20 Tab 0   • cephALEXin (KEFLEX) 500 MG Cap Take 1 Cap by mouth 4 times a day. 40 Cap 0   • Esomeprazole Magnesium (NEXIUM) 20 MG Pack Take 1 Each by mouth every day. 30 Each 3   • potassium chloride ER (KLOR-CON) 10 MEQ tablet Take 1 Tab by mouth every day. 90 Tab 3   • ipratropium-albuterol (COMBIVENT RESPIMAT)  MCG/ACT Aero Soln Inhale 1 Puff by mouth 4 times a day. 1 Inhaler 3   • finasteride (PROSCAR) 5 MG Tab Take 1 tablet by mouth  every day 90 Tab 3   • furosemide (LASIX) 40 MG Tab Take 1 Tab by mouth every day. 90 Tab 3   • enoxaparin (LOVENOX) 120 MG/0.8ML Solution inj Inject 120 mg as instructed every 12 hours. 10 Syringe 0   • acetaminophen (TYLENOL) 325 MG Tab Take 2 Tabs by mouth 4 times a day. 30 Tab 0   • potassium chloride SA (KDUR) 20 MEQ Tab CR Take 1 Tab by mouth every day. 30 Tab 0   • oxycodone immediate-release (ROXICODONE) 5 MG Tab Take 1 Tab by mouth every 6 hours as needed for Severe Pain. 10 Tab 0   • potassium chloride (KLOR-CON) 20 MEQ Pack Take 10 mEq by mouth 2 times a day.     • finasteride (PROSCAR) 5 MG Tab Take 5 mg by mouth every day.     • warfarin (COUMADIN) 1 MG Tab Take 1 mg by mouth every day.     • PROAIR  (90  BASE) MCG/ACT Aero Soln inhalation aerosol INHALE 2 PUFFS BY MOUTH EVERY 6 HOURS AS NEEDED FOR SHORTNESS OF BREATH. PROAIR OKAY 8.5 Inhaler 3   • Omega-3 Fatty Acids (FISH OIL) 1000 MG Cap capsule Take 1,000 mg by mouth every day.     • Multiple Vitamins-Minerals (CENTRUM SILVER) TABS Take 2 Tabs by mouth every day. 100 Tab 3     No current facility-administered medications for this visit.      He  has a past medical history of Arrhythmia; Arthritis; Arthropathy, unspecified, site unspecified; At risk for sleep apnea; Back pain (age 30); Blood clotting disorder (CMS-HCC) (current 2017); BPH (benign prostatic hyperplasia); Bronchitis (12/2014); Cancer (CMS-HCC); Cancer (CMS-HCC) (6/06); Cataract; Chronic congestive heart failure (CMS-HCC) (6/6/2017); Disorders of bursae and tendons in shoulder region, unspecified; Esophageal stricture (07/2007); Essential hypertension, benign; Foot fracture (12/07); Gastric ulcer (07/2007); GERD (gastroesophageal reflux disease); Glaucoma; Heart burn; Hypertension; Hypertrophy of prostate without urinary obstruction and other lower urinary tract symptoms (LUTS); Indigestion; Pain (2/13/12); Personal history of venous thrombosis and embolism (11/2008); Pneumonia; Pneumonia (2004); Reflux esophagitis; Rosacea; Skin cancer; Unspecified cataract; Unspecified glaucoma; and Urinary incontinence. He also has no past medical history of Anginal syndrome (CMS-HCC); Asthma; Bronchitis; COPD (chronic obstructive pulmonary disease) (CMS-HCC); Diabetes (CMS-HCC); Dialysis patient (CMS-HCC); Disorder of thyroid; Fall; Gynecological disorder; Heart murmur; Heart valve disease; Infectious disease; Jaundice; Myocardial infarct (CMS-HCC); Pacemaker; Psychiatric problem; Renal disorder; Rheumatic fever; Seizure (CMS-HCC); or Stroke (CMS-HCC).    ROS   No chest pain, no shortness of breath, no abdominal pain       Objective:     Blood pressure 132/80, pulse (!) 101, temperature 37.1 °C (98.8 °F), height  "1.778 m (5' 10\"), weight 117.9 kg (260 lb), SpO2 94 %. Body mass index is 37.31 kg/m².     PHYSICAL EXAM     GEN: Alert and oriented,well appearing, no acute distress  SKIN: warm, dry to touch, no rashes or lesions in visible areas  PSYCH: mood and affect normal, judgement normal  EYES: Conjunctiva clear, lids normal,pupils equal round and reactive  ENMT: Normal external nose and ears,EACs normal appearing, TM pearly gray with normal light reflex bilaterally,nasal mucosa and turbinates normal appearing without erythema or edema, lips without lesions,good dentition,oropharynx clear  Neck : Trachea midline, no masses or swelling, no thyromegaly  LYMPHATIC : No cervical or supraclavicular lymphadenopathy  RESPIRATORY : Unlabored respiratory effort, no distress noted, clear to auscultation bilaterally, no wheeze, rhonchi, crackles  CARDIOVASCULAR: RRR, S1 S2 normal, no murmurs , gallop , no carotid bruit, no edema of the extremities, pedal pulses B/L 2+  GI: Soft, Non tender, No rebound or guarding, no hepatosplenomegaly, no masses  MUSCULOSKELETAL : Normal gait and stance, no obvious abnormalities   NEURO: No overt focal neurologic deficits,sensation intact        Assessment and Plan:   The following treatment plan was discussed    1. Right wrist pain  New problem.acute gout vs flare up of severe hand arthritis  Ordered Uric acid level and labs to R/O inflammatory arthritis  Continue indomethacin.F/U with Orthopedics  - URIC ACID; Future  - WESTERGREN SED RATE; Future  - RHEUMATOID ARTHRITIS FACTOR; Future  - LOUIS ANTIBODY WITH REFLEX; Future    2. Localized swelling on right hand  New problem.acute gout vs flare up of severe hand arthritis  Ordered Uric acid level and labs to R/O inflammatory arthritis  Continue indomethacin.F/U with Orthopedics  - URIC ACID; Future  - WESTERGREN SED RATE; Future  - RHEUMATOID ARTHRITIS FACTOR; Future  - LOUIS ANTIBODY WITH REFLEX; Future    3. Arthritis of right hand  See " above        Followup: Return in about 2 weeks (around 10/4/2017), or if symptoms worsen or fail to improve.         Please note that this dictation was created using voice recognition software. I have made every reasonable attempt to correct obvious errors, but I expect that there are errors of grammar and possibly content that I did not discover before finalizing the note.

## 2017-09-22 ENCOUNTER — TELEPHONE (OUTPATIENT)
Dept: MEDICAL GROUP | Facility: PHYSICIAN GROUP | Age: 77
End: 2017-09-22

## 2017-09-22 LAB — NUCLEAR IGG SER QL IA: NORMAL

## 2017-09-23 NOTE — TELEPHONE ENCOUNTER
----- Message from KEYONA Tucker sent at 9/22/2017  4:41 PM PDT -----  Please advise patient that all results have come back without sign of inflammatory arthritis (auto-immune or rheumatoid). Uric acid was high normal. There is a possibility this could have still been a gout flare. Please continue the indomethacin and f/u with ortho as recommended.     KEYONA Tucker

## 2017-10-06 LAB — INR PPP: 3.8 (ref 2–3.5)

## 2017-10-09 ENCOUNTER — ANTICOAGULATION MONITORING (OUTPATIENT)
Dept: VASCULAR LAB | Facility: MEDICAL CENTER | Age: 77
End: 2017-10-09

## 2017-10-09 DIAGNOSIS — I82.539 CHRONIC DEEP VEIN THROMBOSIS (DVT) OF POPLITEAL VEIN, UNSPECIFIED LATERALITY (HCC): ICD-10-CM

## 2017-10-09 NOTE — PROGRESS NOTES
OP Telephone Anticoagulation Service Note    Date: 10/9/2017      Anticoagulation Summary  As of 10/9/2017    INR goal:   2.0-3.0   TTR:   57.1 % (1.2 y)   Today's INR:   3.8! (10/6/2017)   Maintenance plan:   3 mg (1 mg x 3) on Mon; 2 mg (1 mg x 2) all other days   Weekly total:   15 mg   Plan last modified:   Zafar Eubanks PharmD (8/16/2017)   Next INR check:      Target end date:   Indefinite    Indications    Chronic deep vein thrombosis (DVT) of popliteal vein (CMS-[facility]) [I82.539]             Anticoagulation Episode Summary     INR check location:       Preferred lab:   [facility]    Send INR reminders to:       Comments:         Anticoagulation Care Providers     Provider Role Specialty Phone number    Jesus Craft M.D. Referring Family Medicine [phone]    [facility] Anticoagulation Services Responsible  [phone]    Zafar Eubanks, Nancy Responsible          Anticoagulation Patient Findings  Patient Findings     Negatives:   Signs/symptoms of thrombosis, Signs/symptoms of bleeding, Laboratory test error suspected, Change in health, Change in alcohol use, Change in activity, Upcoming invasive procedure, Emergency department visit, Upcoming dental procedure, Missed doses, Extra doses, Change in medications, Change in diet/appetite, Hospital admission, Bruising, Other complaints            Plan: INR is supratherapeutic. Spoke with pt on the phone. Confirmed regimen. Instructed pt to HOLD his dose x 1 then resume usual regimen. Follow up 2 weeks.           Daniella Chaudhari, PharmD

## 2017-10-11 ENCOUNTER — HOSPITAL ENCOUNTER (OUTPATIENT)
Dept: LAB | Facility: MEDICAL CENTER | Age: 77
End: 2017-10-11
Attending: OPHTHALMOLOGY
Payer: MEDICARE

## 2017-10-11 LAB
ANION GAP SERPL CALC-SCNC: 8 MMOL/L (ref 0–11.9)
BASOPHILS # BLD AUTO: 0.4 % (ref 0–1.8)
BASOPHILS # BLD: 0.03 K/UL (ref 0–0.12)
BUN SERPL-MCNC: 19 MG/DL (ref 8–22)
CALCIUM SERPL-MCNC: 9.1 MG/DL (ref 8.5–10.5)
CHLORIDE SERPL-SCNC: 111 MMOL/L (ref 96–112)
CO2 SERPL-SCNC: 21 MMOL/L (ref 20–33)
CREAT SERPL-MCNC: 0.81 MG/DL (ref 0.5–1.4)
EOSINOPHIL # BLD AUTO: 0.17 K/UL (ref 0–0.51)
EOSINOPHIL NFR BLD: 2.1 % (ref 0–6.9)
ERYTHROCYTE [DISTWIDTH] IN BLOOD BY AUTOMATED COUNT: 59.5 FL (ref 35.9–50)
GFR SERPL CREATININE-BSD FRML MDRD: >60 ML/MIN/1.73 M 2
GLUCOSE SERPL-MCNC: 129 MG/DL (ref 65–99)
HCT VFR BLD AUTO: 45.1 % (ref 42–52)
HGB BLD-MCNC: 14.5 G/DL (ref 14–18)
IMM GRANULOCYTES # BLD AUTO: 0.06 K/UL (ref 0–0.11)
IMM GRANULOCYTES NFR BLD AUTO: 0.7 % (ref 0–0.9)
LYMPHOCYTES # BLD AUTO: 1.36 K/UL (ref 1–4.8)
LYMPHOCYTES NFR BLD: 16.5 % (ref 22–41)
MCH RBC QN AUTO: 30.2 PG (ref 27–33)
MCHC RBC AUTO-ENTMCNC: 32.2 G/DL (ref 33.7–35.3)
MCV RBC AUTO: 94 FL (ref 81.4–97.8)
MONOCYTES # BLD AUTO: 0.63 K/UL (ref 0–0.85)
MONOCYTES NFR BLD AUTO: 7.7 % (ref 0–13.4)
NEUTROPHILS # BLD AUTO: 5.97 K/UL (ref 1.82–7.42)
NEUTROPHILS NFR BLD: 72.6 % (ref 44–72)
NRBC # BLD AUTO: 0 K/UL
NRBC BLD AUTO-RTO: 0 /100 WBC
PLATELET # BLD AUTO: 124 K/UL (ref 164–446)
PMV BLD AUTO: 11.3 FL (ref 9–12.9)
POTASSIUM SERPL-SCNC: 4.5 MMOL/L (ref 3.6–5.5)
RBC # BLD AUTO: 4.8 M/UL (ref 4.7–6.1)
SODIUM SERPL-SCNC: 140 MMOL/L (ref 135–145)
WBC # BLD AUTO: 8.2 K/UL (ref 4.8–10.8)

## 2017-10-11 PROCEDURE — 36415 COLL VENOUS BLD VENIPUNCTURE: CPT

## 2017-10-11 PROCEDURE — 85025 COMPLETE CBC W/AUTO DIFF WBC: CPT

## 2017-10-11 PROCEDURE — 80048 BASIC METABOLIC PNL TOTAL CA: CPT

## 2017-10-16 ENCOUNTER — TELEPHONE (OUTPATIENT)
Dept: VASCULAR LAB | Facility: MEDICAL CENTER | Age: 77
End: 2017-10-16

## 2017-10-16 NOTE — TELEPHONE ENCOUNTER
Surgical clearance sent to us from [facility]. Pt has history of DVT twice and PE. He has a IVC filter still in place. He has been off Warfarin without bridging in the past. His last INR was high, he held a dose then went back to his usual but has not tested yet. He started on Indicin due to a gout flare up. Due to the IVC filter I think he would be safe to go off for 4 to 5 days w/out bridging.   BETO Cabrera.

## 2017-11-01 ENCOUNTER — HOSPITAL ENCOUNTER (OUTPATIENT)
Dept: CARDIOLOGY | Facility: MEDICAL CENTER | Age: 77
End: 2017-11-01
Payer: MEDICARE

## 2017-11-01 LAB — INR PPP: 2.7 (ref 2–3.5)

## 2017-11-01 NOTE — PROGRESS NOTES
OP Anticoagulation Telephone Note    Date: 11/1/2017  Physician Name: Venancio  Duration of Therapy : Indefinite  Reason for Anticoagulation: Deep Vein Thrombosis, Pulmonary Embolism  Previous Regimen (mg / week): 15  New Regimen (mg / week): 15  Signs & Symptoms of Anticoagulation: None  Compliance: Patient is Compliant  Significant Interactions: Antibiotics, NSAID, Proton Pump Inhibitor, Statin  Assessment: Therapeutic  Target INR: 2.0 to 3.0  Comments: INR 2.7  Anna Marie      Plan:      Talked to patient on the phone. Patient reports taking antibiotics prescribed by Dr. Lopez.  (unsure about which specific abx). Patient also uses indomethacin occasionally for pain but not taking as scheduled. No overt s/s of bleeding. No significant changes in diet. INR is within goal range. Plan to continue current range. Re-check INR in 2 weeks.    Medication: Warfarin (Coumadin)     Sunday Monday Tuesday Wednesday Thursday Friday Saturday      2 mg    3 mg    2 mg    2 mg    2 mg    2 mg    2 mg      2 tab(s)    3 tab(s)    2 tab(s)    2 tab(s)    2 tab(s)    2 tab(s)  2 tab(s)     Next Appointment: Tuesday, 11/14/2017     Martha Han, Pharmacy Intern    I have reviewed and concur with the above plan on 11/02/2017.  Alyssa Aguayo [facility]

## 2017-11-02 ENCOUNTER — ANTICOAGULATION MONITORING (OUTPATIENT)
Dept: VASCULAR LAB | Facility: MEDICAL CENTER | Age: 77
End: 2017-11-02

## 2017-11-02 DIAGNOSIS — I82.531 CHRONIC DEEP VEIN THROMBOSIS (DVT) OF POPLITEAL VEIN OF RIGHT LOWER EXTREMITY (HCC): ICD-10-CM

## 2017-11-02 NOTE — PROGRESS NOTES
Anticoagulation Summary  As of 11/2/2017    INR goal:   2.0-3.0   TTR:   55.3 % (1.2 y)   Today's INR:   2.7 (11/1/2017)   Maintenance plan:   3 mg (1 mg x 3) on Mon; 2 mg (1 mg x 2) all other days   Weekly total:   15 mg   Plan last modified:   Zafar Eubanks PharmD (8/16/2017)   Next INR check:   11/14/2017   Target end date:   Indefinite    Indications    Chronic deep vein thrombosis (DVT) of popliteal vein (CMS-[facility]) [I82.539]             Anticoagulation Episode Summary     INR check location:       Preferred lab:   [facility]    Send INR reminders to:       Comments:         Anticoagulation Care Providers     Provider Role Specialty Phone number    Jesus Craft M.D. [facility] Family Medicine [phone]    [facility] Anticoagulation Services Responsible  [phone]    Zafar Eubanks, Nancy Responsible          Anticoagulation Patient Findings    See note by Martha Han 11/01/2017    Alyssa Aguayo PharmD

## 2017-11-17 ENCOUNTER — HOSPITAL ENCOUNTER (OUTPATIENT)
Dept: HOSPITAL 8 - CVU | Age: 77
Discharge: HOME | End: 2017-11-17
Attending: INTERNAL MEDICINE
Payer: MEDICARE

## 2017-11-17 ENCOUNTER — HOSPITAL ENCOUNTER (OUTPATIENT)
Dept: HOSPITAL 8 - CVU | Age: 77
End: 2017-11-17
Attending: INTERNAL MEDICINE
Payer: MEDICARE

## 2017-11-17 DIAGNOSIS — I65.21: ICD-10-CM

## 2017-11-17 DIAGNOSIS — Z02.9: Primary | ICD-10-CM

## 2017-11-17 DIAGNOSIS — I10: ICD-10-CM

## 2017-11-17 DIAGNOSIS — I82.409: ICD-10-CM

## 2017-11-17 DIAGNOSIS — I08.3: Primary | ICD-10-CM

## 2017-11-17 PROCEDURE — 93306 TTE W/DOPPLER COMPLETE: CPT

## 2017-11-17 PROCEDURE — 93880 EXTRACRANIAL BILAT STUDY: CPT

## 2017-11-22 ENCOUNTER — ANTICOAGULATION MONITORING (OUTPATIENT)
Dept: VASCULAR LAB | Facility: MEDICAL CENTER | Age: 77
End: 2017-11-22

## 2017-11-22 DIAGNOSIS — I82.531 CHRONIC DEEP VEIN THROMBOSIS (DVT) OF POPLITEAL VEIN OF RIGHT LOWER EXTREMITY (HCC): ICD-10-CM

## 2017-11-22 LAB — INR PPP: 2.7 (ref 2–3.5)

## 2017-11-23 NOTE — PROGRESS NOTES
Anticoagulation Summary  As of 11/22/2017    INR goal:   2.0-3.0   TTR:   57.3 % (1.3 y)   Today's INR:   2.7   Maintenance plan:   3 mg (1 mg x 3) on Mon; 2 mg (1 mg x 2) all other days   Weekly total:   15 mg   Plan last modified:   Zafar Eubanks, PharmD (8/16/2017)   Next INR check:   11/29/2017   Target end date:   Indefinite    Indications    Chronic deep vein thrombosis (DVT) of popliteal vein (CMS-[facility]) [I82.539]             Anticoagulation Episode Summary     INR check location:       Preferred lab:   [facility]    Send INR reminders to:       Comments:         Anticoagulation Care Providers     Provider Role Specialty Phone number    Jesus Craft M.D. Referring Family Medicine [phone]    [facility] Anticoagulation [facility] Responsible  [phone]    Zafar Eubanks, PharmD Responsible          Anticoagulation Patient Findings  Patient Findings     Positives:   Missed doses    Negatives:   Signs/symptoms of thrombosis, Signs/symptoms of bleeding, Laboratory test error suspected, Change in health, Change in alcohol use, Change in activity, Upcoming invasive procedure, Emergency department visit, Upcoming dental procedure, Extra doses, Change in medications, Change in diet/appetite, Hospital admission, Bruising, Other complaints        Spoke with patient today regarding therapeutic INR of 2.7.  Patient denies any signs/symptoms of bruising or bleeding or any changes in diet and medications.  Instructed patient to call clinic with any questions or concerns.  States he may have missed a dose or more last week as he ran out of medication.  Pt is to continue with current warfarin dosing regimen.  Follow up in 1 weeks, to reduce risk of adverse events related to this high risk medication,  Warfarin.    Zafar Eubanks, JeannieD

## 2017-12-07 ENCOUNTER — ANTICOAGULATION MONITORING (OUTPATIENT)
Dept: VASCULAR LAB | Facility: MEDICAL CENTER | Age: 77
End: 2017-12-07

## 2017-12-07 DIAGNOSIS — I82.531 CHRONIC DEEP VEIN THROMBOSIS (DVT) OF POPLITEAL VEIN OF RIGHT LOWER EXTREMITY (HCC): ICD-10-CM

## 2017-12-07 LAB — INR PPP: 2.7 (ref 2–3.5)

## 2017-12-08 NOTE — PROGRESS NOTES
Patient arrived on unit from SICU.  Assumed care of patient.  Patient A&Ox4.  No complaints of pain at this time.  No nausea or vomiting.  Pitting edema BLE 3+.  Scrotal edema.  On 3L oxygen with O2 saturations of 97%.  LLE reddened area on bottom of foot noted. Redness on sacrum blanching.  Left flank ecchymoses. Left chest tube removal site.  On telemetry monitoring.  MARZENA hose applied.  Heels floated.  Waffle mattress in place.  SCDs in place and on.      2 RN skin check completed with interventions in place.       no

## 2017-12-08 NOTE — PROGRESS NOTES
Anticoagulation Summary  As of 12/7/2017    INR goal:   2.0-3.0   TTR:   58.7 % (1.3 y)   Today's INR:   2.7   Maintenance plan:   3 mg (1 mg x 3) on Mon; 2 mg (1 mg x 2) all other days   Weekly total:   15 mg   No change documented:   Gayla Palomino, Med Ass't   Plan last modified:   Zafar Eubanks PharmD (8/16/2017)   Next INR check:   12/21/2017   Target end date:   Indefinite    Indications    Chronic deep vein thrombosis (DVT) of popliteal vein (CMS-[facility]) [I82.539]             Anticoagulation Episode Summary     INR check location:       Preferred lab:   [facility]    Send INR reminders to:       Comments:         Anticoagulation Care Providers     Provider Role Specialty Phone number    Jesus Craft M.D. Referring Family Medicine [phone]    [facility] Anticoagulation Services Responsible  [phone]    Zafar Eubanks, PharmD Responsible          Anticoagulation Patient Findings  Patient Findings     Negatives:   Signs/symptoms of thrombosis, Signs/symptoms of bleeding, Laboratory test error suspected, Change in health, Change in alcohol use, Change in activity, Upcoming invasive procedure, Emergency department visit, Upcoming dental procedure, Missed doses, Extra doses, Change in medications, Change in diet/appetite, Hospital admission, Bruising, Other complaints        Spoke with patient to report a therapeutic INR.  Pt instructed to continue with current warfarin dosing regimen. Pt denies any s/s of bleeding, bruising, clotting or any changes to diet or medication.  Will follow up in 2 weeks.    Gayla Palomino, Med Ass't    12/8/2017    Concur with plan outlined above    Rickie Farrell, JeannieD

## 2017-12-13 ENCOUNTER — OFFICE VISIT (OUTPATIENT)
Dept: URGENT CARE | Facility: PHYSICIAN GROUP | Age: 77
End: 2017-12-13
Payer: MEDICARE

## 2017-12-13 ENCOUNTER — HOSPITAL ENCOUNTER (OUTPATIENT)
Dept: RADIOLOGY | Facility: MEDICAL CENTER | Age: 77
End: 2017-12-13
Attending: PHYSICIAN ASSISTANT
Payer: MEDICARE

## 2017-12-13 ENCOUNTER — HOSPITAL ENCOUNTER (OUTPATIENT)
Facility: MEDICAL CENTER | Age: 77
End: 2017-12-13
Attending: PHYSICIAN ASSISTANT
Payer: MEDICARE

## 2017-12-13 VITALS
WEIGHT: 272 LBS | DIASTOLIC BLOOD PRESSURE: 78 MMHG | TEMPERATURE: 99.3 F | HEART RATE: 71 BPM | HEIGHT: 70 IN | BODY MASS INDEX: 38.94 KG/M2 | SYSTOLIC BLOOD PRESSURE: 142 MMHG | OXYGEN SATURATION: 95 %

## 2017-12-13 DIAGNOSIS — N10 ACUTE PYELONEPHRITIS: ICD-10-CM

## 2017-12-13 DIAGNOSIS — R10.9 FLANK PAIN: ICD-10-CM

## 2017-12-13 LAB
APPEARANCE UR: CLEAR
BILIRUB UR STRIP-MCNC: NEGATIVE MG/DL
COLOR UR AUTO: YELLOW
GLUCOSE UR STRIP.AUTO-MCNC: NEGATIVE MG/DL
KETONES UR STRIP.AUTO-MCNC: NEGATIVE MG/DL
LEUKOCYTE ESTERASE UR QL STRIP.AUTO: POSITIVE
NITRITE UR QL STRIP.AUTO: POSITIVE
PH UR STRIP.AUTO: 7.5 [PH] (ref 5–8)
PROT UR QL STRIP: NEGATIVE MG/DL
RBC UR QL AUTO: NORMAL
SP GR UR STRIP.AUTO: 1
UROBILINOGEN UR STRIP-MCNC: NEGATIVE MG/DL

## 2017-12-13 PROCEDURE — 87077 CULTURE AEROBIC IDENTIFY: CPT

## 2017-12-13 PROCEDURE — 81002 URINALYSIS NONAUTO W/O SCOPE: CPT | Performed by: PHYSICIAN ASSISTANT

## 2017-12-13 PROCEDURE — 74176 CT ABD & PELVIS W/O CONTRAST: CPT

## 2017-12-13 PROCEDURE — 87186 SC STD MICRODIL/AGAR DIL: CPT

## 2017-12-13 PROCEDURE — 96372 THER/PROPH/DIAG INJ SC/IM: CPT | Performed by: PHYSICIAN ASSISTANT

## 2017-12-13 PROCEDURE — 99214 OFFICE O/P EST MOD 30 MIN: CPT | Mod: 25 | Performed by: PHYSICIAN ASSISTANT

## 2017-12-13 PROCEDURE — 87086 URINE CULTURE/COLONY COUNT: CPT

## 2017-12-13 RX ORDER — CEFTRIAXONE 1 G/1
1 INJECTION, POWDER, FOR SOLUTION INTRAMUSCULAR; INTRAVENOUS ONCE
Status: COMPLETED | OUTPATIENT
Start: 2017-12-13 | End: 2017-12-13

## 2017-12-13 RX ORDER — CIPROFLOXACIN 500 MG/1
500 TABLET, FILM COATED ORAL 2 TIMES DAILY
Qty: 20 TAB | Refills: 0 | Status: SHIPPED | OUTPATIENT
Start: 2017-12-13 | End: 2018-03-20

## 2017-12-13 RX ADMIN — CEFTRIAXONE 1 G: 1 INJECTION, POWDER, FOR SOLUTION INTRAMUSCULAR; INTRAVENOUS at 15:31

## 2017-12-13 ASSESSMENT — PAIN SCALES - GENERAL: PAINLEVEL: 8=MODERATE-SEVERE PAIN

## 2017-12-14 ENCOUNTER — ANTICOAGULATION MONITORING (OUTPATIENT)
Dept: VASCULAR LAB | Facility: MEDICAL CENTER | Age: 77
End: 2017-12-14

## 2017-12-14 NOTE — PROGRESS NOTES
Called pt bc he was prescribed Cipro 500mg BID for 10 days. He just picked it up today.  He will start it today. Advised we need to check INR sooner on Monday. Pt agreed.     Alice Ramires, PharmD

## 2017-12-14 NOTE — PROGRESS NOTES
Chief Complaint   Patient presents with   • Back Pain     Back Pain; Cough x 5 days       HISTORY OF PRESENT ILLNESS: Patient is a 77 y.o. male who presents today for about 5 days of intermittent/waxing and waning left flank pain/back pain.  Patient denies any injury and states that he cannot recall anything that he may have done to cause musculoskeletal pain.   The pain did not start suddenly and has not been worsening.  It has not been getting better overall either. He has not had any increased frequency of urination, urgency or dysuria.  He denies any changes in bowels.   Patient notes he has also had some coughing, runny nose for last few days but denies increased SOB from baseline, pain with breathing, flank pain exacerbated by breathing or chest pain.  No leg swelling or problems with laying flat at night.    Does have hx of e coli UTI in the past.  No known hx of pyelonephritis. No hx of kidney stones.  No known fevers or chills.     Patient Active Problem List    Diagnosis Date Noted   • Respiratory failure following trauma and surgery (CMS-HCC) 06/22/2017     Priority: Medium   • Cellulitis of left foot 06/17/2017     Priority: Medium   • Obesity (BMI 30-39.9) 06/12/2017     Priority: Medium   • Chronic congestive heart failure (CMS-HCC) 06/06/2017     Priority: Medium   • CAD (coronary artery disease) 06/06/2017     Priority: Medium   • Chronic deep vein thrombosis (DVT) of popliteal vein (CMS-HCC) 06/04/2017     Priority: Medium   • AV block, Mobitz 1 06/03/2017     Priority: Medium   • Penetrating back wound 06/02/2017     Priority: Medium   • History of pulmonary embolus (PE) 06/02/2017     Priority: Medium   • Hemorrhagic disorder due to extrinsic circulating anticoagulants (CMS-HCC) 06/02/2017     Priority: Medium   • 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) 06/14/2017     Priority: Low   • Acute blood loss anemia 06/02/2017     Priority: Low   • Right wrist pain 09/20/2017   • Localized swelling  on right hand 09/20/2017   • Obesity (BMI 35.0-39.9 without comorbidity) 07/06/2017   • Hemothorax on left 06/26/2017   • Leg DVT (deep venous thromboembolism), acute ([facility]) 11/09/2016   • Edema 10/21/2016   • Sleep apnea 10/21/2016   • Obesity (BMI 30-39.9) 10/21/2016   • Generalized edema 09/16/2016   • History of inferior vena caval filter placement 07/26/2016   • Pulmonary embolism on right (CMS-HCC) 11/30/2015   • Diverticulosis of large intestine without hemorrhage 08/04/2015   • History of colonic polyps 08/04/2015   • Malignant neoplasm of skin of eyelid, including canthus 04/01/2015   • Benign neoplasm of eyelid 02/11/2015   • Chronic gout 12/17/2012   • Carpal tunnel syndrome, bilateral 02/14/2012   • Low serum testosterone level 01/06/2012   • Vitamin D deficiency disease 01/06/2012   • BPH (benign prostatic hyperplasia) 12/30/2010   • Foot pain 09/28/2010   • Essential hypertension 09/18/2009   • Rosacea 09/18/2009   • Arthropathy of ankle and foot 09/18/2009   • Glaucoma 09/18/2009   • Essential hypertension, benign 05/18/2009   • ED (erectile dysfunction) 05/18/2009   • Glaucoma 05/18/2009   • Rosacea 05/18/2009   • Arthropathy 05/18/2009   • Reflux esophagitis 05/18/2009   • Esophageal stricture 05/18/2009       Allergies:Hydrocodone and Nexium    Current Outpatient Prescriptions Ordered in [facility]   Medication Sig Dispense Refill   • lisinopril (PRINIVIL) 10 MG Tab Take 10 mg by mouth every day.     • losartan (COZAAR) 50 MG Tab Take 50 mg by mouth.     • indomethacin (INDOCIN) 50 MG Cap Take 1 capsule by mouth  twice daily with meals for  gout flare ups     • hydrochlorothiazide (HYDRODIURIL) 25 MG Tab Take 1 tablet by mouth  every day     • warfarin (COUMADIN) 1 MG Tab TAKE 2 TABLETS BY MOUTH EVERY DAY AS DIRECTED 180 Tab 3   • Esomeprazole Magnesium (NEXIUM) 20 MG Pack Take 1 Each by mouth every day. 30 Each 3   • potassium chloride ER (KLOR-CON) 10 MEQ tablet Take 1 Tab by mouth every day. 90 Tab 3    • ipratropium-albuterol (COMBIVENT RESPIMAT)  MCG/ACT Aero Soln Inhale 1 Puff by mouth 4 times a day. 1 Inhaler 3   • furosemide (LASIX) 40 MG Tab Take 1 Tab by mouth every day. 30 Tab 0   • finasteride (PROSCAR) 5 MG Tab Take 5 mg by mouth every day.     • LUMIGAN 0.01 % Solution      • Omega-3 Fatty Acids (FISH OIL) 1000 MG Cap capsule Take 1,000 mg by mouth.     • furosemide (LASIX) 40 MG Tab Take 40 mg by mouth.     • silver sulfADIAZINE (SILVADENE) 1 % Cream Apply 1 Units to affected area(s).     • omeprazole (PRILOSEC) 20 MG delayed-release capsule Take 20 mg by mouth.     • neomycin-polymixin-dexamethasone (MAXITROL) 3.5-88250-4.1 Ointment ophthalmic ointment by Ophthalmic route.     • metoprolol (LOPRESSOR) 50 MG Tab Take 50 mg by mouth.     • Multiple Vitamins-Minerals (CENTRUM SILVER PO) Take  by mouth.     • Omega-3 Fatty Acids (FISH OIL) 1000 MG Cap capsule Take 1,000 mg by mouth every day.       No current [facility]-ordered facility-administered medications on file.        Past Medical History:   Diagnosis Date   • Arrhythmia     unsure of time. thinks he was in hospital    • Arthritis    • Arthropathy, unspecified, site unspecified    • At risk for sleep apnea    • Back pain age 30    lower back pain   • Blood clotting disorder (CMS-HCC) current 2017    DVT and PE   • BPH (benign prostatic hyperplasia)    • Bronchitis 12/2014   • Cancer (CMS-HCC)     squamous cell carinoma left eye    • Cancer (CMS-[facility]) 6/06    lower lip skin cancer--   • Cataract     cataract and glacoma   • Chronic congestive heart failure (CMS-[facility]) 6/6/2017   • Disorders of bursae and tendons in shoulder region, unspecified    • Esophageal stricture 07/2007   • Essential hypertension, benign    • Foot fracture 12/07   • Gastric ulcer 07/2007   • GERD (gastroesophageal reflux disease)    • Glaucoma    • Heart burn    • Hypertension    • Hypertrophy of prostate without urinary obstruction and other lower urinary tract  "symptoms (LUTS)    • Indigestion    • Pain 2/13/12    2/10 ankles, lower back   • Personal history of venous thrombosis and embolism 2008    post knee replacement -took coumadin then   • Pneumonia        • Pneumonia    • Reflux esophagitis    • Rosacea    • Skin cancer    • Unspecified cataract     ONE REMOVED   • Unspecified glaucoma(365.9)    • Urinary incontinence     occasional incontinence        Social History   Substance Use Topics   • Smoking status: Never Smoker   • Smokeless tobacco: Former User     Types: Chew     Quit date: 6/3/1967   • Alcohol use 0.0 - 0.6 oz/week      Comment: 0ccastional        Family Status   Relation Status   • Mother    • Father    History reviewed. No pertinent family history.    ROS:  Review of Systems   Constitutional: Negative for fever, chills, weight loss and malaise/fatigue.   HENT: Negative for ear pain, nosebleeds, congestion, sore throat and neck pain.    Eyes: Negative for blurred vision.   Respiratory: Negative for cough, sputum production, shortness of breath and wheezing.    Cardiovascular: Negative for chest pain, palpitations, orthopnea and leg swelling.   Gastrointestinal: SEE HPI  Genitourinary: SEE HPI  All other systems reviewed and are negative.       Exam:  Blood pressure 142/78, pulse 71, temperature 37.4 °C (99.3 °F), height 1.778 m (5' 10\"), weight 123.4 kg (272 lb), SpO2 95 %.  General:  Well nourished, well developed male in NAD  Eyes: PERRLA, EOM within normal limits, no conjunctival injection, no scleral icterus, visual fields and acuity grossly intact.  Ears: Normal shape and symmetry, no tenderness, no discharge. External canals are without any significant edema or erythema. Tympanic membranes are without any inflammation, no effusion. Gross auditory acuity is intact  Nose: Symmetrical, sinuses without tenderness, scant clear rhinorrhea.   Mouth: reasonable hygiene, no erythema exudates or tonsillar enlargement.  Neck: no " masses, range of motion within normal limits, no tracheal deviation. No lymphadenopathy  Pulmonary: Normal respiratory effort, no wheezes, crackles, or rhonchi.  Cardiovascular: regular rate and rhythm without murmurs, rubs, or gallops.  Abdomen: Soft, nontender, nondistended. Normal bowel sounds. No hepatosplenomegaly or masses, or hernias. No rebound or guarding.  No CVA tenderness.   Skin: No visible rashes or lesion. Warm, pink, dry.   Extremities: no clubbing, cyanosis, or edema.  Neuro: A&O x 3. Speech normal/clear.  Normal gait.       COMPARISON: CT AP 6/20/2017    FINDINGS:    The visualized lung bases demonstrate minimal bilateral pleural effusions, left greater than right.  There is minimal atelectasis or pneumonia in each lower lobe.      No free air is present.  The liver, spleen, pancreas, and adrenal glands are normal in noncontrast appearance.  The gallbladder is normal.  Both kidneys appear normal.  There are no renal or ureteral calcifications.  There is a 3 cm cyst in the lateral aspect of the upper pole of the right kidney which is partially exophytic and unchanged.  There is minimal perinephric fat stranding surrounding the left kidney which is nonspecific. This could represent minimal pyelonephritis.  No adrenal enlargement is present.  The aorta and IVC are normal in caliber.  An IVC filter is present.    The bowel and mesentery are normal.  Sigmoid diverticulosis is present. There is no evidence of acute diverticulitis. The appendix is not visualized.  No focal inflammatory change is present.    No free fluid or fluid collections are present.    There is no abdominal or pelvic adenopathy.    Evaluation of the pelvis shows no mass, inflammation, or other significant abnormalities.  The bladder appears normal.  There is prostate enlargement with median lobe hypertrophy.  Small unchanged bilateral inguinal hernias are present containing fat.    Please note that noncontrast evaluation will not  detect many abnormalities of the solid organs, bowel, or peritoneal cavity.      Impression           1.  No hydronephrosis or nephrolithiasis.    2.  Minimal left perinephric fat stranding which is nonspecific. This could represent minimal pyelonephritis.    3.  No evidence of bowel obstruction or inflammation.            Component Results     Component Value Ref Range & Units Status   POC Color yellow Negative Final   POC Appearance Clear Negative Final   POC Leukocyte Esterase Positive Negative Final   Comment:   Moderate   POC Nitrites Positive Negative Final   POC Urobiligen Negative Negative (0.2) mg/dL Final   POC Protein Negative Negative mg/dL Final   POC Urine PH 7.5 5.0 - 8.0 Final   POC Blood Trace Negative Final   POC Specific Gravity 1.005 <1.005 - >1.030 Final   POC Ketones Negative Negative mg/dL Final   POC Biliruben Negative Negative mg/dL Final   POC Glucose Negative Negative mg/dL Final       Assessment/Plan:  1. Acute pyelonephritis  URINE CULTURE(NEW)    POCT Urinalysis    CT-RENAL COLIC EVALUATION(A/P W/O)    cefTRIAXone (ROCEPHIN) injection 1 g    ciprofloxacin (CIPRO) 500 MG Tab       -U/A as above, flank pain and imaging consistent with pyelonephritis.   Patient without any SIRS criteria met today, has not had fevers he is aware of.  No chills/vomiting  -will initiate outpatient treatment without patient having very strict ER precautions discussed with he and his wife.   -please discuss all new medications with INR clinic advised.   -PCP follow up.      Supportive care, differential diagnoses, and indications for immediate follow-up discussed with patient.   Pathogenesis of diagnosis discussed including typical length and natural progression.   Instructed to return to clinic or nearest emergency department for any change in condition, further concerns, or worsening of symptoms.  Patient states understanding of the plan of care and discharge instructions.  Instructed to make an appointment,  for follow up, with their primary care provider.      Lindsey Anderson P.A.-C.

## 2017-12-16 LAB
BACTERIA UR CULT: ABNORMAL
SIGNIFICANT IND 70042: ABNORMAL
SOURCE SOURCE: ABNORMAL

## 2017-12-22 ENCOUNTER — ANTICOAGULATION MONITORING (OUTPATIENT)
Dept: VASCULAR LAB | Facility: MEDICAL CENTER | Age: 77
End: 2017-12-22

## 2017-12-22 DIAGNOSIS — I82.531 CHRONIC DEEP VEIN THROMBOSIS (DVT) OF POPLITEAL VEIN OF RIGHT LOWER EXTREMITY (HCC): ICD-10-CM

## 2017-12-22 LAB — INR PPP: 2.1 (ref 2–3.5)

## 2017-12-22 NOTE — PROGRESS NOTES
Anticoagulation Summary  As of 12/22/2017    INR goal:   2.0-3.0   TTR:   59.9 % (1.4 y)   Today's INR:   2.1   Maintenance plan:   3 mg (1 mg x 3) on Mon; 2 mg (1 mg x 2) all other days   Weekly total:   15 mg   Plan last modified:   Zafar Eubanks, PharmD (8/16/2017)   Next INR check:   1/5/2018   Target end date:   Indefinite    Indications    Chronic deep vein thrombosis (DVT) of popliteal vein (CMS-[facility]) [I82.539]             Anticoagulation Episode Summary     INR check location:       Preferred lab:   [facility]    Send INR reminders to:       Comments:         Anticoagulation Care Providers     Provider Role Specialty Phone number    Jesus Craft M.D. Referring Family Medicine [phone]    [facility] Anticoagulation Services Responsible  [phone]    Zafar Eubanks, PharmD Responsible          Anticoagulation Patient Findings    Spoke with Kiran to report a therapeutic INR of 2.1. Pt is to continue with current warfarin dosing regimen.  Pt denies any unusual s/s of bleeding, bruising, clotting or any changes to diet or medications.  Follow up in 2 weeks, to reduce risk of adverse events related to this high risk medication,  Warfarin.    Alyssa Aguayo, JeannieD

## 2018-01-16 LAB — INR PPP: 2.3 (ref 2–3.5)

## 2018-01-16 NOTE — PROGRESS NOTES
OP Anticoagulation Telephone Note    Date: 1/16/2018  Physician Name: Jesus Craft M.D.  Duration of Therapy : Indefinite  Reason for Anticoagulation: Deep Vein Thrombosis, Pulmonary Embolism  Previous Regimen (mg / week): 15  New Regimen (mg / week): 15  Signs & Symptoms of Anticoagulation: None  Signs & Symptoms of Thrombosis: None  Compliance: Patient is Compliant  Significant Interactions: Proton Pump Inhibitor  Assessment: Therapeutic  Target INR: 2.0 to 3.0  Comments: INR 2.3 (DAVID Thrasher)    Plan:  Spoke with patient on the phone regarding therapeutic INR. No signs of bleeding, no changes in medications, and no diet changes. No interval medication changes at this time. Instructed patient to continue current dose of warfarin. Follow up INR in 2 weeks.      Medication: Warfarin (Coumadin)     Sunday Monday Tuesday Wednesday Thursday Friday Saturday      2 mg    3 mg    2 mg    2 mg    2 mg    2 mg    2 mg      2 tab(s)    3 tab(s)    2 tab(s)    2 tab(s)    2 tab(s)    2 tab(s)  2 tab(s)     Next Appointment: Monday, January 29th, 2018       Komal Gonzalez, Pharmacy Intern      I have reviewed and concur with the above plan on 01/19/2018.  Alyssa Aguayo [facility].

## 2018-01-19 ENCOUNTER — ANTICOAGULATION MONITORING (OUTPATIENT)
Dept: VASCULAR LAB | Facility: MEDICAL CENTER | Age: 78
End: 2018-01-19

## 2018-01-19 DIAGNOSIS — I82.531 CHRONIC DEEP VEIN THROMBOSIS (DVT) OF POPLITEAL VEIN OF RIGHT LOWER EXTREMITY (HCC): ICD-10-CM

## 2018-01-19 NOTE — PROGRESS NOTES
Anticoagulation Summary  As of 1/19/2018    INR goal:   2.0-3.0   TTR:   61.8 % (1.4 y)   Today's INR:   2.3 (1/16/2018)   Maintenance plan:   3 mg (1 mg x 3) on Mon; 2 mg (1 mg x 2) all other days   Weekly total:   15 mg   Plan last modified:   Zafar Eubanks PharmD (8/16/2017)   Next INR check:   1/29/2018   Target end date:   Indefinite    Indications    Chronic deep vein thrombosis (DVT) of popliteal vein (CMS-[facility]) [I82.539]             Anticoagulation Episode Summary     INR check location:       Preferred lab:   [facility]    Send INR reminders to:       Comments:         Anticoagulation Care Providers     Provider Role Specialty Phone number    Jesus Craft M.D. [facility] Family Medicine [phone]    [facility] Anticoagulation Services Responsible  [phone]    Zafar Eubanks, PharmD Responsible          Anticoagulation Patient Findings    See note by Komal Gonzalez  11/01/2017    Jeannie CoelhoD

## 2018-02-02 ENCOUNTER — ANTICOAGULATION MONITORING (OUTPATIENT)
Dept: VASCULAR LAB | Facility: MEDICAL CENTER | Age: 78
End: 2018-02-02

## 2018-02-02 DIAGNOSIS — I82.531 CHRONIC DEEP VEIN THROMBOSIS (DVT) OF POPLITEAL VEIN OF RIGHT LOWER EXTREMITY (HCC): ICD-10-CM

## 2018-02-02 LAB — INR PPP: 2.1 (ref 2–3.5)

## 2018-02-02 NOTE — PROGRESS NOTES
OP Anticoagulation Telephone Note    Date: 2/2/2018  Anticoagulation Summary  As of 2/2/2018    INR goal:   2.0-3.0   TTR:   63.0 % (1.5 y)   Today's INR:   2.1   Maintenance plan:   3 mg (1 mg x 3) on Mon; 2 mg (1 mg x 2) all other days   Weekly total:   15 mg   No change documented:   Danielle Jurado, Med Ass't   Plan last modified:   Jeannie FitzpatrickD (8/16/2017)   Next INR check:   2/16/2018   Target end date:   Indefinite    Indications    Chronic deep vein thrombosis (DVT) of popliteal vein (CMS-[facility]) [I82.539]             Anticoagulation Episode Summary     INR check location:       Preferred lab:   [facility]    Send INR reminders to:       Comments:         Anticoagulation Care Providers     Provider Role Specialty Phone number    Jesus Craft M.D. Referring Family Medicine [phone]    [facility] Anticoagulation Services Responsible  [phone]    Zafar Eubanks, PharmD Responsible          Anticoagulation Patient Findings  Patient Findings     Negatives:   Signs/symptoms of thrombosis, Signs/symptoms of bleeding, Laboratory test error suspected, Change in health, Change in alcohol use, Change in activity, Upcoming invasive procedure, Emergency department visit, Upcoming dental procedure, Missed doses, Extra doses, Change in medications, Change in diet/appetite, Hospital admission, Bruising, Other complaints      Plan:  Spoke with patient on the phone. Patient is therapeutic today. Patient denies any changes in medications or diet. Patient denies any signs or symptoms of bleeding or clotting. Instructed patient to call clinic if any unusual bleeding or bruising occurs. Will continue dosing as outlined above. Will follow-up with patient in 2 weeks.    Danielle Jurado, Medical Assistant      I have reviewed and agree with the plan above on 02/02/2018      Alyssa Aguayo, JeannieD

## 2018-02-14 ENCOUNTER — HOSPITAL ENCOUNTER (OUTPATIENT)
Dept: LAB | Facility: MEDICAL CENTER | Age: 78
End: 2018-02-14
Attending: OPHTHALMOLOGY
Payer: MEDICARE

## 2018-02-14 LAB
BUN SERPL-MCNC: 20 MG/DL (ref 8–22)
CREAT SERPL-MCNC: 0.92 MG/DL (ref 0.5–1.4)

## 2018-02-14 PROCEDURE — 36415 COLL VENOUS BLD VENIPUNCTURE: CPT

## 2018-02-14 PROCEDURE — 84520 ASSAY OF UREA NITROGEN: CPT

## 2018-02-14 PROCEDURE — 82565 ASSAY OF CREATININE: CPT

## 2018-02-22 ENCOUNTER — HOSPITAL ENCOUNTER (OUTPATIENT)
Dept: RADIOLOGY | Facility: MEDICAL CENTER | Age: 78
End: 2018-02-22
Attending: OPHTHALMOLOGY
Payer: MEDICARE

## 2018-02-22 DIAGNOSIS — C69.62 MALIGNANT NEOPLASM OF LEFT ORBIT (HCC): ICD-10-CM

## 2018-02-22 DIAGNOSIS — C44.121 SQUAMOUS CELL CARCINOMA OF SKIN OF LEFT EYELID, INCLUDING CANTHUS: ICD-10-CM

## 2018-02-22 PROCEDURE — 700117 HCHG RX CONTRAST REV CODE 255: Performed by: OPHTHALMOLOGY

## 2018-02-22 PROCEDURE — A9579 GAD-BASE MR CONTRAST NOS,1ML: HCPCS | Performed by: OPHTHALMOLOGY

## 2018-02-22 PROCEDURE — 70543 MRI ORBT/FAC/NCK W/O &W/DYE: CPT

## 2018-02-22 RX ADMIN — GADODIAMIDE 20 ML: 287 INJECTION INTRAVENOUS at 15:22

## 2018-02-27 ENCOUNTER — ANTICOAGULATION MONITORING (OUTPATIENT)
Dept: VASCULAR LAB | Facility: MEDICAL CENTER | Age: 78
End: 2018-02-27

## 2018-02-27 DIAGNOSIS — I82.531 CHRONIC DEEP VEIN THROMBOSIS (DVT) OF POPLITEAL VEIN OF RIGHT LOWER EXTREMITY (HCC): ICD-10-CM

## 2018-02-27 LAB — INR PPP: 2.4 (ref 2–3.5)

## 2018-02-28 NOTE — PROGRESS NOTES
Anticoagulation Summary  As of 2/27/2018    INR goal:   2.0-3.0   TTR:   64.6 % (1.6 y)   Today's INR:   2.4   Maintenance plan:   3 mg (1 mg x 3) on Mon; 2 mg (1 mg x 2) all other days   Weekly total:   15 mg   Plan last modified:   Zafar Eubanks, PharmD (8/16/2017)   Next INR check:   4/10/2018   Target end date:   Indefinite    Indications    Chronic deep vein thrombosis (DVT) of popliteal vein (CMS-[facility]) [I82.539]             Anticoagulation Episode Summary     INR check location:       Preferred lab:   [facility]    Send INR reminders to:       Comments:         Anticoagulation Care Providers     Provider Role Specialty Phone number    Jesus Craft M.D. Referring Family Medicine [phone]    [facility] Anticoagulation Services Responsible  [phone]    Zafar Eubanks, PharmD Responsible          Anticoagulation Patient Findings    Spoke with Kiran to report a therapeutic INR of 2.4. Pt is to continue with current warfarin dosing regimen.  Pt denies any unusual s/s of bleeding, bruising, clotting or any changes to diet or medications.  Follow up in 6 weeks, to reduce risk of adverse events related to this high risk medication,  Warfarin.    Alyssa Aguayo, JeannieD

## 2018-03-13 ENCOUNTER — HOSPITAL ENCOUNTER (OUTPATIENT)
Dept: LAB | Facility: MEDICAL CENTER | Age: 78
End: 2018-03-13
Attending: OPHTHALMOLOGY
Payer: MEDICARE

## 2018-03-13 LAB
ANION GAP SERPL CALC-SCNC: 10 MMOL/L (ref 0–11.9)
BUN SERPL-MCNC: 18 MG/DL (ref 8–22)
CALCIUM SERPL-MCNC: 9 MG/DL (ref 8.5–10.5)
CHLORIDE SERPL-SCNC: 107 MMOL/L (ref 96–112)
CO2 SERPL-SCNC: 21 MMOL/L (ref 20–33)
CREAT SERPL-MCNC: 0.94 MG/DL (ref 0.5–1.4)
GLUCOSE SERPL-MCNC: 228 MG/DL (ref 65–99)
POTASSIUM SERPL-SCNC: 4.5 MMOL/L (ref 3.6–5.5)
SODIUM SERPL-SCNC: 138 MMOL/L (ref 135–145)

## 2018-03-13 PROCEDURE — 85025 COMPLETE CBC W/AUTO DIFF WBC: CPT

## 2018-03-13 PROCEDURE — 36415 COLL VENOUS BLD VENIPUNCTURE: CPT

## 2018-03-13 PROCEDURE — 80048 BASIC METABOLIC PNL TOTAL CA: CPT

## 2018-03-16 LAB
BASOPHILS # BLD AUTO: 1.2 % (ref 0–1.8)
BASOPHILS # BLD: 0.07 K/UL (ref 0–0.12)
EOSINOPHIL # BLD AUTO: 0.05 K/UL (ref 0–0.51)
EOSINOPHIL NFR BLD: 0.9 % (ref 0–6.9)
ERYTHROCYTE [DISTWIDTH] IN BLOOD BY AUTOMATED COUNT: 55.6 FL (ref 35.9–50)
HCT VFR BLD AUTO: 49.6 % (ref 42–52)
HGB BLD-MCNC: 15.4 G/DL (ref 14–18)
IMM GRANULOCYTES # BLD AUTO: 0.06 K/UL (ref 0–0.11)
IMM GRANULOCYTES NFR BLD AUTO: 1 % (ref 0–0.9)
LYMPHOCYTES # BLD AUTO: 1.19 K/UL (ref 1–4.8)
LYMPHOCYTES NFR BLD: 20.3 % (ref 22–41)
MCH RBC QN AUTO: 31.8 PG (ref 27–33)
MCHC RBC AUTO-ENTMCNC: 31 G/DL (ref 33.7–35.3)
MCV RBC AUTO: 102.5 FL (ref 81.4–97.8)
MONOCYTES # BLD AUTO: 0.36 K/UL (ref 0–0.85)
MONOCYTES NFR BLD AUTO: 6.1 % (ref 0–13.4)
NEUTROPHILS # BLD AUTO: 4.14 K/UL (ref 1.82–7.42)
NEUTROPHILS NFR BLD: 70.5 % (ref 44–72)
NRBC # BLD AUTO: 0 K/UL
NRBC BLD-RTO: 0 /100 WBC
PLATELET # BLD AUTO: 103 K/UL (ref 164–446)
PMV BLD AUTO: 11.8 FL (ref 9–12.9)
RBC # BLD AUTO: 4.84 M/UL (ref 4.7–6.1)
WBC # BLD AUTO: 5.9 K/UL (ref 4.8–10.8)

## 2018-03-20 ENCOUNTER — APPOINTMENT (OUTPATIENT)
Dept: ADMISSIONS | Facility: MEDICAL CENTER | Age: 78
DRG: 982 | End: 2018-03-20
Attending: OPHTHALMOLOGY
Payer: MEDICARE

## 2018-03-20 RX ORDER — LISINOPRIL 20 MG/1
20 TABLET ORAL EVERY MORNING
COMMUNITY
End: 2018-04-02

## 2018-03-20 RX ORDER — ESOMEPRAZOLE MAGNESIUM 20 MG/1
20 GRANULE, DELAYED RELEASE ORAL EVERY MORNING
COMMUNITY
End: 2018-11-12 | Stop reason: SDUPTHER

## 2018-03-20 RX ORDER — WARFARIN SODIUM 1 MG/1
2-3 TABLET ORAL DAILY
Status: ON HOLD | COMMUNITY
End: 2018-03-25

## 2018-03-21 ENCOUNTER — HOSPITAL ENCOUNTER (INPATIENT)
Facility: MEDICAL CENTER | Age: 78
LOS: 1 days | DRG: 982 | End: 2018-03-25
Attending: OPHTHALMOLOGY | Admitting: HOSPITALIST
Payer: MEDICARE

## 2018-03-21 ENCOUNTER — RESOLUTE PROFESSIONAL BILLING HOSPITAL PROF FEE (OUTPATIENT)
Dept: HOSPITALIST | Facility: MEDICAL CENTER | Age: 78
End: 2018-03-21
Payer: MEDICARE

## 2018-03-21 DIAGNOSIS — R79.1 SUBTHERAPEUTIC INTERNATIONAL NORMALIZED RATIO (INR): ICD-10-CM

## 2018-03-21 DIAGNOSIS — Z86.711 HISTORY OF PULMONARY EMBOLUS (PE): ICD-10-CM

## 2018-03-21 PROBLEM — J96.01 ACUTE RESPIRATORY FAILURE WITH HYPOXIA (HCC): Status: ACTIVE | Noted: 2018-03-21

## 2018-03-21 LAB
ANION GAP SERPL CALC-SCNC: 13 MMOL/L (ref 0–11.9)
BASOPHILS # BLD AUTO: 0.1 % (ref 0–1.8)
BASOPHILS # BLD: 0.01 K/UL (ref 0–0.12)
BUN SERPL-MCNC: 18 MG/DL (ref 8–22)
CALCIUM SERPL-MCNC: 9 MG/DL (ref 8.5–10.5)
CHLORIDE SERPL-SCNC: 104 MMOL/L (ref 96–112)
CO2 SERPL-SCNC: 18 MMOL/L (ref 20–33)
CREAT SERPL-MCNC: 1.04 MG/DL (ref 0.5–1.4)
EKG IMPRESSION: NORMAL
EOSINOPHIL # BLD AUTO: 0 K/UL (ref 0–0.51)
EOSINOPHIL NFR BLD: 0 % (ref 0–6.9)
ERYTHROCYTE [DISTWIDTH] IN BLOOD BY AUTOMATED COUNT: 51.4 FL (ref 35.9–50)
GLUCOSE SERPL-MCNC: 325 MG/DL (ref 65–99)
HCT VFR BLD AUTO: 44.9 % (ref 42–52)
HGB BLD-MCNC: 14.6 G/DL (ref 14–18)
IMM GRANULOCYTES # BLD AUTO: 0.05 K/UL (ref 0–0.11)
IMM GRANULOCYTES NFR BLD AUTO: 0.7 % (ref 0–0.9)
INR PPP: 1.12 (ref 0.87–1.13)
LYMPHOCYTES # BLD AUTO: 0.52 K/UL (ref 1–4.8)
LYMPHOCYTES NFR BLD: 7.2 % (ref 22–41)
MCH RBC QN AUTO: 31.7 PG (ref 27–33)
MCHC RBC AUTO-ENTMCNC: 32.5 G/DL (ref 33.7–35.3)
MCV RBC AUTO: 97.4 FL (ref 81.4–97.8)
MONOCYTES # BLD AUTO: 0.08 K/UL (ref 0–0.85)
MONOCYTES NFR BLD AUTO: 1.1 % (ref 0–13.4)
NEUTROPHILS # BLD AUTO: 6.53 K/UL (ref 1.82–7.42)
NEUTROPHILS NFR BLD: 90.9 % (ref 44–72)
NRBC # BLD AUTO: 0 K/UL
NRBC BLD-RTO: 0 /100 WBC
PLATELET # BLD AUTO: 117 K/UL (ref 164–446)
PMV BLD AUTO: 10.5 FL (ref 9–12.9)
POTASSIUM SERPL-SCNC: 4.8 MMOL/L (ref 3.6–5.5)
PROTHROMBIN TIME: 14.1 SEC (ref 12–14.6)
RBC # BLD AUTO: 4.61 M/UL (ref 4.7–6.1)
SODIUM SERPL-SCNC: 135 MMOL/L (ref 135–145)
WBC # BLD AUTO: 7.2 K/UL (ref 4.8–10.8)

## 2018-03-21 PROCEDURE — A6402 STERILE GAUZE <= 16 SQ IN: HCPCS | Performed by: OPHTHALMOLOGY

## 2018-03-21 PROCEDURE — 500128 HCHG BOVIE, NEEDLE TIP 3CM: Performed by: OPHTHALMOLOGY

## 2018-03-21 PROCEDURE — A6404 STERILE GAUZE > 48 SQ IN: HCPCS | Performed by: OPHTHALMOLOGY

## 2018-03-21 PROCEDURE — 700101 HCHG RX REV CODE 250

## 2018-03-21 PROCEDURE — 93005 ELECTROCARDIOGRAM TRACING: CPT | Performed by: OPHTHALMOLOGY

## 2018-03-21 PROCEDURE — 700102 HCHG RX REV CODE 250 W/ 637 OVERRIDE(OP): Performed by: HOSPITALIST

## 2018-03-21 PROCEDURE — 36415 COLL VENOUS BLD VENIPUNCTURE: CPT

## 2018-03-21 PROCEDURE — 94760 N-INVAS EAR/PLS OXIMETRY 1: CPT

## 2018-03-21 PROCEDURE — 700111 HCHG RX REV CODE 636 W/ 250 OVERRIDE (IP)

## 2018-03-21 PROCEDURE — 160048 HCHG OR STATISTICAL LEVEL 1-5: Performed by: OPHTHALMOLOGY

## 2018-03-21 PROCEDURE — 160041 HCHG SURGERY MINUTES - EA ADDL 1 MIN LEVEL 4: Performed by: OPHTHALMOLOGY

## 2018-03-21 PROCEDURE — 160009 HCHG ANES TIME/MIN: Performed by: OPHTHALMOLOGY

## 2018-03-21 PROCEDURE — 160002 HCHG RECOVERY MINUTES (STAT): Performed by: OPHTHALMOLOGY

## 2018-03-21 PROCEDURE — 93010 ELECTROCARDIOGRAM REPORT: CPT | Performed by: INTERNAL MEDICINE

## 2018-03-21 PROCEDURE — A6403 STERILE GAUZE>16 <= 48 SQ IN: HCPCS | Performed by: OPHTHALMOLOGY

## 2018-03-21 PROCEDURE — 160035 HCHG PACU - 1ST 60 MINS PHASE I: Performed by: OPHTHALMOLOGY

## 2018-03-21 PROCEDURE — 93010 ELECTROCARDIOGRAM REPORT: CPT | Mod: 77 | Performed by: INTERNAL MEDICINE

## 2018-03-21 PROCEDURE — 88300 SURGICAL PATH GROSS: CPT

## 2018-03-21 PROCEDURE — 160036 HCHG PACU - EA ADDL 30 MINS PHASE I: Performed by: OPHTHALMOLOGY

## 2018-03-21 PROCEDURE — A9270 NON-COVERED ITEM OR SERVICE: HCPCS | Performed by: OPHTHALMOLOGY

## 2018-03-21 PROCEDURE — 500123 HCHG BOVIE, CONTROL W/BLADE: Performed by: OPHTHALMOLOGY

## 2018-03-21 PROCEDURE — 700102 HCHG RX REV CODE 250 W/ 637 OVERRIDE(OP): Performed by: OPHTHALMOLOGY

## 2018-03-21 PROCEDURE — G0378 HOSPITAL OBSERVATION PER HR: HCPCS

## 2018-03-21 PROCEDURE — 88331 PATH CONSLTJ SURG 1 BLK 1SPC: CPT

## 2018-03-21 PROCEDURE — A9270 NON-COVERED ITEM OR SERVICE: HCPCS

## 2018-03-21 PROCEDURE — 85025 COMPLETE CBC W/AUTO DIFF WBC: CPT

## 2018-03-21 PROCEDURE — 93005 ELECTROCARDIOGRAM TRACING: CPT | Performed by: INTERNAL MEDICINE

## 2018-03-21 PROCEDURE — 160029 HCHG SURGERY MINUTES - 1ST 30 MINS LEVEL 4: Performed by: OPHTHALMOLOGY

## 2018-03-21 PROCEDURE — A6410 STERILE EYE PAD: HCPCS | Performed by: OPHTHALMOLOGY

## 2018-03-21 PROCEDURE — 700105 HCHG RX REV CODE 258: Performed by: HOSPITALIST

## 2018-03-21 PROCEDURE — 80048 BASIC METABOLIC PNL TOTAL CA: CPT

## 2018-03-21 PROCEDURE — 88305 TISSUE EXAM BY PATHOLOGIST: CPT

## 2018-03-21 PROCEDURE — A9270 NON-COVERED ITEM OR SERVICE: HCPCS | Performed by: HOSPITALIST

## 2018-03-21 PROCEDURE — 85610 PROTHROMBIN TIME: CPT

## 2018-03-21 PROCEDURE — 99219 PR INITIAL OBSERVATION CARE,LEVL II: CPT | Performed by: HOSPITALIST

## 2018-03-21 PROCEDURE — 700102 HCHG RX REV CODE 250 W/ 637 OVERRIDE(OP)

## 2018-03-21 PROCEDURE — 500440 HCHG DRESSING, STERILE ROLL (KERLIX): Performed by: OPHTHALMOLOGY

## 2018-03-21 RX ORDER — MAGNESIUM CARB/ALUMINUM HYDROX 105-160MG
TABLET,CHEWABLE ORAL
Status: DISCONTINUED | OUTPATIENT
Start: 2018-03-21 | End: 2018-03-21 | Stop reason: HOSPADM

## 2018-03-21 RX ORDER — LATANOPROST 50 UG/ML
1 SOLUTION/ DROPS OPHTHALMIC EVERY EVENING
Status: DISCONTINUED | OUTPATIENT
Start: 2018-03-21 | End: 2018-03-25 | Stop reason: HOSPADM

## 2018-03-21 RX ORDER — OXYCODONE HCL 5 MG/5 ML
SOLUTION, ORAL ORAL
Status: COMPLETED
Start: 2018-03-21 | End: 2018-03-21

## 2018-03-21 RX ORDER — SODIUM CHLORIDE 9 MG/ML
INJECTION, SOLUTION INTRAVENOUS CONTINUOUS
Status: DISCONTINUED | OUTPATIENT
Start: 2018-03-21 | End: 2018-03-24

## 2018-03-21 RX ORDER — LIDOCAINE HYDROCHLORIDE 20 MG/ML
INJECTION, SOLUTION INFILTRATION; PERINEURAL
Status: DISPENSED
Start: 2018-03-21 | End: 2018-03-21

## 2018-03-21 RX ORDER — ACETAMINOPHEN 325 MG/1
650 TABLET ORAL EVERY 6 HOURS PRN
Status: DISCONTINUED | OUTPATIENT
Start: 2018-03-21 | End: 2018-03-25 | Stop reason: HOSPADM

## 2018-03-21 RX ORDER — TIOTROPIUM BROMIDE 18 UG/1
1 CAPSULE ORAL; RESPIRATORY (INHALATION) DAILY
Status: DISCONTINUED | OUTPATIENT
Start: 2018-03-21 | End: 2018-03-25 | Stop reason: HOSPADM

## 2018-03-21 RX ORDER — TETRACAINE HYDROCHLORIDE 5 MG/ML
SOLUTION OPHTHALMIC
Status: DISPENSED
Start: 2018-03-21 | End: 2018-03-21

## 2018-03-21 RX ORDER — LISINOPRIL 10 MG/1
20 TABLET ORAL EVERY MORNING
Status: DISCONTINUED | OUTPATIENT
Start: 2018-03-21 | End: 2018-03-25 | Stop reason: HOSPADM

## 2018-03-21 RX ORDER — AMOXICILLIN 250 MG
2 CAPSULE ORAL 2 TIMES DAILY
Status: DISCONTINUED | OUTPATIENT
Start: 2018-03-21 | End: 2018-03-25 | Stop reason: HOSPADM

## 2018-03-21 RX ORDER — ERYTHROMYCIN 5 MG/G
OINTMENT OPHTHALMIC
Status: DISPENSED
Start: 2018-03-21 | End: 2018-03-21

## 2018-03-21 RX ORDER — ERYTHROMYCIN 5 MG/G
OINTMENT OPHTHALMIC
Status: DISCONTINUED | OUTPATIENT
Start: 2018-03-21 | End: 2018-03-21 | Stop reason: HOSPADM

## 2018-03-21 RX ORDER — SODIUM CHLORIDE, SODIUM LACTATE, POTASSIUM CHLORIDE, CALCIUM CHLORIDE 600; 310; 30; 20 MG/100ML; MG/100ML; MG/100ML; MG/100ML
INJECTION, SOLUTION INTRAVENOUS CONTINUOUS
Status: DISCONTINUED | OUTPATIENT
Start: 2018-03-21 | End: 2018-03-21

## 2018-03-21 RX ORDER — ALBUTEROL SULFATE 90 UG/1
1-2 AEROSOL, METERED RESPIRATORY (INHALATION) 4 TIMES DAILY
Status: DISCONTINUED | OUTPATIENT
Start: 2018-03-21 | End: 2018-03-25 | Stop reason: HOSPADM

## 2018-03-21 RX ORDER — ESOMEPRAZOLE MAGNESIUM 20 MG/1
20 GRANULE, DELAYED RELEASE ORAL EVERY MORNING
Status: DISCONTINUED | OUTPATIENT
Start: 2018-03-21 | End: 2018-03-21

## 2018-03-21 RX ORDER — POTASSIUM CHLORIDE 20 MEQ/1
10 TABLET, EXTENDED RELEASE ORAL DAILY
Status: DISCONTINUED | OUTPATIENT
Start: 2018-03-21 | End: 2018-03-25 | Stop reason: HOSPADM

## 2018-03-21 RX ORDER — ONDANSETRON 2 MG/ML
4 INJECTION INTRAMUSCULAR; INTRAVENOUS EVERY 4 HOURS PRN
Status: DISCONTINUED | OUTPATIENT
Start: 2018-03-21 | End: 2018-03-25 | Stop reason: HOSPADM

## 2018-03-21 RX ORDER — FINASTERIDE 5 MG/1
5 TABLET, FILM COATED ORAL EVERY MORNING
Status: DISCONTINUED | OUTPATIENT
Start: 2018-03-21 | End: 2018-03-25 | Stop reason: HOSPADM

## 2018-03-21 RX ORDER — METOPROLOL TARTRATE 50 MG/1
25 TABLET, FILM COATED ORAL EVERY MORNING
Status: DISCONTINUED | OUTPATIENT
Start: 2018-03-21 | End: 2018-03-21

## 2018-03-21 RX ORDER — BISACODYL 10 MG
10 SUPPOSITORY, RECTAL RECTAL
Status: DISCONTINUED | OUTPATIENT
Start: 2018-03-21 | End: 2018-03-25 | Stop reason: HOSPADM

## 2018-03-21 RX ORDER — LIDOCAINE HYDROCHLORIDE 10 MG/ML
0.5 INJECTION, SOLUTION INFILTRATION; PERINEURAL
Status: ACTIVE | OUTPATIENT
Start: 2018-03-21 | End: 2018-03-22

## 2018-03-21 RX ORDER — LIDOCAINE HYDROCHLORIDE AND EPINEPHRINE BITARTRATE 20; .01 MG/ML; MG/ML
INJECTION, SOLUTION SUBCUTANEOUS
Status: DISCONTINUED | OUTPATIENT
Start: 2018-03-21 | End: 2018-03-21 | Stop reason: HOSPADM

## 2018-03-21 RX ORDER — POLYETHYLENE GLYCOL 3350 17 G/17G
1 POWDER, FOR SOLUTION ORAL
Status: DISCONTINUED | OUTPATIENT
Start: 2018-03-21 | End: 2018-03-25 | Stop reason: HOSPADM

## 2018-03-21 RX ORDER — HALOPERIDOL 5 MG/ML
INJECTION INTRAMUSCULAR
Status: COMPLETED
Start: 2018-03-21 | End: 2018-03-21

## 2018-03-21 RX ORDER — LIDOCAINE AND PRILOCAINE 25; 25 MG/G; MG/G
1 CREAM TOPICAL
Status: ACTIVE | OUTPATIENT
Start: 2018-03-21 | End: 2018-03-22

## 2018-03-21 RX ORDER — ONDANSETRON 4 MG/1
4 TABLET, ORALLY DISINTEGRATING ORAL EVERY 4 HOURS PRN
Status: DISCONTINUED | OUTPATIENT
Start: 2018-03-21 | End: 2018-03-25 | Stop reason: HOSPADM

## 2018-03-21 RX ORDER — EPINEPHRINE 1 MG/ML
INJECTION INTRAMUSCULAR; INTRAVENOUS; SUBCUTANEOUS
Status: DISPENSED
Start: 2018-03-21 | End: 2018-03-21

## 2018-03-21 RX ORDER — POTASSIUM CHLORIDE 750 MG/1
10 TABLET, FILM COATED, EXTENDED RELEASE ORAL DAILY
Status: DISCONTINUED | OUTPATIENT
Start: 2018-03-21 | End: 2018-03-21

## 2018-03-21 RX ADMIN — ACETAMINOPHEN 650 MG: 325 TABLET, FILM COATED ORAL at 21:29

## 2018-03-21 RX ADMIN — LISINOPRIL 20 MG: 10 TABLET ORAL at 18:03

## 2018-03-21 RX ADMIN — POTASSIUM CHLORIDE 10 MEQ: 1500 TABLET, EXTENDED RELEASE ORAL at 17:40

## 2018-03-21 RX ADMIN — STANDARDIZED SENNA CONCENTRATE AND DOCUSATE SODIUM 2 TABLET: 8.6; 5 TABLET, FILM COATED ORAL at 21:29

## 2018-03-21 RX ADMIN — METOPROLOL TARTRATE 12.5 MG: 25 TABLET, FILM COATED ORAL at 17:41

## 2018-03-21 RX ADMIN — HALOPERIDOL LACTATE 1 MG: 5 INJECTION, SOLUTION INTRAMUSCULAR at 13:15

## 2018-03-21 RX ADMIN — FINASTERIDE 5 MG: 5 TABLET, FILM COATED ORAL at 17:41

## 2018-03-21 RX ADMIN — SODIUM CHLORIDE: 9 INJECTION, SOLUTION INTRAVENOUS at 17:36

## 2018-03-21 RX ADMIN — OXYCODONE HYDROCHLORIDE 5 MG: 5 SOLUTION ORAL at 13:15

## 2018-03-21 RX ADMIN — SODIUM CHLORIDE, SODIUM LACTATE, POTASSIUM CHLORIDE, CALCIUM CHLORIDE 1000 ML: 600; 310; 30; 20 INJECTION, SOLUTION INTRAVENOUS at 07:15

## 2018-03-21 RX ADMIN — LATANOPROST 1 DROP: 50 SOLUTION OPHTHALMIC at 21:29

## 2018-03-21 ASSESSMENT — ENCOUNTER SYMPTOMS
CHILLS: 0
HEARTBURN: 0
DOUBLE VISION: 0
DEPRESSION: 0
FEVER: 0
MYALGIAS: 0
BRUISES/BLEEDS EASILY: 0
BLURRED VISION: 0
HEMOPTYSIS: 0
HEADACHES: 0
COUGH: 0
DIZZINESS: 0
FLANK PAIN: 0
PALPITATIONS: 0

## 2018-03-21 ASSESSMENT — PAIN SCALES - GENERAL
PAINLEVEL_OUTOF10: 0
PAINLEVEL_OUTOF10: 2
PAINLEVEL_OUTOF10: 6
PAINLEVEL_OUTOF10: 0
PAINLEVEL_OUTOF10: 0
PAINLEVEL_OUTOF10: 2
PAINLEVEL_OUTOF10: 3
PAINLEVEL_OUTOF10: 0
PAINLEVEL_OUTOF10: 0
PAINLEVEL_OUTOF10: 2
PAINLEVEL_OUTOF10: 0
PAINLEVEL_OUTOF10: 4
PAINLEVEL_OUTOF10: 3
PAINLEVEL_OUTOF10: 0
PAINLEVEL_OUTOF10: 2
PAINLEVEL_OUTOF10: 2
PAINLEVEL_OUTOF10: 4
PAINLEVEL_OUTOF10: 2
PAINLEVEL_OUTOF10: 3

## 2018-03-21 ASSESSMENT — COGNITIVE AND FUNCTIONAL STATUS - GENERAL
SUGGESTED CMS G CODE MODIFIER DAILY ACTIVITY: CH
SUGGESTED CMS G CODE MODIFIER DAILY ACTIVITY: CH
SUGGESTED CMS G CODE MODIFIER MOBILITY: CH
DAILY ACTIVITIY SCORE: 24
DAILY ACTIVITIY SCORE: 24
MOBILITY SCORE: 24
SUGGESTED CMS G CODE MODIFIER MOBILITY: CH
MOBILITY SCORE: 24

## 2018-03-21 ASSESSMENT — COPD QUESTIONNAIRES
HAVE YOU SMOKED AT LEAST 100 CIGARETTES IN YOUR ENTIRE LIFE: NO/DON'T KNOW
DURING THE PAST 4 WEEKS HOW MUCH DID YOU FEEL SHORT OF BREATH: NONE/LITTLE OF THE TIME
DO YOU EVER COUGH UP ANY MUCUS OR PHLEGM?: YES, EVERY DAY
COPD SCREENING SCORE: 4

## 2018-03-21 ASSESSMENT — PATIENT HEALTH QUESTIONNAIRE - PHQ9
1. LITTLE INTEREST OR PLEASURE IN DOING THINGS: NOT AT ALL
SUM OF ALL RESPONSES TO PHQ9 QUESTIONS 1 AND 2: 0
2. FEELING DOWN, DEPRESSED, IRRITABLE, OR HOPELESS: NOT AT ALL

## 2018-03-21 ASSESSMENT — LIFESTYLE VARIABLES
EVER_SMOKED: NEVER
EVER_SMOKED: NEVER
ALCOHOL_USE: NO

## 2018-03-21 NOTE — PROGRESS NOTES
2 RN skin check performed with AKUA Red    Ears dry and pink but blanching  Toes calloused on both feet, healed blister on 5th toe on right foot  Coccyx red but blanching.     Dressing over Left eye CDI  Dressing and ace wrap over left thigh, CDI    All other skin is intact with no signs of breakdown

## 2018-03-21 NOTE — OR NURSING
1141-arrived from OR via [facility]. S/p left eye exavasation and left thigh skin graft. Report received from RN and Dr. Rachel. 02 at 5L mask. Course breath sounds. Vss.    1215-awake but drowsy. Denies pain. Wife at bedside. Plan to admit.    1300-trial on room air. Continues to dip into the 80's on room air. Nauseated, complaining of head ache. Medicated as ordered.    1315-02 put on at 1.5L nc.    1430-up to bedside to void. o2 sat up and down. Drops to 80's when sleeping. Very dizzy and unsteady when standing.  Wife at bedside.    1500-report called to Receiving RN. Page out to Dr. Staples for orders.    1534- Dr. Staples returned call. Will put in orders on floor.    1546-to floor via wheelchair. With monitor and staff.

## 2018-03-22 PROBLEM — R00.1 BRADYCARDIA: Status: ACTIVE | Noted: 2018-03-22

## 2018-03-22 PROBLEM — I45.9 HEART BLOCK: Status: ACTIVE | Noted: 2018-03-22

## 2018-03-22 LAB
ANION GAP SERPL CALC-SCNC: 8 MMOL/L (ref 0–11.9)
BUN SERPL-MCNC: 23 MG/DL (ref 8–22)
CALCIUM SERPL-MCNC: 9 MG/DL (ref 8.5–10.5)
CHLORIDE SERPL-SCNC: 107 MMOL/L (ref 96–112)
CO2 SERPL-SCNC: 22 MMOL/L (ref 20–33)
CREAT SERPL-MCNC: 0.96 MG/DL (ref 0.5–1.4)
EKG IMPRESSION: NORMAL
ERYTHROCYTE [DISTWIDTH] IN BLOOD BY AUTOMATED COUNT: 50.4 FL (ref 35.9–50)
GLUCOSE SERPL-MCNC: 181 MG/DL (ref 65–99)
HCT VFR BLD AUTO: 43 % (ref 42–52)
HGB BLD-MCNC: 13.9 G/DL (ref 14–18)
INR PPP: 1.17 (ref 0.87–1.13)
MCH RBC QN AUTO: 31.2 PG (ref 27–33)
MCHC RBC AUTO-ENTMCNC: 32.3 G/DL (ref 33.7–35.3)
MCV RBC AUTO: 96.6 FL (ref 81.4–97.8)
PLATELET # BLD AUTO: 122 K/UL (ref 164–446)
PMV BLD AUTO: 10.9 FL (ref 9–12.9)
POTASSIUM SERPL-SCNC: 4.4 MMOL/L (ref 3.6–5.5)
PROTHROMBIN TIME: 14.6 SEC (ref 12–14.6)
RBC # BLD AUTO: 4.45 M/UL (ref 4.7–6.1)
SODIUM SERPL-SCNC: 137 MMOL/L (ref 135–145)
WBC # BLD AUTO: 7.7 K/UL (ref 4.8–10.8)

## 2018-03-22 PROCEDURE — 700102 HCHG RX REV CODE 250 W/ 637 OVERRIDE(OP): Performed by: HOSPITALIST

## 2018-03-22 PROCEDURE — 99226 PR SUBSEQUENT OBSERVATION CARE,LEVEL III: CPT | Performed by: HOSPITALIST

## 2018-03-22 PROCEDURE — 0HR1X73 REPLACEMENT OF FACE SKIN WITH AUTOLOGOUS TISSUE SUBSTITUTE, FULL THICKNESS, EXTERNAL APPROACH: ICD-10-PCS | Performed by: OPHTHALMOLOGY

## 2018-03-22 PROCEDURE — 08BR0ZZ EXCISION OF LEFT LOWER EYELID, OPEN APPROACH: ICD-10-PCS | Performed by: OPHTHALMOLOGY

## 2018-03-22 PROCEDURE — G0378 HOSPITAL OBSERVATION PER HR: HCPCS

## 2018-03-22 PROCEDURE — 700105 HCHG RX REV CODE 258: Performed by: HOSPITALIST

## 2018-03-22 PROCEDURE — 85027 COMPLETE CBC AUTOMATED: CPT

## 2018-03-22 PROCEDURE — 36415 COLL VENOUS BLD VENIPUNCTURE: CPT

## 2018-03-22 PROCEDURE — A9270 NON-COVERED ITEM OR SERVICE: HCPCS | Performed by: HOSPITALIST

## 2018-03-22 PROCEDURE — 08T1XZZ RESECTION OF LEFT EYE, EXTERNAL APPROACH: ICD-10-PCS | Performed by: OPHTHALMOLOGY

## 2018-03-22 PROCEDURE — 700102 HCHG RX REV CODE 250 W/ 637 OVERRIDE(OP): Performed by: OPHTHALMOLOGY

## 2018-03-22 PROCEDURE — 0HBJXZZ EXCISION OF LEFT UPPER LEG SKIN, EXTERNAL APPROACH: ICD-10-PCS | Performed by: OPHTHALMOLOGY

## 2018-03-22 PROCEDURE — 80048 BASIC METABOLIC PNL TOTAL CA: CPT

## 2018-03-22 PROCEDURE — 0NBQ0ZZ EXCISION OF LEFT ORBIT, OPEN APPROACH: ICD-10-PCS | Performed by: OPHTHALMOLOGY

## 2018-03-22 PROCEDURE — 85610 PROTHROMBIN TIME: CPT

## 2018-03-22 PROCEDURE — A9270 NON-COVERED ITEM OR SERVICE: HCPCS | Performed by: OPHTHALMOLOGY

## 2018-03-22 RX ORDER — WARFARIN SODIUM 2 MG/1
2 TABLET ORAL
Status: DISCONTINUED | OUTPATIENT
Start: 2018-03-23 | End: 2018-03-23

## 2018-03-22 RX ORDER — WARFARIN SODIUM 3 MG/1
3 TABLET ORAL
Status: DISCONTINUED | OUTPATIENT
Start: 2018-03-26 | End: 2018-03-23

## 2018-03-22 RX ORDER — METOPROLOL SUCCINATE 25 MG/1
25 TABLET, EXTENDED RELEASE ORAL DAILY
COMMUNITY
End: 2018-03-27

## 2018-03-22 RX ORDER — WARFARIN SODIUM 6 MG/1
6 TABLET ORAL
Status: COMPLETED | OUTPATIENT
Start: 2018-03-22 | End: 2018-03-22

## 2018-03-22 RX ORDER — INDOMETHACIN 50 MG/1
50 CAPSULE ORAL 3 TIMES DAILY
COMMUNITY
End: 2018-04-23

## 2018-03-22 RX ORDER — FUROSEMIDE 40 MG/1
40 TABLET ORAL DAILY
COMMUNITY
End: 2018-04-23

## 2018-03-22 RX ADMIN — WARFARIN SODIUM 6 MG: 6 TABLET ORAL at 17:51

## 2018-03-22 RX ADMIN — FINASTERIDE 5 MG: 5 TABLET, FILM COATED ORAL at 09:13

## 2018-03-22 RX ADMIN — LATANOPROST 1 DROP: 50 SOLUTION OPHTHALMIC at 21:24

## 2018-03-22 RX ADMIN — LISINOPRIL 20 MG: 10 TABLET ORAL at 09:13

## 2018-03-22 RX ADMIN — SODIUM CHLORIDE: 9 INJECTION, SOLUTION INTRAVENOUS at 11:56

## 2018-03-22 RX ADMIN — POTASSIUM CHLORIDE 10 MEQ: 1500 TABLET, EXTENDED RELEASE ORAL at 09:13

## 2018-03-22 ASSESSMENT — PAIN SCALES - GENERAL
PAINLEVEL_OUTOF10: 0

## 2018-03-22 ASSESSMENT — ENCOUNTER SYMPTOMS
WEAKNESS: 0
PALPITATIONS: 0
BACK PAIN: 0
VOMITING: 0
NECK PAIN: 0
DEPRESSION: 0
FEVER: 0
ABDOMINAL PAIN: 0
DIARRHEA: 0
HEADACHES: 0
COUGH: 0
HEARTBURN: 0
MYALGIAS: 0
DIZZINESS: 0

## 2018-03-22 NOTE — PROGRESS NOTES
Pt states that he takes Respimat at home PRN; Pt does not want the medication substituted and hopes to be going home tomorrow. Pt states that since he will be going home tomorrow that he will not bring in his Respimat and does not want nebulizers.

## 2018-03-22 NOTE — PROGRESS NOTES
Inpatient Anticoagulation Service Note  Date: 3/22/2018  Reason for Anticoagulation: Deep Vein Thrombosis   Hemoglobin Value: (!) 13.9  Hematocrit Value: 43  Lab Platelet Value: (!) 122  Target INR: 2.0 to 3.0  INR from last 7 days     Date/Time INR Value    03/21/18 0720  1.12        Dose from last 7 days     Date/Time Dose (mg)    03/22/18 1600  6        Average Dose (mg): TBD (Home Dose: 3 mg Mo and 2 mg ROW per [facility])  Significant Interactions: Not Applicable  Bridge Therapy: No (none noted per provider) Reversal Agent Administered: Not Applicable  Comments: INR at baseline on admission for hypoxia after eye surgery. H/H down. PLT low. No overt bleeding noted. Minimal warfarin interactions noted. Will give bolus dose today and trend INR with AM labs.    Plan:  Warfarin 6 mg 3/22/18  Education Material Provided?: No (chronic warfarin patient)  Pharmacist suggested discharge dosing: warfarin 3 mg Mo and 2 mg all other days    Steve Hagen, PharmD

## 2018-03-22 NOTE — H&P
Hospital Medicine History and Physical    Date of Service  3/21/2018    Chief Complaint  Hypoxia after surgery.     History of Presenting Illness  77 y.o. male who presented 3/21/2018 with probably pmh of copd, left eye CA is coming today for elective left eye exavasation and left thigh skin graft by Dr Garcia patient went for surgery did ok but after surgery he was hypoxic and requiring 5L of o2, hospitalist group is asked to admit patient for observation, patient was evaluated in his room he is alert and oriented he is on 1.5l of o2 he denies any sob or chest pain, denies dizziness, pain is well controlled, patient follows commands he is not wheezing at this time, family is at bedside, patient denies focal weakness, no rash, he is tired after surgery, patient will be continued on his inhalers will use IS will try to wean him off o2 today.   He has h/o PE/DVT around 1 year ago, he is holding his warfarin due to surgery this will be restarted when ok with surgery.   All questions answered.   Hypoxia probably from anesthesia that is improving now.       Primary Care Physician  Jesus Craft M.D.    Consultants  None    Code Status  Full code    Review of Systems  Review of Systems   Constitutional: Negative for chills and fever.   HENT: Negative for hearing loss and tinnitus.    Eyes: Negative for blurred vision and double vision.   Respiratory: Negative for cough and hemoptysis.    Cardiovascular: Negative for chest pain and palpitations.   Gastrointestinal: Negative for heartburn and melena.   Genitourinary: Negative for dysuria and flank pain.   Musculoskeletal: Negative for myalgias.   Skin: Negative for itching.   Neurological: Negative for dizziness and headaches.   Endo/Heme/Allergies: Does not bruise/bleed easily.   Psychiatric/Behavioral: Negative for depression.          Past Medical History  Past Medical History:   Diagnosis Date   • Chronic congestive heart failure (CMS-HCC) 6/6/2017    pt denies    • Bronchitis 12/2014   • Pain 2/13/12    2/10 ankles, lower back   • Personal history of venous thrombosis and embolism 11/2008    post knee replacement -took coumadin then   • Foot fracture 12/07   • Esophageal stricture 07/2007   • Gastric ulcer 07/2007   • Cancer (CMS-HCC) 6/06    lower lip skin cancer--   • Pneumonia 2004   • Arrhythmia     unsure of time. thinks he was in hospital    • Arthritis    • Arthropathy, unspecified, site unspecified    • At risk for sleep apnea    • Back pain age 30    lower back pain   • Blood clotting disorder (CMS-HCC) current 2017    DVT and PE   • BPH (benign prostatic hyperplasia)    • Breath shortness    • Cancer (CMS-HCC)     squamous cell carinoma left eye    • Cataract     cataract and glacoma   • Disorders of bursae and tendons in shoulder region, unspecified    • Essential hypertension, benign    • GERD (gastroesophageal reflux disease)    • Glaucoma    • Heart burn    • Hypertension    • Hypertrophy of prostate without urinary obstruction and other lower urinary tract symptoms (LUTS)    • Indigestion    • Pneumonia     2007   • Reflux esophagitis    • Rosacea    • Skin cancer    • Sleep apnea     cpap    • Snoring    • Unspecified cataract     ONE REMOVED   • Unspecified glaucoma(365.9)    • Urinary incontinence     occasional incontinence        Surgical History  Past Surgical History:   Procedure Laterality Date   • EXPLORATORY LAPAROTOMY  6/21/2017    Procedure: EXPLORATORY LAPAROTOMY;  Surgeon: Wilfred Amin M.D.;  Location: SURGERY [facility];  Service:    • RECOVERY  6/29/2016    Procedure: VASCULAR CASE-ARTHUR-INFERIOR VENA CAVA FILTER PLACEMENT 37191-DEEP VEIN THROMBOSIS I82.401;  Surgeon: [facility];  Location: SURGERY PRE-POST [facility] UN[facility];  Service:    • FLAP CLOSURE  4/1/2015    Performed by Artem Garcia M.D. at SURGERY [facility]   • EYE LESION EXCISION  2/11/2015    Performed by Artem Garcia M.D. at SUR[facility]ERY [facility]   • CARPAL TUNNEL RELEASE  2012    left side   • FULL THICKNESS SKIN GRAFT  4/3/2012    Performed by JAMESON PATEL at SURGERY SAME DAY [facility]S   • BLEPHAROPLASTY  3/6/2012    Performed by JAMESON PATEL at SURGERY SAME DAY [facility]   • FULL THICKNESS SKIN GRAFT  3/6/2012    Performed by JAMESON PATEL at SURGERY SAME DAY [facility]   • ATHROPLASTY  08    Bilat. TKRs   • KNEE ARTHROPLASTY TOTAL  08    Performed by MICHEAL WINSTON at SURGERY [facility]   • OPEN REDUCTION      left foot   • FOOT SURGERY  2007    left foot    • OTHER      IVC filter    • OTHER      skin cancer removed   • OTHER ORTHOPEDIC SURGERY  greater than 10 years ago    bilat knee replacement with hardware   • OTHER ORTHOPEDIC SURGERY  greater than 10 years    left ankle / foot repair complted 2 years before knee surgery       Medications  No current facility-administered medications on file prior to encounter.      Current Outpatient Prescriptions on File Prior to Encounter   Medication Sig Dispense Refill   • potassium chloride ER (KLOR-CON) 10 MEQ tablet Take 1 Tab by mouth every day. 90 Tab 3   • ipratropium-albuterol (COMBIVENT RESPIMAT)  MCG/ACT Aero Soln Inhale 1 Puff by mouth 4 times a day. 1 Inhaler 3   • finasteride (PROSCAR) 5 MG Tab Take 5 mg by mouth every morning.         Family History  History reviewed. No pertinent family history.   No contributory.     Social History  Social History   Substance Use Topics   • Smoking status: Never Smoker   • Smokeless tobacco: Former User     Types: Chew     Quit date: 6/3/1967   • Alcohol use 0.0 - 0.6 oz/week      Comment: ocassionally       Allergies  Allergies   Allergen Reactions   • Hydrocodone Vomiting and Nausea   • Nexium      Diarrhea          Physical Exam  Laboratory   Hemodynamics  Temp (24hrs), Av °C (98.6 °F), Min:36.1 °C (97 °F), Max:37.8 °C (100.1 °F)   Temperature: 36.1 °C (97 °F)  Pulse  Av.9  Min: 52   Max: 96 Heart Rate (Monitored): (!) 56  Blood Pressure : 155/73, NIBP: 143/55      Respiratory      Respiration: (P) 20, Pulse Oximetry: (P) 94 %, O2 Daily Delivery Respiratory : (P) Room Air with O2 Available     Work Of Breathing / Effort: (P) Mild  RUL Breath Sounds: (P) Clear, RML Breath Sounds: (P) Clear, RLL Breath Sounds: (P) Diminished, ABIGAIL Breath Sounds: (P) Clear, LLL Breath Sounds: (P) Diminished    Physical Exam   Constitutional: He is oriented to person, place, and time. He appears well-developed. No distress.   HENT:   Head: Normocephalic.   Mouth/Throat: No oropharyngeal exudate.   Eyes: Conjunctivae are normal. No scleral icterus.   Left eye: dressing in place.    Neck: Normal range of motion. Neck supple. No JVD present.   Cardiovascular: Normal rate, regular rhythm and normal heart sounds.    Pulmonary/Chest: Effort normal and breath sounds normal. No respiratory distress. He has no wheezes.   Abdominal: Soft. Bowel sounds are normal. He exhibits no distension. There is no tenderness. There is no rebound.   Musculoskeletal: Normal range of motion. He exhibits no tenderness.   Neurological: He is alert and oriented to person, place, and time. He exhibits normal muscle tone.   Skin: No erythema.   Psychiatric: He has a normal mood and affect.   Nursing note and vitals reviewed.              No results for input(s): ALTSGPT, ASTSGOT, ALKPHOSPHAT, TBILIRUBIN, DBILIRUBIN, GAMMAGT, AMYLASE, LIPASE, ALB, PREALBUMIN, GLUCOSE in the last 72 hours.  Recent Labs      03/21/18   0720   INR  1.12             Lab Results   Component Value Date    TROPONINI 0.01 06/08/2017       Imaging  N/A   Assessment/Plan     I anticipate this patient is appropriate for observation status at this time.    CAD (coronary artery disease)- (present on admission)   Assessment & Plan    Stable, no chest pain.         Chronic congestive heart failure (CMS-HCC)- (present on admission)   Assessment & Plan    Last echo showed normal  EF. Will keep monitoring .        Chronic deep vein thrombosis (DVT) of popliteal vein (CMS-HCC)- (present on admission)   Assessment & Plan    Holding warfarin due to recent surgery. Restart when ok with surgery, INR 1.1        History of pulmonary embolus (PE)- (present on admission)   Assessment & Plan    Around 1 year ago. Holding warfarin due to recent surgery.        Acute respiratory failure with hypoxia (CMS-[facility])- (present on admission)   Assessment & Plan    After surgery, he was requiring 5L of o2, now he is down to 1.5L probably due to anesthesia will continue IS, nebs treatment. Wean off o2, continue albuterol inhaler. No wheezing on examination.         Essential hypertension, benign- (present on admission)   Assessment & Plan    Continue metoprolol and lisinopril.         Squamous cell carcinoma of left eye (CMS-[facility])- (present on admission)   Assessment & Plan    S/p left eye exavasation and left thigh skin graft by Dr Garcia today.            VTE prophylaxis: scd's

## 2018-03-22 NOTE — PROGRESS NOTES
Dr. Dang informed of pt's continuing changes of heart rhythms, including a brief period of third degree AV block. EKG ordered and instructed to notify him if pt has any further or extended periods of 3rd degree AVB.

## 2018-03-22 NOTE — PROGRESS NOTES
Med rec completed by interview with patient at bedside and phone call to patient's wife  No abx in past 30 days  NKDA confirmed  Spoke to patient about allergies to hydrocodone and esomeprazole, patient did not report an allergy to either and does not know where those allergies came from, patient is currently taking esomeprazole daily

## 2018-03-22 NOTE — CONSULTS
Cardiology Consult Note:                                       Note Author:   Michelle Huizar  Date & Time note created:    3/22/2018   1:40 PM     Referring MD:  Dr. Sandhu    Patient ID:  Name:             Kiran Eason   YOB: 1940  Age:                 77 y.o.  male   MRN:               [id number]                                                             Reason for Consult:      Third-degree heart block.    History of Present Illness:    Kiran Eason is a 77 y.o. male here post enucleation surgery for squamous cancer. After the surgery, patient was noted to be hypoxic and required oxygen therapy, but that has now resolved.    Patient was put on telemetry and he was noted to have a short run of third-degree heart block overnight. During that time, the patient remained asymptomatic and denies any history of chest pain, palpitations, shortness of breath, lightheadedness or dizziness. On evaluation, EKG showed a Mobitz 1 second-degree block and sinus bradycardia. Patient reports that he's had this rhythm abnormality for long time and it has never caused him any issues. Also notes that his mother has the same problem and lived until she was 93. In the past, the patient had a Holter evaluation (2016) which also showed a Mobitz 1 type heart block, but no third-degree blocks. Nuclear stress test done in 2016 was normal.      Review of Systems:      Constitutional: Denies fevers, Denies weight changes  Eyes: Denies changes in vision, no eye pain  Ears/Nose/Throat/Mouth: Denies nasal congestion or sore throat   Cardiovascular: No chest pain, no palpitations   Respiratory: No shortness of breath , Denies cough  Gastrointestinal/Hepatic: Denies abdominal pain, nausea, vomiting, diarrhea, constipation or GI bleeding   Genitourinary: Denies dysuria or frequency  Musculoskeletal/Rheum: Denies  joint pain and swelling, no edema  Skin: Denies rash  Neurological: Denies headache, confusion, memory loss or  focal weakness/parasthesias  Psychiatric: denies mood disorder   Endocrine: Glory thyroid problems  Heme/Oncology/Lymph Nodes: Denies enlarged lymph nodes, denies brusing or known bleeding disorder  All other systems were reviewed and are negative (AMA/CMS criteria)                Past Medical History:   Past Medical History:   Diagnosis Date   • Arrhythmia     unsure of time. thinks he was in hospital    • Arthritis    • Arthropathy, unspecified, site unspecified    • At risk for sleep apnea    • Back pain age 30    lower back pain   • Blood clotting disorder (CMS-HCC) current 2017    DVT and PE   • BPH (benign prostatic hyperplasia)    • Breath shortness    • Bronchitis 12/2014   • Cancer (CMS-HCC)     squamous cell carinoma left eye    • Cancer (CMS-HCC) 6/06    lower lip skin cancer--   • Cataract     cataract and glacoma   • Chronic congestive heart failure (CMS-HCC) 6/6/2017    pt denies   • Disorders of bursae and tendons in shoulder region, unspecified    • Esophageal stricture 07/2007   • Essential hypertension, benign    • Foot fracture 12/07   • Gastric ulcer 07/2007   • GERD (gastroesophageal reflux disease)    • Glaucoma    • Heart burn    • Hypertension    • Hypertrophy of prostate without urinary obstruction and other lower urinary tract symptoms (LUTS)    • Indigestion    • Pain 2/13/12    2/10 ankles, lower back   • Personal history of venous thrombosis and embolism 11/2008    post knee replacement -took coumadin then   • Pneumonia     2007   • Pneumonia 2004   • Reflux esophagitis    • Rosacea    • Skin cancer    • Sleep apnea     cpap    • Snoring    • Unspecified cataract     ONE REMOVED   • Unspecified glaucoma(365.9)    • Urinary incontinence     occasional incontinence      Active Hospital Problems    Diagnosis   • Chronic congestive heart failure (CMS-HCC) [I50.9]     Priority: Medium   • CAD (coronary artery disease) [I25.10]     Priority: Medium   • Chronic deep vein thrombosis  (DVT) of popliteal vein (CMS-HCC) [I82.539]     Priority: Medium   • History of pulmonary embolus (PE) [Z86.711]     Priority: Medium   • 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]     Priority: Low   • Acute respiratory failure with hypoxia (CMS-HCC) [J96.01]   • Essential hypertension, benign [I10]       Past Surgical History:  Past Surgical History:   Procedure Laterality Date   • ORBITOTOMY Left 3/21/2018    Procedure: ORBITOTOMY- ORBITAL EXENTERTATION WITH PLACEMENT OF SKIN GRAFT INTO LEFT SOCKET;  Surgeon: Artem Garcia M.D.;  Location: SURGERY SAME DAY [facility];  Service: Ophthalmology   • EXPLORATORY LAPAROTOMY  6/21/2017    Procedure: EXPLORATORY LAPAROTOMY;  Surgeon: Wilfred Amin M.D.;  Location: SURGERY [facility];  Service:    • RECOVERY  6/29/2016    Procedure: VASCULAR CASE-ARTHUR-INFERIOR VENA CAVA FILTER PLACEMENT 37191-DEEP VEIN THROMBOSIS I82.401;  Surgeon: Recoveryonly Surgery;  Location: SURGERY PRE-POST PROC UNIT [facility];  Service:    • FLAP CLOSURE  4/1/2015    Performed by Artem Garcia M.D. at SURGERY [facility]   • EYE LESION EXCISION  2/11/2015    Performed by Artem Garcia M.D. at SURGERY [facility]   • CARPAL TUNNEL RELEASE  9/2012    left side   • FULL THICKNESS SKIN GRAFT  4/3/2012    Performed by JAMESON PATEL at SURGERY SAME DAY [facility]   • BLEPHAROPLASTY  3/6/2012    Performed by JAMESON PATEL at SURGERY SAME DAY [facility]   • FULL THICKNESS SKIN GRAFT  3/6/2012    Performed by JAMESON PATEL at SURGERY SAME DAY [facility]   • ATHROPLASTY  11/18/08    Bilat. TKRs   • KNEE ARTHROPLASTY TOTAL  11/18/08    Performed by MICHEAL WINSTON at SURGERY [facility]   • OPEN REDUCTION  12/07    left foot   • FOOT SURGERY  12/2007    left foot    • OTHER      IVC filter 2016   • OTHER      skin cancer removed   • OTHER ORTHOPEDIC SURGERY  greater than 10 years ago    bilat knee replacement with hardware   • OTHER ORTHOPEDIC SURGERY   greater than 10 years    left ankle / foot repair complted 2 years before knee surgery       Hospital Medications:  Current Facility-Administered Medications   Medication Dose   • finasteride (PROSCAR) tablet 5 mg  5 mg   • lisinopril (PRINIVIL) 10 MG tablet 20 mg  20 mg   • senna-docusate (PERICOLACE or SENOKOT S) 8.6-50 MG per tablet 2 Tab  2 Tab    And   • polyethylene glycol/lytes (MIRALAX) PACKET 1 Packet  1 Packet    And   • magnesium hydroxide (MILK OF MAGNESIA) suspension 30 mL  30 mL    And   • bisacodyl (DULCOLAX) suppository 10 mg  10 mg   • Respiratory Care per Protocol     • NS infusion     • ondansetron (ZOFRAN) syringe/vial injection 4 mg  4 mg   • ondansetron (ZOFRAN ODT) dispertab 4 mg  4 mg   • acetaminophen (TYLENOL) tablet 650 mg  650 mg   • latanoprost (XALATAN) 0.005 % ophthalmic solution 1 Drop  1 Drop   • tiotropium (SPIRIVA) 18 MCG inhalation capsule 1 Cap  1 Cap    And   • albuterol inhaler 1-2 Puff  1-2 Puff   • potassium chloride SA (Kdur) tablet 10 mEq  10 mEq         Current Outpatient Medications:  Prescriptions Prior to Admission   Medication Sig Dispense Refill Last Dose   • Esomeprazole Magnesium (NEXIUM) 20 MG Pack Take 20 mg by mouth every morning.   3/20/2018 at Unknown time   • lisinopril (PRINIVIL) 20 MG Tab Take 20 mg by mouth every morning.   3/20/2018 at Unknown time   • metoprolol (LOPRESSOR) 25 MG Tab Take 25 mg by mouth every morning.   3/20/2018 at Unknown time   • warfarin (COUMADIN) 1 MG Tab Take 1 mg by mouth every day. Mondays 3 mg and all other days taking 2 mg   3/16/2018   • bimatoprost (LUMIGAN) 0.01 % Solution Place 1 Drop in both eyes every bedtime.      • potassium chloride ER (KLOR-CON) 10 MEQ tablet Take 1 Tab by mouth every day. 90 Tab 3 3/19/2018   • ipratropium-albuterol (COMBIVENT RESPIMAT)  MCG/ACT Aero Soln Inhale 1 Puff by mouth 4 times a day. 1 Inhaler 3 3/20/2018 at Unknown time   • finasteride (PROSCAR) 5 MG Tab Take 5 mg by mouth every  "morning.   3/20/2018 at Unknown time       Medication Allergy:  Allergies   Allergen Reactions   • Hydrocodone Vomiting and Nausea   • Nexium      Diarrhea         Family History:  History reviewed. No pertinent family history.    Social History:  Social History     Social History   • Marital status:      Spouse name: N/A   • Number of children: N/A   • Years of education: N/A     Occupational History   • Not on file.     Social History Main Topics   • Smoking status: Never Smoker   • Smokeless tobacco: Former User     Types: Chew     Quit date: 6/3/1967   • Alcohol use 0.0 - 0.6 oz/week      Comment: ocassionally   • Drug use: No   • Sexual activity: No     Other Topics Concern   • Not on file     Social History Narrative    ** Merged History Encounter **         ** Merged History Encounter **              Physical Exam:  Vitals/ General Appearance:   Weight/BMI: Body mass index is 39.7 kg/m².  Blood pressure 150/76, pulse (!) 59, temperature 36.7 °C (98 °F), resp. rate 15, height 1.778 m (5' 10\"), weight (!) 125.5 kg (276 lb 10.8 oz), SpO2 95 %.  Vitals:    03/22/18 0000 03/22/18 0400 03/22/18 0820 03/22/18 1210   BP: 136/81 145/76 130/51 150/76   Pulse: 65 75 60 (!) 59   Resp: 18 18 15 15   Temp: 36.8 °C (98.2 °F) 36.5 °C (97.7 °F) 37.1 °C (98.8 °F) 36.7 °C (98 °F)   SpO2: 96% 93% 96% 95%   Weight:       Height:         Oxygen Therapy:  Pulse Oximetry: 95 %, O2 (LPM): 0, FIO2%: 1.5, O2 Delivery: None (Room Air)  Constitutional:  Well developed, Well nourished, No acute distress  HENMT:  Normocephalic, Atraumatic, Oropharynx moist mucous membranes, No oral exudates, Nose normal.  No thyromegaly.  Eyes:  EOMI, Conjunctiva normal, No discharge.  Neck:  Normal range of motion, No cervical tenderness,  no JVD.  Cardiovascular:  Normal heart rate, Normal rhythm, No murmurs, No rubs, No gallops.   Extremitites with intact distal pulses, no cyanosis, or edema.  Lungs:  Normal breath sounds, breath sounds clear to " auscultation bilaterally,  no crackles, no wheezing.   Skin: Warm, Dry, No erythema, No rash, no induration.  Neurologic: Alert & oriented x 3, No focal deficits noted, cranial nerves II through X are grossly intact.  Psychiatric: Affect normal, Judgment normal, Mood normal.      Lab Data Review:  Recent Results (from the past 24 hour(s))   CBC WITH DIFFERENTIAL    Collection Time: 03/21/18  6:42 PM   Result Value Ref Range    WBC 7.2 4.8 - 10.8 K/uL    RBC 4.61 (L) 4.70 - 6.10 M/uL    Hemoglobin 14.6 14.0 - 18.0 g/dL    Hematocrit 44.9 42.0 - 52.0 %    MCV 97.4 81.4 - 97.8 fL    MCH 31.7 27.0 - 33.0 pg    MCHC 32.5 (L) 33.7 - 35.3 g/dL    RDW 51.4 (H) 35.9 - 50.0 fL    Platelet Count 117 (L) 164 - 446 K/uL    MPV 10.5 9.0 - 12.9 fL    Neutrophils-Polys 90.90 (H) 44.00 - 72.00 %    Lymphocytes 7.20 (L) 22.00 - 41.00 %    Monocytes 1.10 0.00 - 13.40 %    Eosinophils 0.00 0.00 - 6.90 %    Basophils 0.10 0.00 - 1.80 %    Immature Granulocytes 0.70 0.00 - 0.90 %    Nucleated RBC 0.00 /100 WBC    Neutrophils (Absolute) 6.53 1.82 - 7.42 K/uL    Lymphs (Absolute) 0.52 (L) 1.00 - 4.80 K/uL    Monos (Absolute) 0.08 0.00 - 0.85 K/uL    Eos (Absolute) 0.00 0.00 - 0.51 K/uL    Baso (Absolute) 0.01 0.00 - 0.12 K/uL    Immature Granulocytes (abs) 0.05 0.00 - 0.11 K/uL    NRBC (Absolute) 0.00 K/uL   BASIC METABOLIC PANEL    Collection Time: 03/21/18  6:42 PM   Result Value Ref Range    Sodium 135 135 - 145 mmol/L    Potassium 4.8 3.6 - 5.5 mmol/L    Chloride 104 96 - 112 mmol/L    Co2 18 (L) 20 - 33 mmol/L    Glucose 325 (H) 65 - 99 mg/dL    Bun 18 8 - 22 mg/dL    Creatinine 1.04 0.50 - 1.40 mg/dL    Calcium 9.0 8.5 - 10.5 mg/dL    Anion Gap 13.0 (H) 0.0 - 11.9   ESTIMATED GFR    Collection Time: 03/21/18  6:42 PM   Result Value Ref Range    GFR If African American >60 >60 mL/min/1.73 m 2    GFR If Non African American >60 >60 mL/min/1.73 m 2   EKG    Collection Time: 03/21/18 11:25 PM   Result Value Ref Range    Report        [facility]    Test Date:  2018  Pt Name:    DOLLY MCCOY                   Department: 183  MRN:        [id number]                      Room:       T833  Gender:     Male                         Technician: KAMILLE  :        1940                   Requested By:COLETTE METCALF  Order #:    [id number]                    Reading MD: Surendra Villeda MD    Measurements  Intervals                                Axis  Rate:       51                           P:          44  WI:                                      QRS:        -29  QRSD:       102                          T:          -29  QT:         512  QTc:        472    Interpretive Statements  SINUS BRADYCARDIA  MOBITZ I AV BLOCK ([place]KEBACH)  LEFT VENTRICULAR HYPERTROPHY  ABNORMAL T, CONSIDER ISCHEMIA, INFERIOR LEADS  BORDERLINE PROLONGED QT INTERVAL  BASELINE WANDER IN LEAD(S) V4,V6  Compared to ECG 2018 07:01:40  Heart rate is slower  Electronically Signed On 3- 7:42:38 PDT by Surendra Villeda MD     CBC without Differential    Collection Time: 18  2:44 AM   Result Value Ref Range    WBC 7.7 4.8 - 10.8 K/uL    RBC 4.45 (L) 4.70 - 6.10 M/uL    Hemoglobin 13.9 (L) 14.0 - 18.0 g/dL    Hematocrit 43.0 42.0 - 52.0 %    MCV 96.6 81.4 - 97.8 fL    MCH 31.2 27.0 - 33.0 pg    MCHC 32.3 (L) 33.7 - 35.3 g/dL    RDW 50.4 (H) 35.9 - 50.0 fL    Platelet Count 122 (L) 164 - 446 K/uL    MPV 10.9 9.0 - 12.9 fL   Basic Metabolic Panel (BMP)    Collection Time: 18  2:44 AM   Result Value Ref Range    Sodium 137 135 - 145 mmol/L    Potassium 4.4 3.6 - 5.5 mmol/L    Chloride 107 96 - 112 mmol/L    Co2 22 20 - 33 mmol/L    Glucose 181 (H) 65 - 99 mg/dL    Bun 23 (H) 8 - 22 mg/dL    Creatinine 0.96 0.50 - 1.40 mg/dL    Calcium 9.0 8.5 - 10.5 mg/dL    Anion Gap 8.0 0.0 - 11.9   ESTIMATED GFR    Collection Time: 18  2:44 AM   Result Value Ref Range    GFR If African American >60 >60 mL/min/1.73 m 2    GFR If Non African American >60 >60 mL/min/1.73 m 2        Imaging/Procedures Review:    No orders to display     Reviewed    EKG:   Sinus bradycardia.  Mobitz 1 block.    ECHO:  Results for orders placed or performed during the hospital encounter of 09/29/16   ECHOCARDIOGRAM COMP W/O CONT   Result Value Ref Range    Eject.Frac. MOD BP 67.51     Eject.Frac. MOD 4C 65.12     Eject.Frac. MOD 2C 70.31     Left Ventrical Ejection Fraction 65         MDM (Assessment and Plan):     Active Hospital Problems    Diagnosis   • Chronic congestive heart failure (CMS-HCC) [I50.9]     Priority: Medium   • CAD (coronary artery disease) [I25.10]     Priority: Medium   • Chronic deep vein thrombosis (DVT) of popliteal vein (CMS-HCC) [I82.539]     Priority: Medium   • History of pulmonary embolus (PE) [Z86.711]     Priority: Medium   • 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) [C69.92]     Priority: Low   • Acute respiratory failure with hypoxia (CMS-HCC) [J96.01]   • Essential hypertension, benign [I10]       Patient with new onset third-degree heart block (short run during the night) that has not recurred since then. Patient has been taking metoprolol for a long time. It seems that the Mobitz 1 A-V block is something that has been present for a long time and has not given the patient any trouble. If the patient remains asymptomatic and there is no recurrence of third-degree AV block, then the patient will not require any intervention and can be sent home with close outpatient follow-up as well as a Holter evaluation. If it does recur, patient would be a candidate for pacemaker.    - Hold metoprolol  - Monitor overnight for recurrence of third-degree heart block.      Thank you for this consult. Cardiology will reevaluate the patient tomorrow

## 2018-03-22 NOTE — ASSESSMENT & PLAN NOTE
Resolved  Intermittently during this admission, down to 28bpm, which is concerning and dangerous, follows cardiology outpatient  Cards consulted , overnight with 2.2 sec pause, EP consulted for pacemaker  Dc BB and monitor for sx

## 2018-03-22 NOTE — RESPIRATORY CARE
COPD EDUCATION by COPD CLINICAL EDUCATOR  3/22/2018 at 6:28 AM by Aylin Sosa     Patient reviewed by COPD education team. Patient does not qualify for COPD program.

## 2018-03-22 NOTE — ASSESSMENT & PLAN NOTE
On chronic warfarin, was holding 2/2 surgery, PE >1 year ago no need for bridge  Restarted today  Monitor INR closely as it is subtherapeutic still 1.31

## 2018-03-22 NOTE — CARE PLAN
Problem: Safety  Goal: Will remain free from falls  Outcome: PROGRESSING AS EXPECTED  Discussed safety precautions with pt, pt verbalized understanding     Problem: Knowledge Deficit  Goal: Knowledge of disease process/condition, treatment plan, diagnostic tests, and medications will improve  Outcome: PROGRESSING AS EXPECTED  Discussed POC with pt, pt verbalized understanding

## 2018-03-22 NOTE — ASSESSMENT & PLAN NOTE
Patient with history of PVC, 1st-2nd degree heart blocks, follows with cardiology outpatient  Cardiology consulted during this admission, no intervention at this time.  Went into 3rd degree Heart block overnight, asymptomatic, however also bradycardia in low 30s, BB stopped   patient had 2.2 sec pause, EP consulted for pacemaker, placed 3/23/18  Continue to monitor patient closely given age and risk factors, lives alone

## 2018-03-22 NOTE — HEART FAILURE PROGRAM
"Cardiovascular Nurse Navigator () Progress Note:     Per Dr. Meek on 3/21 this patient is not currently in acute heart failure. Therefore, a seven day HF f/u appt is not currently indicated.    Should clinical status change to acute HF, patient will require a seven calendar day f/u appt which can be achieved by placing a \"schedule heart failure follow up appointment\" order per protocol or by calling the hospital schedulers at [phone].     Thank you and please call with questions or concerns.  "

## 2018-03-22 NOTE — PROGRESS NOTES
[facility] Hospitalist Progress Note    Date of Service: 3/22/2018    Chief Complaint  77 y.o. male admitted 3/21/2018 for hypoxia after left eye surgery.    Interval Problem Update  3/22: patient doing well this morning, on Ra. Breathing is better. However tele picked up 2nd and 3rd degree heart block, patient was asymptomatic. He was had bradycardia in 30s, cardiology consulted.  Patient denies any chest pain, sob, nausea, or dizziness        Consultants/Specialty  cardiology    Disposition  Home like 24hours        Review of Systems   Constitutional: Negative for fever.   HENT: Positive for ear pain. Negative for ear discharge and hearing loss.    Respiratory: Negative for cough.    Cardiovascular: Negative for chest pain and palpitations.   Gastrointestinal: Negative for abdominal pain, diarrhea, heartburn and vomiting.   Genitourinary: Negative for dysuria and urgency.   Musculoskeletal: Negative for back pain, myalgias and neck pain.   Skin: Negative for itching and rash.   Neurological: Negative for dizziness, weakness and headaches.   Psychiatric/Behavioral: Negative for depression and suicidal ideas.      Physical Exam  Laboratory/Imaging   Hemodynamics  Temp (24hrs), Av.6 °C (97.9 °F), Min:36.1 °C (97 °F), Max:37.1 °C (98.8 °F)   Temperature: 36.7 °C (98 °F)  Pulse  Av.9  Min: 52  Max: 96 Heart Rate (Monitored): (!) 56  Blood Pressure : 150/76, NIBP: 143/55      Respiratory      Respiration: 15, Pulse Oximetry: 95 %, O2 Daily Delivery Respiratory : Room Air with O2 Available     Work Of Breathing / Effort: Mild  RUL Breath Sounds: Clear, RML Breath Sounds: Clear, RLL Breath Sounds: Diminished, ABIGAIL Breath Sounds: Clear, LLL Breath Sounds: Diminished    Fluids    Intake/Output Summary (Last 24 hours) at 18 1330  Last data filed at 18 0400   Gross per 24 hour   Intake              800 ml   Output             1800 ml   Net            -1000 ml       Nutrition  Orders Placed This Encounter    Procedures   • Diet Order     Standing Status:   Standing     Number of Occurrences:   1     Order Specific Question:   Diet:     Answer:   Regular [1]     Physical Exam   Constitutional: He is oriented to person, place, and time. He appears well-developed and well-nourished. No distress.   HENT:   Head: Normocephalic and atraumatic.   Mouth/Throat: Oropharynx is clear and moist.   Eyes: Pupils are equal, round, and reactive to light. No scleral icterus.   Left eye patch   Neck: No JVD present.   Cardiovascular:   Bradycardia,irregular   Pulmonary/Chest: Effort normal and breath sounds normal. No respiratory distress. He has no wheezes.   Abdominal: Bowel sounds are normal. He exhibits no distension. There is no tenderness.   Musculoskeletal: Normal range of motion. He exhibits no edema.   Lymphadenopathy:     He has no cervical adenopathy.   Neurological: He is alert and oriented to person, place, and time. He exhibits normal muscle tone.   Skin: Skin is warm and dry.   Psychiatric: He has a normal mood and affect.       Recent Labs      03/21/18 1842 03/22/18   0244   WBC  7.2  7.7   RBC  4.61*  4.45*   HEMOGLOBIN  14.6  13.9*   HEMATOCRIT  44.9  43.0   MCV  97.4  96.6   MCH  31.7  31.2   MCHC  32.5*  32.3*   RDW  51.4*  50.4*   PLATELETCT  117*  122*   MPV  10.5  10.9     Recent Labs      03/21/18   1842  03/22/18   0244   SODIUM  135  137   POTASSIUM  4.8  4.4   CHLORIDE  104  107   CO2  18*  22   GLUCOSE  325*  181*   BUN  18  23*   CREATININE  1.04  0.96   CALCIUM  9.0  9.0     Recent Labs      03/21/18   0720   INR  1.12                  Assessment/Plan     Bradycardia   Assessment & Plan    Intermittently during this admission, down to 28bpm, which is concerning and dangerous, follows cardiology outpatient  Cards consulted here, no intervention, may need holter  Dc BB and monitor for sx         Heart block   Assessment & Plan    Patient with history of PVC, 1st-2nd degree heart blocks, follows with  cardiology outpatient  Cardiology consulted during this admission, no intervention at this time.  Went into 3rd degree Heart block overnight, asymptomatic, however also bradycardia in low 30s, BB stopped  Continue to monitor patient closely given age and risk factors, lives alone        Acute respiratory failure with hypoxia (CMS-[facility])- (present on admission)   Assessment & Plan    Resolved; After surgery, he was requiring 5L of o2, now he is down to 1.5L probably due to anesthesia will continue IS, nebs treatment. Wean off o2, continue albuterol inhaler. No wheezing on examination.         CAD (coronary artery disease)- (present on admission)   Assessment & Plan    Stable, no chest pain.         Chronic congestive heart failure (CMS-[facility])- (present on admission)   Assessment & Plan    Last echo in 2017 showed hyperdynamic EF of 75%, chf rulled out        Chronic deep vein thrombosis (DVT) of popliteal vein ([facility] [place] [facility])- (present on admission)   Assessment & Plan    Restarting warfarin now  Monitor INR closely        History of pulmonary embolus (PE)- (present on admission)   Assessment & Plan    On chronic warfarin, was holding 2/2 surgery  Will restart today  Monitor INR closely        Essential hypertension, benign- (present on admission)   Assessment & Plan    Continue metoprolol and lisinopril.         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)- (present on admission)   Assessment & Plan    S/p left eye exavasation and left thigh skin graft by Dr Garcia today.          Quality-Core Measures   Reviewed items::  Labs reviewed and Medications reviewed  Castaneda catheter::  No Castaneda

## 2018-03-22 NOTE — ASSESSMENT & PLAN NOTE
Resolved; After surgery, he was requiring 5L of o2, now he is down to 1.5L probably due to anesthesia will continue IS, nebs treatment. Wean off o2, continue albuterol inhaler. No wheezing on examination.

## 2018-03-23 ENCOUNTER — APPOINTMENT (OUTPATIENT)
Dept: RADIOLOGY | Facility: MEDICAL CENTER | Age: 78
DRG: 982 | End: 2018-03-23
Attending: INTERNAL MEDICINE
Payer: MEDICARE

## 2018-03-23 LAB
ANION GAP SERPL CALC-SCNC: 8 MMOL/L (ref 0–11.9)
BUN SERPL-MCNC: 26 MG/DL (ref 8–22)
CALCIUM SERPL-MCNC: 8.4 MG/DL (ref 8.5–10.5)
CHLORIDE SERPL-SCNC: 108 MMOL/L (ref 96–112)
CO2 SERPL-SCNC: 24 MMOL/L (ref 20–33)
CREAT SERPL-MCNC: 0.9 MG/DL (ref 0.5–1.4)
EKG IMPRESSION: NORMAL
GLUCOSE SERPL-MCNC: 178 MG/DL (ref 65–99)
INR PPP: 1.13 (ref 0.87–1.13)
MAGNESIUM SERPL-MCNC: 2 MG/DL (ref 1.5–2.5)
PHOSPHATE SERPL-MCNC: 2.5 MG/DL (ref 2.5–4.5)
POTASSIUM SERPL-SCNC: 3.9 MMOL/L (ref 3.6–5.5)
PROTHROMBIN TIME: 14.2 SEC (ref 12–14.6)
SODIUM SERPL-SCNC: 140 MMOL/L (ref 135–145)

## 2018-03-23 PROCEDURE — 02HK3JZ INSERTION OF PACEMAKER LEAD INTO RIGHT VENTRICLE, PERCUTANEOUS APPROACH: ICD-10-PCS | Performed by: INTERNAL MEDICINE

## 2018-03-23 PROCEDURE — 99226 PR SUBSEQUENT OBSERVATION CARE,LEVEL III: CPT | Performed by: HOSPITALIST

## 2018-03-23 PROCEDURE — 93010 ELECTROCARDIOGRAM REPORT: CPT | Performed by: INTERNAL MEDICINE

## 2018-03-23 PROCEDURE — 99152 MOD SED SAME PHYS/QHP 5/>YRS: CPT

## 2018-03-23 PROCEDURE — 700111 HCHG RX REV CODE 636 W/ 250 OVERRIDE (IP)

## 2018-03-23 PROCEDURE — A9270 NON-COVERED ITEM OR SERVICE: HCPCS | Performed by: OPHTHALMOLOGY

## 2018-03-23 PROCEDURE — G0378 HOSPITAL OBSERVATION PER HR: HCPCS

## 2018-03-23 PROCEDURE — 700101 HCHG RX REV CODE 250

## 2018-03-23 PROCEDURE — 0JH606Z INSERTION OF PACEMAKER, DUAL CHAMBER INTO CHEST SUBCUTANEOUS TISSUE AND FASCIA, OPEN APPROACH: ICD-10-PCS | Performed by: INTERNAL MEDICINE

## 2018-03-23 PROCEDURE — 84100 ASSAY OF PHOSPHORUS: CPT

## 2018-03-23 PROCEDURE — 85610 PROTHROMBIN TIME: CPT

## 2018-03-23 PROCEDURE — 83735 ASSAY OF MAGNESIUM: CPT

## 2018-03-23 PROCEDURE — 99153 MOD SED SAME PHYS/QHP EA: CPT

## 2018-03-23 PROCEDURE — 02H63JZ INSERTION OF PACEMAKER LEAD INTO RIGHT ATRIUM, PERCUTANEOUS APPROACH: ICD-10-PCS | Performed by: INTERNAL MEDICINE

## 2018-03-23 PROCEDURE — 33208 INSRT HEART PM ATRIAL & VENT: CPT

## 2018-03-23 PROCEDURE — C1892 INTRO/SHEATH,FIXED,PEEL-AWAY: HCPCS

## 2018-03-23 PROCEDURE — 93005 ELECTROCARDIOGRAM TRACING: CPT | Performed by: INTERNAL MEDICINE

## 2018-03-23 PROCEDURE — C1785 PMKR, DUAL, RATE-RESP: HCPCS

## 2018-03-23 PROCEDURE — 305387 HCHG SUTURES

## 2018-03-23 PROCEDURE — 700102 HCHG RX REV CODE 250 W/ 637 OVERRIDE(OP): Performed by: OPHTHALMOLOGY

## 2018-03-23 PROCEDURE — 36415 COLL VENOUS BLD VENIPUNCTURE: CPT

## 2018-03-23 PROCEDURE — 700105 HCHG RX REV CODE 258: Performed by: HOSPITALIST

## 2018-03-23 PROCEDURE — 71045 X-RAY EXAM CHEST 1 VIEW: CPT

## 2018-03-23 PROCEDURE — 304952 HCHG R 2 PADS

## 2018-03-23 PROCEDURE — 80048 BASIC METABOLIC PNL TOTAL CA: CPT

## 2018-03-23 PROCEDURE — 700102 HCHG RX REV CODE 250 W/ 637 OVERRIDE(OP): Performed by: HOSPITALIST

## 2018-03-23 PROCEDURE — A9270 NON-COVERED ITEM OR SERVICE: HCPCS | Performed by: HOSPITALIST

## 2018-03-23 PROCEDURE — 304853 HCHG PPM TEST CABLE

## 2018-03-23 PROCEDURE — C1898 LEAD, PMKR, OTHER THAN TRANS: HCPCS

## 2018-03-23 RX ORDER — MIDAZOLAM HYDROCHLORIDE 1 MG/ML
INJECTION INTRAMUSCULAR; INTRAVENOUS
Status: COMPLETED
Start: 2018-03-23 | End: 2018-03-23

## 2018-03-23 RX ORDER — LIDOCAINE HYDROCHLORIDE 20 MG/ML
INJECTION, SOLUTION INFILTRATION; PERINEURAL
Status: COMPLETED
Start: 2018-03-23 | End: 2018-03-23

## 2018-03-23 RX ADMIN — MIDAZOLAM 2 MG: 1 INJECTION INTRAMUSCULAR; INTRAVENOUS at 16:31

## 2018-03-23 RX ADMIN — LISINOPRIL 20 MG: 10 TABLET ORAL at 08:42

## 2018-03-23 RX ADMIN — FENTANYL CITRATE 100 MCG: 50 INJECTION, SOLUTION INTRAMUSCULAR; INTRAVENOUS at 16:49

## 2018-03-23 RX ADMIN — POTASSIUM CHLORIDE 10 MEQ: 1500 TABLET, EXTENDED RELEASE ORAL at 08:42

## 2018-03-23 RX ADMIN — LIDOCAINE HYDROCHLORIDE: 20 INJECTION, SOLUTION INFILTRATION; PERINEURAL at 16:15

## 2018-03-23 RX ADMIN — FINASTERIDE 5 MG: 5 TABLET, FILM COATED ORAL at 08:42

## 2018-03-23 RX ADMIN — SODIUM CHLORIDE: 9 INJECTION, SOLUTION INTRAVENOUS at 11:38

## 2018-03-23 RX ADMIN — LATANOPROST 1 DROP: 50 SOLUTION OPHTHALMIC at 21:07

## 2018-03-23 RX ADMIN — VANCOMYCIN HYDROCHLORIDE 2860 MG: 1 INJECTION, POWDER, LYOPHILIZED, FOR SOLUTION INTRAVENOUS at 16:06

## 2018-03-23 ASSESSMENT — ENCOUNTER SYMPTOMS
CLAUDICATION: 0
NECK PAIN: 0
ABDOMINAL PAIN: 0
PALPITATIONS: 0
DIARRHEA: 0
HEARTBURN: 0
HEADACHES: 0
VOMITING: 0
DEPRESSION: 0
MYALGIAS: 0
COUGH: 0
FEVER: 0
DIZZINESS: 0
BACK PAIN: 0
WEAKNESS: 0

## 2018-03-23 ASSESSMENT — PAIN SCALES - GENERAL
PAINLEVEL_OUTOF10: 0
PAINLEVEL_OUTOF10: 1
PAINLEVEL_OUTOF10: 0
PAINLEVEL_OUTOF10: 0

## 2018-03-23 NOTE — PROGRESS NOTES
[facility] Hospitalist Progress Note    Date of Service: 3/23/2018    Chief Complaint  77 y.o. male admitted 3/21/2018 for hypoxia after left eye surgery.    Interval Problem Update  3/22: patient doing well this morning, on Ra. Breathing is better. However tele picked up 2nd and 3rd degree heart block, patient was asymptomatic. He was had bradycardia in 30s, cardiology consulted.  Patient denies any chest pain, sob, nausea, or dizziness    3/23; patient continues to have bradycardia in 30s, and overnight had 2.2 sec pause. With hx of 3rd degree heart block, EP consulted for pacemaker today. No chest pain but feels weak and tired . Scared about pacemaker        Consultants/Specialty  cardiology  EP    Disposition  Home like 24hours        Review of Systems   Constitutional: Negative for fever.   HENT: Positive for ear pain. Negative for ear discharge and hearing loss.    Respiratory: Negative for cough.    Cardiovascular: Negative for chest pain and palpitations.   Gastrointestinal: Negative for abdominal pain, diarrhea, heartburn and vomiting.   Genitourinary: Negative for dysuria and urgency.   Musculoskeletal: Negative for back pain, myalgias and neck pain.   Skin: Negative for itching and rash.   Neurological: Negative for dizziness, weakness and headaches.   Psychiatric/Behavioral: Negative for depression and suicidal ideas.      Physical Exam  Laboratory/Imaging   Hemodynamics  Temp (24hrs), Av.9 °C (98.4 °F), Min:36.4 °C (97.6 °F), Max:37.1 °C (98.8 °F)   Temperature: 37 °C (98.6 °F)  Pulse  Av.2  Min: 52  Max: 96   Blood Pressure : 136/68      Respiratory      Respiration: 15, Pulse Oximetry: 95 %        RUL Breath Sounds: Clear, RML Breath Sounds: Diminished, RLL Breath Sounds: Diminished, ABIGAIL Breath Sounds: Clear, LLL Breath Sounds: Diminished    Fluids    Intake/Output Summary (Last 24 hours) at 18 1448  Last data filed at 18 0600   Gross per 24 hour   Intake             1440 ml   Output               250 ml   Net             1190 ml       Nutrition  Orders Placed This Encounter   Procedures   • DIET NPO     Standing Status:   Standing     Number of Occurrences:   1     Order Specific Question:   Restrict to:     Answer:   Sips with Medications [3]     Physical Exam   Constitutional: He is oriented to person, place, and time. He appears well-developed and well-nourished. No distress.   HENT:   Head: Normocephalic and atraumatic.   Mouth/Throat: Oropharynx is clear and moist.   Eyes: Pupils are equal, round, and reactive to light. No scleral icterus.   Left eye patch   Neck: No JVD present.   Cardiovascular:   Bradycardia,irregular   Pulmonary/Chest: Effort normal and breath sounds normal. No respiratory distress. He has no wheezes. He has no rales.   Abdominal: Bowel sounds are normal. He exhibits no distension. There is no tenderness.   Musculoskeletal: Normal range of motion. He exhibits no edema.   Lymphadenopathy:     He has no cervical adenopathy.   Neurological: He is alert and oriented to person, place, and time. He exhibits normal muscle tone.   Skin: Skin is warm and dry.   Psychiatric: He has a normal mood and affect.       Recent Labs      03/21/18   1842  03/22/18   0244   WBC  7.2  7.7   RBC  4.61*  4.45*   HEMOGLOBIN  14.6  13.9*   HEMATOCRIT  44.9  43.0   MCV  97.4  96.6   MCH  31.7  31.2   MCHC  32.5*  32.3*   RDW  51.4*  50.4*   PLATELETCT  117*  122*   MPV  10.5  10.9     Recent Labs      03/21/18   1842  03/22/18   0244  03/23/18   1044   SODIUM  135  137  140   POTASSIUM  4.8  4.4  3.9   CHLORIDE  104  107  108   CO2  18*  22  24   GLUCOSE  325*  181*  178*   BUN  18  23*  26*   CREATININE  1.04  0.96  0.90   CALCIUM  9.0  9.0  8.4*     Recent Labs      03/21/18   0720  03/22/18   1620  03/23/18   0412   INR  1.12  1.17*  1.13                  Assessment/Plan     Bradycardia   Assessment & Plan    Intermittently during this admission, down to 28bpm, which is concerning and  dangerous, follows cardiology outpatient  Cards consulted , overnight with 2.2 sec pause, EP consulted for pacemaker  Dc BB and monitor for sx         Heart block   Assessment & Plan    Patient with history of PVC, 1st-2nd degree heart blocks, follows with cardiology outpatient  Cardiology consulted during this admission, no intervention at this time.  Went into 3rd degree Heart block overnight, asymptomatic, however also bradycardia in low 30s, BB stopped  Overnight patient had 2.2 sec pause, EP consulted for possible pacemaker, monitor patient closely till pacemaker in place. Watching Hr, tele  Continue to monitor patient closely given age and risk factors, lives alone        Acute respiratory failure with hypoxia (CMS-[facility])- (present on admission)   Assessment & Plan    Resolved; After surgery, he was requiring 5L of o2, now he is down to 1.5L probably due to anesthesia will continue IS, nebs treatment. Wean off o2, continue albuterol inhaler. No wheezing on examination.         CAD (coronary artery disease)- (present on admission)   Assessment & Plan    Stable, no chest pain.         Chronic congestive heart failure (CMS-[facility])- (present on admission)   Assessment & Plan    Last echo in 2017 showed hyperdynamic EF of 75%, chf rulled out        Chronic deep vein thrombosis (DVT) of popliteal vein (CMS-[facility])- (present on admission)   Assessment & Plan    On warfarin, will need to start after pacemaker  Monitor INR closely        History of pulmonary embolus (PE)- (present on admission)   Assessment & Plan    On chronic warfarin, was holding 2/2 surgery  Hold for pacemaker  Monitor INR closely        Essential hypertension, benign- (present on admission)   Assessment & Plan    Continue metoprolol and lisinopril.         Squamous cell carcinoma of left eye (CMS-[facility])- (present on admission)   Assessment & Plan    S/p left eye exavasation and left thigh skin graft by Dr Garcia today.          Quality-Core Measures   Reviewed  items::  Labs reviewed and Medications reviewed  Castaneda catheter::  No Castaneda

## 2018-03-23 NOTE — CARE PLAN
Problem: Safety  Goal: Will remain free from injury    Intervention: Provide assistance with mobility   03/22/18 0800   OTHER   Assistance / Tolerance Standby Assist     Intervention: Collaborate with Interdisciplinary Team for safe transfer and mobilization techniques   03/22/18 0800   OTHER   Assistive Devices Hand held assist       Goal: Will remain free from falls    Intervention: Assess risk factors for falls   03/21/18 0700 03/21/18 1600 03/22/18 0800   OTHER   Mobility Status Assessment 0-Ambulates & Transfers Independently. No Assistance Required --  --    History of fall --  0 --    Date of Last Fall --  (pt states no falls) --    Pt Calls for Assistance --  --  Yes     Intervention: Implement fall precautions   03/22/18 0800   OTHER   Environmental Precautions Treaded Slipper Socks on Patient;Personal Belongings, Wastebasket, Call Bell etc. in Easy Reach;Transferred to Stronger Side;Report Given to Other Health Care Providers Regarding Fall Risk;Bed in Low Position;Communication Sign for Patients & Families;Mobility Assessed & Appropriate Sign Placed         Problem: Knowledge Deficit  Goal: Knowledge of disease process/condition, treatment plan, diagnostic tests, and medications will improve    Intervention: Assess knowledge level of disease process/condition, treatment plan, diagnostic tests, and medications  Pt educated regarding activity, diet, meds and plan of care. Verbalized understanding.

## 2018-03-23 NOTE — PROGRESS NOTES
Patient seen on EPS rounds.  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     PROCEDURE PERFORMED:  1.  Orbital exenteration, left eye.  2.  Placement of full thickness skin graft from the left thigh to the left   orbit measuring 9 x 9 cm.     Episode of 3rd degree HB after surgery then MObitz I which has been a known issue. 2.2 second pause last night.  Pacemaker implantation requested.  Procedure reviewed with patient. He is right handed. 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  The risk, benefits, and alternatives to pacemaker placement were discussed in great detail, specific risks mentioned including bleeding, infection, cardiac perforation with possible tamponade requiring pericardiocentesis or open heart surgery.  In addition the possibility of lead dislodgment (2-3%),  pneumothorax (3%), hemothorax were discussed. Also mentioned were the possibility of death, stroke, and myocardial infarction. The patient verbalized understanding of these potential complications and wishes to proceed with this procedure.

## 2018-03-23 NOTE — CARE PLAN
Problem: Safety  Goal: Will remain free from falls    Intervention: Assess risk factors for falls   03/21/18 0700 03/21/18 1600 03/22/18 2120   OTHER   Mobility Status Assessment 0-Ambulates & Transfers Independently. No Assistance Required --  --    History of fall --  0 --    Date of Last Fall --  (pt states no falls) --    Pt Calls for Assistance --  --  Yes     Intervention: Implement fall precautions   03/22/18 2120   OTHER   Environmental Precautions Treaded Slipper Socks on Patient;Personal Belongings, Wastebasket, Call Bell etc. in Easy Reach;Transferred to Stronger Side;Report Given to Other Health Care Providers Regarding Fall Risk;Bed in Low Position         Problem: Knowledge Deficit  Goal: Knowledge of disease process/condition, treatment plan, diagnostic tests, and medications will improve    Intervention: Assess knowledge level of disease process/condition, treatment plan, diagnostic tests, and medications  Pt educated regarding activity, cardiac diet, pain meds and plan of care. Verbalized understanding.

## 2018-03-23 NOTE — CARE PLAN
Problem: Safety  Goal: Will remain free from falls  Outcome: PROGRESSING AS EXPECTED  Discussed safety precautions with pt, pt verbalized understanding    Problem: Knowledge Deficit  Goal: Knowledge of disease process/condition, treatment plan, diagnostic tests, and medications will improve  Outcome: PROGRESSING AS EXPECTED  Discussed POC and procedures with pt, pt verbalized understanding.

## 2018-03-23 NOTE — OP REPORT
PROCEDURE PERFORMED: DDDR PPM, moderate sedation administered by CRNA and supervised by physician    : Kiran Rios MD    ASSISTANT: none    ANESTHESIA: Moderate sedation,  start time 4:08 pm, stop time 5:04 pm  the moderate sedation document has been reviewed, signed and scanned into media     EBL: 30 cc    SPECIMENS: None    INDICATION: Second degree heart block    PRE PROCEDURE ECG: SR with mobitz 1    POST PROCEDURE ECG: Dual chamber pacing     COMPLICATIONS:  none     DESCRIPTION OF PROCEDURE:  After informed written consent, the patient was brought to the electrophysiology lab in the fasting, unsedated state. The patient was prepped and draped in the usual sterile fashion. The procedure was performed under moderate sedation with local anesthetic.   A left infraclavicular incision was made with a scalpel and the pectoral device pocket was created using a combination of blunt dissection and electrocautery. The modified Seldinger technique was used to gain access to the left axillary vein. A peel-away hemostasis sheath was placed in the vein. Under fluoroscopic guidance, the pacemaker leads were introduced into the heart. The ventricular lead was advanced to the RVOT and then lowered into position at the RV apex. The atrial lead was positioned in the Right atrial appendage. The leads were tested and had satisfactory sensing and pacing parameters. High output ventricular pacing did not produce extracardiac stimulation. The leads were sutured to the underlying pectoral muscle with interrupted non absorbable suture over a silastic suture sleeve. The device pocket was irrigated with antibiotic solution, inspected, and no bleeding was seen. The leads were connected to the PPM pulse generator and the device was inserted into the pocket The wound was closed with three layers of absorbable sutures.  I personally supervised the administration of moderate sedation by the CRNA and observed the level of consciousness  and physiologic status throughout the procedure.   Following recovery from sedation, the patient was transferred to a monitored bed in good condition.     IMPLANTED DEVICE INFORMATION:  Pulse generator is a Medtronic model A2DR01  Serial # [id number]    LEAD INFORMATION:  1)Right atrial lead is a Medtronic model # 5076-52 , serial # [id number]  ,P wave 3  millivolts, threshold 0.4 Volts, pacing impedance 902 Ohms.    2)Right ventricular lead is a Medtronic model # 5076-58 , serial # [id number] ,R wave 5 millivolts, threshold 1 Volts, pacing impedance 863 Ohms.    DEVICE PROGRAMMING:  DDDR    FLUOROSCOPY TIME: 3.4 min    IMPRESSIONS:  1. Successful Dual chamber PPM implantation    RECOMMENDATIONS:  1. Transfer to monitored bed  2. PA and lateral chest x-ray  3. Device interrogation prior to hospital discharge  4. Followup in device clinic

## 2018-03-23 NOTE — PROGRESS NOTES
"Cardiology Progress Note                                      Admit Date: 3/21/2018  Resident(s): Michelle Huizar     Date & Time:   3/23/2018   3:39 PM       Patient ID:    Name:             Kiran Eason   YOB: 1940  Age:                 77 y.o.  male   MRN:               [id number]    HPI:  Kiran Eason is a 77 y.o. male here post enucleation surgery for squamous cancer. After the surgery, patient was noted to be hypoxic and required oxygen therapy, but that has now resolved.     Patient was put on telemetry and he was noted to have a short run of third-degree heart block overnight. During that time, the patient remained asymptomatic and denies any history of chest pain, palpitations, shortness of breath, lightheadedness or dizziness. On evaluation, EKG showed a Mobitz 1 second-degree block and sinus bradycardia. Patient reports that he's had this rhythm abnormality for long time and it has never caused him any issues. Also notes that his mother has the same problem and lived until she was 93. In the past, the patient had a Holter evaluation (2016) which also showed a Mobitz 1 type heart block, but no third-degree blocks. Nuclear stress test done in 2016 was normal.    Reason for consult:  - Third-degree heart block.      Interval History:  Reports feeling well. Has several bradycardic episodes overnight; 2.2 second pause. Otherwise feels well.    Review of Systems   Cardiovascular: Negative for chest pain, palpitations and claudication.       Physical Exam   Blood pressure 136/68, pulse (!) 58, temperature 37 °C (98.6 °F), resp. rate 15, height 1.778 m (5' 10\"), weight 124 kg (273 lb 5.9 oz), SpO2 95 %.  Constitutional: NAD. AAOx3.  Neck: No JVD present.   Cardiovascular: Normal rate. Exam reveals no gallop and no friction rub. No murmur heard.   Pulmonary/Chest: CTABL.  Abdominal: S/NT/ND BS+   Musculoskeletal: Exhibits no edema. Pulses present.  Skin: Skin is warm and dry. "     Fluids:    Intake/Output Summary (Last 24 hours) at 03/23/18 1539  Last data filed at 03/23/18 0600   Gross per 24 hour   Intake             1440 ml   Output              250 ml   Net             1190 ml       Date 03/23/18 0700 - 03/24/18 0659   Shift 2279-6166 8958-9426 2241-9745 24 Hour Total   I  N  T  A  K  E   Shift Total       O  U  T  P  U  T   Urine          Number of Times Voided 2 x   2 x    Stool          Number of Times Stooled 1 x   1 x    Shift Total       NET              Weight: 124 kg (273 lb 5.9 oz)  Body mass index is 39.22 kg/m².    Recent Labs      03/21/18   1842  03/22/18   0244  03/23/18   1044   SODIUM  135  137  140   POTASSIUM  4.8  4.4  3.9   CHLORIDE  104  107  108   CO2  18*  22  24   BUN  18  23*  26*   CREATININE  1.04  0.96  0.90   MAGNESIUM   --    --   2.0   PHOSPHORUS   --    --   2.5   CALCIUM  9.0  9.0  8.4*         Recent Labs      03/21/18   1842  03/22/18   0244   WBC  7.2  7.7   RBC  4.61*  4.45*   HEMOGLOBIN  14.6  13.9*   HEMATOCRIT  44.9  43.0   MCV  97.4  96.6   MCH  31.7  31.2   MCHC  32.5*  32.3*   RDW  51.4*  50.4*   PLATELETCT  117*  122*   MPV  10.5  10.9     Recent Labs      03/21/18   1842  03/22/18   0244  03/23/18   1044   SODIUM  135  137  140   POTASSIUM  4.8  4.4  3.9   CHLORIDE  104  107  108   CO2  18*  22  24   GLUCOSE  325*  181*  178*   BUN  18  23*  26*   CREATININE  1.04  0.96  0.90   CALCIUM  9.0  9.0  8.4*     Recent Labs      03/21/18   0720  03/22/18   1620  03/23/18   0412   INR  1.12  1.17*  1.13                   Meds:  • lidocaine       • vancomycin       • finasteride  5 mg     • lisinopril  20 mg     • senna-docusate  2 Tab      And   • polyethylene glycol/lytes  1 Packet      And   • magnesium hydroxide  30 mL      And   • bisacodyl  10 mg     • Respiratory Care per Protocol       • NS   50 mL/hr at 03/23/18 1138   • ondansetron  4 mg     • ondansetron  4 mg     • acetaminophen  650 mg     • latanoprost  1 Drop     • tiotropium  1 Cap       And   • albuterol  1-2 Puff     • potassium chloride SA  10 mEq         Current Facility-Administered Medications   Medication Dose Frequency Provider Last Rate Last Dose   • LIDOCAINE HCL 2 % INJ SOLN           • VANCOMYCIN HCL 1000 MG IV SOLR           • finasteride (PROSCAR) tablet 5 mg  5 mg ABBIE Meek M.D.   5 mg at 03/23/18 0842   • lisinopril (PRINIVIL) 10 MG tablet 20 mg  20 mg ABBIE Meek M.D.   20 mg at 03/23/18 0842   • senna-docusate (PERICOLACE or SENOKOT S) 8.6-50 MG per tablet 2 Tab  2 Tab BID Fab Meek M.D.   2 Tab at 03/21/18 2129    And   • polyethylene glycol/lytes (MIRALAX) PACKET 1 Packet  1 Packet QDAY PRN Fab Meek M.D.        And   • magnesium hydroxide (MILK OF MAGNESIA) suspension 30 mL  30 mL QDAY PRN Fab Meek M.D.        And   • bisacodyl (DULCOLAX) suppository 10 mg  10 mg QDAY PRN Fab Meek M.D.       • Respiratory Care per Protocol   Continuous RT Fab Meek M.D.       • NS infusion   Continuous Fab Meek M.D. 50 mL/hr at 03/23/18 1138     • ondansetron (ZOFRAN) syringe/vial injection 4 mg  4 mg Q4HRS PRN Fab Meek M.D.       • ondansetron (ZOFRAN ODT) dispertab 4 mg  4 mg Q4HRS PRN Fab Meek M.D.       • acetaminophen (TYLENOL) tablet 650 mg  650 mg Q6HRS PRN Fab Meek M.D.   650 mg at 03/21/18 2129   • latanoprost (XALATAN) 0.005 % ophthalmic solution 1 Drop  1 Drop Q EVENING Artem Garcia M.D.   1 Drop at 03/22/18 2124   • tiotropium (SPIRIVA) 18 MCG inhalation capsule 1 Cap  1 Cap DAILY Artem Garcia M.D.        And   • albuterol inhaler 1-2 Puff  1-2 Puff 4X/DAY Artem Garcia M.D.       • potassium chloride SA (Kdur) tablet 10 mEq  10 mEq DAILY Artem Garcia M.D.   10 mEq at 03/23/18 0842     Last reviewed on 3/22/2018  3:49 PM by Komal Gonzalez, Pharmacy Intern  Medications reviewed      Imaging reviewed    ECHO (reviewed):  Results  for orders placed or performed during the hospital encounter of 09/29/16   ECHOCARDIOGRAM COMP W/O CONT   Result Value Ref Range    Eject.Frac. MOD BP 67.51     Eject.Frac. MOD 4C 65.12     Eject.Frac. MOD 2C 70.31     Left Ventrical Ejection Fraction 65           Impressions and Recommendations:  Active Hospital Problems    Diagnosis   • Heart block [I45.9]     Priority: High   • Bradycardia [R00.1]     Priority: High   • Acute respiratory failure with hypoxia (CMS-[facility]) [J96.01]     Priority: High   • Chronic congestive heart failure (CMS-[facility]) [I50.9]     Priority: Medium   • CAD (coronary artery disease) [I25.10]     Priority: Medium   • Chronic deep vein thrombosis (DVT) of popliteal vein (CMS-[facility]) [I82.539]     Priority: Medium   • History of pulmonary embolus (PE) [Z86.711]     Priority: Medium   • 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) [C69.92]     Priority: Low   • Essential hypertension, benign [I10]       Patient with one short run of new onset third-degree heart block on chronic metoprolol for a long time. EKG shows Wenckebach conduction and it looks like the Mobitz 1 A-V block is something that has been present for a long time and has not given the patient any trouble. Even though it is not currently causing the patient any problems, he does live in an isolated area and should he become symptomatic, medical care will be delayed. Based on that and the patient's tele monitor, he is a good candidate for a pacemaker.    - continue to hold BB  - pacemaker today        Thank you for this consult. Cardiology will follow.

## 2018-03-23 NOTE — PROGRESS NOTES
MD notified of pt heart rate in 20s-30s and heart block 2nd degree type 1 and 2, asked if EP consult should be made. MD wants to continue to monitor with external pacer at bedside.

## 2018-03-23 NOTE — PROGRESS NOTES
Report received. Care assumed. Patient A&Ox4. Pt reports feeling 0/10 pain, no SOB at this time.  Slight headache at times. Pt denies any needs.Discussed POC for the night with Pt. Call light within reach, encouraged pt to call for assistance.

## 2018-03-23 NOTE — PROGRESS NOTES
Tele Monitor Summary  SB-SR 2 degree type 2  43-70  Nicole down to 26, nicole down to 30s multiple times

## 2018-03-23 NOTE — OP REPORT
DATE OF SERVICE:  03/21/2018    ATTENDING SURGEON:  Artem Garcia MD    ASSISTANT SURGEON:  None.    ANESTHESIOLOGIST:  Emigdio Rachel MD    ANESTHESIA:  General with local supplementation.    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    PROCEDURE PERFORMED:  1.  Orbital exenteration, left eye.  2.  Placement of full thickness skin graft from the left thigh to the left   orbit measuring 9 x 9 cm.    SPECIMENS:  1.  Left orbital contents for permanent.  2.  Left lacrimal sac margin for frozen sections, which was read   intraoperatively by the pathologists as negative for malignancy.  3.  Left posterior orbit margin for permanent.  4.  Left medial skin margins for permanent.    COMPLICATIONS:  None.    IMPLANTS:  None.    ESTIMATED BLOOD LOSS:  75 mL.    INDICATIONS FOR PROCEDURE:  This is a 77-year-old male with a history of a   squamous cell carcinoma of the left lower eyelid which was previously   resected.  He had a local recurrence.  Most recently in the early winter of   2016, he underwent Mohs excision with eyelid reconstruction at [facility].  Subsequently, the patient was found on additional margins   obtained at the reconstructive surgery that there was some evidence of   microscopic squamous cell carcinoma within the anterior orbit.  Therefore, the   patient was offered for orbital exenteration versus adjuvant radiation and   the patient elected to have radiation.  He completed that here in [place].  This   was completed in April 2017.  He was followed at regular intervals and then at   its most recent appointment in February of 2018, he was noted to have a new   onset of redness in the eye with signs of bacterial conjunctivitis, but also   increase injection of the bulbar conjunctiva with indurated tissue in the   medial left lower  eyelid.  The bacterial conjunctivitis was treated, but   imaging which was obtained on 02/22/2018 with MRI showed a mass within the   medial left orbit and the left medial canthus was concerning for recurrent   squamous cell carcinoma within the anteromedial orbit.  The patient was   brought back for a biopsy of the medial forniceal tissue in the left lower   eyelid.  This was positive.  Therefore, because of the positive pathology   findings and the recent MRI findings, there was significant change from his   prior MRI, it was felt that all of this represented continued progression of   his squamous cell carcinoma into the orbit and a discussion was had with the   patient regarding possible treatment options including orbital exenteration to   both way clear the margins and to prevent further spread of the cancer in a   locally regionally invasive manner.  He felt that this was an appropriate   approach to the treatment.  He was offered a second opinion at an academic   institution such as [facility], however the patient declined.  He elected to   proceed with orbital exenteration with reconstruction using a full-thickness   skin graft.  Risks, benefits, alternatives were explained to the patient in   detail and informed consent was signed.    PROCEDURE IN DETAIL:  The patient was brought to the operating room and laid   supine on the operating room table.  After induction of general anesthesia,   the right eye was taped shut and shielded, protected for the duration of the   case.  The left periocular area was prepped and draped in the usual sterile   manner as was the anterior left thigh areas in preparation for harvesting of   the full thickness skin graft.  A marking pen was used to yoni exenteration   incision.  In the lower eyelid, this incision essentially corresponded to the   inferior orbital rim and then medially extended up to nasal sidewall to give a   wide margin around the area where the tumor seemed to  be localized than in   the medial anterior orbit and medial canthal region.  Then, the incision was   extended towards into the upper eyelid where a partial skin sparing incision   was made just above the lid crease.  This was then extended laterally again   extending just beyond the lateral orbital rim.  This area around the perimeter   of the incision that was marked was infiltrated with 2% lidocaine with   epinephrine.  After several minutes, a #15 blade was used to incise the skin   along the perimeter of the circular incision.  Monopolar cautery was then used   to dissect down to the inferior orbital rim first staying in a preseptal   plane so as to identify the margins of the orbit at the rim.  The incision was   then extended out laterally to incorporate the lateral orbital rim and then   superiorly, the incision was made to elevate a skin muscle flap all the way up   to the arcus marginalis superiorly, again taking care to not violate the   orbit.  A frost suture was placed by blinking the upper and lower eyelids   together with a 4-0 silk.  This was used for traction during the case and then   the incision was extended medially down to the periosteum.  Again, taking   care to make sure that the early entrance into the orbit was not performed.    Supratrochlear and supraorbital neurovascular bundles were cauterized with   bipolar cautery and then divided.  Then, the periosteum around the periphery   of the exenteration was incised using the monopolar cautery.  A [place] elevator   was then used to elevate the periosteum towards the orbit in a 360-degree   manner.  Care was taken to elevate the orbit off of the orbital bones while   not violating those bone cells along the floor, the vessel coming off of the   infraorbital nerve root was cauterized using a bipolar cautery.  The periorbit   was elevated off of the bone superiorly and superolaterally all the way back   to the orbital apex.  Medially, it was  approached from above the trochlea.    The trochlear attachments were taken off of the bone.  Any bleeding points of   the bone were cauterized with monopolar cautery.  The anterior and posterior   ethmoidal artery neurovascular bundles were identified and intussusception was   cauterized and then divided sharply.  In the inferomedial area where the   dissection carried into the lacrimal fossa and lacrimal sac, care was taken to   divide the lacrimal sac at its emergence at the bony rim of the orbit.  This   was performed using monopolar cautery.  The inferior orbital fissure tissue   was also divided using scissors.  The posterior orbital contents were then   clamped using a hemostat for several minutes.  Then, using curved Metzenbaum   scissors, the orbital contents were divided posteriorly near the apex and the   tissue was delivered out of the socket and labeled as left orbital contents.    This was placed in formalin.  Before placing in formalin, the orbital tissue   was palpated and there was a solid palpable mass in the inferomedial anterior   orbit.  It did not appeared to erode through the periorbita or it was grossly   evident at the juncture where the lacrimal sac was divided.  After palpation   of the orbital contents, the gloves were changed.  An additional margin was   taken from the edge of the left lacrimal sac.  This was sent for frozen to   ensure that there was no extension from that portion into the lacrimal sac.    This was returned after several minutes, with a negative read for any sort of   evidence of malignancy on that margin.  There was some brisk bleeding from the   orbital apex, which was therefore passed for several minutes with gentle   pressure.  After several minutes, the packing was removed.  There was some   bleeding; however, this was much less brisk.  All retinal bleeding points were   cauterized using a bipolar cautery. An additional posterior margin was   obtained from the  atypical tissue and this was sent also for permanent as a   separate specimen.  Bipolar cautery was used for hemostasis as needed.  Then   the socket was irrigated out and suctioned.  Any bleeding points were also   identified and cauterized with bipolar cautery.    Attention was then turned to harvesting the split thickness skin graft and a   dermatome with 0.014 inches.  A 3 inchx9 cm rectangle was marked out on the   thigh using mineral oil as a lubricant.  The dermatome was used to obtain a   split thickness graft.  A split thickness graft was then placed through 1-1.5   mesher.  The donor site was dressed with Tegaderm.  The full thickness graft   was then laid over the socket before placing this tissue into the socket, an   additional permanent margin of the skin along the medial aspect of the wound   was obtained, although this does not look grossly involved.  The edges of the   split thickness graft was then sutured to the wound edge at the perimeter of   the exenteration site using 4-0 Vicryls.  The graft was then trimmed along its   edges to confirm with the curvilinear aspect of the wound.  The graft was   further secured with additional interrupted 4-0 Vicryl sutures and running 4-0   Vicryl sutures.  The graft was then positioned into the posterior socket by   packing the socket with antibiotic ointment coated with Telfa which was then   filled with 2 scrub brush sponges without silk.  Bolster sutures were sutured   over bolster dressing using 2-0 silks around the perimeter.  This was tied   tautly.  This nicely kept the bolster dressing secured.  The drapes were taken   down.  The left eye socket was then further pressure patched using eye pads   and fluff as well as securing it with Mastisol and paper tape.  The donor site   was further dressed with Kerlix or over the Tegaderm and further secured with   a wide ACE wrap.  The patient was extubated and taken to the PACU in stable   condition.  There  were no complications.       ____________________________________     MD Karlo Angel / ELDA    DD:  03/22/2018 18:23:04  DT:  03/22/2018 20:46:59    D#:  [id number]  Job#:  [id number]

## 2018-03-24 ENCOUNTER — APPOINTMENT (OUTPATIENT)
Dept: RADIOLOGY | Facility: MEDICAL CENTER | Age: 78
DRG: 982 | End: 2018-03-24
Attending: INTERNAL MEDICINE
Payer: MEDICARE

## 2018-03-24 PROBLEM — R79.1 SUBTHERAPEUTIC INTERNATIONAL NORMALIZED RATIO (INR): Status: ACTIVE | Noted: 2018-03-24

## 2018-03-24 LAB
EKG IMPRESSION: NORMAL
INR PPP: 1.31 (ref 0.87–1.13)
PROTHROMBIN TIME: 16 SEC (ref 12–14.6)

## 2018-03-24 PROCEDURE — 36415 COLL VENOUS BLD VENIPUNCTURE: CPT

## 2018-03-24 PROCEDURE — A9270 NON-COVERED ITEM OR SERVICE: HCPCS | Performed by: OPHTHALMOLOGY

## 2018-03-24 PROCEDURE — 700102 HCHG RX REV CODE 250 W/ 637 OVERRIDE(OP): Performed by: OPHTHALMOLOGY

## 2018-03-24 PROCEDURE — 700105 HCHG RX REV CODE 258: Performed by: HOSPITALIST

## 2018-03-24 PROCEDURE — 770020 HCHG ROOM/CARE - TELE (206)

## 2018-03-24 PROCEDURE — A9270 NON-COVERED ITEM OR SERVICE: HCPCS | Performed by: HOSPITALIST

## 2018-03-24 PROCEDURE — 71045 X-RAY EXAM CHEST 1 VIEW: CPT

## 2018-03-24 PROCEDURE — 99233 SBSQ HOSP IP/OBS HIGH 50: CPT | Performed by: HOSPITALIST

## 2018-03-24 PROCEDURE — 700111 HCHG RX REV CODE 636 W/ 250 OVERRIDE (IP): Performed by: HOSPITALIST

## 2018-03-24 PROCEDURE — 85610 PROTHROMBIN TIME: CPT

## 2018-03-24 PROCEDURE — 93010 ELECTROCARDIOGRAM REPORT: CPT | Performed by: INTERNAL MEDICINE

## 2018-03-24 PROCEDURE — 700102 HCHG RX REV CODE 250 W/ 637 OVERRIDE(OP): Performed by: HOSPITALIST

## 2018-03-24 PROCEDURE — 93005 ELECTROCARDIOGRAM TRACING: CPT | Performed by: INTERNAL MEDICINE

## 2018-03-24 RX ORDER — WARFARIN SODIUM 2 MG/1
2 TABLET ORAL
Status: DISCONTINUED | OUTPATIENT
Start: 2018-03-25 | End: 2018-03-25 | Stop reason: HOSPADM

## 2018-03-24 RX ORDER — WARFARIN SODIUM 3 MG/1
3 TABLET ORAL
Status: DISCONTINUED | OUTPATIENT
Start: 2018-03-26 | End: 2018-03-25 | Stop reason: HOSPADM

## 2018-03-24 RX ORDER — WARFARIN SODIUM 6 MG/1
6 TABLET ORAL
Status: COMPLETED | OUTPATIENT
Start: 2018-03-24 | End: 2018-03-24

## 2018-03-24 RX ADMIN — POTASSIUM CHLORIDE 10 MEQ: 1500 TABLET, EXTENDED RELEASE ORAL at 09:04

## 2018-03-24 RX ADMIN — ALBUTEROL SULFATE 1 PUFF: 90 AEROSOL, METERED RESPIRATORY (INHALATION) at 10:26

## 2018-03-24 RX ADMIN — LISINOPRIL 20 MG: 10 TABLET ORAL at 09:21

## 2018-03-24 RX ADMIN — TIOTROPIUM BROMIDE 1 CAPSULE: 18 CAPSULE ORAL; RESPIRATORY (INHALATION) at 10:25

## 2018-03-24 RX ADMIN — LATANOPROST 1 DROP: 50 SOLUTION OPHTHALMIC at 20:13

## 2018-03-24 RX ADMIN — WARFARIN SODIUM 6 MG: 6 TABLET ORAL at 17:32

## 2018-03-24 RX ADMIN — FINASTERIDE 5 MG: 5 TABLET, FILM COATED ORAL at 09:04

## 2018-03-24 RX ADMIN — VANCOMYCIN HYDROCHLORIDE 1800 MG: 100 INJECTION, POWDER, LYOPHILIZED, FOR SOLUTION INTRAVENOUS at 09:21

## 2018-03-24 RX ADMIN — SODIUM CHLORIDE: 9 INJECTION, SOLUTION INTRAVENOUS at 09:05

## 2018-03-24 RX ADMIN — ACETAMINOPHEN 650 MG: 325 TABLET, FILM COATED ORAL at 06:14

## 2018-03-24 ASSESSMENT — PAIN SCALES - GENERAL
PAINLEVEL_OUTOF10: 3
PAINLEVEL_OUTOF10: 0
PAINLEVEL_OUTOF10: 4
PAINLEVEL_OUTOF10: 3
PAINLEVEL_OUTOF10: 3

## 2018-03-24 ASSESSMENT — ENCOUNTER SYMPTOMS
WHEEZING: 0
PALPITATIONS: 0
COUGH: 0
NECK PAIN: 0
SHORTNESS OF BREATH: 0
ABDOMINAL PAIN: 0
WEAKNESS: 0
HEARTBURN: 0
CLAUDICATION: 0
FEVER: 0
BACK PAIN: 0
HEADACHES: 0
ORTHOPNEA: 0
DEPRESSION: 0
DIARRHEA: 0
VOMITING: 0
MYALGIAS: 0
DIZZINESS: 0

## 2018-03-24 NOTE — PROGRESS NOTES
Report received. Care assumed. Patient A&Ox4. Pt reports feeling soreness on left shoulder but refuses pain medications. Pacemaker pressure dressing CDI. Left shoulder in sling. Patient educated not to bear weight with arm. Pt denies any needs.Discussed POC for the night with Pt. Call light within reach, encouraged pt to call for assistance.

## 2018-03-24 NOTE — PROGRESS NOTES
"Cardiology Progress Note                                      Admit Date: 3/21/2018  Resident(s): Juan Null     Date & Time:   3/24/2018   10:51 AM       Patient ID:    Name:             Kiran Eason   YOB: 1940  Age:                 77 y.o.  male   MRN:               [id number]    HPI:  Kiran Eason is a 77 y.o. male here post enucleation surgery for squamous cancer. After the surgery, patient was noted to be hypoxic and required oxygen therapy, but that has now resolved.     Patient was put on telemetry and he was noted to have a short run of third-degree heart block overnight. During that time, the patient remained asymptomatic and denies any history of chest pain, palpitations, shortness of breath, lightheadedness or dizziness. On evaluation, EKG showed a Mobitz 1 second-degree block and sinus bradycardia. Patient reports that he's had this rhythm abnormality for long time and it has never caused him any issues. Also notes that his mother has the same problem and lived until she was 93. In the past, the patient had a Holter evaluation (2016) which also showed a Mobitz 1 type heart block, but no third-degree blocks. Nuclear stress test done in 2016 was normal.    Reason for consult:  - Third-degree heart block.      Interval History:  Patient had pacemaker placement yesterday  Doing well, no episodes of CP, SOB, dizziness  Pacemaker working as expected, EP cleared patient for DC to be f/u outpatient       Review of Systems   Cardiovascular: Negative for chest pain, palpitations and claudication.       Physical Exam   Blood pressure 149/73, pulse 73, temperature 36.4 °C (97.6 °F), resp. rate 18, height 1.778 m (5' 10\"), weight 124 kg (273 lb 5.9 oz), SpO2 97 %.  Constitutional: NAD. AAOx3.  Neck: No JVD present.   Cardiovascular: Normal rate. Exam reveals no gallop and no friction rub. No murmur heard.   Pulmonary/Chest: CTABL.  Abdominal: S/NT/ND BS+   Musculoskeletal: Exhibits no " edema. Pulses present.  Skin: Skin is warm and dry.     Fluids:    Intake/Output Summary (Last 24 hours) at 03/23/18 1539  Last data filed at 03/23/18 0600   Gross per 24 hour   Intake             1440 ml   Output              250 ml   Net             1190 ml       Date 03/24/18 0700 - 03/25/18 0659   Shift 5515-7126 3673-7900 5691-8111 24 Hour Total   I  N  T  A  K  E   Shift Total       O  U  T  P  U  T   Stool          Number of Times Stooled 1 x   1 x    Shift Total       NET                 Body mass index is 39.22 kg/m².    Recent Labs      03/21/18   1842  03/22/18   0244  03/23/18   1044   SODIUM  135  137  140   POTASSIUM  4.8  4.4  3.9   CHLORIDE  104  107  108   CO2  18*  22  24   BUN  18  23*  26*   CREATININE  1.04  0.96  0.90   MAGNESIUM   --    --   2.0   PHOSPHORUS   --    --   2.5   CALCIUM  9.0  9.0  8.4*         Recent Labs      03/21/18   1842  03/22/18   0244   WBC  7.2  7.7   RBC  4.61*  4.45*   HEMOGLOBIN  14.6  13.9*   HEMATOCRIT  44.9  43.0   MCV  97.4  96.6   MCH  31.7  31.2   MCHC  32.5*  32.3*   RDW  51.4*  50.4*   PLATELETCT  117*  122*   MPV  10.5  10.9     Recent Labs      03/21/18   1842  03/22/18   0244  03/23/18   1044   SODIUM  135  137  140   POTASSIUM  4.8  4.4  3.9   CHLORIDE  104  107  108   CO2  18*  22  24   GLUCOSE  325*  181*  178*   BUN  18  23*  26*   CREATININE  1.04  0.96  0.90   CALCIUM  9.0  9.0  8.4*     Recent Labs      03/22/18   1620  03/23/18   0412  03/24/18   0355   INR  1.17*  1.13  1.31*                   Meds:  • MD ALERT... warfarin       • warfarin  6 mg     • [START ON 3/26/2018] warfarin  3 mg     • [START ON 3/25/2018] warfarin  2 mg     • finasteride  5 mg     • lisinopril  20 mg     • senna-docusate  2 Tab      And   • polyethylene glycol/lytes  1 Packet      And   • magnesium hydroxide  30 mL      And   • bisacodyl  10 mg     • Respiratory Care per Protocol       • ondansetron  4 mg     • ondansetron  4 mg     • acetaminophen  650 mg     •  latanoprost  1 Drop     • tiotropium  1 Cap      And   • albuterol  1-2 Puff     • potassium chloride SA  10 mEq         Current Facility-Administered Medications   Medication Dose Frequency Provider Last Rate Last Dose   • MD ALERT... warfarin (COUMADIN) per pharmacy protocol   pharmacy to dose Lindsey Sandhu M.D.       • warfarin (COUMADIN) tablet 6 mg  6 mg COUMADIN-ONCE Lindsey Sandhu M.D.       • [START ON 3/26/2018] warfarin (COUMADIN) tablet 3 mg  3 mg Q MONDAY Lindsey Sandhu M.D.       • [START ON 3/25/2018] warfarin (COUMADIN) tablet 2 mg  2 mg Once per day on Sun Tue Wed Thu Fri Sat Lindsey Sandhu M.D.       • finasteride (PROSCAR) tablet 5 mg  5 mg ABBIE Meek M.D.   5 mg at 03/24/18 0904   • lisinopril (PRINIVIL) 10 MG tablet 20 mg  20 mg ABBIE Meek M.D.   20 mg at 03/24/18 0921   • senna-docusate (PERICOLACE or SENOKOT S) 8.6-50 MG per tablet 2 Tab  2 Tab BID Fab Meek M.D.   2 Tab at 03/21/18 2129    And   • polyethylene glycol/lytes (MIRALAX) PACKET 1 Packet  1 Packet QDAY PRN Fab Meek M.D.        And   • magnesium hydroxide (MILK OF MAGNESIA) suspension 30 mL  30 mL QDAY PRN Fab Meek M.D.        And   • bisacodyl (DULCOLAX) suppository 10 mg  10 mg QDAY PRN Fab Meek M.D.       • Respiratory Care per Protocol   Continuous RT Fab Meek M.D.       • ondansetron (ZOFRAN) syringe/vial injection 4 mg  4 mg Q4HRS PRN Fab Meek M.D.       • ondansetron (ZOFRAN ODT) dispertab 4 mg  4 mg Q4HRS PRN Fab Meek M.D.       • acetaminophen (TYLENOL) tablet 650 mg  650 mg Q6HRS PRN Fab Meek M.D.   650 mg at 03/24/18 0614   • latanoprost (XALATAN) 0.005 % ophthalmic solution 1 Drop  1 Drop Q EVENING Artem Garcia M.D.   1 Drop at 03/23/18 2107   • tiotropium (SPIRIVA) 18 MCG inhalation capsule 1 Cap  1 Cap DAILY Artem Garcia M.D.   1 Cap at 03/24/18 1025    And   • albuterol inhaler 1-2  Puff  1-2 Puff 4X/DAY Artem Garcia M.D.   1 Puff at 03/24/18 1026   • potassium chloride SA (Kdur) tablet 10 mEq  10 mEq DAILY Artem Garcia M.D.   10 mEq at 03/24/18 0904     Last reviewed on 3/22/2018  3:49 PM by Komal Gonzalez, Pharmacy Intern  Medications reviewed      Imaging reviewed    ECHO (reviewed):  Results for orders placed or performed during the hospital encounter of 09/29/16   ECHOCARDIOGRAM COMP W/O CONT   Result Value Ref Range    Eject.Frac. MOD BP 67.51     Eject.Frac. MOD 4C 65.12     Eject.Frac. MOD 2C 70.31     Left Ventrical Ejection Fraction 65           Impressions and Recommendations:  Active Hospital Problems    Diagnosis   • Heart block [I45.9]     Priority: High   • Bradycardia [R00.1]     Priority: High   • Acute respiratory failure with hypoxia (CMS-[facility]) [J96.01]     Priority: High   • Chronic congestive heart failure (CMS-HCC) [I50.9]     Priority: Medium   • CAD (coronary artery disease) [I25.10]     Priority: Medium   • Chronic deep vein thrombosis (DVT) of popliteal vein (CMS-HCC) [I82.539]     Priority: Medium   • History of pulmonary embolus (PE) [Z86.711]     Priority: Medium   • 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) [C69.92]     Priority: Low   • Essential hypertension, benign [I10]       Patient with one short run of new onset third-degree heart block on chronic metoprolol for a long time. EKG shows Wenckebach conduction and it looks like the Mobitz 1 A-V block is something that has been present for a long time and has not given the patient any trouble. Even though it is not currently causing the patient any problems, he does live in an isolated area and should he become symptomatic, medical care will be delayed. Based on that and the patient's tele monitor, decision to place a pacemaker was made.   - patient received biventricular pacemaker (Meditronic) yesterday; 3/23. No complications  - resume BB  - patient will f/u with device clinic in 1 week, follow  instruction for Pacemaker as provided.         Thank you for this consult. Cardiology will sign off.

## 2018-03-24 NOTE — PROGRESS NOTES
Cardiology Progress Note               Author: Cielo Bass Date & Time created: 3/24/2018  9:10 AM     Patient seen EPS rounds.    77 years old male came in for elective left eye exavasation and left thigh skin graft by Dr Garcia.     Consultation second degree HB    3/23/18: Underwent a Successful Dual chamber PPM implantation    Procedure note as noted   IMPLANTED DEVICE INFORMATION:  Pulse generator is a Medtronic model A2DR01  Serial # [id number]     LEAD INFORMATION:  1)Right atrial lead is a Medtronic model # 5076-52 , serial # [id number]  ,P wave 3  millivolts, threshold 0.4 Volts, pacing impedance 902 Ohms.     2)Right ventricular lead is a Medtronic model # 5076-58 , serial # [id number] ,R wave 5 millivolts, threshold 1 Volts, pacing impedance 863 Ohms.     DEVICE PROGRAMMING:  DDDR     FLUOROSCOPY TIME: 3.4 min     IMPRESSIONS:  1. Successful Dual chamber PPM implantation    Device check   RA impedance 513  RV impedence 627  P waves 2.3  R waves 9    RA threshold 0.5 @ 0.4  RV threshold 0.5 @ 0.4    Review of Systems   Respiratory: Negative for cough, shortness of breath and wheezing.    Cardiovascular: Negative for chest pain, palpitations, orthopnea and leg swelling.   All other systems reviewed and are negative.      Physical Exam   Constitutional: He is oriented to person, place, and time. He appears well-developed and well-nourished.   HENT:   Head: Normocephalic.   Cardiovascular: Normal rate, regular rhythm and normal heart sounds.    No murmur heard.  PPM present in the left upper chest    Pulmonary/Chest: Effort normal and breath sounds normal. No respiratory distress. He has no wheezes.   Musculoskeletal: He exhibits no edema or tenderness.   Neurological: He is alert and oriented to person, place, and time.   Psychiatric: His behavior is normal. Judgment normal.   Nursing note and vitals reviewed.      Hemodynamics:  Temp (24hrs), Av.6 °C (97.9 °F), Min:36.2 °C (97.1 °F), Max:37 °C (98.6  °F)  Temperature: 36.4 °C (97.6 °F)  Pulse  Av.8  Min: 52  Max: 96  Blood Pressure : 149/73     Respiratory:    Respiration: 18, Pulse Oximetry: 97 %        RUL Breath Sounds: Clear, RML Breath Sounds: Diminished, RLL Breath Sounds: Diminished, ABIGAIL Breath Sounds: Clear, LLL Breath Sounds: Diminished  Fluids:        GI/Nutrition:  Orders Placed This Encounter   Procedures   • Diet Order     Standing Status:   Standing     Number of Occurrences:   1     Order Specific Question:   Diet:     Answer:   Cardiac [6]     Lab Results:  Recent Labs      18   18418   0244   WBC  7.2  7.7   RBC  4.61*  4.45*   HEMOGLOBIN  14.6  13.9*   HEMATOCRIT  44.9  43.0   MCV  97.4  96.6   MCH  31.7  31.2   MCHC  32.5*  32.3*   RDW  51.4*  50.4*   PLATELETCT  117*  122*   MPV  10.5  10.9     Recent Labs      18   1842  18   0244  18   1044   SODIUM  135  137  140   POTASSIUM  4.8  4.4  3.9   CHLORIDE  104  107  108   CO2  18*  22  24   GLUCOSE  325*  181*  178*   BUN  18  23*  26*   CREATININE  1.04  0.96  0.90   CALCIUM  9.0  9.0  8.4*     Recent Labs      18   1620  18   0412  18   0355   INR  1.17*  1.13  1.31*                     Medical Decision Making, by Problem:  Active Hospital Problems    Diagnosis   • Heart block [I45.9]   • Bradycardia [R00.1]   • Acute respiratory failure with hypoxia (CMS-[facility]) [J96.01]   • Chronic congestive heart failure (CMS-[facility]) [I50.9]   • CAD (coronary artery disease) [I25.10]   • Chronic deep vein thrombosis (DVT) of popliteal vein (CMS-[facility]) [I82.539]   • History of pulmonary embolus (PE) [Z86.711]   • 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) [C69.92]   • Essential hypertension, benign [I10]       Plan:  1. Second degree heart block:   - Dual chamber Meditronic PPM with normal function.  - Device site is uncomplicated.    - CXR without PTX.  - EP will sign off, cleared to discharge from our standpoint.  Follow up in device clinic in 1 week  Pacer Check. Please call with any questions.     Pacer restrictions reviewed with patient. Do not raise affected arm above head or behind back for six weeks. May remove arm sling after 24 hrs, please wear if trouble remembering arm limitation and prn at night. No heavy lifting/pushing with L arm for six weeks. No driving for first week. No showers first week. Keep dressing on, clean, and dry until seen follow up. Wound care reviewed. Instructed to report s/s of infection such as warmth/redness/drainage/swelling at site or fever/chills.  Patient and  verbalizes understanding.      Quality-Core Measures

## 2018-03-24 NOTE — CARE PLAN
Problem: Knowledge Deficit  Goal: Knowledge of disease process/condition, treatment plan, diagnostic tests, and medications will improve    Intervention: Assess knowledge level of disease process/condition, treatment plan, diagnostic tests, and medications  Pt educated regarding limited range of motion of left arm activity, cardiac diet, meds and plan of care. Verbalized understanding.       Problem: Pain Management  Goal: Pain level will decrease to patient's comfort goal    Intervention: Follow pain managment plan developed in collaboration with patient and Interdisciplinary Team  Pt assessed for pain q4h and medicated PRN. Pt instructed to notify RN of any new or increasing pain to prevent it from becoming intolerable. Verbalized understanding.

## 2018-03-24 NOTE — CARE PLAN
Problem: Safety  Goal: Will remain free from injury  Outcome: PROGRESSING AS EXPECTED      Problem: Venous Thromboembolism (VTW)/Deep Vein Thrombosis (DVT) Prevention:  Goal: Patient will participate in Venous Thrombosis (VTE)/Deep Vein Thrombosis (DVT)Prevention Measures  Outcome: PROGRESSING AS EXPECTED      Problem: Knowledge Deficit  Goal: Knowledge of disease process/condition, treatment plan, diagnostic tests, and medications will improve  Outcome: PROGRESSING AS EXPECTED      Problem: Skin Integrity  Goal: Risk for impaired skin integrity will decrease  Outcome: PROGRESSING AS EXPECTED

## 2018-03-24 NOTE — PROGRESS NOTES
Inpatient Anticoagulation Service Note    Date: 3/24/2018  Reason for Anticoagulation: Deep Vein Thrombosis, Pulmonary Embolism      Hemoglobin Value: (!) 13.9  Hematocrit Value: 43  Lab Platelet Value: (!) 122  Target INR: 2.0 to 3.0    INR from last 7 days     Date/Time INR Value    03/24/18 0355 (!)  1.31    03/23/18 0412  1.13    03/22/18 1620 (!)  1.17    03/21/18 0720  1.12        Dose from last 7 days     Date/Time Dose (mg)    03/24/18 0900  6    03/22/18 1600  6        Average Dose (mg): 2.14 (Home regimen: Warfarin 3 mg on Mondays, 2 mg all other days.)  Significant Interactions: Not Applicable  Bridge Therapy: No  Reversal Agent Administered: Not Applicable    Comments: Patient was admitted on 3/21/18 for hypoxia following eye surgery.  Warfarin now to resume following PPM procedure on 3/23/18.  No new H/H or plt since 3/22/18.  Patient has been on chronic warfarin therapy for hx of PE and chronic DVT of popliteal vein.  INR 1.12 on admit 3/21.  INR 1.31 this AM with last warfarin dose of 6 mg given on 3/22/18.  INR ordered daily through 3/27/18 for trending and dose adjustments. No bridge therapy ordered at this time.  No bleeding noted per chart.  Will plan for Warfarin 6 mg x1 today followed be resumption of home dosage.  Pharmacy will continue to follow.     Plan:  Warfarin 6 mg x1 today.  Then 3 mg on Mondays and 2 mg on all other days.  INR daily ordered through 3/27/18.  Education Material Provided?: No (chronic warfarin therapy)  Pharmacist suggested discharge dosing: Warfarin 3 mg on Mondays and 2 mg on all other days.  INR within 72 hours of discharge.     Ramon Howard PharmD

## 2018-03-24 NOTE — PROGRESS NOTES
[facility] Hospitalist Progress Note    Date of Service: 3/24/2018    Chief Complaint  77 y.o. male admitted 3/21/2018 for hypoxia after left eye surgery.    Interval Problem Update  3/22: patient doing well this morning, on Ra. Breathing is better. However tele picked up 2nd and 3rd degree heart block, patient was asymptomatic. He was had bradycardia in 30s, cardiology consulted.  Patient denies any chest pain, sob, nausea, or dizziness    3/23; patient continues to have bradycardia in 30s, and overnight had 2.2 sec pause. With hx of 3rd degree heart block, EP consulted for pacemaker today. No chest pain but feels weak and tired . Scared about pacemaker    3/24: patient feels ok this morning, feeling tired but otherwise no complaints      Consultants/Specialty  cardiology  EP    Disposition  Home like 24hours        Review of Systems   Constitutional: Negative for fever.   HENT: Positive for ear pain. Negative for ear discharge and hearing loss.    Respiratory: Negative for cough.    Cardiovascular: Negative for chest pain and palpitations.   Gastrointestinal: Negative for abdominal pain, diarrhea, heartburn and vomiting.   Genitourinary: Negative for dysuria and urgency.   Musculoskeletal: Negative for back pain, myalgias and neck pain.   Skin: Negative for itching and rash.   Neurological: Negative for dizziness, weakness and headaches.   Psychiatric/Behavioral: Negative for depression and suicidal ideas.      Physical Exam  Laboratory/Imaging   Hemodynamics  Temp (24hrs), Av.6 °C (97.9 °F), Min:36.2 °C (97.1 °F), Max:37 °C (98.6 °F)   Temperature: 36.4 °C (97.6 °F)  Pulse  Av.8  Min: 52  Max: 96   Blood Pressure : 149/73      Respiratory      Respiration: 18, Pulse Oximetry: 97 %     Work Of Breathing / Effort: Mild  RUL Breath Sounds: Clear, RML Breath Sounds: Diminished, RLL Breath Sounds: Diminished, ABIGAIL Breath Sounds: Clear, LLL Breath Sounds: Diminished    Fluids    Intake/Output Summary (Last 24  hours) at 03/24/18 1139  Last data filed at 03/24/18 0600   Gross per 24 hour   Intake              900 ml   Output             1500 ml   Net             -600 ml       Nutrition  Orders Placed This Encounter   Procedures   • Diet Order     Standing Status:   Standing     Number of Occurrences:   1     Order Specific Question:   Diet:     Answer:   Cardiac [6]     Physical Exam   Constitutional: He is oriented to person, place, and time. He appears well-developed and well-nourished. No distress.   HENT:   Head: Normocephalic and atraumatic.   Mouth/Throat: Oropharynx is clear and moist.   Eyes: Pupils are equal, round, and reactive to light. No scleral icterus.   Left eye patch   Neck: No JVD present.   Cardiovascular: Normal rate, regular rhythm and normal heart sounds.    Pulmonary/Chest: Effort normal and breath sounds normal. No respiratory distress. He has no wheezes. He has no rales.   Abdominal: Bowel sounds are normal. He exhibits no distension. There is no tenderness.   Musculoskeletal: Normal range of motion. He exhibits no edema.   Lymphadenopathy:     He has no cervical adenopathy.   Neurological: He is alert and oriented to person, place, and time. He exhibits normal muscle tone.   Skin: Skin is warm and dry.   Psychiatric: He has a normal mood and affect.       Recent Labs      03/21/18   1842  03/22/18   0244   WBC  7.2  7.7   RBC  4.61*  4.45*   HEMOGLOBIN  14.6  13.9*   HEMATOCRIT  44.9  43.0   MCV  97.4  96.6   MCH  31.7  31.2   MCHC  32.5*  32.3*   RDW  51.4*  50.4*   PLATELETCT  117*  122*   MPV  10.5  10.9     Recent Labs      03/21/18   1842  03/22/18   0244  03/23/18   1044   SODIUM  135  137  140   POTASSIUM  4.8  4.4  3.9   CHLORIDE  104  107  108   CO2  18*  22  24   GLUCOSE  325*  181*  178*   BUN  18  23*  26*   CREATININE  1.04  0.96  0.90   CALCIUM  9.0  9.0  8.4*     Recent Labs      03/22/18   1620  03/23/18   0412  03/24/18   0355   INR  1.17*  1.13  1.31*                   Assessment/Plan     Subtherapeutic international normalized ratio (INR)   Assessment & Plan    Hx of chronic PE and DVT > 1 year ago, warfarin was being held for procedures,  Restarted however INR still subtherapeutic  Monitor closely, patient needs to be therapeutic before he leave as he lives far away  Will try to see if he has HH for INR check  SW consulted        Heart block   Assessment & Plan    Patient with history of PVC, 1st-2nd degree heart blocks, follows with cardiology outpatient  Cardiology consulted during this admission, no intervention at this time.  Went into 3rd degree Heart block overnight, asymptomatic, however also bradycardia in low 30s, BB stopped   patient had 2.2 sec pause, EP consulted for pacemaker, placed 3/23/18  Continue to monitor patient closely given age and risk factors, lives alone        Acute respiratory failure with hypoxia (CMS-[facility])- (present on admission)   Assessment & Plan    Resolved; After surgery, he was requiring 5L of o2, now he is down to 1.5L probably due to anesthesia will continue IS, nebs treatment. Wean off o2, continue albuterol inhaler. No wheezing on examination.         CAD (coronary artery disease)- (present on admission)   Assessment & Plan    Stable, no chest pain.         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          Quality-Core Measures   Reviewed items::  Labs reviewed and Medications reviewed  Castaneda catheter::  No Castaneda

## 2018-03-24 NOTE — ASSESSMENT & PLAN NOTE
Hx of chronic PE and DVT > 1 year ago, warfarin was being held for procedures,   INR still subtherapeutic; high risk for clotting, Monitor closely, patient needs to be therapeutic before he leave as he lives far away  Will try to see if he has HH for INR check  SW consulted

## 2018-03-25 VITALS
DIASTOLIC BLOOD PRESSURE: 86 MMHG | HEART RATE: 66 BPM | TEMPERATURE: 98.4 F | SYSTOLIC BLOOD PRESSURE: 156 MMHG | OXYGEN SATURATION: 95 % | RESPIRATION RATE: 16 BRPM | BODY MASS INDEX: 38.98 KG/M2 | HEIGHT: 70 IN | WEIGHT: 272.27 LBS

## 2018-03-25 PROBLEM — R00.1 BRADYCARDIA: Status: RESOLVED | Noted: 2018-03-22 | Resolved: 2018-03-25

## 2018-03-25 PROBLEM — I45.9 HEART BLOCK: Status: RESOLVED | Noted: 2018-03-22 | Resolved: 2018-03-25

## 2018-03-25 PROBLEM — J96.01 ACUTE RESPIRATORY FAILURE WITH HYPOXIA (HCC): Status: RESOLVED | Noted: 2018-03-21 | Resolved: 2018-03-25

## 2018-03-25 LAB
INR PPP: 1.31 (ref 0.87–1.13)
PROTHROMBIN TIME: 16 SEC (ref 12–14.6)

## 2018-03-25 PROCEDURE — A9270 NON-COVERED ITEM OR SERVICE: HCPCS | Performed by: HOSPITALIST

## 2018-03-25 PROCEDURE — 99239 HOSP IP/OBS DSCHRG MGMT >30: CPT | Performed by: HOSPITALIST

## 2018-03-25 PROCEDURE — 36415 COLL VENOUS BLD VENIPUNCTURE: CPT

## 2018-03-25 PROCEDURE — 700102 HCHG RX REV CODE 250 W/ 637 OVERRIDE(OP): Performed by: HOSPITALIST

## 2018-03-25 PROCEDURE — 85610 PROTHROMBIN TIME: CPT

## 2018-03-25 PROCEDURE — A9270 NON-COVERED ITEM OR SERVICE: HCPCS | Performed by: OPHTHALMOLOGY

## 2018-03-25 PROCEDURE — 700102 HCHG RX REV CODE 250 W/ 637 OVERRIDE(OP): Performed by: OPHTHALMOLOGY

## 2018-03-25 RX ORDER — HYDROCODONE BITARTRATE AND ACETAMINOPHEN 7.5; 325 MG/1; MG/1
1 TABLET ORAL EVERY 6 HOURS PRN
Status: DISCONTINUED | OUTPATIENT
Start: 2018-03-25 | End: 2018-03-25 | Stop reason: HOSPADM

## 2018-03-25 RX ORDER — BACITRACIN ZINC 500 [USP'U]/G
OINTMENT TOPICAL 2 TIMES DAILY
Status: DISCONTINUED | OUTPATIENT
Start: 2018-03-25 | End: 2018-03-25 | Stop reason: HOSPADM

## 2018-03-25 RX ORDER — WARFARIN SODIUM 3 MG/1
3 TABLET ORAL DAILY
Qty: 17 TAB | Refills: 0 | Status: SHIPPED | OUTPATIENT
Start: 2018-03-25 | End: 2018-11-27 | Stop reason: SDUPTHER

## 2018-03-25 RX ORDER — LATANOPROST 50 UG/ML
1 SOLUTION/ DROPS OPHTHALMIC EVERY EVENING
Qty: 1 BOTTLE | Refills: 1 | Status: SHIPPED | OUTPATIENT
Start: 2018-03-25 | End: 2018-11-12

## 2018-03-25 RX ORDER — FINASTERIDE 5 MG/1
5 TABLET, FILM COATED ORAL EVERY MORNING
Qty: 30 TAB | Refills: 0 | Status: SHIPPED | OUTPATIENT
Start: 2018-03-26 | End: 2018-11-12

## 2018-03-25 RX ADMIN — HYDROCODONE BITARTRATE AND ACETAMINOPHEN 1 TABLET: 7.5; 325 TABLET ORAL at 15:58

## 2018-03-25 RX ADMIN — WARFARIN SODIUM 2 MG: 2 TABLET ORAL at 17:18

## 2018-03-25 RX ADMIN — BACITRACIN ZINC: 500 OINTMENT TOPICAL at 17:07

## 2018-03-25 RX ADMIN — LISINOPRIL 20 MG: 10 TABLET ORAL at 08:14

## 2018-03-25 RX ADMIN — ALBUTEROL SULFATE 1 PUFF: 90 AEROSOL, METERED RESPIRATORY (INHALATION) at 14:03

## 2018-03-25 RX ADMIN — FINASTERIDE 5 MG: 5 TABLET, FILM COATED ORAL at 08:14

## 2018-03-25 RX ADMIN — POTASSIUM CHLORIDE 10 MEQ: 1500 TABLET, EXTENDED RELEASE ORAL at 08:13

## 2018-03-25 ASSESSMENT — ENCOUNTER SYMPTOMS
DEPRESSION: 0
ABDOMINAL PAIN: 0
MYALGIAS: 0
PALPITATIONS: 0
FEVER: 0
COUGH: 0
HEADACHES: 0
NECK PAIN: 0
BACK PAIN: 0
WEAKNESS: 0
DIZZINESS: 0
DIARRHEA: 0
HEARTBURN: 0
VOMITING: 0

## 2018-03-25 ASSESSMENT — PAIN SCALES - GENERAL
PAINLEVEL_OUTOF10: 0

## 2018-03-25 ASSESSMENT — PATIENT HEALTH QUESTIONNAIRE - PHQ9
2. FEELING DOWN, DEPRESSED, IRRITABLE, OR HOPELESS: NOT AT ALL
SUM OF ALL RESPONSES TO PHQ9 QUESTIONS 1 AND 2: 0
1. LITTLE INTEREST OR PLEASURE IN DOING THINGS: NOT AT ALL

## 2018-03-25 NOTE — PROGRESS NOTES
Bedside report received. Patient A&O x4. RA. 100% paced on the monitor. No complaints of pain this morning. Pacemaker dressing is clean, dry and intact.  POC discussed with patient. Patient verbalized understanding. Call light and belongings within reach. Bed locked and in lowest position, alarm and fall precautions in place.

## 2018-03-25 NOTE — PROGRESS NOTES
Inpatient Anticoagulation Service Note    Date: 3/25/2018  Reason for Anticoagulation: Deep Vein Thrombosis, Pulmonary Embolism      Hemoglobin Value: (!) 13.9  Hematocrit Value: 43  Lab Platelet Value: (!) 122  Target INR: 2.0 to 3.0    INR from last 7 days     Date/Time INR Value    03/25/18 0306 (!)  1.31    03/24/18 0355 (!)  1.31    03/23/18 0412  1.13    03/22/18 1620 (!)  1.17    03/21/18 0720  1.12        Dose from last 7 days     Date/Time Dose (mg)    03/25/18 0800  2    03/24/18 0900  6    03/22/18 1600  6        Average Dose (mg):  (Home Dose: 3 mg Mo and 2 mg ROW per [facility])  Significant Interactions: Not Applicable  Bridge Therapy: No     Reversal Agent Administered: Not Applicable  Comments: Warfarin resumed yesterday after being held for eye surgery. Was given a larger dose of warfarin 6 mg yesterday. INR today remains subtherapeutic, most likely due to held dose. No bridging therapy at this time. H/h stable, no signs/symptoms of bleeding/bruising. No drug interactons identified. Will resume patient's home dose of warfarin 2 mg today, as this has kept him at therapeutic levels in the past. Will continue to monitor.    Plan:  Warfarin 2 mg  Education Material Provided?: No (chronic warfarin patient)  Pharmacist suggested discharge dosing: Resume home dose of warfarin 3 mg on Mondays and warfarin 2 mg daily on other days of the week, with close followup within 72 hours.     Ginette Roman, JeannieD

## 2018-03-25 NOTE — DISCHARGE PLANNING
Medical Social Work    SW met with pt at bedside to discuss HH. Pt states he is independent at home. He has a FWW/cane but does not use either. Pt states his wife is available to help him if needed, as is his son who lives close by. Pt refusing HH services at this time and he is aware he can f/u with his PCP for HH if he feels he does need it. Pt expressed understanding.    Pt currently does his INR checks himself with a home monitor and states he has had no issues with this. [facility] anticoag clinic follows. We will need a new anticoag clinic referral. SW will discuss in IDT rounds.

## 2018-03-25 NOTE — DISCHARGE INSTRUCTIONS
Discharge Instructions    Discharged to home by car with relative. Discharged via wheelchair, hospital escort: Yes.  Special equipment needed: Not Applicable    Be sure to schedule a follow-up appointment with your primary care doctor or any specialists as instructed.     Discharge Plan:   Diet Plan: Discussed  Activity Level: Discussed  Confirmed Follow up Appointment: Appointment Scheduled  Confirmed Symptoms Management: Discussed  Medication Reconciliation Updated: Yes  Influenza Vaccine Indication: Patient Refuses    I understand that a diet low in cholesterol, fat, and sodium is recommended for good health. Unless I have been given specific instructions below for another diet, I accept this instruction as my diet prescription.   Other diet: Cardiac diet 2 Gram sodium    Special Instructions: None    · Is patient discharged on Warfarin / Coumadin?   Yes    You are receiving the drug warfarin. Please understand the importance of monitoring warfarin with scheduled PT/INR blood draws.  [facility] with [facility] in one week for INR lab..    IMPORTANT: HOW TO USE THIS INFORMATION:  This is a summary and does NOT have all possible information about this product. This information does not assure that this product is safe, effective, or appropriate for you. This information is not individual medical advice and does not substitute for the advice of your health care professional. Always ask your health care professional for complete information about this product and your specific health needs.      WARFARIN - ORAL (WARF-uh-rin)      COMMON BRAND NAME(S): Coumadin      WARNING:  Warfarin can cause very serious (possibly fatal) bleeding. This is more likely to occur when you first start taking this medication or if you take too much warfarin. To decrease your risk for bleeding, your doctor or other health care provider will monitor you closely and check your lab results (INR test) to make sure you are not taking  "too much warfarin. Keep all medical and laboratory appointments. Tell your doctor right away if you notice any signs of serious bleeding. See also Side Effects section.      USES:  This medication is used to treat blood clots (such as in deep vein thrombosis-DVT or pulmonary embolus-PE) and/or to prevent new clots from forming in your body. Preventing harmful blood clots helps to reduce the risk of a stroke or heart attack. Conditions that increase your risk of developing blood clots include a certain type of irregular heart rhythm (atrial fibrillation), heart valve replacement, recent heart attack, and certain surgeries (such as hip/knee replacement). Warfarin is commonly called a \"blood thinner,\" but the more correct term is \"anticoagulant.\" It helps to keep blood flowing smoothly in your body by decreasing the amount of certain substances (clotting proteins) in your blood.      HOW TO USE:  Read the Medication Guide provided by your pharmacist before you start taking warfarin and each time you get a refill. If you have any questions, ask your doctor or pharmacist. Take this medication by mouth with or without food as directed by your doctor or other health care professional, usually once a day. It is very important to take it exactly as directed. Do not increase the dose, take it more frequently, or stop using it unless directed by your doctor. Dosage is based on your medical condition, laboratory tests (such as INR), and response to treatment. Your doctor or other health care provider will monitor you closely while you are taking this medication to determine the right dose for you. Use this medication regularly to get the most benefit from it. To help you remember, take it at the same time each day. It is important to eat a balanced, consistent diet while taking warfarin. Some foods can affect how warfarin works in your body and may affect your treatment and dose. Avoid sudden large increases or decreases in " your intake of foods high in vitamin K (such as broccoli, cauliflower, cabbage, brussels sprouts, kale, spinach, and other green leafy vegetables, liver, green tea, certain vitamin supplements). If you are trying to lose weight, check with your doctor before you try to go on a diet. Cranberry products may also affect how your warfarin works. Limit the amount of cranberry juice (16 ounces/480 milliliters a day) or other cranberry products you may drink or eat.      SIDE EFFECTS:  Nausea, loss of appetite, or stomach/abdominal pain may occur. If any of these effects persist or worsen, tell your doctor or pharmacist promptly. Remember that your doctor has prescribed this medication because he or she has judged that the benefit to you is greater than the risk of side effects. Many people using this medication do not have serious side effects. This medication can cause serious bleeding if it affects your blood clotting proteins too much (shown by unusually high INR lab results). Even if your doctor stops your medication, this risk of bleeding can continue for up to a week. Tell your doctor right away if you have any signs of serious bleeding, including: unusual pain/swelling/discomfort, unusual/easy bruising, prolonged bleeding from cuts or gums, persistent/frequent nosebleeds, unusually heavy/prolonged menstrual flow, pink/dark urine, coughing up blood, vomit that is bloody or looks like coffee grounds, severe headache, dizziness/fainting, unusual or persistent tiredness/weakness, bloody/black/tarry stools, chest pain, shortness of breath, difficulty swallowing. Tell your doctor right away if any of these unlikely but serious side effects occur: persistent nausea/vomiting, severe stomach/abdominal pain, yellowing eyes/skin. This drug rarely has caused very serious (possibly fatal) problems if its effects lead to small blood clots (usually at the beginning of treatment). This can lead to severe skin/tissue damage that  may require surgery or amputation if left untreated. Patients with certain blood conditions (protein C or S deficiency) may be at greater risk. Get medical help right away if any of these rare but serious side effects occur: painful/red/purplish patches on the skin (such as on the toe, breast, abdomen), change in the amount of urine, vision changes, confusion, slurred speech, weakness on one side of the body. A very serious allergic reaction to this drug is rare. However, get medical help right away if you notice any symptoms of a serious allergic reaction, including: rash, itching/swelling (especially of the face/tongue/throat), severe dizziness, trouble breathing. This is not a complete list of possible side effects. If you notice other effects not listed above, contact your doctor or pharmacist. In the [place] - Call your doctor for medical advice about side effects. You may report side effects to FDA at 2-617-JID-1653. In [place] - Call your doctor for medical advice about side effects. You may report side effects to [facility] [place] at 1-875.747.9318.      PRECAUTIONS:  Before taking warfarin, tell your doctor or pharmacist if you are allergic to it; or if you have any other allergies. This product may contain inactive ingredients, which can cause allergic reactions or other problems. Talk to your pharmacist for more details. Before using this medication, tell your doctor or pharmacist your medical history, especially of: blood disorders (such as anemia, hemophilia), bleeding problems (such as bleeding of the stomach/intestines, bleeding in the brain), blood vessel disorders (such as aneurysms), recent major injury/surgery, liver disease, alcohol use, mental/mood disorders (including memory problems), frequent falls/injuries. It is important that all your doctors and dentists know that you take warfarin. Before having surgery or any medical/dental procedures, tell your doctor or dentist that you are taking this  medication and about all the products you use (including prescription drugs, nonprescription drugs, and herbal products). Avoid getting injections into the muscles. If you must have an injection into a muscle (for example, a flu shot), it should be given in the arm. This way, it will be easier to check for bleeding and/or apply pressure bandages. This medication may cause stomach bleeding. Daily use of alcohol while using this medicine will increase your risk for stomach bleeding and may also affect how this medication works. Limit or avoid alcoholic beverages. If you have not been eating well, if you have an illness or infection that causes fever, vomiting, or diarrhea for more than 2 days, or if you start using any antibiotic medications, contact your doctor or pharmacist immediately because these conditions can affect how warfarin works. This medication can cause heavy bleeding. To lower the chance of getting cut, bruised, or injured, use great caution with sharp objects like safety razors and nail cutters. Use an electric razor when shaving and a soft toothbrush when brushing your teeth. Avoid activities such as contact sports. If you fall or injure yourself, especially if you hit your head, call your doctor immediately. Your doctor may need to check you. The Food & Drug Administration has stated that generic warfarin products are interchangeable. However, consult your doctor or pharmacist before switching warfarin products. Be careful not to take more than one medication that contains warfarin unless specifically directed by the doctor or health care provider who is monitoring your warfarin treatment. Older adults may be at greater risk for bleeding while using this drug. This medication is not recommended for use during pregnancy because of serious (possibly fatal) harm to an unborn baby. Discuss the use of reliable forms of birth control with your doctor. If you become pregnant or think you may be pregnant,  "tell your doctor immediately. If you are planning pregnancy, discuss a plan for managing your condition with your doctor before you become pregnant. Your doctor may switch the type of medication you use during pregnancy. Very small amounts of this medication may pass into breast milk but is unlikely to harm a nursing infant. Consult your doctor before breast-feeding.      DRUG INTERACTIONS:  Drug interactions may change how your medications work or increase your risk for serious side effects. This document does not contain all possible drug interactions. Keep a list of all the products you use (including prescription/nonprescription drugs and herbal products) and share it with your doctor and pharmacist. Do not start, stop, or change the dosage of any medicines without your doctor's approval. Warfarin interacts with many prescription, nonprescription, vitamin, and herbal products. This includes medications that are applied to the skin or inside the vagina or rectum. The interactions with warfarin usually result in an increase or decrease in the \"blood-thinning\" (anticoagulant) effect. Your doctor or other health care professional should closely monitor you to prevent serious bleeding or clotting problems. While taking warfarin, it is very important to tell your doctor or pharmacist of any changes in medications, vitamins, or herbal products that you are taking. Some products that may interact with this drug include: capecitabine, imatinib, mifepristone. Aspirin, aspirin-like drugs (salicylates), and nonsteroidal anti-inflammatory drugs (NSAIDs such as ibuprofen, naproxen, celecoxib) may have effects similar to warfarin. These drugs may increase the risk of bleeding problems if taken during treatment with warfarin. Carefully check all prescription/nonprescription product labels (including drugs applied to the skin such as pain-relieving creams) since the products may contain NSAIDs or salicylates. Talk to your doctor " about using a different medication (such as acetaminophen) to treat pain/fever. Low-dose aspirin and related drugs (such as clopidogrel, ticlopidine) should be continued if prescribed by your doctor for specific medical reasons such as heart attack or stroke prevention. Consult your doctor or pharmacist for more details. Many herbal products interact with warfarin. Tell your doctor before taking any herbal products, especially bromelains, coenzyme Q10, cranberry, danshen, dong quai, fenugreek, garlic, ginkgo biloba, ginseng, and Addis's wort, among others. This medication may interfere with a certain laboratory test to measure theophylline levels, possibly causing false test results. Make sure laboratory personnel and all your doctors know you use this drug.      OVERDOSE:  If overdose is suspected, contact a poison control center or emergency room immediately. [place] residents can call the [place] National Poison Hotline at 1-133.884.5088. [place] residents can call a provincial poison control center. Symptoms of overdose may include: bloody/black/tarry stools, pink/dark urine, unusual/prolonged bleeding.      NOTES:  Do not share this medication with others. Laboratory and/or medical tests (such as INR, complete blood count) must be performed periodically to monitor your progress or check for side effects. Consult your doctor for more details.      MISSED DOSE:  For the best possible benefit, do not miss any doses. If you do miss a dose and remember on the same day, take it as soon as you remember. If you remember on the next day, skip the missed dose and resume your usual dosing schedule. Do not double the dose to catch up because this could increase your risk for bleeding. Keep a record of missed doses to give to your doctor or pharmacist. Contact your doctor or pharmacist if you miss 2 or more doses in a row.      STORAGE:  Store at room temperature away from light and moisture. Do not store in the bathroom. Keep all  medications away from children and pets. Do not flush medications down the toilet or pour them into a drain unless instructed to do so. Properly discard this product when it is  or no longer needed. Consult your pharmacist or local waste disposal company for more details about how to safely discard your product.      MEDICAL ALERT:  Your condition and medication can cause complications in a medical emergency. For information about enrolling in MedicAlert, call 1-464.304.9640 ([place]) or 1-414.198.1307 ([place]).      Information last revised 2010 Copyright(c) 2010 [employer]        Pacemaker Implantation, Care After  Refer to this sheet over the next few weeks. These instructions provide you with information on caring for yourself after the procedure. Your health care provider may also give you more specific instructions. Your treatment has been planned according to current medical practices, but problems sometimes occur. Call your health care provider if you have any problems or questions regarding your pacemaker.   WHAT TO EXPECT AFTER THE PROCEDURE  · You may feel pain. Some pain is normal. It may last a few days.  · A slight bump may be seen over the skin where the device was placed. Sometimes, it is possible to feel the device under the skin. This is normal.  · In the months and years afterward, your health care provider will check the device, the leads, and the battery every few months. Eventually, when the battery is low, the device will be replaced.  HOME CARE INSTRUCTIONS  Medicines  · Take medicines only as directed by your health care provider.  · If you were prescribed an antibiotic medicine, finish it all even if you start to feel better.  · Do not take any other medicines without asking your health care provider first. Some medicines, including certain painkillers, can cause bleeding in your stomach after surgery.  Wound Care  · Do not remove the bandage on your chest until directed  to do so by your health care provider.  · After your bandage is removed, you may see pieces of tape called skin adhesive strips over the area where the cut was made (incision site). Let them fall off on their own.  · Check the incision site every day to make sure it is not infected, bleeding, or starting to pull apart.  · Do not use lotions or ointments near the incision site unless directed to do so.  · Keep the incision area clean and dry for 2-3 days after the procedure or as directed by your health care provider. It takes several weeks for the incision site to completely heal.  · Do not take baths, swim, or use a hot tub until your health care provider approves.  Activities  · Try to walk a little every day. Exercising is important after this procedure. It is also important to use your shoulder on the side of the pacemaker in daily tasks that do not require exaggerated motion.  · Avoid sudden jerking, pulling, or chopping movements that pull your upper arm far away from your body for at least 6 weeks.  · Do not lift your upper arm above your shoulders for at least 6 weeks. This means no tennis, golf, or swimming for this period of time. If you sleep with the arm above your head, use a restraint to prevent this from happening as you sleep.  · You may go back to work when your health care provider says it is okay. Check with your health care provider before you start to drive or play sports.  Other Instructions  · Follow diet instructions if they were provided. You should be able to eat what you usually do right away, but you may need to limit your salt intake.  · Weigh yourself every day. If you suddenly gain weight, fluid may be building up in your body.  · Always carry your pacemaker identification card with you. The card should list the implant date, device model, and . Consider wearing a medical alert bracelet or necklace.  · Tell all health care providers that you have a pacemaker. This may  prevent them from giving you a magnetic resource imaging scan (MRI) because of the strong magnets used during that test.  · If you must pass through a metal detector, quickly walk through it. Do not stop under the detector or stand near it.  · Avoid places or objects with a strong electric or magnetic field, including:  ¨ Airport security celis. When at the airport, let officials know you have a pacemaker. Your ID card will let you be checked in a way that is safe for you and that will not damage your pacemaker. Also, do not let a security person wave a magnetic wand near your pacemaker. That can make it stop working.  ¨ Power plants.  ¨ Large electrical generators.  ¨ Radiofrequency transmission towers, such as cell phone and radio towers.  · Do not use amateur (ham) radio equipment or electric (arc) welding torches. Some devices are safe to use if held at least 1 foot from your pacemaker. These include power tools, lawn mowers, and speakers. If you are unsure of whether something is safe to use, ask your health care provider.  · You may safely use electric blankets, heating pads, computers, and microwave ovens.  · When using your cell phone, hold it to the ear opposite the pacemaker. Do not leave your cell phone in a pocket over the pacemaker.  · Keep all follow-up visits as directed by your health care provider. This is how your health care provider makes sure your chest is healing the way it should. Ask your health care provider when you should come back to have your stitches or staples taken out.  · Have your pacemaker checked every 3-6 months or as directed by your health care provider. Most pacemakers last for 4-8 years before a new one is needed.  SEEK MEDICAL CARE IF:  · You gain weight suddenly.  · Your legs or feet swell more than they have before.  · It feels like your heart is fluttering or skipping beats (heart palpitations).  · You have a fever.  SEEK IMMEDIATE MEDICAL CARE IF:  · You have chest  "pain.  · You feel more short of breath than you have felt before.  · You feel more light-headed than you have felt before.  · You have problems with your incision site, such as swelling or bleeding, or it starts to open up.  · You have drainage, redness, swelling, or pain at your incision site.     This information is not intended to replace advice given to you by your health care provider. Make sure you discuss any questions you have with your health care provider.     Document Released: 07/07/2006 Document Revised: 01/08/2016 Document Reviewed: 04/19/2013  [employer] Patient Education ©2016 [employer]      Dual-Chamber Pacemaker  A pacemaker is a small, lightweight, battery-powered device that is implanted under the skin in the upper chest. Your caregiver may place a pacemaker if your heartbeat is too slow (bradycardia) or if you experience symptoms from a slow heartbeat. A dual-chamber pacemaker has 2 leads (electrodes) that are connected in your heart. One lead is placed in the upper chamber of the heart, called the right atrium. The second lead is placed in the lower part of the heart, called the right ventricle. Dual-chamber pacemakers may pace in both the upper chamber and lower chamber. By doing so, correct rhythm and function are often maintained. When the heart rate is too slow, the pacemaker senses the heartbeat and will pace the heart at a programmed rate.  CAUSES   Different conditions can cause a slow heart rate. Some of these can include:  · Sick sinus syndrome. This is a type of slow heart rate where the \"pacemaker\" of the heart does not work very well. It is often related to aging.  · Heart attack (myocardial infarction). This can damage the heart muscle and cause a slow heart beat.  · Heart block. This is a condition where the signal that causes the heart to beat does not communicate very well between the upper chambers of the heart and the lower chambers of the heart.  · Some heart " "medications that control fast heart rates or other abnormal heart rhythms can also cause a slow heart rate.  SYMPTOMS   A very slow heart rate results in the heart not pumping enough blood to your body. Symptoms of a slow heart rate can include:  · Passing out (fainting).  · Confusion.  · Shortness of breath.  · Tiredness (fatigue).  · Ankle swelling.  · Chest discomfort or pain.  DIAGNOSIS   Tests will be done to look at how your heart works and beats. This can include:  · A physical exam.  · An electrocardiogram (ECG). An ECG records your heart beat on a strip of paper for your caregiver to look at.  · Continuous ECG monitoring:  · Holter monitor or an Event monitor. These devices record your heart rhythm and can be worn for 24 or more hours at a time. Your caregiver can then look at the recorded history of your heartbeat.  · An electrophysiology study. This is a test to study the heart's electrical system. If your heart has a disruption in its electrical pathway, a slow heart beat can occur.  PACEMAKER IMPLANTATION  · Do not eat or drink for 6 hours before the procedure or as told by your caregiver.  · Pacemaker implantation usually takes about one hour.  · Your skin on your upper chest will be cleaned with germ-killing soap.  · Sedation will be given through an IV. This will help you relax during the procedure.  · The site of the incision, often just below a collarbone, will be injected with numbing medicine.  · The insulated electrode is inserted through a large vein in your chest. Then, using a special type of X-ray (fluoroscopy), the tip is positioned in the target area of your heart. The end of the pacemaker lead is fixated to your heart by a corkscrew tip or by small \"tines\" (soft anchor hooks).  · The connection between the pacemaker electrode and the heart is checked to ensure optimal contact and placement.  · After your pacemaker is implanted, you will need to stay in the hospital to make sure the " "pacemaker is working correctly. You will be able to go home when your caregiver feels it is safe for you to do so.  HOME CARE INSTRUCTIONS   · Excessive movement of the arm next to the new pacemaker can cause the electrodes to dislodge. Your caregiver will determine how many days the upper arm should not be moved excessively. It is usually three or more days.  · The incision needs to be kept dry as told by your caregiver. As with any surgery, if the incision becomes swollen, red or pus (yellow or tan drainage) appears, call your caregiver right away.  · Your caregiver may use small strips of tape hold the incision closed. They should be allowed to fall off naturally. Do not pull the strips of tape off.  · Digital cell phones should be kept 12 inches away from the pacemaker. Hold them at the ear on the side opposite of the pacer.  · Never leave a cell phone in a pocket over the pacemaker.  · Avoid strong electro-magnetic fields. You will not be able to have an MRI scan because of the strong magnets.  · The pacemaker battery should last several years. The pacemaker needs to be checked at regular intervals as told by your caregiver.  RISKS AND COMPLICATIONS  An implanted pacemaker has risks. Some of these can include:  · Infection.  · The pacemaker electrode can become dislodged. If this should happen, a second surgery would be needed to reposition it.  · During pacemaker implantation, it is possible to puncture the lung. This is a very rare occurrence.  SEEK MEDICAL CARE IF:   · You have dizziness or pass out.  · You feel your heart \"skipping\" beats or feel your heart \"racing.\"  · Hiccups that do not go away.  · You develop redness, swelling or pain at the pacemaker insertion site.  · The pacemaker insertion site has yellow drainage or there is a bad odor coming from the insertion site.  · An unexplained temperature of 102° F (38.9° C) or above develops.  MAKE SURE YOU:   · Understand these instructions.  · Will watch " your condition.  · Will get help right away if you are not doing well or get worse.  Document Released: 10/15/2010 Document Revised: 03/11/2013 Document Reviewed: 10/15/2010  [employer]® Patient Information ©2014 [employer].        Skin Grafting, Care After  Refer to this sheet in the next few weeks. These instructions provide you with information about caring for yourself after your procedure. Your health care provider may also give you more specific instructions. Your treatment has been planned according to current medical practices, but problems sometimes occur. Call your health care provider if you have any problems or questions after your procedure.  WHAT TO EXPECT AFTER THE PROCEDURE  After your procedure, it is common to have:  · Pain.  · Swelling.  HOME CARE INSTRUCTIONS  · Take medicines only as directed by your health care provider.  · Follow your health care provider's instructions about:  ¨ Care of your graft and your donor site.  ¨ Bandage (dressing) changes and removal.  ¨ Wound closure removal.  · Check your graft and your donor site every day for signs of infection. Watch for:  ¨ Redness, swelling, or pain.  ¨ Fluid, blood, or pus.  · You may need to wear a supportive bandage or wrap for several weeks.  · Do not take baths, swim, or use a hot tub until your health care provider approves.  · Sponge bath only for 7 days post surgery.  · Do not exercise or do any other activities that could stretch the graft. Ask your health care provider when it will be safe for you to exercise again.  · Keep all follow-up visits as directed by your health care provider. This is important.  SEEK MEDICAL CARE IF:  · You have a fever.  · You have redness, swelling, or pain at your graft or your donor site.  · You have fluid, blood, or pus coming from your graft or your donor site.  · Your pain gets worse.  SEEK IMMEDIATE MEDICAL CARE IF:  · You have chest pain.  · You feel short of breath or you start coughing.  · You  feel dizzy.     This information is not intended to replace advice given to you by your health care provider. Make sure you discuss any questions you have with your health care provider.     Document Released: 01/08/2016 Document Reviewed: 01/08/2016  [employer] Interactive Patient Education ©2016 [employer]      Depression / Suicide Risk    As you are discharged from this [facility] facility, it is important to learn how to keep safe from harming yourself.    Recognize the warning signs:  · Abrupt changes in personality, positive or negative- including increase in energy   · Giving away possessions  · Change in eating patterns- significant weight changes-  positive or negative  · Change in sleeping patterns- unable to sleep or sleeping all the time   · Unwillingness or inability to communicate  · Depression  · Unusual sadness, discouragement and loneliness  · Talk of wanting to die  · Neglect of personal appearance   · Rebelliousness- reckless behavior  · Withdrawal from people/activities they love  · Confusion- inability to concentrate     If you or a loved one observes any of these behaviors or has concerns about self-harm, here's what you can do:  · Talk about it- your feelings and reasons for harming yourself  · Remove any means that you might use to hurt yourself (examples: pills, rope, extension cords, firearm)  · Get professional help from the community (Mental Health, Substance Abuse, psychological counseling)  · Do not be alone:Call your Safe Contact- someone whom you trust who will be there for you.  · Call your local CRISIS HOTLINE 013-2718 or 783-646-5001  · Call your local Children's Mobile Crisis Response Team [place] (558) 300-8989 or www.[employer]  · Call the toll free National Suicide Prevention Hotlines   · National Suicide Prevention Lifeline 583-430-ATIF (5825)  · National Hope Line Network 800-SUICIDE (013-0445)

## 2018-03-25 NOTE — PROGRESS NOTES
[facility] Hospitalist Progress Note    Date of Service: 3/25/2018    Chief Complaint  77 y.o. male admitted 3/21/2018 for hypoxia after left eye surgery.    Interval Problem Update  3/22: patient doing well this morning, on Ra. Breathing is better. However tele picked up 2nd and 3rd degree heart block, patient was asymptomatic. He was had bradycardia in 30s, cardiology consulted.  Patient denies any chest pain, sob, nausea, or dizziness    3/23; patient continues to have bradycardia in 30s, and overnight had 2.2 sec pause. With hx of 3rd degree heart block, EP consulted for pacemaker today. No chest pain but feels weak and tired . Scared about pacemaker    3/24: patient feels ok this morning, feeling tired but otherwise no complaints    3/25: patient in bed, pleasant, no complaints, slight weakness, otherwise no chest pains, no SOB      Consultants/Specialty  cardiology  EP    Disposition  Home like 24hours        Review of Systems   Constitutional: Negative for fever.   HENT: Negative for ear discharge, ear pain and hearing loss.    Respiratory: Negative for cough.    Cardiovascular: Negative for chest pain and palpitations.   Gastrointestinal: Negative for abdominal pain, diarrhea, heartburn and vomiting.   Genitourinary: Negative for dysuria and urgency.   Musculoskeletal: Negative for back pain, myalgias and neck pain.   Skin: Negative for itching and rash.   Neurological: Negative for dizziness, weakness and headaches.   Psychiatric/Behavioral: Negative for depression and suicidal ideas.      Physical Exam  Laboratory/Imaging   Hemodynamics  Temp (24hrs), Av.7 °C (98.1 °F), Min:36.6 °C (97.8 °F), Max:37 °C (98.6 °F)   Temperature: 36.6 °C (97.9 °F)  Pulse  Av.5  Min: 52  Max: 96   Blood Pressure : 146/82      Respiratory      Respiration: 18, Pulse Oximetry: 96 %     Work Of Breathing / Effort: Mild  RUL Breath Sounds: Clear, RML Breath Sounds: Diminished, RLL Breath Sounds: Diminished, ABIGAIL Breath Sounds:  Clear, LLL Breath Sounds: Diminished    Fluids    Intake/Output Summary (Last 24 hours) at 03/25/18 0826  Last data filed at 03/25/18 0600   Gross per 24 hour   Intake              240 ml   Output             1100 ml   Net             -860 ml       Nutrition  Orders Placed This Encounter   Procedures   • Diet Order     Standing Status:   Standing     Number of Occurrences:   1     Order Specific Question:   Diet:     Answer:   Cardiac [6]     Physical Exam   Constitutional: He is oriented to person, place, and time. He appears well-developed and well-nourished. No distress.   HENT:   Head: Normocephalic and atraumatic.   Mouth/Throat: Oropharynx is clear and moist.   Eyes: Pupils are equal, round, and reactive to light. No scleral icterus.   Left eye patch   Neck: No JVD present.   Cardiovascular: Normal rate, regular rhythm and normal heart sounds.    Pulmonary/Chest: Effort normal and breath sounds normal. No respiratory distress. He has no wheezes. He has no rales.   Abdominal: Bowel sounds are normal. He exhibits no distension. There is no tenderness.   Musculoskeletal: Normal range of motion. He exhibits no edema.   Lymphadenopathy:     He has no cervical adenopathy.   Neurological: He is alert and oriented to person, place, and time. He exhibits normal muscle tone.   Skin: Skin is warm and dry.   Psychiatric: He has a normal mood and affect.           Recent Labs      03/23/18   1044   SODIUM  140   POTASSIUM  3.9   CHLORIDE  108   CO2  24   GLUCOSE  178*   BUN  26*   CREATININE  0.90   CALCIUM  8.4*     Recent Labs      03/23/18   0412  03/24/18   0355  03/25/18   0306   INR  1.13  1.31*  1.31*                  Assessment/Plan     * Subtherapeutic international normalized ratio (INR)   Assessment & Plan    Hx of chronic PE and DVT > 1 year ago, warfarin was being held for procedures,   INR still subtherapeutic; high risk for clotting, Monitor closely, patient needs to be therapeutic before he leave as he  lives far away  Will try to see if he has HH for INR check  SW consulted        Heart block   Assessment & Plan    Patient with history of PVC, 1st-2nd degree heart blocks, follows with cardiology outpatient  Cardiology consulted during this admission, no intervention at this time.  Went into 3rd degree Heart block overnight, asymptomatic, however also bradycardia in low 30s, BB stopped   patient had 2.2 sec pause, EP consulted for pacemaker, placed 3/23/18  Continue to monitor patient closely given age and risk factors, lives alone        Acute respiratory failure with hypoxia (CMS-[facility])- (present on admission)   Assessment & Plan    Resolved; After surgery, he was requiring 5L of o2, now he is down to 1.5L probably due to anesthesia will continue IS, nebs treatment. Wean off o2, continue albuterol inhaler. No wheezing on examination.         CAD (coronary artery disease)- (present on admission)   Assessment & Plan    Stable, no chest pain.         Chronic congestive heart failure (CMS-[facility])- (present on admission)   Assessment & Plan    Last echo in 2017 showed hyperdynamic EF of 75%, chf rulled out        Chronic deep vein thrombosis (DVT) of [place] (present on admission)   Assessment & Plan    On warfarin,started after pacemaker  Monitor INR closely        History of pulmonary embolus (PE)- (present on admission)   Assessment & Plan    On chronic warfarin, was holding 2/2 surgery, PE >1 year ago no need for bridge  Restarted today  Monitor INR closely as it is subtherapeutic still 1.31        Essential hypertension, benign- (present on admission)   Assessment & Plan    Continue metoprolol and lisinopril.         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          Quality-Core Measures   Reviewed items::  Labs reviewed and Medications reviewed  Castaneda catheter::  No Castaneda

## 2018-03-25 NOTE — PROGRESS NOTES
Report received by day shift RN. Pt sitting up in chair awake alert and oriented x 4 with no c/o pain. Pacemaker incision site covered with dressing. CDI. Pt updated to POC and verbalized understanding. Bed locked in low position with fall precautions in place and call light in reach.

## 2018-03-25 NOTE — FACE TO FACE
Face to Face Supporting Documentation - Home Health    The encounter with this patient was in whole or in part the primary reason for home health admission.    Date of encounter:   Patient:                    MRN:                       YOB: 2018  Kiran Eason  [id number]  1940     Home health to see patient for:  Skilled Nursing care for assessment, interventions & education and Home health aide    Skilled need for:  Exacerbation of Chronic Disease State Chronic anticoagulation    Skilled nursing interventions to include:  Comment: INR checks    Homebound status evidenced by:  Need the aid of supportive devices such as crutches, canes, wheelchairs or walkers. Leaving home requires a considerable and taxing effort. There is a normal inability to leave the home.    Community Physician to provide follow up care: Jesus Craft M.D.     Optional Interventions? No      I certify the face to face encounter for this home health care referral meets the CMS requirements and the encounter/clinical assessment with the patient was, in whole, or in part, for the medical condition(s) listed above, which is the primary reason for home health care. Based on my clinical findings: the service(s) are medically necessary, support the need for home health care, and the homebound criteria are met.  I certify that this patient has had a face to face encounter by myself.  Lindsey Sandhu M.D. - NPI: [id number]

## 2018-03-25 NOTE — DISCHARGE SUMMARY
CHIEF COMPLAINT ON ADMISSION  No chief complaint on file.      CODE STATUS  Full Code    HPI & HOSPITAL COURSE  77 y.o. male who presented 3/21/2018 with probably pmh of copd, left eye CA was in the hospital for elective left eye exavasation and left thigh skin graft by Dr Garcia. patient went for surgery did ok but after surgery he was hypoxic and requiring 5L of o2, hospitalist group is asked to admit patient for observation.  He has h/o PE/DVT around 1 year ago,his warfarin was held 2/2 to eye surgery.     Patient was put on telemetry and he was noted to have a short run of third-degree heart block overnight. During that time, the patient remained asymptomatic and denied any cest pain, palpitations, shortness of breath, lightheadedness or dizziness. On evaluation, EKG showed a Mobitz 1 second-degree block and sinus bradycardia. Patient reported that he's had this rhythm abnormality for long time and it has never caused him any issues. Also notes that his mother has the same problem and lived until she was 93. In the past, the patient had a Holter evaluation (2016) which also showed a Mobitz 1 type heart block, but no third-degree blocks. Nuclear stress test done in 2016 was normal.cardiology was consulted and initially there was no intervention that was recommended however overnight patient had 2.2 sec pause and continued to have bradycardia between 30-40, therefore decision was made to consult EP and pacemaker was placed on 3/23/18.Dual chamber Meditronic PPM with normal function.device was interrogated, CXR was normal without any pneumothorax. Patient was monitored and did well. He was restarted on his warfarin and INRs were checked. Patient INR was subtherapeutic for a few days and warfarin was adjusted per pharmacy protocol. However patient anxious to leave and states he has had HH do INR checks at home before and felt comfortable doing himself as well. I explained to patient that it would be best if we set him  up with INR at discharge and then he can fu with his PCP. Apparently he has had been to several INR clincis before. SW assisted and set up him with INR clinic for appointment on 3/27/18 at 4pm for INR check. Patient understood and agreed with the above plan. Therefore he was discharged in stable medical conditions.            The patient met 2-midnight criteria for an inpatient stay at the time of discharge.    Therefore, he is discharged in fair and stable condition with close outpatient follow-up.    SPECIFIC OUTPATIENT FOLLOW-UP  pcp  INR clinic on 3/27/18 4pm INR check    DISCHARGE PROBLEM LIST  Principal Problem:    Subtherapeutic international normalized ratio (INR) POA: Unknown  Active Problems:    History of pulmonary embolus (PE) POA: Yes      Overview: With DVT.      On warfarin as an outpatient.      IVC filter present.      6/12 - Prophylactic Lovenox resumed.      Restart anticoagulation as outpatient.      Chronic deep vein thrombosis (DVT) of popliteal vein (CMS-[facility]) POA: Yes      Overview: Present on admission, takes outpatient coumadin.      Old clot in left popliteal vein.      6/4 - Prophylactic Lovenox initiated.      6/3 - Trauma screening bilateral lower extremity venous duplex positive       for chronic DVT of the left popliteal vein; no acute process in either       leg.      6/6 - Therapeutic Lovenox started.      6/8 - Stopped upon ICU transfer.      6/11 - Trauma screening bilateral lower extremity venous duplex positive       for partially occlusive chronic DVT seen in the left popliteal vein as        membrane-like structure with response to compression and good venous       flow. No evidence of acute DVT seen in the left lower extremity.      6/12 - Prophylactic Lovenox resumed.      Chronic congestive heart failure (CMS-[facility]) POA: Yes      Overview: 6/6 - Home lasix and potassium started.      6/8 - Lasix stopped upon ICU transfer.      6/11- BNP low / restart lasix as BLE edema.       6/12 - BP low - Lasix and potassium on hold.      6/15 - BNP elevated. Resume Lasix.       6/26 - Lasix increased to 40mg PO BID with good response      Trend diagnostic laboratory studies     CAD (coronary artery disease) POA: Yes      Overview: 6/6 - Home metoprolol restarted.      Held on transfer to ICU.      6/11 - Resumed.        6/17 - Metoprolol stopped per cardiology recommendation    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) POA: Yes      Overview: Premorbid.      Squamous cell carinoma left eye.      PRN wetting drops    Essential hypertension, benign POA: Yes  Resolved Problems:    Acute respiratory failure with hypoxia (CMS-HCC) POA: Yes    Heart block POA: Unknown    Bradycardia POA: Unknown      FOLLOW UP  Future Appointments  Date Time Provider Department Center   3/26/2018 1:40 PM CARE MANAGER [facility]   3/27/2018 4:15 PM [facility] EXAM 5 [facility] None   3/28/2018 1:40 PM Charity Olmedo M.D. [facility]   4/3/2018 3:45 PM PACER CHECK-[facility] None     No follow-up provider specified.    MEDICATIONS ON DISCHARGE   Kiran Eason   Home Medication Instructions SANDRA:[id number]    Printed on:03/25/18 8624   Medication Information                      bimatoprost (LUMIGAN) 0.01 % Solution  Place 1 Drop in both eyes every bedtime.             Esomeprazole Magnesium (NEXIUM) 20 MG Pack  Take 20 mg by mouth every morning.             finasteride (PROSCAR) 5 MG Tab  Take 1 Tab by mouth every morning.             furosemide (LASIX) 40 MG Tab  Take 40 mg by mouth every day.             indomethacin (INDOCIN) 50 MG Cap  Take 50 mg by mouth 3 times a day.             ipratropium-albuterol (COMBIVENT RESPIMAT)  MCG/ACT Aero Soln  Inhale 1 Puff by mouth 4 times a day as needed (SOB).             latanoprost (XALATAN) 0.005 % Solution  Place 1 Drop in both eyes every evening.             lisinopril (PRINIVIL) 20 MG Tab  Take 20 mg by mouth every morning.             metoprolol SR (TOPROL XL) 25 MG TABLET SR  24 HR  Take 25 mg by mouth every day.             potassium chloride ER (KLOR-CON) 10 MEQ tablet  Take 1 Tab by mouth every day.             warfarin (COUMADIN) 3 MG Tab  Take 1 Tab by mouth every day.                 DIET  Orders Placed This Encounter   Procedures   • Diet Order     Standing Status:   Standing     Number of Occurrences:   1     Order Specific Question:   Diet:     Answer:   Cardiac [6]       ACTIVITY  As tolerated.  Weight bearing as tolerated      CONSULTATIONS  Cardiology  EP    PROCEDURES  PROCEDURE PERFORMED: DDDR PPM, moderate sedation administered by CRNA and supervised by physician  PROCEDURE PERFORMED:  1.  Orbital exenteration, left eye.  2.  Placement of full thickness skin graft from the left thigh to the left   orbit measuring 9 x 9 cm.    LABORATORY  Lab Results   Component Value Date/Time    SODIUM 140 03/23/2018 10:44 AM    POTASSIUM 3.9 03/23/2018 10:44 AM    CHLORIDE 108 03/23/2018 10:44 AM    CO2 24 03/23/2018 10:44 AM    GLUCOSE 178 (H) 03/23/2018 10:44 AM    BUN 26 (H) 03/23/2018 10:44 AM    CREATININE 0.90 03/23/2018 10:44 AM    CREATININE 1.1 12/08/2008 05:50 AM        Lab Results   Component Value Date/Time    WBC 7.7 03/22/2018 02:44 AM    HEMOGLOBIN 13.9 (L) 03/22/2018 02:44 AM    HEMATOCRIT 43.0 03/22/2018 02:44 AM    PLATELETCT 122 (L) 03/22/2018 02:44 AM        Total time of the discharge process exceeds 38 minutes

## 2018-03-25 NOTE — CARE PLAN
Problem: Discharge Barriers/Planning  Goal: Patient's continuum of care needs will be met    Intervention: Involve patient and significant other/support system in setting and prioritizing goals for hospital stay and discharge  Pt educated to POC and visits with f/u appointments. Pt educated to therapeutic INR. Verbalized understanding.

## 2018-03-25 NOTE — CARE PLAN
Problem: Communication  Goal: The ability to communicate needs accurately and effectively will improve  Outcome: PROGRESSING AS EXPECTED  POC discussed with pt.  Pt educated on therapeutic levels of coumadin.  Pt verbalize understanding.     Problem: Venous Thromboembolism (VTW)/Deep Vein Thrombosis (DVT) Prevention:  Goal: Patient will participate in Venous Thrombosis (VTE)/Deep Vein Thrombosis (DVT)Prevention Measures  Outcome: PROGRESSING AS EXPECTED  Pt on coumadin for VTE.

## 2018-03-26 ENCOUNTER — PATIENT OUTREACH (OUTPATIENT)
Dept: HEALTH INFORMATION MANAGEMENT | Facility: OTHER | Age: 78
End: 2018-03-26

## 2018-03-26 ENCOUNTER — ANTICOAGULATION MONITORING (OUTPATIENT)
Dept: VASCULAR LAB | Facility: MEDICAL CENTER | Age: 78
End: 2018-03-26

## 2018-03-26 DIAGNOSIS — I82.539 CHRONIC DEEP VEIN THROMBOSIS (DVT) OF POPLITEAL VEIN, UNSPECIFIED LATERALITY (HCC): ICD-10-CM

## 2018-03-26 LAB — INR PPP: 2.1 (ref 2–3.5)

## 2018-03-26 NOTE — PROGRESS NOTES
Anticoagulation Summary  As of 3/26/2018    INR goal:   2.0-3.0   TTR:   63.1 % (1.6 y)   Today's INR:   2.1   Maintenance plan:   3 mg (1 mg x 3) on Mon; 2 mg (1 mg x 2) all other days   Weekly total:   15 mg   Plan last modified:   Zafar Eubanks, PharmD (8/16/2017)   Next INR check:   4/2/2018   Target end date:   Indefinite    Indications    Chronic deep vein thrombosis (DVT) of popliteal vein (CMS-[facility]) [I82.539]             Anticoagulation Episode Summary     INR check location:       Preferred lab:   [email or web address] GENER[facility]    Send INR reminders to:       Comments:         Anticoagulation Care Providers     Provider Role Specialty Phone number    Jesus Craft M.D. Referring Family Medicine [phone]    [facility] Anticoagulation Services Responsible  [phone]    Zafar Eubanks, PharmD Responsible          Anticoagulation Patient Findings        Spoke with patient by phone. Patient is therapeutic. Just out of the hospital no new meds.     Changes to current medical/health status since last appt: none.   Denies signs/symptoms of bleeding and/or thrombosis since the last appt.   Denies any interval changes to diet  Denies any interval changes to medications since last appt.   Denies any complications or cost restrictions with current therapy  Follow up appointment in 1 week(s).      Continue current medication regimen.        Patient is on a high risk medication and therefore requires close monitoring and follow up.   The patient will go to the ER for falls with a head injury,  blood in urine or stool or any bleeding that last longer than 20 min.        BETO Cabrera.

## 2018-03-26 NOTE — PROGRESS NOTES
Patient discharged home. Patient AOX 4.  IV and tele box removed, discharge instructions provided to patient and reviewed with them. All questions answered, patient provided copy of discharge instructions and instructed on when to F/U with MD. Patient wheeled off unit.

## 2018-03-27 ENCOUNTER — OFFICE VISIT (OUTPATIENT)
Dept: MEDICAL GROUP | Facility: CLINIC | Age: 78
End: 2018-03-27
Payer: MEDICARE

## 2018-03-27 VITALS
BODY MASS INDEX: 39.69 KG/M2 | SYSTOLIC BLOOD PRESSURE: 108 MMHG | TEMPERATURE: 98.7 F | WEIGHT: 268 LBS | HEIGHT: 69 IN | DIASTOLIC BLOOD PRESSURE: 64 MMHG | HEART RATE: 88 BPM | RESPIRATION RATE: 16 BRPM | OXYGEN SATURATION: 95 %

## 2018-03-27 DIAGNOSIS — E11.9 TYPE 2 DIABETES MELLITUS WITHOUT COMPLICATION, WITHOUT LONG-TERM CURRENT USE OF INSULIN (HCC): ICD-10-CM

## 2018-03-27 DIAGNOSIS — C69.92: ICD-10-CM

## 2018-03-27 DIAGNOSIS — Z09 HOSPITAL DISCHARGE FOLLOW-UP: ICD-10-CM

## 2018-03-27 DIAGNOSIS — I44.2 THIRD DEGREE AV BLOCK (HCC): ICD-10-CM

## 2018-03-27 DIAGNOSIS — R73.01 IMPAIRED FASTING GLUCOSE: ICD-10-CM

## 2018-03-27 DIAGNOSIS — E66.9 OBESITY (BMI 30-39.9): ICD-10-CM

## 2018-03-27 PROBLEM — D62 ACUTE BLOOD LOSS ANEMIA: Status: RESOLVED | Noted: 2017-06-02 | Resolved: 2018-03-27

## 2018-03-27 PROBLEM — L03.116 CELLULITIS OF LEFT FOOT: Status: RESOLVED | Noted: 2017-06-17 | Resolved: 2018-03-27

## 2018-03-27 LAB
HBA1C MFR BLD: 7 % (ref ?–5.8)
INT CON NEG: NEGATIVE
INT CON POS: POSITIVE

## 2018-03-27 PROCEDURE — 83036 HEMOGLOBIN GLYCOSYLATED A1C: CPT | Performed by: NURSE PRACTITIONER

## 2018-03-27 PROCEDURE — 99495 TRANSJ CARE MGMT MOD F2F 14D: CPT | Performed by: NURSE PRACTITIONER

## 2018-03-27 ASSESSMENT — ENCOUNTER SYMPTOMS
HEADACHES: 0
ABDOMINAL PAIN: 0
DIZZINESS: 0
PALPITATIONS: 0
WHEEZING: 0
HEARTBURN: 0
SHORTNESS OF BREATH: 0
MYALGIAS: 0
SPUTUM PRODUCTION: 0
CHILLS: 0
NAUSEA: 0
WEAKNESS: 0
FEVER: 0
FALLS: 0
COUGH: 1
FOCAL WEAKNESS: 0
DEPRESSION: 0
NERVOUS/ANXIOUS: 0
VOMITING: 0

## 2018-03-27 NOTE — PROGRESS NOTES
POST DISCHARGE CALL MADE BY Melodie Stone  Discharge Date:3/25/2018   Date of Outreach Call: 3/26/2018  1:41 PM  Now that you're home, how are you doing? Good  Do you have questions about your medications? No    Did you fill your medications? Yes    Do you have a follow-up appointment scheduled?Yes  Comment:Pt requested CM change appointment date for  tomorrow at post discharge clinic.    Discharging Department: Telemetry 8    Number of Attempts: 1  Current or previous attempts completed within two business days of discharge? Yes  Provided education regarding treatment plan, medication, self-management, ADLs? Yes  Has patient completed Advance Directive? If yes, advise them to bring to appointment. Yes  Care Manager phone number provided? Yes  Is there anything else I can help you with? No

## 2018-03-27 NOTE — PROGRESS NOTES
Subjective:     Kiran Eason is a 77 y.o. male who presents for Hospital Follow-up.  Chart reviewed. Discharge summary available for review: Yes   Date of discharge 3/25/2018.  48- hour post discharge RN call completed on 3/26/2018 and documented in the medical record by  Melodie Stone.    HPI: Recently hospitalized for left orbital exenteration with skin graft from the left thigh to left orbit by Dr. 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 He has follow up appointment with surgeon this afternoon.    He is noted to have asymptomatic 3rd degree AV block with bradycardia down to 30s and 2.2 sec pause in the hospital, BB discontinued, status post pacemaker placement on 3/23/2018, pacer check scheduled on 4/3. He has his own cardiologist at [facility], next follow up appointment on 5/3.     Hx of PE/DVT, coumadin resumed after eye surgery, 3/26 INR 2.1, following with ac clinic, now on coumadin 3 mg Monday and 2 mg all other days.    Pt is discharged with BB for unknown reason but pt reports he knows it is discontinued in the hospital so that he is not taking it. Medication profile will be updated to remove BB.     Impaired fasting glucose noted, 170-325 in the hospital. No known hx of DM, underlying obesity. He agrees to check A1C today. His last A1C 5.4 in June 2017.    Since returning home, patient reports feeling good. He is still using arm sling to remind himself the arm restriction after pacemaker. He has no questions about hospitalization. He has a little bit cough but not bad. He denied fever, chills, sob or wheezing.     The patient denied weakness; no difficulty taking care of self at home.  Patient reports taking medications as instructed.    PCP follow up on 4/25 with different provider at same group with Dr. Craft.    Patient Active Problem List    Diagnosis Date Noted   • Subtherapeutic international normalized ratio (INR) 03/24/2018     Priority:  High   • Respiratory failure following trauma and surgery (CMS-HCC) 06/22/2017     Priority: Medium   • Obesity (BMI 30-39.9) 06/12/2017     Priority: Medium   • Chronic congestive heart failure (CMS-HCC) 06/06/2017     Priority: Medium   • CAD (coronary artery disease) 06/06/2017     Priority: Medium   • Chronic deep vein thrombosis (DVT) of popliteal vein (CMS-HCC) 06/04/2017     Priority: Medium   • Third degree AV block (CMS-HCC) 06/03/2017     Priority: Medium   • Penetrating back wound 06/02/2017     Priority: Medium   • History of pulmonary embolus (PE) 06/02/2017     Priority: Medium   • Hemorrhagic disorder due to extrinsic circulating anticoagulants (CMS-HCC) 06/02/2017     Priority: Medium   • 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) 06/14/2017     Priority: Low   • Type 2 diabetes mellitus without complication, without long-term current use of insulin (CMS-HCC) 03/27/2018   • Right wrist pain 09/20/2017   • Localized swelling on right hand 09/20/2017   • Obesity (BMI 35.0-39.9 without comorbidity) 07/06/2017   • Hemothorax on left 06/26/2017   • Leg DVT (deep venous thromboembolism), acute ([facility]) 11/09/2016   • Sleep apnea 10/21/2016   • Obesity (BMI 30-39.9) 10/21/2016   • History of inferior vena caval filter placement 07/26/2016   • Pulmonary embolism on right (CMS-HCC) 11/30/2015   • Diverticulosis of large intestine without hemorrhage 08/04/2015   • History of colonic polyps 08/04/2015   • Malignant neoplasm of skin of eyelid, including canthus 04/01/2015   • Benign neoplasm of eyelid 02/11/2015   • Chronic gout 12/17/2012   • Carpal tunnel syndrome, bilateral 02/14/2012   • Low serum testosterone level 01/06/2012   • Vitamin D deficiency disease 01/06/2012   • BPH (benign prostatic hyperplasia) 12/30/2010   • Foot pain 09/28/2010   • Essential hypertension 09/18/2009   • Rosacea 09/18/2009   • Arthropathy of ankle and foot 09/18/2009   • Glaucoma 09/18/2009   • Essential hypertension, benign  "05/18/2009   • ED (erectile dysfunction) 05/18/2009   • Glaucoma 05/18/2009   • Rosacea 05/18/2009   • Arthropathy 05/18/2009   • Reflux esophagitis 05/18/2009   • Esophageal stricture 05/18/2009         Allergies:   Patient has no known allergies.    Social History:  Social History   Substance Use Topics   • Smoking status: Never Smoker   • Smokeless tobacco: Former User     Types: Chew     Quit date: 6/3/1967   • Alcohol use 0.0 - 0.6 oz/week      Comment: ocassionally        ROS:  Review of Systems   Constitutional: Negative for chills, fever and malaise/fatigue.   Eyes:        Dressing on left eye   Respiratory: Positive for cough. Negative for sputum production, shortness of breath and wheezing.    Cardiovascular: Negative for chest pain, palpitations and leg swelling.   Gastrointestinal: Negative for abdominal pain, heartburn, nausea and vomiting.   Genitourinary: Negative for dysuria, frequency and urgency.   Musculoskeletal: Negative for falls and myalgias.   Skin: Negative for rash.   Neurological: Negative for dizziness, focal weakness, weakness and headaches.   Psychiatric/Behavioral: Negative for depression. The patient is not nervous/anxious.         Objective:     Blood pressure 108/64, pulse 88, temperature 37.1 °C (98.7 °F), resp. rate 16, height 1.753 m (5' 9\"), weight 121.6 kg (268 lb), SpO2 95 %.     Physical Exam:  Physical Exam   Constitutional: He is oriented to person, place, and time and well-developed, well-nourished, and in no distress.   HENT:   Head: Normocephalic and atraumatic.   Eyes: Conjunctivae are normal.   Neck: Neck supple. No JVD present.   Cardiovascular: Normal rate and regular rhythm.    Pulmonary/Chest: Effort normal. No respiratory distress. He exhibits no tenderness.   Somewhat diminished at base   Abdominal: Soft. Bowel sounds are normal. He exhibits no distension. There is no tenderness. There is no rebound.   Musculoskeletal: Normal range of motion. He exhibits no " edema.   Neurological: He is alert and oriented to person, place, and time.   Skin: Skin is warm.   Dressing on left eye clean, going to see surgeon this afternoon   Vitals reviewed.        Assessment and Plan:     1. Hospital discharge follow-up  Hospitalization and results reviewed with patient. High risk conditions requiring teaching or care coordination were identified and addressed.The patient demonstrate understanding of admission and underlying conditions. The patient understands discharge instructions and when to seek medical attention. Medications reviewed including instructions regarding high risk medications, dosing and side effects.    The patient is able to safely adhere to ADL/IADL, treatment and medication regimen, self-manage of high-risk conditions? Yes   The patient requires physical therapy/home health/DME referral? No   The patient requires referral to care coordination/behavioral health/social work?  No   Patient requires referral for pharmacy consult? No   Advance directive/POLST on file?  Yes   Required counseled on advance directive?  No     PCP follow up on 4/25 with different provider at same group with Dr. Craft.    Pt to call if worsening coughing or new symptoms. He verbalized understanding, no clinic sign or indication for antibiotic at this time. He did have pulmonary edema and atelectasis on 3/24 cxr but it is improving    2. Impaired fasting glucose  - POCT  A1C: 7.0  Discussed life modification including diet and exercise, hand out provided. Pt to follow up with PCP and repeat A1C in 3-4 months    3. 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 Third degree AV block (CMS-HCC)  Status post pacer, pacercheck on 4/3  Follow up with his own cardiologist at [place] on 5/3  BB discontinued. Med profile updated    5. Type 2 diabetes mellitus without  complication, without long-term current use of insulin (CMS-HCC)  Discussed life modification including diet and exercise, hand out provided. Pt to follow up with PCP and repeat A1C in 3-4 months    6. Obesity (BMI 30-39.9)  - Patient identified as having weight management issue.  Appropriate orders and counseling given.          Medication Reconciliation  Medication list at end of encounter:   Current Outpatient Prescriptions   Medication Sig Dispense Refill   • warfarin (COUMADIN) 3 MG Tab Take 1 Tab by mouth every day. 17 Tab 0   • finasteride (PROSCAR) 5 MG Tab Take 1 Tab by mouth every morning. 30 Tab 0   • latanoprost (XALATAN) 0.005 % Solution Place 1 Drop in both eyes every evening. 1 Bottle 1   • ipratropium-albuterol (COMBIVENT RESPIMAT)  MCG/ACT Aero Soln Inhale 1 Puff by mouth 4 times a day as needed (SOB).     • furosemide (LASIX) 40 MG Tab Take 40 mg by mouth every day.     • indomethacin (INDOCIN) 50 MG Cap Take 50 mg by mouth 3 times a day.     • Esomeprazole Magnesium (NEXIUM) 20 MG Pack Take 20 mg by mouth every morning.     • lisinopril (PRINIVIL) 20 MG Tab Take 20 mg by mouth every morning.     • bimatoprost (LUMIGAN) 0.01 % Solution Place 1 Drop in both eyes every bedtime.     • potassium chloride ER (KLOR-CON) 10 MEQ tablet Take 1 Tab by mouth every day. 90 Tab 3     No current facility-administered medications for this visit.        Primary care follow-up:  New health conditions identified during hospitalization? Yes   Labs/pathology/imaging requires future PCP follow-up?  No   Changes to medications during hospitalization or today? Yes     Recommended followup: Return in about 4 weeks (around 4/24/2018), or if symptoms worsen or fail to improve, for Follow up with PCP. with Jesus Craft M.D.   Future Appointments       Provider Department [address]    3/27/2018 11:20 AM [facility]    4/3/2018 3:45 PM PACER [facility]     4/25/2018 5:00 PM Josh Valenzuela M.D. [facility] [place]          Patient Instruction  Patient offered educational material on discharge diagnosis and management of symptoms/red flags. Patient instructed to keep follow-up appointments and to bring written questions and and actual medications to each office visit. Patient instructed to call PCP/specialist with any problems/questions/concerns. Patient verbalizes understanding and has no further questions at this time.    Face-to-face transitional care management services with moderate complexity medical decision making.

## 2018-03-27 NOTE — PATIENT INSTRUCTIONS
If you need further assistance, or have any questions; concerns or lingering symptoms before seeing your Primary Care Provider or specialist.     Do not hesitate to contact us.     Please contact us at the Post-Hospital Follow Up Program at [phone].   Our offices hours are Monday-Friday 8 am-5 pm.    Diabetes Mellitus and Food  It is important for you to manage your blood sugar (glucose) level. Your blood glucose level can be greatly affected by what you eat. Eating healthier foods in the appropriate amounts throughout the day at about the same time each day will help you control your blood glucose level. It can also help slow or prevent worsening of your diabetes mellitus. Healthy eating may even help you improve the level of your blood pressure and reach or maintain a healthy weight.  General recommendations for healthful eating and cooking habits include:  · Eating meals and snacks regularly. Avoid going long periods of time without eating to lose weight.  · Eating a diet that consists mainly of plant-based foods, such as fruits, vegetables, nuts, legumes, and whole grains.  · Using low-heat cooking methods, such as baking, instead of high-heat cooking methods, such as deep frying.  Work with your dietitian to make sure you understand how to use the Nutrition Facts information on food labels.  How can food affect me?  Carbohydrates   Carbohydrates affect your blood glucose level more than any other type of food. Your dietitian will help you determine how many carbohydrates to eat at each meal and teach you how to count carbohydrates. Counting carbohydrates is important to keep your blood glucose at a healthy level, especially if you are using insulin or taking certain medicines for diabetes mellitus.  Alcohol   Alcohol can cause sudden decreases in blood glucose (hypoglycemia), especially if you use insulin or take certain medicines for diabetes mellitus. Hypoglycemia can be a life-threatening condition.  Symptoms of hypoglycemia (sleepiness, dizziness, and disorientation) are similar to symptoms of having too much alcohol.  If your health care provider has given you approval to drink alcohol, do so in moderation and use the following guidelines:  · Women should not have more than one drink per day, and men should not have more than two drinks per day. One drink is equal to:  ¨ 12 oz of beer.  ¨ 5 oz of wine.  ¨ 1½ oz of hard liquor.  · Do not drink on an empty stomach.  · Keep yourself hydrated. Have water, diet soda, or unsweetened iced tea.  · Regular soda, juice, and other mixers might contain a lot of carbohydrates and should be counted.  What foods are not recommended?  As you make food choices, it is important to remember that all foods are not the same. Some foods have fewer nutrients per serving than other foods, even though they might have the same number of calories or carbohydrates. It is difficult to get your body what it needs when you eat foods with fewer nutrients. Examples of foods that you should avoid that are high in calories and carbohydrates but low in nutrients include:  · Trans fats (most processed foods list trans fats on the Nutrition Facts label).  · Regular soda.  · Juice.  · Candy.  · Sweets, such as cake, pie, doughnuts, and cookies.  · Fried foods.  What foods can I eat?  Eat nutrient-rich foods, which will nourish your body and keep you healthy. The food you should eat also will depend on several factors, including:  · The calories you need.  · The medicines you take.  · Your weight.  · Your blood glucose level.  · Your blood pressure level.  · Your cholesterol level.  You should eat a variety of foods, including:  · Protein.  ¨ Lean cuts of meat.  ¨ Proteins low in saturated fats, such as fish, egg whites, and beans. Avoid processed meats.  · Fruits and vegetables.  ¨ Fruits and vegetables that may help control blood glucose levels, such as apples, mangoes, and yams.  · Dairy  products.  ¨ Choose fat-free or low-fat dairy products, such as milk, yogurt, and cheese.  · Grains, bread, pasta, and rice.  ¨ Choose whole grain products, such as multigrain bread, whole oats, and brown rice. These foods may help control blood pressure.  · Fats.  ¨ Foods containing healthful fats, such as nuts, avocado, olive oil, canola oil, and fish.  Does everyone with diabetes mellitus have the same meal plan?  Because every person with diabetes mellitus is different, there is not one meal plan that works for everyone. It is very important that you meet with a dietitian who will help you create a meal plan that is just right for you.  This information is not intended to replace advice given to you by your health care provider. Make sure you discuss any questions you have with your health care provider.  Document Released: 09/14/2006 Document Revised: 05/25/2017 Document Reviewed: 11/14/2014  [employer] Interactive Patient Education © 2017 [employer]    Diabetes Mellitus and Exercise  Exercising regularly is important for your overall health, especially when you have diabetes (diabetes mellitus). Exercising is not only about losing weight. It has many health benefits, such as increasing muscle strength and bone density and reducing body fat and stress. This leads to improved fitness, flexibility, and endurance, all of which result in better overall health.  Exercise has additional benefits for people with diabetes, including:  · Reducing appetite.  · Helping to lower and control blood glucose.  · Lowering blood pressure.  · Helping to control amounts of fatty substances (lipids) in the blood, such as cholesterol and triglycerides.  · Helping the body to respond better to insulin (improving insulin sensitivity).  · Reducing how much insulin the body needs.  · Decreasing the risk for heart disease by:  ¨ Lowering cholesterol and triglyceride levels.  ¨ Increasing the levels of good cholesterol.  ¨ Lowering blood  glucose levels.  What is my activity plan?  Your health care provider or certified diabetes educator can help you make a plan for the type and frequency of exercise (activity plan) that works for you. Make sure that you:  · Do at least 150 minutes of moderate-intensity or vigorous-intensity exercise each week. This could be brisk walking, biking, or water aerobics.  ¨ Do stretching and strength exercises, such as yoga or weightlifting, at least 2 times a week.  ¨ Spread out your activity over at least 3 days of the week.  · Get some form of physical activity every day.  ¨ Do not go more than 2 days in a row without some kind of physical activity.  ¨ Avoid being inactive for more than 90 minutes at a time. Take frequent breaks to walk or stretch.  · Choose a type of exercise or activity that you enjoy, and set realistic goals.  · Start slowly, and gradually increase the intensity of your exercise over time.  What do I need to know about managing my diabetes?  · Check your blood glucose before and after exercising.  ¨ If your blood glucose is higher than 240 mg/dL (13.3 mmol/L) before you exercise, check your urine for ketones. If you have ketones in your urine, do not exercise until your blood glucose returns to normal.  · Know the symptoms of low blood glucose (hypoglycemia) and how to treat it. Your risk for hypoglycemia increases during and after exercise. Common symptoms of hypoglycemia can include:  ¨ Hunger.  ¨ Anxiety.  ¨ Sweating and feeling clammy.  ¨ Confusion.  ¨ Dizziness or feeling light-headed.  ¨ Increased heart rate or palpitations.  ¨ Blurry vision.  ¨ Tingling or numbness around the mouth, lips, or tongue.  ¨ Tremors or shakes.  ¨ Irritability.  · Keep a rapid-acting carbohydrate snack available before, during, and after exercise to help prevent or treat hypoglycemia.  · Avoid injecting insulin into areas of the body that are going to be exercised. For example, avoid injecting insulin into:  ¨ The  arms, when playing tennis.  ¨ The legs, when jogging.  · Keep records of your exercise habits. Doing this can help you and your health care provider adjust your diabetes management plan as needed. Write down:  ¨ Food that you eat before and after you exercise.  ¨ Blood glucose levels before and after you exercise.  ¨ The type and amount of exercise you have done.  ¨ When your insulin is expected to peak, if you use insulin. Avoid exercising at times when your insulin is peaking.  · When you start a new exercise or activity, work with your health care provider to make sure the activity is safe for you, and to adjust your insulin, medicines, or food intake as needed.  · Drink plenty of water while you exercise to prevent dehydration or heat stroke. Drink enough fluid to keep your urine clear or pale yellow.  This information is not intended to replace advice given to you by your health care provider. Make sure you discuss any questions you have with your health care provider.  Document Released: 03/09/2005 Document Revised: 07/07/2017 Document Reviewed: 05/29/2017  [employer] Interactive Patient Education © 2017 [employer]    Monitoring for Diabetes  There are two blood tests that help you monitor and manage your diabetes. These include:  · An A1c (hemoglobin A1c) test.  · This test is an average of your glucose (or blood sugar) control over the past 3 months.  · This is recommended as a way for you and your caregiver to understand how well your glucose levels are controlled on the average.  · Your A1c goal will be determined by your caregiver, but it is usually best if it is less than 6.5% to 7.0%.  · Glucose (sugar) attaches itself to red blood cells. The amount of glucose then can then be measured. The amount of glucose on the cells depends on how high your blood glucose has been.  · SMBG test (self-monitoring blood glucose).  · Using a blood glucose monitor (meter) to do SMBG testing is an easy way to monitor the  amount of glucose in your blood and can help you improve your control. The monitor will tell you what your blood glucose is at that very moment. Every person with diabetes should have a blood glucose monitor and know how to use it. The better you control your blood sugar on a daily basis, the better your A1c levels will be.  HOW OFTEN SHOULD I HAVE AN A1C LEVEL?  · Every 3 months if your diabetes is not well controlled or if therapy has changed.  · Every 6 months if you are meeting your treatment goals.  HOW OFTEN SHOULD I DO SMBG TESTING?   Your caregiver will recommend how often you should test. Testing times are based on the kind of medicine you take, type of diabetes you have, and your blood glucose control. Testing times can include:  · Type 1 diabetes: test 3 or 4 times a day or as directed.  · Type 2 diabetes and if you are taking insulin and diabetes pills: test 3 or 4 times a day or as directed.  · If you are taking diabetes pills only and not reaching your target A1c: test 2 to 4 times a day or as directed.  · If you are taking diabetes pills and are controlling your diabetes well with diet and exercise, your caregiver will help you decide what is appropriate.  WHAT TIME OF DAY SHOULD I TEST?   The best time of day to test your blood glucose depends on medications, mealtimes, exercise, and blood glucose control. It is best to test at different times because this will help you know how you are doing throughout the day. Your caregiver will help you decide what is best.  WHAT SHOULD MY BLOOD GLUCOSE BE?  Blood glucose target goals may vary depending on each persons needs, whether they have type 1 or type 2 diabetes or what medications they are taking. However, as a general rule, blood glucose should be:  · Before meals    mg/dl.  · After meals    ..less than 180 mg/dl.  CHECK YOUR BLOOD GLUCOSE IF:  · You have symptoms of low blood sugar (hypoglycemia), which may include dizziness, shaking, sweating,  chills and confusion.  · You have symptoms of high blood sugar (hyperglycemia), which may include sleepiness, blurred vision, frequent urination and excessive thirst.  · You are learning how meals, physical activity and medicine affect your blood glucose level. The more you learn about how various foods, your medications, and activities affect you, the better job you will do of taking care of yourself.  · You have a job in which poor control could cause safety problems while driving or operating machinery.  CHECK YOUR BLOOD SUGAR MORE FREQUENTLY:  · If you have medication or dietary changes.  · If you begin taking other kinds of medicines.  · If you become sick or your level of stress increases. With an illness, your blood sugar may even be high without eating.  · Before and after exercise.  Follow your caregiver's testing recommendations during this time.   TO DISPOSE OF SHARPS:  Each city or state may have different regulations. Check with your public works or waste management department.  · Sharps containers can be purchased from pharmacies.  · Place all used sharps in a container. You do not need to replace any protective covers over the needle or break the needle.  · Sharps should be contained in a ridge, leakproof, puncture-resistant container.  · Plastic detergent bottle.  · Bleach bottle.  · When container is almost full, add a solution that is 1 part laundry bleach and 9 parts tap water (it is okay to use undiluted bleach if you wish). You may want to wear gloves since bleach can damage tissue. Let the solution sit for 30 minutes.  · Carefully pour all the liquid into the sanitary . Be sure to prevent the sharps from falling out.  · Once liquid is drained, reseal the container with lid and tape it shut with duct tape. This will prevent the cap from coming off.  · Dispose of the container with your regular household trash and waste. It is a good idea to let your  know that you will be  disposing of sharps.  Document Released: 12/20/2004 Document Revised: 09/11/2013 Document Reviewed: 06/21/2010  [employer]® Patient Information ©2014 [employer], [employer].

## 2018-03-29 ENCOUNTER — TELEPHONE (OUTPATIENT)
Dept: HEALTH INFORMATION MANAGEMENT | Facility: OTHER | Age: 78
End: 2018-03-29

## 2018-03-29 ENCOUNTER — PATIENT OUTREACH (OUTPATIENT)
Dept: HEALTH INFORMATION MANAGEMENT | Facility: OTHER | Age: 78
End: 2018-03-29

## 2018-03-29 NOTE — TELEPHONE ENCOUNTER
Outgoing [facility] Clinical Pharmacist requesting phone consultation in regards to patient's lisinopril concerns and possible side effects to [phone].

## 2018-03-29 NOTE — TELEPHONE ENCOUNTER
Dear Dr. Craft,    Your patient reports having a dry cough about 3-4 times daily. He states this cough has been occurring on and off for the past 2 months. Per chart review, patient had lisinopril discontinued in 11/2016 due to cough. Patient states cough was previously resolved off lisinopril. It appears lisinopril was restarted in the fall of 2017. Patient states he could not recall the name of this medication to inform his provider that it caused cough. Would you consider switching patient to an ARB such as losartan? Please advise.     Thank you for your consideration,   Magdalena Gaines, PharmD  Ext [phone]

## 2018-03-29 NOTE — PROGRESS NOTES
Received referral from [facility] patient advocate Nanci for patient questions related to lisinopril. Outbound call to Kiran. Patient reports having a dry cough about 3-4 times daily. He states this cough has been occurring on and off for the past 2 months. Per chart review, patient had lisinopril discontinued in 11/2016 due to cough. Patient states cough was previously resolved off lisinopril. It appears lisinopril was restarted in the fall of 2017. Patient states he could not recall the name of his medication to inform his provider that it caused cough. Will send message to PCP to consider ARB. Patient declines full medication review at this time but was provided with my contact information should he want to do a telephonic review or have additional medication questions/ concerns.     Magdalena Gaines, PharmD

## 2018-04-02 DIAGNOSIS — I10 ESSENTIAL HYPERTENSION: ICD-10-CM

## 2018-04-02 RX ORDER — LOSARTAN POTASSIUM 50 MG/1
50 TABLET ORAL DAILY
Qty: 30 TAB | Refills: 3 | Status: SHIPPED | OUTPATIENT
Start: 2018-04-02 | End: 2018-04-23

## 2018-04-02 NOTE — TELEPHONE ENCOUNTER
Please D/C lisinopril. I have sent a new Rx for Losartan 50 mg to [facility] for him and I want to see him in 1 month.  Jesus Craft M.D.

## 2018-04-02 NOTE — TELEPHONE ENCOUNTER
Notified patient to stop lisinopril and begin losartan 50 mg. Patient verbalizes understanding. Patient states he has appointment scheduled with [facility] on 4/25/18.     Magdalena Gaines, JeannieD

## 2018-04-03 ENCOUNTER — NON-PROVIDER VISIT (OUTPATIENT)
Dept: CARDIOLOGY | Facility: MEDICAL CENTER | Age: 78
End: 2018-04-03
Payer: MEDICARE

## 2018-04-03 PROCEDURE — 93280 PM DEVICE PROGR EVAL DUAL: CPT | Performed by: INTERNAL MEDICINE

## 2018-04-03 NOTE — NON-PROVIDER
S/p new pacemaker, implanted on 3-. Appropriate device function demonstrated. Wound site appears clear. Advised to watch for redness, swelling, oozing or fever. Pt verbalized understanding.  Pt states he is a pt of Dr. Fernando's and [facility]-. Advised to call and make appt to see Dr. Fernando for hospital f/u and device clinic. Verbalized understanding.

## 2018-04-09 ENCOUNTER — HOSPITAL ENCOUNTER (OUTPATIENT)
Dept: LAB | Facility: MEDICAL CENTER | Age: 78
End: 2018-04-09
Attending: INTERNAL MEDICINE
Payer: MEDICARE

## 2018-04-09 LAB
ALBUMIN SERPL BCP-MCNC: 3.8 G/DL (ref 3.2–4.9)
ALBUMIN/GLOB SERPL: 1.3 G/DL
ALP SERPL-CCNC: 104 U/L (ref 30–99)
ALT SERPL-CCNC: 30 U/L (ref 2–50)
ANION GAP SERPL CALC-SCNC: 10 MMOL/L (ref 0–11.9)
AST SERPL-CCNC: 22 U/L (ref 12–45)
BASOPHILS # BLD AUTO: 0.3 % (ref 0–1.8)
BASOPHILS # BLD: 0.02 K/UL (ref 0–0.12)
BILIRUB SERPL-MCNC: 0.8 MG/DL (ref 0.1–1.5)
BUN SERPL-MCNC: 25 MG/DL (ref 8–22)
CALCIUM SERPL-MCNC: 9.6 MG/DL (ref 8.5–10.5)
CHLORIDE SERPL-SCNC: 102 MMOL/L (ref 96–112)
CHOLEST SERPL-MCNC: 168 MG/DL (ref 100–199)
CO2 SERPL-SCNC: 23 MMOL/L (ref 20–33)
CREAT SERPL-MCNC: 0.94 MG/DL (ref 0.5–1.4)
CREAT UR-MCNC: 95.6 MG/DL
EOSINOPHIL # BLD AUTO: 0.04 K/UL (ref 0–0.51)
EOSINOPHIL NFR BLD: 0.6 % (ref 0–6.9)
ERYTHROCYTE [DISTWIDTH] IN BLOOD BY AUTOMATED COUNT: 50.6 FL (ref 35.9–50)
EST. AVERAGE GLUCOSE BLD GHB EST-MCNC: 169 MG/DL
GLOBULIN SER CALC-MCNC: 3 G/DL (ref 1.9–3.5)
GLUCOSE SERPL-MCNC: 150 MG/DL (ref 65–99)
HBA1C MFR BLD: 7.5 % (ref 0–5.6)
HCT VFR BLD AUTO: 46.9 % (ref 42–52)
HDLC SERPL-MCNC: 40 MG/DL
HGB BLD-MCNC: 15.6 G/DL (ref 14–18)
IMM GRANULOCYTES # BLD AUTO: 0.06 K/UL (ref 0–0.11)
IMM GRANULOCYTES NFR BLD AUTO: 0.9 % (ref 0–0.9)
LDLC SERPL CALC-MCNC: 96 MG/DL
LYMPHOCYTES # BLD AUTO: 1.1 K/UL (ref 1–4.8)
LYMPHOCYTES NFR BLD: 16.6 % (ref 22–41)
MCH RBC QN AUTO: 31.8 PG (ref 27–33)
MCHC RBC AUTO-ENTMCNC: 33.3 G/DL (ref 33.7–35.3)
MCV RBC AUTO: 95.5 FL (ref 81.4–97.8)
MICROALBUMIN UR-MCNC: 0.7 MG/DL
MICROALBUMIN/CREAT UR: 7 MG/G (ref 0–30)
MONOCYTES # BLD AUTO: 0.51 K/UL (ref 0–0.85)
MONOCYTES NFR BLD AUTO: 7.7 % (ref 0–13.4)
NEUTROPHILS # BLD AUTO: 4.89 K/UL (ref 1.82–7.42)
NEUTROPHILS NFR BLD: 73.9 % (ref 44–72)
NRBC # BLD AUTO: 0 K/UL
NRBC BLD-RTO: 0 /100 WBC
PLATELET # BLD AUTO: 136 K/UL (ref 164–446)
PMV BLD AUTO: 11.1 FL (ref 9–12.9)
POTASSIUM SERPL-SCNC: 4.5 MMOL/L (ref 3.6–5.5)
PROT SERPL-MCNC: 6.8 G/DL (ref 6–8.2)
RBC # BLD AUTO: 4.91 M/UL (ref 4.7–6.1)
SODIUM SERPL-SCNC: 135 MMOL/L (ref 135–145)
TRIGL SERPL-MCNC: 158 MG/DL (ref 0–149)
WBC # BLD AUTO: 6.6 K/UL (ref 4.8–10.8)

## 2018-04-09 PROCEDURE — 36415 COLL VENOUS BLD VENIPUNCTURE: CPT | Mod: GZ

## 2018-04-09 PROCEDURE — 85025 COMPLETE CBC W/AUTO DIFF WBC: CPT

## 2018-04-09 PROCEDURE — 82570 ASSAY OF URINE CREATININE: CPT

## 2018-04-09 PROCEDURE — 80061 LIPID PANEL: CPT

## 2018-04-09 PROCEDURE — 82043 UR ALBUMIN QUANTITATIVE: CPT

## 2018-04-09 PROCEDURE — 80053 COMPREHEN METABOLIC PANEL: CPT

## 2018-04-09 PROCEDURE — 83036 HEMOGLOBIN GLYCOSYLATED A1C: CPT | Mod: GZ

## 2018-04-17 RX ORDER — TAMSULOSIN HYDROCHLORIDE 0.4 MG/1
CAPSULE ORAL
Qty: 90 CAP | Refills: 3 | Status: SHIPPED | OUTPATIENT
Start: 2018-04-17 | End: 2018-11-12 | Stop reason: SDUPTHER

## 2018-04-19 ENCOUNTER — TELEPHONE (OUTPATIENT)
Dept: VASCULAR LAB | Facility: MEDICAL CENTER | Age: 78
End: 2018-04-19

## 2018-04-19 NOTE — TELEPHONE ENCOUNTER
Spoke with pt to remind that it is time for an INR. He states that he gets it once a month and it is not due until end of next wee. BETO Cabrera.

## 2018-04-23 ENCOUNTER — OFFICE VISIT (OUTPATIENT)
Dept: MEDICAL GROUP | Facility: PHYSICIAN GROUP | Age: 78
End: 2018-04-23
Payer: MEDICARE

## 2018-04-23 VITALS
HEIGHT: 70 IN | WEIGHT: 252 LBS | SYSTOLIC BLOOD PRESSURE: 110 MMHG | TEMPERATURE: 98 F | DIASTOLIC BLOOD PRESSURE: 72 MMHG | BODY MASS INDEX: 36.08 KG/M2 | HEART RATE: 82 BPM | RESPIRATION RATE: 16 BRPM | OXYGEN SATURATION: 95 %

## 2018-04-23 DIAGNOSIS — Z95.0 CARDIAC PACEMAKER: ICD-10-CM

## 2018-04-23 DIAGNOSIS — Z09 HOSPITAL DISCHARGE FOLLOW-UP: ICD-10-CM

## 2018-04-23 DIAGNOSIS — I44.2 THIRD DEGREE AV BLOCK (HCC): ICD-10-CM

## 2018-04-23 DIAGNOSIS — I50.9 CHRONIC CONGESTIVE HEART FAILURE, UNSPECIFIED CONGESTIVE HEART FAILURE TYPE: ICD-10-CM

## 2018-04-23 DIAGNOSIS — C69.92: ICD-10-CM

## 2018-04-23 PROCEDURE — 99214 OFFICE O/P EST MOD 30 MIN: CPT | Performed by: NURSE PRACTITIONER

## 2018-04-23 RX ORDER — METOPROLOL SUCCINATE 25 MG/1
25 TABLET, EXTENDED RELEASE ORAL DAILY
COMMUNITY
End: 2018-11-12

## 2018-04-23 RX ORDER — CHLORTHALIDONE 25 MG/1
25 TABLET ORAL DAILY
COMMUNITY
End: 2018-11-12

## 2018-04-23 RX ORDER — LISINOPRIL 20 MG/1
20 TABLET ORAL DAILY
COMMUNITY
End: 2018-04-23

## 2018-04-23 RX ORDER — ATORVASTATIN CALCIUM 20 MG/1
20 TABLET, FILM COATED ORAL NIGHTLY
COMMUNITY
End: 2018-11-12 | Stop reason: SDUPTHER

## 2018-04-23 RX ORDER — LISINOPRIL 20 MG/1
20 TABLET ORAL DAILY
COMMUNITY
End: 2018-05-06

## 2018-04-23 NOTE — PROGRESS NOTES
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 Garcia. After surgery, he was hypoxic and found to have third degree heart block, then EKG revealed Mobitz 1 second-degree block with bradycardia HR 30-40. Pacemaker was placed on 3/23. Chest x-ray was normal. He does have hx of chronic DVT and PE managed with chronic warfarin. INR was subtherapeutic for a few days, but INR is now being monitored by Coumadin clinic again and is stable.   In regard to his left eye, appointment with Dr. Garcia was two weeks ago and he was happy with progress. Next appointment is this afternoon. No new unusual headaches, dizziness, N/V. No F/C  He has not f/u with cardiology Dr. Fernando since discharge. Appointment scheduled in two weeks.  He was given the medtronic implant, but their home router is not working. No new sob, KING, dyspnea, chest pain, palpitations.   He monitors bp at home every Monday. It is low normal range today. He is on room air and oxygen levels are normal.   Subjective:     Chief Complaint   Patient presents with   • Eye Problem   • Pacemaker Check/Dysfunction   • Hospital Follow-up       HPI  Kiran Eason is a 78 y.o. 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 Garcia. After surgery, he was hypoxic and found to have third degree heart block, then EKG revealed Mobitz 1 second-degree block with bradycardia HR 30-40. Pacemaker was placed on 3/23. Chest x-ray was normal. He does have hx of chronic DVT and PE managed with chronic warfarin. INR was subtherapeutic for a few days, but INR is now being monitored by Coumadin clinic again and is stable.   In regard to his left eye, appointment with Dr. Garcia was two weeks ago and he was happy with progress. Next appointment is this afternoon. No new unusual headaches, dizziness, N/V. No F/C  He has not f/u with cardiology Dr. Fernando since discharge. Appointment scheduled in  two weeks.  He was given the medtronic implant, but their home router is not working. No new sob, KING, dyspnea, chest pain, palpitations.   He monitors bp at home every Monday. It is low normal range today. He is on room air and oxygen levels are normal.     Chronic congestive heart failure (CMS-HCC)  Chronic problem managed with metoprolol, lisinopril and chlorthalidone. Followed by cardiology. 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    Allergies: Lisinopril  Current medicines (including changes today)  Current Outpatient Prescriptions   Medication Sig Dispense Refill   • chlorthalidone (HYGROTON) 25 MG Tab Take 25 mg by mouth every day.     • metoprolol SR (TOPROL XL) 25 MG TABLET SR 24 HR Take 25 mg by mouth every day.     • atorvastatin (LIPITOR) 20 MG Tab Take 20 mg by mouth every evening.     • lisinopril (PRINIVIL) 20 MG Tab Take 20 mg by mouth every day.     • tamsulosin (FLOMAX) 0.4 MG capsule TAKE 1 CAPSULE BY MOUTH AT  BEDTIME 90 Cap 3   • warfarin (COUMADIN) 3 MG Tab Take 1 Tab by mouth every day. 17 Tab 0   • finasteride (PROSCAR) 5 MG Tab Take 1 Tab by mouth every morning. 30 Tab 0   • latanoprost (XALATAN) 0.005 % Solution Place 1 Drop in both eyes every evening. 1 Bottle 1   • ipratropium-albuterol (COMBIVENT RESPIMAT)  MCG/ACT Aero Soln Inhale 1 Puff by mouth 4 times a day as needed (SOB).     • Esomeprazole Magnesium (NEXIUM) 20 MG Pack Take 20 mg by mouth every morning.     • bimatoprost (LUMIGAN) 0.01 % Solution Place 1 Drop in both eyes every bedtime.     • potassium chloride ER (KLOR-CON) 10 MEQ tablet Take 1 Tab by mouth every day. 90 Tab 3     No current facility-administered medications for this visit.        He  has a past medical  "history of Arrhythmia; Arthritis; Arthropathy, unspecified, site unspecified; At risk for sleep apnea; Back pain (age 30); Blood clotting disorder (CMS-HCC) (current 2017); BPH (benign prostatic hyperplasia); Breath shortness; Bronchitis (12/2014); Cancer (CMS-HCC); Cancer (CMS-HCC) (6/06); Cataract; Chronic congestive heart failure (CMS-HCC) (6/6/2017); Disorders of bursae and tendons in shoulder region, unspecified; Esophageal stricture (07/2007); Essential hypertension, benign; Foot fracture (12/07); Gastric ulcer (07/2007); GERD (gastroesophageal reflux disease); Glaucoma; Heart burn; Hypertension; Hypertrophy of prostate without urinary obstruction and other lower urinary tract symptoms (LUTS); Indigestion; Pain (2/13/12); Personal history of venous thrombosis and embolism (11/2008); Pneumonia; Pneumonia (2004); Reflux esophagitis; Rosacea; Skin cancer; Sleep apnea; Snoring; Unspecified cataract; Unspecified glaucoma(365.9); and Urinary incontinence. He also has no past medical history of Bronchitis; COPD (chronic obstructive pulmonary disease) (CMS-HCC); or Fall.      ROS  As stated in HPI and additionally  Gen: No F/C  Neuro: No headache or dizziness  CV: No chest pain, KING, palpitations, syncope or near-syncope, or LE edema  Pulm: No sob or dyspnea     Objective:     Blood pressure 110/72, pulse 82, temperature 36.7 °C (98 °F), resp. rate 16, height 1.778 m (5' 10\"), weight 114.3 kg (252 lb), SpO2 95 %. Body mass index is 36.16 kg/m².  Physical Exam:  General: Alert, oriented, in no acute distress.  Eye contact is good, speech goal directed, affect calm  CNs grossly intact.  Eye: OD not assessed as it is covered with bandage and he is going to see surgeon today    Gross hearing intact.  Neck: Supple. No adenopathy or masses in the cervical or supraclavicular regions.  Lungs: unlabored. clear to auscultation bilaterally with good excursion.  CV: regular rate and rhythm. No murmurs. No carotid bruits.   Ext: " no edema, normal color and temperature.   Skin: No rashes or lesions in visible areas  Gait steady.     Assessment and Plan:   Assessment/Plan:  1. Hospital discharge follow-up  Stable. I have reviewed all hospital records including lab results, imaging studies, progress notes, and admission and discharge summaries.  Educated patient on s/sx to observe for and what action to take when these occur. Reviewed current medications and educated on importance of compliance with these medications all appropriate follow-up visits are scheduled.     2. 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 Removed with orbitectomy. F/u with Dr. Garcia today     3. Third degree AV block (CMS-HCC)  Stable resolved with pacemaker. F/u with cardiology     4. Cardiac pacemaker  Stable continue f/u with cardiology     5. Chronic congestive heart failure, unspecified congestive heart failure type (CMS-HCC)  Stable continue f/u with cardiology        Follow up:  Return in about 3 months (around 7/23/2018).    Educated in proper administration of medication(s) ordered today including safety, possible SE, risks, benefits, rationale and alternatives to therapy.   Supportive care, differential diagnoses, and indications for immediate follow-up discussed with patient.    Pathogenesis of diagnosis discussed including typical length and natural progression.    Instructed to return to clinic or nearest emergency department for any change in condition, further concerns, or worsening of symptoms.  Patient states understanding of the plan of care and discharge instructions.      Please note that this dictation was created using voice recognition software. I have made every reasonable attempt to correct obvious errors, but I expect that there are errors of grammar and possibly content that I did not discover before finalizing the note.    Followup: Return in about 3 months (around 7/23/2018). sooner should new symptoms or problems arise.

## 2018-04-24 PROBLEM — J95.821 RESPIRATORY FAILURE FOLLOWING TRAUMA AND SURGERY (HCC): Status: RESOLVED | Noted: 2017-06-22 | Resolved: 2018-04-24

## 2018-04-24 PROBLEM — M25.531 RIGHT WRIST PAIN: Status: RESOLVED | Noted: 2017-09-20 | Resolved: 2018-04-24

## 2018-04-24 PROBLEM — R22.31 LOCALIZED SWELLING ON RIGHT HAND: Status: RESOLVED | Noted: 2017-09-20 | Resolved: 2018-04-24

## 2018-04-24 PROBLEM — S21.439A: Status: RESOLVED | Noted: 2017-06-02 | Resolved: 2018-04-24

## 2018-04-25 ENCOUNTER — PATIENT OUTREACH (OUTPATIENT)
Dept: HEALTH INFORMATION MANAGEMENT | Facility: OTHER | Age: 78
End: 2018-04-25

## 2018-04-25 NOTE — ASSESSMENT & PLAN NOTE
Chronic problem managed with metoprolol, lisinopril and chlorthalidone. Followed by cardiology. Last echo stable July 2017

## 2018-04-30 NOTE — PROGRESS NOTES
Kiran Eason was an emergent admission who discharged on 3/25. Patient advocate was able to assist the patient by coordinating a phone consult with the pharmacist of the Lisinopril medication he was taking that was determined to be an allergy and they changed him to a alternative medication, assisted with coordinating the delivery of his DME equipment through [employer], and with scheduling a sooner PCP appointment. Patient kept appointments with his PCP on 3/27, Cardiology on 4/3, Laboratory Services 4/9, and another follow up with his PCP on 4/23. PPS 70%

## 2018-05-03 ENCOUNTER — ANTICOAGULATION MONITORING (OUTPATIENT)
Dept: VASCULAR LAB | Facility: MEDICAL CENTER | Age: 78
End: 2018-05-03

## 2018-05-03 DIAGNOSIS — I82.539 CHRONIC DEEP VEIN THROMBOSIS (DVT) OF POPLITEAL VEIN, UNSPECIFIED LATERALITY (HCC): ICD-10-CM

## 2018-05-03 LAB — INR PPP: 2.7 (ref 2–3.5)

## 2018-05-03 NOTE — PROGRESS NOTES
Anticoagulation Summary  As of 5/3/2018    INR goal:   2.0-3.0   TTR:   65.3 % (1.7 y)   Today's INR:   2.7   Warfarin maintenance plan:   3 mg (1 mg x 3) on Mon; 2 mg (1 mg x 2) all other days   Weekly warfarin total:   15 mg   No change documented:   Gayla Palomino Med Ass't   Plan last modified:   Zafar Eubanks, Nancy (8/16/2017)   Next INR check:   5/17/2018   Target end date:   Indefinite    Indications    Chronic deep vein thrombosis (DVT) of popliteal vein (HCC) [I82.539]             Anticoagulation Episode Summary     INR check location:       Preferred lab:   [facility]    Send INR reminders to:       Comments:         Anticoagulation Care Providers     Provider Role Specialty Phone number    Jesus Craft M.D. Referring Family Medicine [phone]    [facility] Anticoagulation Services Responsible  [phone]    Zafar Eubanks, PharmD Responsible          Anticoagulation Patient Findings  Patient Findings     Negatives:   Signs/symptoms of thrombosis, Signs/symptoms of bleeding, Laboratory test error suspected, Change in health, Change in alcohol use, Change in activity, Upcoming invasive procedure, Emergency department visit, Upcoming dental procedure, Missed doses, Extra doses, Change in medications, Change in diet/appetite, Hospital admission, Bruising, Other complaints        Spoke with patient to report a therapeutic INR.  Pt instructed to continue with current warfarin dosing regimen. Pt denies any s/s of bleeding, bruising, clotting or any changes to diet or medication.  Will follow up in 2 weeks.    Gayla Palomino Med Ass't    I have reviewed and am in agreement with the above stated plan on 5-4-18.  Zafar Eubanks, PharmD

## 2018-05-06 DIAGNOSIS — I10 ESSENTIAL HYPERTENSION, BENIGN: ICD-10-CM

## 2018-05-06 RX ORDER — VALSARTAN 80 MG/1
80 TABLET ORAL DAILY
Qty: 90 TAB | Refills: 3 | Status: SHIPPED | OUTPATIENT
Start: 2018-05-06 | End: 2018-11-12

## 2018-05-30 ENCOUNTER — ANTICOAGULATION MONITORING (OUTPATIENT)
Dept: VASCULAR LAB | Facility: MEDICAL CENTER | Age: 78
End: 2018-05-30

## 2018-05-30 DIAGNOSIS — I82.539 CHRONIC DEEP VEIN THROMBOSIS (DVT) OF POPLITEAL VEIN, UNSPECIFIED LATERALITY (HCC): ICD-10-CM

## 2018-05-30 LAB — INR PPP: 2.7 (ref 2–3.5)

## 2018-05-30 NOTE — PROGRESS NOTES
Anticoagulation Summary  As of 5/30/2018    INR goal:   2.0-3.0   TTR:   66.7 % (1.8 y)   Today's INR:   2.7   Warfarin maintenance plan:   3 mg (1 mg x 3) on Mon; 2 mg (1 mg x 2) all other days   Weekly warfarin total:   15 mg   Plan last modified:   Zafar Eubanks PharmD (8/16/2017)   Next INR check:   6/27/2018   Target end date:   Indefinite    Indications    Chronic deep vein thrombosis (DVT) of popliteal vein (HCC) [I82.539]             Anticoagulation Episode Summary     INR check location:       Preferred lab:   [facility]    Send INR reminders to:       Comments:         Anticoagulation Care Providers     Provider Role Specialty Phone number    Jesus Craft M.D. Referring Family Medicine [phone]    [facility] Anticoagulation Services Responsible  [phone]    Zafar Eubanks, PharmD Responsible          Anticoagulation Patient Findings  Patient Findings     Negatives:   Signs/symptoms of thrombosis, Signs/symptoms of bleeding, Laboratory test error suspected, Change in health, Change in alcohol use, Change in activity, Upcoming invasive procedure, Emergency department visit, Upcoming dental procedure, Missed doses, Extra doses, Change in medications, Change in diet/appetite, Hospital admission, Bruising, Other complaints        Spoke with patient today regarding therapeutic INR of 2.7.  Patient denies any signs/symptoms of bruising or bleeding or any changes in diet and medications.  Instructed patient to call clinic with any questions or concerns.  Pt is to continue with current warfarin dosing regimen.  Follow up in 4 weeks, to reduce risk of adverse events related to this high risk medication,  Warfarin.    Zafar Eubanks, JeannieD

## 2018-06-11 ENCOUNTER — TELEPHONE (OUTPATIENT)
Dept: MEDICAL GROUP | Facility: PHYSICIAN GROUP | Age: 78
End: 2018-06-11

## 2018-06-11 DIAGNOSIS — E11.9 TYPE 2 DIABETES MELLITUS WITHOUT COMPLICATION, WITHOUT LONG-TERM CURRENT USE OF INSULIN (HCC): ICD-10-CM

## 2018-06-11 NOTE — TELEPHONE ENCOUNTER
VOICEMAIL  1. Caller Name: Kiran Eason                        Call Back Number: [phone] (home)       2. Message: pt is calling to get a lab slip to check his sugars.       3. Patient approves office to leave a detailed voicemail/MyChart message: N\A

## 2018-06-28 DIAGNOSIS — I26.99 PULMONARY EMBOLISM ON RIGHT (HCC): ICD-10-CM

## 2018-06-28 DIAGNOSIS — I82.402 LEG DVT (DEEP VENOUS THROMBOEMBOLISM), ACUTE, LEFT (HCC): ICD-10-CM

## 2018-06-29 ENCOUNTER — TELEPHONE (OUTPATIENT)
Dept: MEDICAL GROUP | Facility: PHYSICIAN GROUP | Age: 78
End: 2018-06-29

## 2018-06-30 RX ORDER — WARFARIN SODIUM 1 MG/1
TABLET ORAL
Qty: 135 TAB | Refills: 3 | Status: SHIPPED | OUTPATIENT
Start: 2018-06-30 | End: 2018-11-27 | Stop reason: SDUPTHER

## 2018-06-30 NOTE — TELEPHONE ENCOUNTER
Called patient to verify what needles and strips he needed but he was unsure. Please address with patient at appointment on Tuesday.

## 2018-07-02 ENCOUNTER — HOSPITAL ENCOUNTER (OUTPATIENT)
Dept: LAB | Facility: MEDICAL CENTER | Age: 78
End: 2018-07-02
Attending: INTERNAL MEDICINE
Payer: MEDICARE

## 2018-07-02 LAB
ALBUMIN SERPL BCP-MCNC: 3.6 G/DL (ref 3.2–4.9)
ALBUMIN/GLOB SERPL: 1.5 G/DL
ALP SERPL-CCNC: 107 U/L (ref 30–99)
ALT SERPL-CCNC: 49 U/L (ref 2–50)
ANION GAP SERPL CALC-SCNC: 9 MMOL/L (ref 0–11.9)
AST SERPL-CCNC: 31 U/L (ref 12–45)
BASOPHILS # BLD AUTO: 0.6 % (ref 0–1.8)
BASOPHILS # BLD: 0.03 K/UL (ref 0–0.12)
BILIRUB SERPL-MCNC: 0.6 MG/DL (ref 0.1–1.5)
BUN SERPL-MCNC: 19 MG/DL (ref 8–22)
CALCIUM SERPL-MCNC: 9.1 MG/DL (ref 8.5–10.5)
CHLORIDE SERPL-SCNC: 108 MMOL/L (ref 96–112)
CO2 SERPL-SCNC: 23 MMOL/L (ref 20–33)
CREAT SERPL-MCNC: 1.1 MG/DL (ref 0.5–1.4)
CRP SERPL HS-MCNC: 0.64 MG/DL (ref 0–0.75)
EOSINOPHIL # BLD AUTO: 0.1 K/UL (ref 0–0.51)
EOSINOPHIL NFR BLD: 2 % (ref 0–6.9)
ERYTHROCYTE [DISTWIDTH] IN BLOOD BY AUTOMATED COUNT: 53.4 FL (ref 35.9–50)
ERYTHROCYTE [SEDIMENTATION RATE] IN BLOOD BY WESTERGREN METHOD: 11 MM/HOUR (ref 0–20)
GLOBULIN SER CALC-MCNC: 2.4 G/DL (ref 1.9–3.5)
GLUCOSE SERPL-MCNC: 108 MG/DL (ref 65–99)
HCT VFR BLD AUTO: 42.7 % (ref 42–52)
HGB BLD-MCNC: 13.7 G/DL (ref 14–18)
IMM GRANULOCYTES # BLD AUTO: 0.09 K/UL (ref 0–0.11)
IMM GRANULOCYTES NFR BLD AUTO: 1.8 % (ref 0–0.9)
LYMPHOCYTES # BLD AUTO: 1.29 K/UL (ref 1–4.8)
LYMPHOCYTES NFR BLD: 26.1 % (ref 22–41)
MCH RBC QN AUTO: 31.9 PG (ref 27–33)
MCHC RBC AUTO-ENTMCNC: 32.1 G/DL (ref 33.7–35.3)
MCV RBC AUTO: 99.5 FL (ref 81.4–97.8)
MONOCYTES # BLD AUTO: 0.38 K/UL (ref 0–0.85)
MONOCYTES NFR BLD AUTO: 7.7 % (ref 0–13.4)
NEUTROPHILS # BLD AUTO: 3.05 K/UL (ref 1.82–7.42)
NEUTROPHILS NFR BLD: 61.8 % (ref 44–72)
NRBC # BLD AUTO: 0 K/UL
NRBC BLD-RTO: 0 /100 WBC
PLATELET # BLD AUTO: 157 K/UL (ref 164–446)
PMV BLD AUTO: 11.2 FL (ref 9–12.9)
POTASSIUM SERPL-SCNC: 4.5 MMOL/L (ref 3.6–5.5)
PROT SERPL-MCNC: 6 G/DL (ref 6–8.2)
RBC # BLD AUTO: 4.29 M/UL (ref 4.7–6.1)
SODIUM SERPL-SCNC: 140 MMOL/L (ref 135–145)
WBC # BLD AUTO: 4.9 K/UL (ref 4.8–10.8)

## 2018-07-02 PROCEDURE — 85025 COMPLETE CBC W/AUTO DIFF WBC: CPT

## 2018-07-02 PROCEDURE — 86140 C-REACTIVE PROTEIN: CPT

## 2018-07-02 PROCEDURE — 85652 RBC SED RATE AUTOMATED: CPT

## 2018-07-02 PROCEDURE — 80053 COMPREHEN METABOLIC PANEL: CPT

## 2018-07-03 ENCOUNTER — OFFICE VISIT (OUTPATIENT)
Dept: MEDICAL GROUP | Facility: PHYSICIAN GROUP | Age: 78
End: 2018-07-03
Payer: MEDICARE

## 2018-07-03 VITALS
RESPIRATION RATE: 16 BRPM | OXYGEN SATURATION: 94 % | WEIGHT: 258 LBS | HEIGHT: 70 IN | HEART RATE: 95 BPM | SYSTOLIC BLOOD PRESSURE: 134 MMHG | TEMPERATURE: 98.2 F | DIASTOLIC BLOOD PRESSURE: 68 MMHG | BODY MASS INDEX: 36.94 KG/M2

## 2018-07-03 DIAGNOSIS — Z86.19 HISTORY OF SEPSIS: ICD-10-CM

## 2018-07-03 DIAGNOSIS — D68.32 HEMORRHAGIC DISORDER DUE TO EXTRINSIC CIRCULATING ANTICOAGULANTS (HCC): ICD-10-CM

## 2018-07-03 DIAGNOSIS — I50.42 CHRONIC COMBINED SYSTOLIC AND DIASTOLIC CONGESTIVE HEART FAILURE (HCC): ICD-10-CM

## 2018-07-03 DIAGNOSIS — C44.121 SQUAMOUS CELL CANCER OF SKIN OF EYELID, LEFT: ICD-10-CM

## 2018-07-03 DIAGNOSIS — Z86.711 HISTORY OF PULMONARY EMBOLUS (PE): ICD-10-CM

## 2018-07-03 DIAGNOSIS — I25.10 CORONARY ARTERY DISEASE INVOLVING NATIVE CORONARY ARTERY OF NATIVE HEART WITHOUT ANGINA PECTORIS: ICD-10-CM

## 2018-07-03 DIAGNOSIS — Z95.0 CARDIAC PACEMAKER IN SITU: ICD-10-CM

## 2018-07-03 PROCEDURE — 99215 OFFICE O/P EST HI 40 MIN: CPT | Performed by: FAMILY MEDICINE

## 2018-07-03 RX ORDER — FUROSEMIDE 40 MG/1
40 TABLET ORAL DAILY
COMMUNITY
End: 2018-11-12

## 2018-07-03 RX ORDER — GLIPIZIDE 2.5 MG/1
2.5 TABLET, EXTENDED RELEASE ORAL DAILY
COMMUNITY
End: 2018-07-25 | Stop reason: SDUPTHER

## 2018-07-03 RX ORDER — LISINOPRIL 20 MG/1
20 TABLET ORAL DAILY
COMMUNITY
End: 2018-11-12

## 2018-07-04 NOTE — PROGRESS NOTES
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  It has gotten so extensive that he had enucleation of the left eye and an orbitotomy done in March of this year with Dr. Garcia.  He unfortunately developed sepsis and had to be hospitalized for IV antibiotics recently.  He is finishing his course of IV antibiotics.  He had his cardiac status evaluated.  While I was happening he was left on both aspirin and Coumadin.  He wonders why he needs to continue taking aspirin because his Coumadin levels are therapeutic.  He has a past history of pulmonary emboli.  He denies shortness of breath or chest pains now.  He now has a cardiac pacemaker in place    I reviewed the following    Past Medical History:   Diagnosis Date   • Arrhythmia     unsure of time. thinks he was in hospital    • Arthritis    • Arthropathy, unspecified, site unspecified    • At risk for sleep apnea    • Back pain age 30    lower back pain   • Blood clotting disorder (HCC) current 2017    DVT and PE   • BPH (benign prostatic hyperplasia)    • Breath shortness    • Bronchitis 12/2014   • Cancer ([facility])     squamous cell carinoma left eye    • Cancer ([facility]) 6/06    lower lip skin cancer--   • Cataract     cataract and glacoma   • Chronic congestive heart failure ([facility]) 6/6/2017    pt denies   • Disorders of bursae and tendons in shoulder region, unspecified    • Esophageal stricture 07/2007   • Essential hypertension, benign    • Foot fracture 12/07   • Gastric ulcer 07/2007   • GERD (gastroesophageal reflux disease)    • Glaucoma    • Heart burn    • Hypertension    • Hypertrophy of prostate without urinary obstruction and other lower urinary tract symptoms (LUTS)    • Indigestion    • Pain 2/13/12    2/10 ankles, lower back   • Personal history of venous thrombosis and embolism 11/2008    post knee replacement -took coumadin then   • Pneumonia     2007   • Pneumonia 2004    • Reflux esophagitis    • Rosacea    • Skin cancer    • Sleep apnea     cpap    • Snoring    • Unspecified cataract     ONE REMOVED   • Unspecified glaucoma(365.9)    • Urinary incontinence     occasional incontinence         Past Surgical History:   Procedure Laterality Date   • ORBITOTOMY Left 3/21/2018    Procedure: ORBITOTOMY- ORBITAL EXENTERTATION WITH PLACEMENT OF SKIN GRAFT INTO LEFT SOCKET;  Surgeon: Artem Garcia M.D.;  Location: SURGERY SAME DAY [facility];  Service: Ophthalmology   • EYE ENUCLEATION Left 03/21/2018    Also with orbitotomy.  Done by Dr. Artem Garcia and Dr. Frederick Hagen   • EXPLORATORY LAPAROTOMY  6/21/2017    Procedure: EXPLORATORY LAPAROTOMY;  Surgeon: Wilfred Amin M.D.;  Location: SURGERY [facility];  Service:    • RECOVERY  6/29/2016    Procedure: VASCULAR CASE-ARTHUR-INFERIOR VENA CAVA FILTER PLACEMENT 37191-DEEP VEIN THROMBOSIS I82.401;  Surgeon: Recoveryon Surgery;  Location: SURGERY PRE-POST PROC UNIT [facility];  Service:    • FLAP CLOSURE  4/1/2015    Performed by Artem Garcia M.D. at SURGERY [facility]   • EYE LESION EXCISION  2/11/2015    Performed by Artem Garcia M.D. at SURGERY [facility]   • CARPAL TUNNEL RELEASE  9/2012    left side   • FULL THICKNESS SKIN GRAFT  4/3/2012    Performed by JAMESON PATEL at SURGERY SAME DAY [facility]   • BLEPHAROPLASTY  3/6/2012    Performed by JAMESON PATEL at SURGERY SAME DAY [facility]   • FULL THICKNESS SKIN GRAFT  3/6/2012    Performed by JAMESON PATEL at SURGERY SAME DAY [facility] ORS   • ATHROPLASTY  11/18/08    Bilat. TKRs   • KNEE ARTHROPLASTY TOTAL  11/18/08    Performed by MICHEAL WINSTON at SURGERY [facility]   • OPEN REDUCTION  12/07    left foot   • FOOT SURGERY  12/2007    left foot    • OTHER      IVC filter 2016   • OTHER      skin cancer removed   • OTHER ORTHOPEDIC SURGERY  greater than 10 years ago    bilat knee replacement with hardware   • OTHER ORTHOPEDIC SURGERY  greater than  10 years    left ankle / foot repair complted 2 years before knee surgery       Allergies   Allergen Reactions   • Lisinopril      Cough         Current Outpatient Prescriptions   Medication Sig Dispense Refill   • glipiZIDE SR (GLUCOTROL) 2.5 MG TABLET SR 24 HR Take 2.5 mg by mouth every day.     • metformin (GLUCOPHAGE) 1000 MG tablet Take 1,000 mg by mouth 2 times a day, with meals.     • furosemide (LASIX) 40 MG Tab Take 40 mg by mouth every day.     • lisinopril (PRINIVIL) 20 MG Tab Take 20 mg by mouth every day.     • chlorthalidone (HYGROTON) 25 MG Tab Take 25 mg by mouth every day.     • atorvastatin (LIPITOR) 20 MG Tab Take 20 mg by mouth every evening.     • tamsulosin (FLOMAX) 0.4 MG capsule TAKE 1 CAPSULE BY MOUTH AT  BEDTIME 90 Cap 3   • warfarin (COUMADIN) 3 MG Tab Take 1 Tab by mouth every day. 17 Tab 0   • finasteride (PROSCAR) 5 MG Tab Take 1 Tab by mouth every morning. 30 Tab 0   • latanoprost (XALATAN) 0.005 % Solution Place 1 Drop in both eyes every evening. 1 Bottle 1   • ipratropium-albuterol (COMBIVENT RESPIMAT)  MCG/ACT Aero Soln Inhale 1 Puff by mouth 4 times a day as needed (SOB).     • Esomeprazole Magnesium (NEXIUM) 20 MG Pack Take 20 mg by mouth every morning.     • bimatoprost (LUMIGAN) 0.01 % Solution Place 1 Drop in both eyes every bedtime.     • warfarin (COUMADIN) 1 MG Tab TAKE 1 TABLET BY MOUTH  EVERY OTHER DAY ALTERNATING WITH 2 TABLETS EVERY OTHER   Tab 3   • valsartan (DIOVAN) 80 MG Tab Take 1 Tab by mouth every day. 90 Tab 3   • metoprolol SR (TOPROL XL) 25 MG TABLET SR 24 HR Take 25 mg by mouth every day.     • potassium chloride ER (KLOR-CON) 10 MEQ tablet Take 1 Tab by mouth every day. 90 Tab 3     No current facility-administered medications for this visit.         History reviewed. No pertinent family history.    Social History     Social History   • Marital status:      Spouse name: N/A   • Number of children: N/A   • Years of education: N/A      Occupational History   • Not on file.     Social History Main Topics   • Smoking status: Never Smoker   • Smokeless tobacco: Former User     Types: Chew     Quit date: 6/3/1967   • Alcohol use 0.0 - 0.6 oz/week      Comment: ocassionally   • Drug use: No   • Sexual activity: No     Other Topics Concern   • Not on file     Social History Narrative    ** Merged History Encounter **         ** Merged History Encounter **           I reviewed his records from [facility]    Physical Exam   Constitutional: He is oriented. He appears well-developed and well-nourished. No distress.   HENT:   Head: Normocephalic and atraumatic.   Right Ear: External ear normal. Ear canal and TM normal   Left Ear: External ear normal. Ear canal and TM normal   Nose: Nose normal.   Mouth/Throat: Oropharynx is clear and moist.   Eyes: He is wearing a sterile gauze patch over his left orbit area.  It is soaked with Betadine.  His eye doctor is happy with the progress he is making.  Neck: No thyromegaly present.   Cardiovascular: Normal rate, regular rhythm, normal heart sounds and intact distal pulses.  Exam reveals no gallop.    No murmur heard.  Pulmonary/Chest: Effort normal and breath sounds normal. No respiratory distress. He has no wheezes. He has no rales.   Abdominal: Soft. Bowel sounds are normal. No hepatosplenomegaly. He exhibits no distension. No tenderness. He has no rebound and no guarding.   Musculoskeletal: Normal range of motion. He exhibits no edema and no tenderness.   Lymphadenopathy:     He has no cervical adenopathy.  No supraclavicular adenopathy.   Neurological: He is alert and oriented. He has normal reflexes.        Babinskis downgoing bilaterally   Skin: Skin is warm and dry. No rash noted. No erythema.   Psychiatric: He has a normal mood and appropriate affect. His behavior is normal. Judgment and thought content normal.    He brings in blood sugar readings which are between 115 and 130.    1.  History of sepsis   finish IV antibiotics.  He has a port in his right brachial area    2. History of pulmonary embolus (PE)   doing well.  Continue anticoagulation   3. 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  Continue anticoagulation   4. Chronic combined systolic and diastolic congestive heart failure (HCC)   stable   5. Coronary artery disease involving native coronary artery of native heart without angina pectoris   doing well   6. Cardiac pacemaker in situ   doing well   7. 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  Right now he is doing well.     Please note that this dictation was created using voice recognition software. I have worked with consultants from the vendor as well as technical experts from [employer] to optimize the interface. I have made every reasonable attempt to correct obvious errors, but I expect that there are errors of grammar and possibly content that I did not discover before finalizing the note.    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    Recheck 2 months or as needed

## 2018-07-09 ENCOUNTER — HOSPITAL ENCOUNTER (OUTPATIENT)
Facility: MEDICAL CENTER | Age: 78
End: 2018-07-09
Attending: INTERNAL MEDICINE
Payer: MEDICARE

## 2018-07-09 LAB
BASOPHILS # BLD AUTO: 0.4 % (ref 0–1.8)
BASOPHILS # BLD: 0.02 K/UL (ref 0–0.12)
CRP SERPL HS-MCNC: 0.4 MG/DL (ref 0–0.75)
EOSINOPHIL # BLD AUTO: 0.07 K/UL (ref 0–0.51)
EOSINOPHIL NFR BLD: 1.5 % (ref 0–6.9)
ERYTHROCYTE [DISTWIDTH] IN BLOOD BY AUTOMATED COUNT: 54.9 FL (ref 35.9–50)
ERYTHROCYTE [SEDIMENTATION RATE] IN BLOOD BY WESTERGREN METHOD: 4 MM/HOUR (ref 0–20)
HCT VFR BLD AUTO: 45.8 % (ref 42–52)
HGB BLD-MCNC: 14.3 G/DL (ref 14–18)
IMM GRANULOCYTES # BLD AUTO: 0.03 K/UL (ref 0–0.11)
IMM GRANULOCYTES NFR BLD AUTO: 0.7 % (ref 0–0.9)
LYMPHOCYTES # BLD AUTO: 0.96 K/UL (ref 1–4.8)
LYMPHOCYTES NFR BLD: 21 % (ref 22–41)
MCH RBC QN AUTO: 31.8 PG (ref 27–33)
MCHC RBC AUTO-ENTMCNC: 31.2 G/DL (ref 33.7–35.3)
MCV RBC AUTO: 101.8 FL (ref 81.4–97.8)
MONOCYTES # BLD AUTO: 0.45 K/UL (ref 0–0.85)
MONOCYTES NFR BLD AUTO: 9.8 % (ref 0–13.4)
NEUTROPHILS # BLD AUTO: 3.04 K/UL (ref 1.82–7.42)
NEUTROPHILS NFR BLD: 66.6 % (ref 44–72)
NRBC # BLD AUTO: 0 K/UL
NRBC BLD-RTO: 0 /100 WBC
PLATELET # BLD AUTO: 120 K/UL (ref 164–446)
PMV BLD AUTO: 11.6 FL (ref 9–12.9)
RBC # BLD AUTO: 4.5 M/UL (ref 4.7–6.1)
URATE SERPL-MCNC: 8.1 MG/DL (ref 2.5–8.3)
WBC # BLD AUTO: 4.6 K/UL (ref 4.8–10.8)

## 2018-07-09 PROCEDURE — 84550 ASSAY OF BLOOD/URIC ACID: CPT

## 2018-07-09 PROCEDURE — 86140 C-REACTIVE PROTEIN: CPT

## 2018-07-09 PROCEDURE — 85652 RBC SED RATE AUTOMATED: CPT

## 2018-07-09 PROCEDURE — 83036 HEMOGLOBIN GLYCOSYLATED A1C: CPT

## 2018-07-09 PROCEDURE — 85025 COMPLETE CBC W/AUTO DIFF WBC: CPT

## 2018-07-10 LAB
EST. AVERAGE GLUCOSE BLD GHB EST-MCNC: 220 MG/DL
HBA1C MFR BLD: 9.3 % (ref 0–5.6)

## 2018-07-11 ENCOUNTER — HOSPITAL ENCOUNTER (OUTPATIENT)
Dept: HOSPITAL 8 - PETCFH | Age: 78
Discharge: HOME | End: 2018-07-11
Attending: INTERNAL MEDICINE
Payer: MEDICARE

## 2018-07-11 DIAGNOSIS — M47.896: Primary | ICD-10-CM

## 2018-07-11 DIAGNOSIS — C44.101: ICD-10-CM

## 2018-07-11 PROCEDURE — A9552 F18 FDG: HCPCS

## 2018-07-11 PROCEDURE — 78815 PET IMAGE W/CT SKULL-THIGH: CPT

## 2018-07-12 ENCOUNTER — TELEPHONE (OUTPATIENT)
Dept: MEDICAL GROUP | Facility: PHYSICIAN GROUP | Age: 78
End: 2018-07-12

## 2018-07-12 ENCOUNTER — TELEPHONE (OUTPATIENT)
Dept: VASCULAR LAB | Facility: MEDICAL CENTER | Age: 78
End: 2018-07-12

## 2018-07-14 LAB — INR PPP: 2.7 (ref 2–3.5)

## 2018-07-16 ENCOUNTER — ANTICOAGULATION MONITORING (OUTPATIENT)
Dept: VASCULAR LAB | Facility: MEDICAL CENTER | Age: 78
End: 2018-07-16

## 2018-07-16 DIAGNOSIS — I82.539 CHRONIC DEEP VEIN THROMBOSIS (DVT) OF POPLITEAL VEIN, UNSPECIFIED LATERALITY (HCC): ICD-10-CM

## 2018-07-16 NOTE — PROGRESS NOTES
OP Anticoagulation Service Note    Date: 7/16/2018  Anticoagulation Summary  As of 7/16/2018    INR goal:   2.0-3.0   TTR:   68.9 % (1.9 y)   Today's INR:   2.7 (7/14/2018)   Warfarin maintenance plan:   3 mg (1 mg x 3) on Mon; 2 mg (1 mg x 2) all other days   Weekly warfarin total:   15 mg   No change documented:   Edouard Miller Ass't   Plan last modified:   Zafar Eubanks, Nancy (8/16/2017)   Next INR check:   8/27/2018   Target end date:   Indefinite    Indications    Chronic deep vein thrombosis (DVT) of popliteal vein (HCC) [I82.539]             Anticoagulation Episode Summary     INR check location:       Preferred lab:   [facility]    Send INR reminders to:       Comments:         Anticoagulation Care Providers     Provider Role Specialty Phone number    Jesus Craft M.D. Referring Family Medicine [phone]    [facility] Anticoagulation Services Responsible  [phone]    Zafar Eubanks, PharmD Responsible          Anticoagulation Patient Findings  Patient Findings     Negatives:   Signs/symptoms of thrombosis, Signs/symptoms of bleeding, Laboratory test error suspected, Change in health, Change in alcohol use, Change in activity, Upcoming invasive procedure, Emergency department visit, Upcoming dental procedure, Missed doses, Extra doses, Change in medications, Change in diet/appetite, Hospital admission, Bruising, Other complaints        Plan: Spoke to patient. Patient is therapeutic and will remain on the same dose. Patient reports no unusual bleeding or bruising and no changes to medication or diet. Patient is to be checked again in 6 weeks.    Edouard Miller. Ass't  [facility]    I have reviewed and concur with the above plan     Vinicio Schaeffer, JeannieD

## 2018-07-23 ENCOUNTER — HOSPITAL ENCOUNTER (OUTPATIENT)
Dept: LAB | Facility: MEDICAL CENTER | Age: 78
End: 2018-07-23
Attending: INTERNAL MEDICINE
Payer: MEDICARE

## 2018-07-23 LAB
ALBUMIN SERPL BCP-MCNC: 4.1 G/DL (ref 3.2–4.9)
ALBUMIN/GLOB SERPL: 1.6 G/DL
ALP SERPL-CCNC: 85 U/L (ref 30–99)
ALT SERPL-CCNC: 36 U/L (ref 2–50)
ANION GAP SERPL CALC-SCNC: 11 MMOL/L (ref 0–11.9)
AST SERPL-CCNC: 28 U/L (ref 12–45)
BASOPHILS # BLD AUTO: 0.5 % (ref 0–1.8)
BASOPHILS # BLD: 0.03 K/UL (ref 0–0.12)
BILIRUB SERPL-MCNC: 0.4 MG/DL (ref 0.1–1.5)
BUN SERPL-MCNC: 17 MG/DL (ref 8–22)
CALCIUM SERPL-MCNC: 9.4 MG/DL (ref 8.5–10.5)
CHLORIDE SERPL-SCNC: 111 MMOL/L (ref 96–112)
CO2 SERPL-SCNC: 21 MMOL/L (ref 20–33)
CREAT SERPL-MCNC: 0.94 MG/DL (ref 0.5–1.4)
CRP SERPL HS-MCNC: 0.53 MG/DL (ref 0–0.75)
EOSINOPHIL # BLD AUTO: 0.16 K/UL (ref 0–0.51)
EOSINOPHIL NFR BLD: 2.6 % (ref 0–6.9)
ERYTHROCYTE [DISTWIDTH] IN BLOOD BY AUTOMATED COUNT: 51.6 FL (ref 35.9–50)
GLOBULIN SER CALC-MCNC: 2.5 G/DL (ref 1.9–3.5)
GLUCOSE SERPL-MCNC: 112 MG/DL (ref 65–99)
HCT VFR BLD AUTO: 42.9 % (ref 42–52)
HGB BLD-MCNC: 14.2 G/DL (ref 14–18)
IMM GRANULOCYTES # BLD AUTO: 0.05 K/UL (ref 0–0.11)
IMM GRANULOCYTES NFR BLD AUTO: 0.8 % (ref 0–0.9)
LYMPHOCYTES # BLD AUTO: 1.35 K/UL (ref 1–4.8)
LYMPHOCYTES NFR BLD: 22.3 % (ref 22–41)
MCH RBC QN AUTO: 32.3 PG (ref 27–33)
MCHC RBC AUTO-ENTMCNC: 33.1 G/DL (ref 33.7–35.3)
MCV RBC AUTO: 97.5 FL (ref 81.4–97.8)
MONOCYTES # BLD AUTO: 0.46 K/UL (ref 0–0.85)
MONOCYTES NFR BLD AUTO: 7.6 % (ref 0–13.4)
NEUTROPHILS # BLD AUTO: 4.01 K/UL (ref 1.82–7.42)
NEUTROPHILS NFR BLD: 66.2 % (ref 44–72)
NRBC # BLD AUTO: 0 K/UL
NRBC BLD-RTO: 0 /100 WBC
PLATELET # BLD AUTO: 117 K/UL (ref 164–446)
PMV BLD AUTO: 11.3 FL (ref 9–12.9)
POTASSIUM SERPL-SCNC: 4.1 MMOL/L (ref 3.6–5.5)
PROT SERPL-MCNC: 6.6 G/DL (ref 6–8.2)
RBC # BLD AUTO: 4.4 M/UL (ref 4.7–6.1)
SODIUM SERPL-SCNC: 143 MMOL/L (ref 135–145)
WBC # BLD AUTO: 6.1 K/UL (ref 4.8–10.8)

## 2018-07-23 PROCEDURE — 85025 COMPLETE CBC W/AUTO DIFF WBC: CPT

## 2018-07-23 PROCEDURE — 80053 COMPREHEN METABOLIC PANEL: CPT

## 2018-07-23 PROCEDURE — 85652 RBC SED RATE AUTOMATED: CPT

## 2018-07-23 PROCEDURE — 86140 C-REACTIVE PROTEIN: CPT

## 2018-07-23 PROCEDURE — 36415 COLL VENOUS BLD VENIPUNCTURE: CPT

## 2018-07-24 LAB — ERYTHROCYTE [SEDIMENTATION RATE] IN BLOOD BY WESTERGREN METHOD: 9 MM/HOUR (ref 0–20)

## 2018-07-25 RX ORDER — GLIPIZIDE 2.5 MG/1
2.5 TABLET, EXTENDED RELEASE ORAL DAILY
Qty: 90 TAB | Refills: 0 | Status: SHIPPED | OUTPATIENT
Start: 2018-07-25 | End: 2018-10-21 | Stop reason: SDUPTHER

## 2018-08-08 ENCOUNTER — HOSPITAL ENCOUNTER (OUTPATIENT)
Facility: MEDICAL CENTER | Age: 78
End: 2018-08-08
Attending: PHYSICIAN ASSISTANT
Payer: MEDICARE

## 2018-08-08 ENCOUNTER — OFFICE VISIT (OUTPATIENT)
Dept: URGENT CARE | Facility: PHYSICIAN GROUP | Age: 78
End: 2018-08-08
Payer: MEDICARE

## 2018-08-08 VITALS
TEMPERATURE: 98.1 F | WEIGHT: 258 LBS | OXYGEN SATURATION: 96 % | HEART RATE: 92 BPM | BODY MASS INDEX: 36.94 KG/M2 | SYSTOLIC BLOOD PRESSURE: 134 MMHG | HEIGHT: 70 IN | DIASTOLIC BLOOD PRESSURE: 76 MMHG

## 2018-08-08 DIAGNOSIS — R31.9 HEMATURIA, UNSPECIFIED TYPE: ICD-10-CM

## 2018-08-08 LAB
APPEARANCE UR: NORMAL
BILIRUB UR STRIP-MCNC: NEGATIVE MG/DL
COLOR UR AUTO: NORMAL
GLUCOSE UR STRIP.AUTO-MCNC: NEGATIVE MG/DL
KETONES UR STRIP.AUTO-MCNC: NEGATIVE MG/DL
LEUKOCYTE ESTERASE UR QL STRIP.AUTO: NEGATIVE
NITRITE UR QL STRIP.AUTO: NEGATIVE
PH UR STRIP.AUTO: 5.5 [PH] (ref 5–8)
PROT UR QL STRIP: NORMAL MG/DL
RBC UR QL AUTO: NORMAL
SP GR UR STRIP.AUTO: 1.02
UROBILINOGEN UR STRIP-MCNC: 2 MG/DL

## 2018-08-08 PROCEDURE — 88112 CYTOPATH CELL ENHANCE TECH: CPT

## 2018-08-08 PROCEDURE — 87086 URINE CULTURE/COLONY COUNT: CPT

## 2018-08-08 PROCEDURE — 99214 OFFICE O/P EST MOD 30 MIN: CPT | Performed by: PHYSICIAN ASSISTANT

## 2018-08-08 PROCEDURE — 81002 URINALYSIS NONAUTO W/O SCOPE: CPT | Performed by: PHYSICIAN ASSISTANT

## 2018-08-08 RX ORDER — CEFDINIR 300 MG/1
300 CAPSULE ORAL 2 TIMES DAILY
Qty: 14 CAP | Refills: 0 | Status: SHIPPED | OUTPATIENT
Start: 2018-08-08 | End: 2018-08-15

## 2018-08-08 ASSESSMENT — ENCOUNTER SYMPTOMS
VOMITING: 0
FLANK PAIN: 0
NAUSEA: 0
COUGH: 0
FEVER: 0
CHILLS: 0
DIARRHEA: 0
ABDOMINAL PAIN: 0
MYALGIAS: 1

## 2018-08-08 NOTE — PROGRESS NOTES
Subjective:   Kiran Spears a 78 y.o. male who presents for Urinary Frequency (Lower back pain, dark urine x 2 wks )    Patient is a pleasant 78-year-old male who comes in with a one-day history of blood in the urine. Denies increasing frequency, urge, hesitancy or burning with urination. Does not have a catheter. Does have a history of enlarged prostate. 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 Patient states he just had a body scan one month ago and showed he had no signs of cancer anywhere. Denies fever, chills, nauseousness, vomiting, abdominal pain or diarrhea. Denies flank pain. Nothing makes better and nothing makes worse. He states he noticed some bright red blood in the toilet when he urinates. This occurred just 2 times this morning. Denies any change in medication or new medication    Past Medical History:  No date: Arrhythmia      Comment:  unsure of time. thinks he was in hospital   No date: Arthritis  No date: Arthropathy, unspecified, site unspecified  No date: At risk for sleep apnea  age 30: Back pain      Comment:  lower back pain  current 2017: Blood clotting disorder ([facility])      Comment:  DVT and PE  No date: BPH (benign prostatic hyperplasia)  No date: Breath shortness  12/2014: Bronchitis  No date: Cancer ([facility])      Comment:  squamous cell carinoma left eye   6/06: Cancer ([facility])      Comment:  lower lip skin cancer--  No date: Cataract      Comment:  cataract and glacoma  6/6/2017: Chronic congestive heart failure ([facility])      Comment:  pt denies  No date: Disorders of bursae and tendons in shoulder region,   unspecified  07/2007: Esophageal stricture  No date: Essential hypertension, benign  12/07: Foot fracture  07/2007: Gastric ulcer  No date: GERD (gastroesophageal reflux disease)  No date: Glaucoma  No date: Heart burn  No date: Hypertension  No date: Hypertrophy of prostate without urinary obstruction and   other lower  urinary tract symptoms (LUTS)  No date: Indigestion  2/13/12: Pain      Comment:  2/10 ankles, lower back  11/2008: Personal history of venous thrombosis and embolism      Comment:  post knee replacement -took coumadin then  No date: Pneumonia      Comment:  2007 2004: Pneumonia  No date: Reflux esophagitis  No date: Rosacea  No date: Skin cancer  No date: Sleep apnea      Comment:  cpap   No date: Snoring  No date: Unspecified cataract      Comment:  ONE REMOVED  No date: Unspecified glaucoma(365.9)  No date: Urinary incontinence      Comment:  occasional incontinence   Current Outpatient Prescriptions on File Prior to Visit:  furosemide (LASIX) 40 MG Tab, Take 40 mg by mouth every day., Disp: , Rfl:   lisinopril (PRINIVIL) 20 MG Tab, Take 20 mg by mouth every day., Disp: , Rfl:   atorvastatin (LIPITOR) 20 MG Tab, Take 20 mg by mouth every evening., Disp: , Rfl:   tamsulosin (FLOMAX) 0.4 MG capsule, TAKE 1 CAPSULE BY MOUTH AT  BEDTIME, Disp: 90 Cap, Rfl: 3  warfarin (COUMADIN) 3 MG Tab, Take 1 Tab by mouth every day., Disp: 17 Tab, Rfl: 0  latanoprost (XALATAN) 0.005 % Solution, Place 1 Drop in both eyes every evening., Disp: 1 Bottle, Rfl: 1  ipratropium-albuterol (COMBIVENT RESPIMAT)  MCG/ACT Aero Soln, Inhale 1 Puff by mouth 4 times a day as needed (SOB)., Disp: , Rfl:   metformin (GLUCOPHAGE) 1000 MG tablet, Take 1 Tab by mouth 2 times a day, with meals., Disp: 180 Tab, Rfl: 0  glipiZIDE SR (GLUCOTROL) 2.5 MG TABLET SR 24 HR, Take 1 Tab by mouth every day., Disp: 90 Tab, Rfl: 0  warfarin (COUMADIN) 1 MG Tab, TAKE 1 TABLET BY MOUTH  EVERY OTHER DAY ALTERNATING WITH 2 TABLETS EVERY OTHER  DAY, Disp: 135 Tab, Rfl: 3  valsartan (DIOVAN) 80 MG Tab, Take 1 Tab by mouth every day., Disp: 90 Tab, Rfl: 3  chlorthalidone (HYGROTON) 25 MG Tab, Take 25 mg by mouth every day., Disp: , Rfl:   metoprolol SR (TOPROL XL) 25 MG TABLET SR 24 HR, Take 25 mg by mouth every day., Disp: , Rfl:   finasteride (PROSCAR) 5 MG Tab,  "Take 1 Tab by mouth every morning., Disp: 30 Tab, Rfl: 0  Esomeprazole Magnesium (NEXIUM) 20 MG Pack, Take 20 mg by mouth every morning., Disp: , Rfl:   bimatoprost (LUMIGAN) 0.01 % Solution, Place 1 Drop in both eyes every bedtime., Disp: , Rfl:   potassium chloride ER (KLOR-CON) 10 MEQ tablet, Take 1 Tab by mouth every day., Disp: 90 Tab, Rfl: 3    No current facility-administered medications on file prior to visit.     Lisinopril        Review of Systems   Constitutional: Negative for chills and fever.   Respiratory: Negative for cough.    Cardiovascular: Negative for chest pain.   Gastrointestinal: Negative for abdominal pain, diarrhea, nausea and vomiting.   Genitourinary: Positive for hematuria. Negative for dysuria, flank pain, frequency and urgency.   Musculoskeletal: Positive for myalgias.      Objective:   /76   Pulse 92   Temp 36.7 °C (98.1 °F)   Ht 1.778 m (5' 10\")   Wt 117 kg (258 lb)   SpO2 96%   BMI 37.02 kg/m²   Physical Exam       Gen.: Patient is A and O ×3, no acute distress, well-nourished well-hydrated  Vitals: Are listed and unremarkable  HEENT: Heads normocephalic, atraumatic, PERRLA, extraocular movements intact, TMs and oropharynx clear  Neck: Soft supple without cervical lymphadenopathy  Cardiovascular: Regular rate and rhythm normal S1 and S2. No murmurs, rubs or gallops  Lungs are clear to auscultation bilaterally. no wheezes rales or rhonchi  Abdomen is soft, nontender, nondistended with good bowel sounds, no hepatosplenomegaly  Skin: Is well perfused without evidence of rash or lesions  Neurological:  cranial nerves II through XII were assessed and intact.  Musculoskeletal: Full range of motion, 5 out of 5 strength against resistance  Neurovascularly: Intact with a 2 second cap refill, good distal pulses    Urgent care course urinalysis showed large amount of blood, trace protein, negative nitrite, negative leukocyte, negative glucose, negative ketone, negative " bili  Assessment/Plan:   1. Hematuria, unspecified type  - POCT Urinalysis [[id number]]  - cefdinir (OMNICEF) 300 MG Cap; Take 1 Cap by mouth 2 times a day for 7 days.  Dispense: 14 Cap; Refill: 0  - Urine Culture [[id number]]; Future  - URINE CYTOLOGY    Given his age he is at risk with hematuria for possible neoplastic process. We'll send urine for culture and started on an antibiotic so as to cover  UTI. i will also order urine cytology. If any abnormalities or symptoms persist or worsen or if urine culture is negative, I will send him to urology    Differential diagnosis, natural history, supportive care, and indications for immediate follow-up discussed.  Return if symptoms worsen or fail to improve.

## 2018-08-09 DIAGNOSIS — R31.9 HEMATURIA, UNSPECIFIED TYPE: ICD-10-CM

## 2018-08-09 LAB — CYTOLOGY REG CYTOL: NORMAL

## 2018-08-09 PROCEDURE — 88112 CYTOPATH CELL ENHANCE TECH: CPT

## 2018-08-10 ENCOUNTER — PATIENT OUTREACH (OUTPATIENT)
Dept: HEALTH INFORMATION MANAGEMENT | Facility: OTHER | Age: 78
End: 2018-08-10

## 2018-08-10 NOTE — PROGRESS NOTES
Outcome: no answer - mailbox full     Please transfer to Patient Outreach Team at [phone] when patient returns call.    WebIZ Checked & Epic Updated:  yes    HealthConnect Verified: no    Attempt # 1

## 2018-08-11 LAB
BACTERIA UR CULT: NORMAL
SIGNIFICANT IND 70042: NORMAL
SITE SITE: NORMAL
SOURCE SOURCE: NORMAL

## 2018-08-16 ENCOUNTER — TELEPHONE (OUTPATIENT)
Dept: URGENT CARE | Facility: PHYSICIAN GROUP | Age: 78
End: 2018-08-16

## 2018-08-16 NOTE — TELEPHONE ENCOUNTER
1. Caller Name: Kiran                                         Call Back Number: [phone] (home)         Patient approves a detailed voicemail message: yes    2. Patient is requesting lab results dated: 8/8/18    3. Confirmed results are in chart. Patient advised they will be contacted once interpreted by provider.

## 2018-08-17 ENCOUNTER — TELEPHONE (OUTPATIENT)
Dept: URGENT CARE | Facility: PHYSICIAN GROUP | Age: 78
End: 2018-08-17

## 2018-08-17 NOTE — TELEPHONE ENCOUNTER
Called patient back to discuss lab results. Discussed negative urine culture negative cytology. He says he finished the antibiotic and feels all better. I told him to follow up with primary care as needed

## 2018-08-22 ENCOUNTER — HOSPITAL ENCOUNTER (OUTPATIENT)
Dept: LAB | Facility: MEDICAL CENTER | Age: 78
End: 2018-08-22
Attending: INTERNAL MEDICINE
Payer: MEDICARE

## 2018-08-22 LAB
ALBUMIN SERPL BCP-MCNC: 4 G/DL (ref 3.2–4.9)
ALBUMIN/GLOB SERPL: 1.4 G/DL
ALP SERPL-CCNC: 80 U/L (ref 30–99)
ALT SERPL-CCNC: 30 U/L (ref 2–50)
ANION GAP SERPL CALC-SCNC: 10 MMOL/L (ref 0–11.9)
AST SERPL-CCNC: 20 U/L (ref 12–45)
BASOPHILS # BLD AUTO: 0.3 % (ref 0–1.8)
BASOPHILS # BLD: 0.02 K/UL (ref 0–0.12)
BILIRUB SERPL-MCNC: 0.6 MG/DL (ref 0.1–1.5)
BUN SERPL-MCNC: 23 MG/DL (ref 8–22)
CALCIUM SERPL-MCNC: 9.1 MG/DL (ref 8.5–10.5)
CHLORIDE SERPL-SCNC: 109 MMOL/L (ref 96–112)
CHOLEST SERPL-MCNC: 116 MG/DL (ref 100–199)
CO2 SERPL-SCNC: 23 MMOL/L (ref 20–33)
CREAT SERPL-MCNC: 1.12 MG/DL (ref 0.5–1.4)
CREAT UR-MCNC: 82.9 MG/DL
EOSINOPHIL # BLD AUTO: 0.1 K/UL (ref 0–0.51)
EOSINOPHIL NFR BLD: 1.7 % (ref 0–6.9)
ERYTHROCYTE [DISTWIDTH] IN BLOOD BY AUTOMATED COUNT: 51.6 FL (ref 35.9–50)
EST. AVERAGE GLUCOSE BLD GHB EST-MCNC: 151 MG/DL
GLOBULIN SER CALC-MCNC: 2.9 G/DL (ref 1.9–3.5)
GLUCOSE SERPL-MCNC: 103 MG/DL (ref 65–99)
HBA1C MFR BLD: 6.9 % (ref 0–5.6)
HCT VFR BLD AUTO: 45.8 % (ref 42–52)
HDLC SERPL-MCNC: 35 MG/DL
HGB BLD-MCNC: 14.8 G/DL (ref 14–18)
IMM GRANULOCYTES # BLD AUTO: 0.06 K/UL (ref 0–0.11)
IMM GRANULOCYTES NFR BLD AUTO: 1 % (ref 0–0.9)
LDLC SERPL CALC-MCNC: 54 MG/DL
LYMPHOCYTES # BLD AUTO: 1.11 K/UL (ref 1–4.8)
LYMPHOCYTES NFR BLD: 19 % (ref 22–41)
MCH RBC QN AUTO: 31.6 PG (ref 27–33)
MCHC RBC AUTO-ENTMCNC: 32.3 G/DL (ref 33.7–35.3)
MCV RBC AUTO: 97.9 FL (ref 81.4–97.8)
MICROALBUMIN UR-MCNC: 4.7 MG/DL
MICROALBUMIN/CREAT UR: 57 MG/G (ref 0–30)
MONOCYTES # BLD AUTO: 0.44 K/UL (ref 0–0.85)
MONOCYTES NFR BLD AUTO: 7.5 % (ref 0–13.4)
NEUTROPHILS # BLD AUTO: 4.12 K/UL (ref 1.82–7.42)
NEUTROPHILS NFR BLD: 70.5 % (ref 44–72)
NRBC # BLD AUTO: 0 K/UL
NRBC BLD-RTO: 0 /100 WBC
PLATELET # BLD AUTO: 133 K/UL (ref 164–446)
PMV BLD AUTO: 11.4 FL (ref 9–12.9)
POTASSIUM SERPL-SCNC: 4.2 MMOL/L (ref 3.6–5.5)
PROT SERPL-MCNC: 6.9 G/DL (ref 6–8.2)
RBC # BLD AUTO: 4.68 M/UL (ref 4.7–6.1)
SODIUM SERPL-SCNC: 142 MMOL/L (ref 135–145)
T4 FREE SERPL-MCNC: 0.9 NG/DL (ref 0.53–1.43)
TRIGL SERPL-MCNC: 134 MG/DL (ref 0–149)
TSH SERPL DL<=0.005 MIU/L-ACNC: 3.99 UIU/ML (ref 0.38–5.33)
WBC # BLD AUTO: 5.9 K/UL (ref 4.8–10.8)

## 2018-08-22 PROCEDURE — 36415 COLL VENOUS BLD VENIPUNCTURE: CPT

## 2018-08-22 PROCEDURE — 84439 ASSAY OF FREE THYROXINE: CPT

## 2018-08-22 PROCEDURE — 80061 LIPID PANEL: CPT

## 2018-08-22 PROCEDURE — 83036 HEMOGLOBIN GLYCOSYLATED A1C: CPT | Mod: GA

## 2018-08-22 PROCEDURE — 85025 COMPLETE CBC W/AUTO DIFF WBC: CPT

## 2018-08-22 PROCEDURE — 82043 UR ALBUMIN QUANTITATIVE: CPT

## 2018-08-22 PROCEDURE — 82570 ASSAY OF URINE CREATININE: CPT

## 2018-08-22 PROCEDURE — 80053 COMPREHEN METABOLIC PANEL: CPT

## 2018-08-22 PROCEDURE — 84443 ASSAY THYROID STIM HORMONE: CPT

## 2018-08-28 NOTE — PROGRESS NOTES
A&O x4.  Ambulating x2 assist with FWW.  Tolerating diet.  Declines pain intervention.   Tele monitoring in place; Second degree type 1 heart block, patient asymptomatic, VSS.  SpO2 96% on 3L NC, expiratory wheezes noted.  L flank bruising, LLE 2+ pitting edema, L foot red - abx in place.  Last BM 6/19/17.  Voiding without difficulty.   Call light and personal belongings within reach.  POC discussed and all questions answered.  No additional needs at this time.    Statement Selected

## 2018-09-04 NOTE — PROGRESS NOTES
1. Attempt #:2    2. WebIZ Checked & Epic Updated: Yes  3. HealthConnect Verified: no  4. Verify PCP: yes    5. Communication Preference Obtained: yes    6. Diabetes Visit Scheduling  Scheduling Status:Not Scheduled. Patient states they are under Doctor's care      7. Care Gap Scheduling (Attempt to Schedule EACH Overdue Care Gap!)    Health Maintenance Due   Topic Date Due   • Annual Wellness Visit  1940   • DIABETES MONOFILAMENT / LE EXAM  1940   • IMM HEP B VACCINE (1 of 3 - Risk 3-dose series) 04/13/1959   • IMM ZOSTER VACCINES (2 of 3) 10/26/2009   • RETINAL SCREENING  01/05/2018   • IMM INFLUENZA (1) 09/01/2018        8. Patient was directed to Health and Wellness Website: no     - Patient plans to schedule appointment for Immunizations: FLU, HEPATITIS B, PNEUMOVAX (PPSV23) and SHINGRIX (Shingles) and Retinal Eye Exam.    9. Screened for Food Pantry Prescription? no  10. [employer] Activation: declined  11. [employer] Jitendra: no  12. Virtual Visits: no  13. Opt In to Text Messages: no

## 2018-09-06 ENCOUNTER — TELEPHONE (OUTPATIENT)
Dept: VASCULAR LAB | Facility: MEDICAL CENTER | Age: 78
End: 2018-09-06

## 2018-09-06 NOTE — TELEPHONE ENCOUNTER
Spoke with the pt to remind that the INR is overdue . He will test when he gets home. He was in [facility] for sepsis but is not on any new meds. BETO Cabrera.

## 2018-09-20 ENCOUNTER — TELEPHONE (OUTPATIENT)
Dept: VASCULAR LAB | Facility: MEDICAL CENTER | Age: 78
End: 2018-09-20

## 2018-09-20 ENCOUNTER — ANTICOAGULATION MONITORING (OUTPATIENT)
Dept: VASCULAR LAB | Facility: MEDICAL CENTER | Age: 78
End: 2018-09-20

## 2018-09-20 DIAGNOSIS — I82.532 CHRONIC DEEP VEIN THROMBOSIS (DVT) OF POPLITEAL VEIN OF LEFT LOWER EXTREMITY (HCC): ICD-10-CM

## 2018-09-20 PROBLEM — I82.539 CHRONIC DEEP VEIN THROMBOSIS (DVT) OF POPLITEAL VEIN (HCC): Status: RESOLVED | Noted: 2017-06-04 | Resolved: 2018-09-20

## 2018-10-23 RX ORDER — GLIPIZIDE 2.5 MG/1
TABLET, EXTENDED RELEASE ORAL
Qty: 90 TAB | Refills: 0 | Status: SHIPPED | OUTPATIENT
Start: 2018-10-23 | End: 2018-11-12

## 2018-11-12 ENCOUNTER — HOSPITAL ENCOUNTER (OUTPATIENT)
Facility: MEDICAL CENTER | Age: 78
End: 2018-11-12
Attending: NURSE PRACTITIONER
Payer: MEDICARE

## 2018-11-12 ENCOUNTER — OFFICE VISIT (OUTPATIENT)
Dept: MEDICAL GROUP | Facility: PHYSICIAN GROUP | Age: 78
End: 2018-11-12
Payer: MEDICARE

## 2018-11-12 VITALS
SYSTOLIC BLOOD PRESSURE: 138 MMHG | DIASTOLIC BLOOD PRESSURE: 86 MMHG | TEMPERATURE: 98.8 F | HEART RATE: 119 BPM | HEIGHT: 70 IN | WEIGHT: 249 LBS | OXYGEN SATURATION: 96 % | BODY MASS INDEX: 35.65 KG/M2

## 2018-11-12 DIAGNOSIS — B96.5 BACTEREMIA DUE TO PSEUDOMONAS: ICD-10-CM

## 2018-11-12 DIAGNOSIS — N17.9 ACUTE RENAL FAILURE SUPERIMPOSED ON CHRONIC KIDNEY DISEASE, UNSPECIFIED CKD STAGE, UNSPECIFIED ACUTE RENAL FAILURE TYPE: ICD-10-CM

## 2018-11-12 DIAGNOSIS — I10 ESSENTIAL HYPERTENSION, BENIGN: ICD-10-CM

## 2018-11-12 DIAGNOSIS — M86.9 OSTEOMYELITIS OF RIGHT FOOT, UNSPECIFIED TYPE (HCC): ICD-10-CM

## 2018-11-12 DIAGNOSIS — E11.9 TYPE 2 DIABETES MELLITUS WITHOUT COMPLICATION, WITHOUT LONG-TERM CURRENT USE OF INSULIN (HCC): ICD-10-CM

## 2018-11-12 DIAGNOSIS — Z79.01 CHRONIC ANTICOAGULATION: ICD-10-CM

## 2018-11-12 DIAGNOSIS — Z09 HOSPITAL DISCHARGE FOLLOW-UP: ICD-10-CM

## 2018-11-12 DIAGNOSIS — N18.9 ACUTE RENAL FAILURE SUPERIMPOSED ON CHRONIC KIDNEY DISEASE, UNSPECIFIED CKD STAGE, UNSPECIFIED ACUTE RENAL FAILURE TYPE: ICD-10-CM

## 2018-11-12 DIAGNOSIS — R78.81 BACTEREMIA DUE TO PSEUDOMONAS: ICD-10-CM

## 2018-11-12 DIAGNOSIS — N39.0 COMPLICATED UTI (URINARY TRACT INFECTION): ICD-10-CM

## 2018-11-12 PROBLEM — R79.1 SUBTHERAPEUTIC INTERNATIONAL NORMALIZED RATIO (INR): Status: RESOLVED | Noted: 2018-03-24 | Resolved: 2018-11-12

## 2018-11-12 LAB
EST. AVERAGE GLUCOSE BLD GHB EST-MCNC: 134 MG/DL
HBA1C MFR BLD: 6.3 % (ref 0–5.6)

## 2018-11-12 PROCEDURE — 83036 HEMOGLOBIN GLYCOSYLATED A1C: CPT

## 2018-11-12 PROCEDURE — 87086 URINE CULTURE/COLONY COUNT: CPT

## 2018-11-12 PROCEDURE — 99215 OFFICE O/P EST HI 40 MIN: CPT | Performed by: NURSE PRACTITIONER

## 2018-11-12 PROCEDURE — 36415 COLL VENOUS BLD VENIPUNCTURE: CPT

## 2018-11-12 PROCEDURE — 80048 BASIC METABOLIC PNL TOTAL CA: CPT

## 2018-11-12 PROCEDURE — 86140 C-REACTIVE PROTEIN: CPT

## 2018-11-12 RX ORDER — CARVEDILOL 3.12 MG/1
3.12 TABLET ORAL 2 TIMES DAILY WITH MEALS
Qty: 180 TAB | Refills: 0 | Status: SHIPPED | OUTPATIENT
Start: 2018-11-12 | End: 2019-01-11 | Stop reason: SDUPTHER

## 2018-11-12 RX ORDER — FINASTERIDE 5 MG/1
5 TABLET, FILM COATED ORAL DAILY
Qty: 90 TAB | Refills: 0 | Status: SHIPPED | OUTPATIENT
Start: 2018-11-12 | End: 2019-01-11 | Stop reason: SDUPTHER

## 2018-11-12 RX ORDER — AMLODIPINE BESYLATE 5 MG/1
5 TABLET ORAL DAILY
COMMUNITY
End: 2018-11-12 | Stop reason: SDUPTHER

## 2018-11-12 RX ORDER — LOSARTAN POTASSIUM 50 MG/1
50 TABLET ORAL DAILY
COMMUNITY
End: 2018-11-12 | Stop reason: SDUPTHER

## 2018-11-12 RX ORDER — LOSARTAN POTASSIUM 50 MG/1
50 TABLET ORAL DAILY
Qty: 90 TAB | Refills: 0 | Status: SHIPPED | OUTPATIENT
Start: 2018-11-12 | End: 2019-01-11 | Stop reason: SDUPTHER

## 2018-11-12 RX ORDER — TAMSULOSIN HYDROCHLORIDE 0.4 MG/1
0.4 CAPSULE ORAL DAILY
Qty: 90 CAP | Refills: 0 | Status: SHIPPED | OUTPATIENT
Start: 2018-11-12 | End: 2019-01-11 | Stop reason: SDUPTHER

## 2018-11-12 RX ORDER — SODIUM BENZOATE
1 POWDER (GRAM) MISCELLANEOUS ONCE
COMMUNITY

## 2018-11-12 RX ORDER — CARVEDILOL 3.12 MG/1
3.12 TABLET ORAL 2 TIMES DAILY WITH MEALS
COMMUNITY
End: 2018-11-12 | Stop reason: SDUPTHER

## 2018-11-12 RX ORDER — ESOMEPRAZOLE MAGNESIUM 20 MG/1
20 GRANULE, DELAYED RELEASE ORAL EVERY MORNING
Qty: 90 EACH | Refills: 0 | Status: SHIPPED | OUTPATIENT
Start: 2018-11-12 | End: 2019-01-01 | Stop reason: SDUPTHER

## 2018-11-12 RX ORDER — ATORVASTATIN CALCIUM 20 MG/1
20 TABLET, FILM COATED ORAL DAILY
Qty: 90 TAB | Refills: 0 | Status: SHIPPED | OUTPATIENT
Start: 2018-11-12 | End: 2018-12-31 | Stop reason: SDUPTHER

## 2018-11-12 RX ORDER — ISOSORBIDE DINITRATE 10 MG/1
10 TABLET ORAL 3 TIMES DAILY
Qty: 270 TAB | Refills: 0 | Status: SHIPPED | OUTPATIENT
Start: 2018-11-12 | End: 2019-01-11 | Stop reason: SDUPTHER

## 2018-11-12 RX ORDER — PANTOPRAZOLE SODIUM 40 MG/1
40 TABLET, DELAYED RELEASE ORAL DAILY
COMMUNITY
End: 2018-11-12

## 2018-11-12 RX ORDER — ERGOCALCIFEROL 1.25 MG/1
CAPSULE ORAL
COMMUNITY
End: 2019-11-20 | Stop reason: SDUPTHER

## 2018-11-12 RX ORDER — ISOSORBIDE DINITRATE 10 MG/1
10 TABLET ORAL 3 TIMES DAILY
COMMUNITY
End: 2018-11-12 | Stop reason: SDUPTHER

## 2018-11-12 RX ORDER — TRAMADOL HYDROCHLORIDE 50 MG/1
50 TABLET ORAL EVERY 4 HOURS PRN
COMMUNITY
End: 2019-02-12

## 2018-11-12 RX ORDER — AMLODIPINE BESYLATE 5 MG/1
5 TABLET ORAL DAILY
Qty: 90 TAB | Refills: 0 | Status: SHIPPED | OUTPATIENT
Start: 2018-11-12 | End: 2019-01-11 | Stop reason: SDUPTHER

## 2018-11-12 RX ORDER — FINASTERIDE 5 MG/1
5 TABLET, FILM COATED ORAL DAILY
COMMUNITY
End: 2018-11-12 | Stop reason: SDUPTHER

## 2018-11-12 NOTE — PROGRESS NOTES
Subjective:     Chief Complaint   Patient presents with   • Hospital Follow-up     due to blood poisening       HPI  Kirna Eason is a 78 y.o. male here today for hospital discharge follow up. Here with his wife.      Original hospitalization was 9/20 for sepsis and bacteremia secondary to right foot ulcer with OM with MSSA, and UTI.  After initial treatment at [facility], he was transferred to [facility].  While here, renal function worsened and nephrology was consulted.  Ultrasound was obtained that revealed a bladder mass therefore urology was consulted.  He was then transferred back to [facility] for urology consultation 9/28/18-10/6/18.  Had positive UTI with Pseudomonas that was requiring treatment prior to cystoscopy.  Once treatment complete, patient had cystoscopy with TUR 10/5 cystoscopy with TUR, but no tumor was found.  They feel his symptoms were inflammation from the UTI.  It was recommended he complete his full course of antibiotics, but infectious disease was consulted. ID recommended 1 week further of IV antibiotics in hospital, and then another 6 weeks of outpatient ertapenem to continue to be followed by ID Dr. Hagen. On 10/6 was then transferred back to [facility].  He was getting IV antibiotics until a week before discharge. He was discharged one week ago to his home.   He has urology f/u scheduled on Wednesday. There is no f/u scheduled with ID. Wife reports she is waiting for a call from another office, but not sure which one.   He does have palencia catheter in place at this time.   He does have diabetes but home glucose readings have been in the 80s. He is not on his metformin or glipizide since discharge.   He has hx of PE and 2 DVT. Has IVC filter placed. Was on warfarin for 10 years or so. Has not been on this since at least 10/6 discharge, but no notes state why discontinued other than maybe for cystoscopy.   For right foot ulcer, this has healed and he has  appointment with podiatrist Dr. Doyle in two weeks    Diagnoses of Hospital discharge follow-up, Osteomyelitis of right foot, unspecified type (HCC), Complicated UTI (urinary tract infection), Bacteremia due to Pseudomonas, Essential hypertension, benign, Type 2 diabetes mellitus without complication, without long-term current use of insulin (HCC), Acute renal failure superimposed on chronic kidney disease, unspecified CKD stage, unspecified acute renal failure type (HCC), and Chronic anticoagulation were pertinent to this visit.    Allergies: Lisinopril  Current medicines (including changes today)  Current Outpatient Prescriptions   Medication Sig Dispense Refill   • amLODIPine (NORVASC) 5 MG Tab Take 5 mg by mouth every day.     • finasteride (PROSCAR) 5 MG Tab Take 5 mg by mouth every day.     • losartan (COZAAR) 50 MG Tab Take 50 mg by mouth every day.     • SENNA PO Take  by mouth.     • carvedilol (COREG) 3.125 MG Tab Take 3.125 mg by mouth 2 times a day, with meals.     • vitamin D, Ergocalciferol, (DRISDOL) 48372 units Cap capsule Take  by mouth every 7 days.     • tramadol (ULTRAM) 50 MG Tab Take 50 mg by mouth every four hours as needed.     • isosorbide dinitrate (ISORDIL) 10 MG Tab Take 10 mg by mouth 3 times a day.     • sodium benzoate Powder Take 1 g by mouth Once.     • atorvastatin (LIPITOR) 20 MG Tab Take 20 mg by mouth every evening.     • tamsulosin (FLOMAX) 0.4 MG capsule TAKE 1 CAPSULE BY MOUTH AT  BEDTIME 90 Cap 3   • ipratropium-albuterol (COMBIVENT RESPIMAT)  MCG/ACT Aero Soln Inhale 1 Puff by mouth 4 times a day as needed (SOB).     • Esomeprazole Magnesium (NEXIUM) 20 MG Pack Take 20 mg by mouth every morning.     • bimatoprost (LUMIGAN) 0.01 % Solution Place 1 Drop in both eyes every bedtime.     • furosemide (LASIX) 40 MG Tab Take 40 mg by mouth every day.     • warfarin (COUMADIN) 1 MG Tab TAKE 1 TABLET BY MOUTH  EVERY OTHER DAY ALTERNATING WITH 2 TABLETS EVERY OTHER  DAY  "(Patient not taking: Reported on 11/12/2018) 135 Tab 3   • warfarin (COUMADIN) 3 MG Tab Take 1 Tab by mouth every day. (Patient not taking: Reported on 11/12/2018) 17 Tab 0   • latanoprost (XALATAN) 0.005 % Solution Place 1 Drop in both eyes every evening. (Patient not taking: Reported on 11/12/2018) 1 Bottle 1     No current facility-administered medications for this visit.        He  has a past medical history of Arrhythmia; Arthritis; Arthropathy, unspecified, site unspecified; At risk for sleep apnea; Back pain (age 30); Blood clotting disorder ([facility]) (current 2017); BPH (benign prostatic hyperplasia); Breath shortness; Bronchitis (12/2014); Cancer ([facility]); Cancer ([facility]) (6/06); Cataract; Chronic congestive heart failure ([facility]) (6/6/2017); Disorders of bursae and tendons in shoulder region, unspecified; Esophageal stricture (07/2007); Essential hypertension, benign; Foot fracture (12/07); Gastric ulcer (07/2007); GERD (gastroesophageal reflux disease); Glaucoma; Heart burn; Hypertension; Hypertrophy of prostate without urinary obstruction and other lower urinary tract symptoms (LUTS); Indigestion; Pain (2/13/12); Personal history of venous thrombosis and embolism (11/2008); Pneumonia; Pneumonia (2004); Reflux esophagitis; Rosacea; Skin cancer; Sleep apnea; Snoring; Unspecified cataract; Unspecified glaucoma(365.9); and Urinary incontinence. He also has no past medical history of Bronchitis; COPD (chronic obstructive pulmonary disease) ([facility]); or Fall.        ROS  As stated in HPI and additionally  Gen: +fatigue. No F/C, malaise  Neuro: No dizziness or HA  CV: +chronic right foot edema. No chest pain, palpitations, syncope  Pulm: No sob or dyspnea  GI: No abd pain, N/V     Objective:     Blood pressure 138/86, pulse (!) 119, temperature 37.1 °C (98.8 °F), temperature source Temporal, height 1.778 m (5' 10\"), weight 112.9 kg (249 lb), SpO2 96 %. Body mass index is 35.73 kg/m².  Physical Exam:  General: Alert, oriented, " in no acute distress.  Eye contact is good, speech goal directed, affect calm  CNs grossly intact.  HEENT: conjunctiva non-injected, sclera non-icteric, EOMs intact. No lid edema or eye drainage.   Gross hearing intact.   Neck: Supple. No adenopathy or masses in the cervical or supraclavicular regions.   Lungs: unlabored. clear to auscultation bilaterally with good excursion.  CV: regular rate and rhythm. Systolic murmur present  : palencia catheter in place with cloudy yellow urine  Ext: 1+ ankle edema bilat, normal color and temperature.   Skin: dorsum right foot with healed wound and no erythema or warmth  Gait steady.     Assessment and Plan:   Assessment/Plan:  1. Hospital discharge follow-up  Stable. I have reviewed all hospital records including lab results, imaging studies, EKG, progress notes, and admission and discharge summaries. I have discussed with patient possible factors that contributed to hospitalization and how we can prevent patient from ending up in the hospital again. Educated patient on s/sx to observe for and what action to take when these occur. Reviewed current medications and educated on importance of compliance with these medications all appropriate follow-up visits are scheduled.     2. Osteomyelitis of right foot, unspecified type (HCC)  Sounds to be resolved. Check labs. Enc him to call ID Dr. Hagen to see if he needed f/u with him   - CRP QUANTITIVE (NON-CARDIAC); Future    3. Complicated UTI (urinary tract infection)  Repeat culture now. Will f/u with urology   - URINE CULTURE(NEW); Future  - CRP QUANTITIVE (NON-CARDIAC); Future    4. Bacteremia due to Pseudomonas  - CRP QUANTITIVE (NON-CARDIAC); Future    5. Essential hypertension, benign  Stable on new medications    6. Type 2 diabetes mellitus without complication, without long-term current use of insulin (HCC)  Status unk. Will need close f/u in 4 weeks to restart medications as warranted  - HEMOGLOBIN A1C; Future    7. Acute  renal failure superimposed on chronic kidney disease, unspecified CKD stage, unspecified acute renal failure type (HCC)  Repeat labs now.   - BASIC METABOLIC PANEL; Future    8. Chronic anticoagulation  Unclear why warfarin not restarted, but cannot find any indication as to why it should not be re-started.   - REFERRAL TO ANTICOAGULATION MONITORING      The total time spent seeing the patient in consultation, and formulating an action plan for this visit was 45 minutes.  Greater than 50% of this time was spent face to face counseling, discussing problems documented above, coordinating care and answering questions by the provider.          Follow up:  Return in about 4 weeks (around 12/10/2018).    Educated in proper administration of medication(s) ordered today including safety, possible SE, risks, benefits, rationale and alternatives to therapy.   Supportive care, differential diagnoses, and indications for immediate follow-up discussed with patient.    Pathogenesis of diagnosis discussed including typical length and natural progression.    Instructed to return to clinic or nearest emergency department for any change in condition, further concerns, or worsening of symptoms.  Patient states understanding of the plan of care and discharge instructions.      Please note that this dictation was created using voice recognition software. I have made every reasonable attempt to correct obvious errors, but I expect that there are errors of grammar and possibly content that I did not discover before finalizing the note.    Followup: Return in about 4 weeks (around 12/10/2018). sooner should new symptoms or problems arise.

## 2018-11-13 ENCOUNTER — TELEPHONE (OUTPATIENT)
Dept: MEDICAL GROUP | Facility: PHYSICIAN GROUP | Age: 78
End: 2018-11-13

## 2018-11-13 LAB
ANION GAP SERPL CALC-SCNC: 10 MMOL/L (ref 0–11.9)
BUN SERPL-MCNC: 22 MG/DL (ref 8–22)
CALCIUM SERPL-MCNC: 9.2 MG/DL (ref 8.5–10.5)
CHLORIDE SERPL-SCNC: 107 MMOL/L (ref 96–112)
CO2 SERPL-SCNC: 20 MMOL/L (ref 20–33)
CREAT SERPL-MCNC: 1.31 MG/DL (ref 0.5–1.4)
CRP SERPL HS-MCNC: 1.82 MG/DL (ref 0–0.75)
GLUCOSE SERPL-MCNC: 170 MG/DL (ref 65–99)
POTASSIUM SERPL-SCNC: 4.2 MMOL/L (ref 3.6–5.5)
SODIUM SERPL-SCNC: 137 MMOL/L (ref 135–145)

## 2018-11-14 NOTE — TELEPHONE ENCOUNTER
Phone Number Called: [phone] (home)       Message: Pt has been informed of Corbin Emmanuel result note waiting for Culture    Left Message for patient to call back: N\A

## 2018-11-14 NOTE — TELEPHONE ENCOUNTER
----- Message from KEYONA Man sent at 11/13/2018  4:11 PM PST -----  Please inform kidneys are almost back to baseline. Infection marker is still slightly increased. Will wait for urine culture results. Please make sure he called his infectious disease specialist Dr. Hagen to see if appointment was needed.    KEYONA Man

## 2018-11-15 ENCOUNTER — TELEPHONE (OUTPATIENT)
Dept: VASCULAR LAB | Facility: MEDICAL CENTER | Age: 78
End: 2018-11-15

## 2018-11-15 LAB
BACTERIA UR CULT: NORMAL
SIGNIFICANT IND 70042: NORMAL
SITE SITE: NORMAL
SOURCE SOURCE: NORMAL

## 2018-11-15 NOTE — TELEPHONE ENCOUNTER
Mik!    Dr. Craft should be reviewing the note this week. I assume he will want warfarin restarted so I put referral in to get process moving. Should have an answer in the next few days.     CRYSTAL Man.

## 2018-11-15 NOTE — TELEPHONE ENCOUNTER
Called pt to establish care regarding Anticoagulation referral for Warfarin from IFEOMA Emmanuel on 11/12/18.    Per pt, states that [facility] Emmanuel was going to discuss his case with Dr. Craft to see if he was to resume warfarin. States that he has not been told that he is to resume at this time.   Will send back to ANN Emmanuel to advise.     [facility] at [phone], fax [phone]    Alice Ramires, PharmD

## 2018-11-21 ENCOUNTER — TELEPHONE (OUTPATIENT)
Dept: MEDICAL GROUP | Facility: PHYSICIAN GROUP | Age: 78
End: 2018-11-21

## 2018-11-21 NOTE — TELEPHONE ENCOUNTER
----- Message from Jesus Craft M.D. sent at 11/18/2018 10:03 PM PST -----  Thank you Corbin--good idea to restart his warfarin.  Jesus Craft M.D.    ----- Message -----  From: KEYONA Man  Sent: 11/15/2018  12:52 PM  To: Jesus Craft M.D.    Hi Dr. Craft,     Please see my note from 11/12.  This patient's warfarin was stopped during his hospitalization, but I do not see any notes as to why, although my assumption was the cystoscopy and that it was never restarted.  I wanted to clear with you based on my note whether you are okay with me restarting his warfarin.  I already referred him back to anticoagulation clinic. Thank you in advance.     KEYONA Man

## 2018-11-21 NOTE — TELEPHONE ENCOUNTER
Please call patient to inform him that Dr. Craft did review his chart and he does agree to restart the warfarin    KEYONA Man

## 2018-11-21 NOTE — TELEPHONE ENCOUNTER
The anticoagulation clinic will get him restarted appropriately. He can call [phone] to schedule    KEYONA Man

## 2018-11-21 NOTE — TELEPHONE ENCOUNTER
Phone Number Called: [phone] (home)       Message: spoke with pt he states he is not sure what dose of warfren he should be on. He states he just got out of the hospital and his medications changed. He is going to have one of his daughters call and give me an updated list of his medications.     Left Message for patient to call back: N\A

## 2018-11-27 ENCOUNTER — TELEPHONE (OUTPATIENT)
Dept: MEDICAL GROUP | Facility: PHYSICIAN GROUP | Age: 78
End: 2018-11-27

## 2018-11-27 DIAGNOSIS — I26.99 PULMONARY EMBOLISM ON RIGHT (HCC): ICD-10-CM

## 2018-11-27 DIAGNOSIS — I82.402 LEG DVT (DEEP VENOUS THROMBOEMBOLISM), ACUTE, LEFT (HCC): ICD-10-CM

## 2018-11-27 RX ORDER — WARFARIN SODIUM 1 MG/1
TABLET ORAL
Qty: 135 TAB | Refills: 3 | Status: SHIPPED | OUTPATIENT
Start: 2018-11-27 | End: 2020-02-25

## 2018-11-27 RX ORDER — WARFARIN SODIUM 3 MG/1
TABLET ORAL
Qty: 100 TAB | Refills: 3 | Status: SHIPPED | OUTPATIENT
Start: 2018-11-27 | End: 2020-02-25

## 2018-11-27 NOTE — TELEPHONE ENCOUNTER
1. Caller Name: Emma Eason                                         Call Back Number: [phone]      Patient approves a detailed voicemail message: N\A    Pt wife Emma called asking about his Warfarin is he suppose to start it or not [facility] has not received a prescription Pt also has not heard from the Anticoagulation clinic either.  Please advise

## 2018-11-28 NOTE — TELEPHONE ENCOUNTER
[facility] called regarding pts warfarin 3 mg dose. We need to know how many pt is taking a day so they can fill it     Last notes for the coumadin clinic pt was 3 mg on Mondays and 2 mg other days

## 2018-11-28 NOTE — TELEPHONE ENCOUNTER
I have refilled his warfarin prescriptions at [facility] on [address] and I have also referred him back to the anticoagulation service.    Jesus Craft MD

## 2018-11-28 NOTE — TELEPHONE ENCOUNTER
Tell them to stay with the last dose as listed but that the patient needs to check with the anticoagulation service for any dose adjustments.Jesus Craft MD

## 2018-11-29 ENCOUNTER — ANTICOAGULATION VISIT (OUTPATIENT)
Dept: MEDICAL GROUP | Facility: PHYSICIAN GROUP | Age: 78
End: 2018-11-29
Payer: MEDICARE

## 2018-11-29 ENCOUNTER — TELEPHONE (OUTPATIENT)
Dept: VASCULAR LAB | Facility: MEDICAL CENTER | Age: 78
End: 2018-11-29

## 2018-11-29 VITALS
HEART RATE: 70 BPM | DIASTOLIC BLOOD PRESSURE: 75 MMHG | WEIGHT: 249 LBS | BODY MASS INDEX: 35.73 KG/M2 | SYSTOLIC BLOOD PRESSURE: 134 MMHG

## 2018-11-29 DIAGNOSIS — Z79.01 CHRONIC ANTICOAGULATION: ICD-10-CM

## 2018-11-29 LAB — INR PPP: 1 (ref 2–3.5)

## 2018-11-29 PROCEDURE — 99211 OFF/OP EST MAY X REQ PHY/QHP: CPT | Performed by: INTERNAL MEDICINE

## 2018-11-29 PROCEDURE — 85610 PROTHROMBIN TIME: CPT | Performed by: INTERNAL MEDICINE

## 2018-11-29 NOTE — PROGRESS NOTES
Anticoagulation Summary  As of 11/29/2018    INR goal:   2.0-3.0   TTR:   67.9 % (1.9 y)   Today's INR:   1.0!   Warfarin maintenance plan:   2 mg (1 mg x 2) every day   Weekly warfarin total:   14 mg   Plan last modified:   Vinicio Schaeffer, PharmD (11/29/2018)   Next INR check:   12/3/2018   Target end date:       Indications    Chronic anticoagulation [Z79.01]  Chronic deep vein thrombosis (DVT) of popliteal vein (HCC) (Resolved) [I82.539]  Pulmonary embolism on right (HCC) (Resolved) [I26.99]             Anticoagulation Episode Summary     INR check location:       Preferred lab:       Send INR reminders to:       Comments:         Anticoagulation Care Providers     Provider Role Specialty Phone number    Jesus Craft M.D.  Family Medicine [phone]    [facility] Anticoagulation Services Responsible  [phone]        Anticoagulation Patient Findings        HPI:  Kiran Flores Yumi referred to the [facility] clinic for anticoagulation management.  He has been on warfarin for about 10 years however is new to us.  He has a history of DVTs and pulmonary emboli.  He has been off warfarin for approximately 4 weeks after he was admitted to [facility].  He is not a great historian and it is difficult to tell exactly why they took him off warfarin.  He might have had some bleeding complications status post a cystoscopy.   His primary care physician recommended that he start back on warfarin and we take over his care.  Kiran has a home monitor and we will contact the home monitoring company so they could change the fax number to our clinic.  He also has a IVC filter in place.  He declines any signs or symptoms of a DVT or PE.  He reports that his last DVT was quite a few years ago.    HAS-BLED:  Hypertension   Uncontrolled, >160 mmHg systolic- n  Renal disease Dialysis, transplant, Cr >2.26 mg/dL or >200 µmol/L - n  Liver disease Cirrhosis or bilirubin >2x normal with AST/ALT/AP >3x normal- n  Stroke  [id number] major bleeding or predisposition to bleeding- n  Labile INR Unstable/high INRs, time in therapeutic range <60%- n/a  Age >65- yes  Medication usage predisposing to bleeding Aspirin, clopidogrel, NSAIDs - no  Alcohol use  ?8 drinks/week- no    Pt gave verbal consent  to leave a VM with detailed medical information regarding INR and dose of warfarin.    Pt education done (s/s of bleeding, when to f/u with clinic vs ER, foods with vitamin K, etc)     Assessment:  INR  sub-therapeutic.      I will start him back on 2 mg once daily.  He reports his old home dose was 2 mg every day except for 3 mg once a week.  He will monitor for signs and symptoms of bleeding.  He will report his INR to us on Monday after testing on his home machine.    Pt tolerating medication well, no s/s of bleeding.     Med rec done with pt , no DI     Plan   Continue weekly warfarin dose as noted      Follow up:  Follow up appointment in 3 days .       Vinicio Schaeffer, JeannieD

## 2018-11-29 NOTE — Clinical Note
Please send in a home monitor request for him.  He currently has a home monitor but the contact information needs to be changed to our clinic. Thanks

## 2018-12-03 ENCOUNTER — ANTICOAGULATION MONITORING (OUTPATIENT)
Dept: VASCULAR LAB | Facility: MEDICAL CENTER | Age: 78
End: 2018-12-03

## 2018-12-03 DIAGNOSIS — Z79.01 CHRONIC ANTICOAGULATION: ICD-10-CM

## 2018-12-03 LAB — INR PPP: 2.7 (ref 2–3.5)

## 2018-12-03 NOTE — PROGRESS NOTES
OP Anticoagulation Service Note    Date: 12/3/2018  Anticoagulation Summary  As of 12/3/2018    INR goal:   2.0-3.0   TTR:   --   Today's INR:   2.7   Warfarin maintenance plan:   2 mg (1 mg x 2) every day   Weekly warfarin total:   14 mg   No change documented:   Edouard Miller Ass't   Plan last modified:   Vinicio Schaeffer PharmD (11/29/2018)   Next INR check:   12/17/2018   Target end date:       Indications    Chronic anticoagulation [Z79.01]  Chronic deep vein thrombosis (DVT) of popliteal vein (HCC) (Resolved) [I82.539]  Pulmonary embolism on right (HCC) (Resolved) [I26.99]             Anticoagulation Episode Summary     INR check location:       Preferred lab:       Send INR reminders to:       Comments:         Anticoagulation Care Providers     Provider Role Specialty Phone number    Jesus Craft M.D.  Family Medicine [phone]    [facility]ticoagulation [facility] Responsible  [phone]        Anticoagulation Patient Findings  Patient Findings     Negatives:   Signs/symptoms of thrombosis, Signs/symptoms of bleeding, Laboratory test error suspected, Change in health, Change in alcohol use, Change in activity, Upcoming invasive procedure, Emergency department visit, Upcoming dental procedure, Missed doses, Extra doses, Change in medications, Change in diet/appetite, Hospital admission, Bruising, Other complaints        Plan: Spoke to patient. Patient is therapeutic and will remain on the same dose. Patient reports no unusual bleeding or bruising and no changes to medication or diet. Patient is to be checked again in 2 weeks.    .Edouard Miller. Ass't  [facility]      I have reviewed and concur with the above plan     Vinicio Schaeffer, Nancy

## 2018-12-11 ENCOUNTER — HOSPITAL ENCOUNTER (OUTPATIENT)
Dept: LAB | Facility: MEDICAL CENTER | Age: 78
End: 2018-12-11
Attending: UROLOGY
Payer: MEDICARE

## 2018-12-11 ENCOUNTER — OFFICE VISIT (OUTPATIENT)
Dept: MEDICAL GROUP | Facility: PHYSICIAN GROUP | Age: 78
End: 2018-12-11
Payer: MEDICARE

## 2018-12-11 VITALS
HEART RATE: 102 BPM | OXYGEN SATURATION: 96 % | SYSTOLIC BLOOD PRESSURE: 140 MMHG | WEIGHT: 249 LBS | TEMPERATURE: 98.3 F | DIASTOLIC BLOOD PRESSURE: 80 MMHG | HEIGHT: 70 IN | BODY MASS INDEX: 35.65 KG/M2

## 2018-12-11 DIAGNOSIS — I10 ESSENTIAL HYPERTENSION, BENIGN: ICD-10-CM

## 2018-12-11 DIAGNOSIS — R60.0 LEG EDEMA, RIGHT: ICD-10-CM

## 2018-12-11 DIAGNOSIS — Z79.01 ANTICOAGULANT LONG-TERM USE: ICD-10-CM

## 2018-12-11 DIAGNOSIS — E11.9 TYPE 2 DIABETES MELLITUS WITHOUT COMPLICATION, WITHOUT LONG-TERM CURRENT USE OF INSULIN (HCC): ICD-10-CM

## 2018-12-11 DIAGNOSIS — M86.9 OSTEOMYELITIS OF RIGHT FOOT, UNSPECIFIED TYPE (HCC): ICD-10-CM

## 2018-12-11 PROBLEM — D68.32 HEMORRHAGIC DISORDER DUE TO EXTRINSIC CIRCULATING ANTICOAGULANTS (HCC): Status: RESOLVED | Noted: 2017-06-02 | Resolved: 2018-12-11

## 2018-12-11 LAB — PSA SERPL-MCNC: 1.69 NG/ML (ref 0–4)

## 2018-12-11 PROCEDURE — 80048 BASIC METABOLIC PNL TOTAL CA: CPT

## 2018-12-11 PROCEDURE — 36415 COLL VENOUS BLD VENIPUNCTURE: CPT

## 2018-12-11 PROCEDURE — 84153 ASSAY OF PSA TOTAL: CPT

## 2018-12-11 PROCEDURE — 99214 OFFICE O/P EST MOD 30 MIN: CPT | Performed by: NURSE PRACTITIONER

## 2018-12-11 RX ORDER — HYDROCHLOROTHIAZIDE 12.5 MG/1
12.5 TABLET ORAL DAILY
Qty: 30 TAB | Refills: 0 | Status: SHIPPED | OUTPATIENT
Start: 2018-12-11 | End: 2018-12-11 | Stop reason: SDUPTHER

## 2018-12-11 RX ORDER — HYDROCHLOROTHIAZIDE 12.5 MG/1
CAPSULE, GELATIN COATED ORAL
Qty: 90 CAP | Refills: 3 | Status: SHIPPED | OUTPATIENT
Start: 2018-12-11 | End: 2019-01-15

## 2018-12-11 RX ORDER — FUROSEMIDE 20 MG/1
20 TABLET ORAL DAILY
Qty: 3 TAB | Refills: 0 | Status: SHIPPED | OUTPATIENT
Start: 2018-12-11 | End: 2018-12-11 | Stop reason: SDUPTHER

## 2018-12-11 RX ORDER — FUROSEMIDE 20 MG/1
TABLET ORAL
Qty: 90 TAB | Refills: 3 | Status: SHIPPED | OUTPATIENT
Start: 2018-12-11 | End: 2019-01-15 | Stop reason: SDUPTHER

## 2018-12-11 NOTE — PROGRESS NOTES
Subjective:     Chief Complaint   Patient presents with   • Follow-Up     x4week fv       HPI  Kiran Eason is a 78 y.o. male here today for close f/u from recent hospitalization. Here with sister-in-law today     Original hospitalization was 9/20 for sepsis and bacteremia secondary to right foot ulcer with OM with MSSA, and UTI.  After initial treatment at [facility], he was transferred to [facility].  While there, renal function worsened and nephrology was consulted.  Ultrasound was obtained that revealed a bladder mass therefore urology was consulted.  He was then transferred back to [facility] for urology consultation 9/28/18-10/6/18.  Had positive UTI with Pseudomonas that was requiring treatment prior to cystoscopy.  Once treatment complete, patient had cystoscopy with TUR 10/5, but no tumor was found. They felt his symptoms were inflammation from the UTI.  It was recommended he complete his full course of antibiotics, but infectious disease was consulted. ID recommended 1 week further of IV antibiotics in hospital, and then another 6 weeks of outpatient ertapenem to continue to be followed by ID Dr. Hagen. On 10/6 was then transferred back to [facility].  He was getting IV antibiotics until a week before discharge. He was discharged one week ago to his home.     F/u was with urology Dr. Sutton last week for f/u. He did have a palencia catheter up until last week this was removed. He is urinating without difficulty since this has been removed. They are discussing possibly addressing his BPH with procedure, but have not determined plan. F/u is scheduled next week again. PSA was drawn today.     He has hx of LE edema with his HTN, and previously was on hydrochlorothiazide, but this was discontinued while hospitalized. Now he has edema starting this week of right leg only. (To note, he has hx of DVT but INR is stable on 12/3 of 2.7)    He does have diabetes but home glucose readings have  been in the 70s-115. He is not on his metformin or glipizide since discharge from hospital.     He has hx of PE and 2 DVT. Has IVC filter placed. Was on warfarin for 10 years or so. Had not been on this since at least 10/6 discharge, but no notes state why discontinued other than maybe for cystoscopy. This was restarted two weeks ago. INR due Monday for monitoring. INR 12/3 was 2.7    For right foot ulcer, this has healed and he had f/u appointment with podiatrist Dr. Doyle who stated feet looked good. He has not seen infectious disease since discharge. CRP was mildly increased to 1.82 on 11/12.     Diagnoses of Osteomyelitis of right foot, unspecified type (HCC), Leg edema, right, Essential hypertension, benign, Type 2 diabetes mellitus without complication, without long-term current use of insulin (HCC), and Anticoagulant long-term use were pertinent to this visit.    Allergies: Lisinopril  Current medicines (including changes today)  Current Outpatient Prescriptions   Medication Sig Dispense Refill   • warfarin (COUMADIN) 1 MG Tab Take as advised by anticoagulation service 135 Tab 3   • warfarin (COUMADIN) 3 MG Tab Take as advised by anticoagulation service 100 Tab 3   • losartan (COZAAR) 50 MG Tab Take 1 Tab by mouth every day. 90 Tab 0   • isosorbide dinitrate (ISORDIL) 10 MG Tab Take 1 Tab by mouth 3 times a day. 270 Tab 0   • carvedilol (COREG) 3.125 MG Tab Take 1 Tab by mouth 2 times a day, with meals. 180 Tab 0   • amLODIPine (NORVASC) 5 MG Tab Take 1 Tab by mouth every day. 90 Tab 0   • atorvastatin (LIPITOR) 20 MG Tab Take 1 Tab by mouth every day. 90 Tab 0   • Esomeprazole Magnesium (NEXIUM) 20 MG Pack Take 20 mg by mouth every morning. 90 Each 0   • tamsulosin (FLOMAX) 0.4 MG capsule Take 1 Cap by mouth every day. 90 Cap 0   • furosemide (LASIX) 20 MG Tab TAKE 1 TABLET BY MOUTH ONCE DAILY 90 Tab 3   • hydrochlorothiazide (MICROZIDE) 12.5 MG capsule TAKE 1 CAPSULE BY MOUTH ONCE DAILY 90 Cap 3   • SENNA  PO Take  by mouth.     • vitamin D, Ergocalciferol, (DRISDOL) 82490 units Cap capsule Take  by mouth every 7 days.     • tramadol (ULTRAM) 50 MG Tab Take 50 mg by mouth every four hours as needed.     • sodium benzoate Powder Take 1 g by mouth Once.     • finasteride (PROSCAR) 5 MG Tab Take 1 Tab by mouth every day. 90 Tab 0   • ipratropium-albuterol (COMBIVENT RESPIMAT)  MCG/ACT Aero Soln Inhale 1 Puff by mouth 4 times a day as needed (SOB).     • bimatoprost (LUMIGAN) 0.01 % Solution Place 1 Drop in both eyes every bedtime.       No current facility-administered medications for this visit.        He  has a past medical history of Arrhythmia; Arthritis; Arthropathy, unspecified, site unspecified; At risk for sleep apnea; Back pain (age 30); Blood clotting disorder ([facility]) (current 2017); BPH (benign prostatic hyperplasia); Breath shortness; Bronchitis (12/2014); Cancer ([facility]); Cancer ([facility]) (6/06); Cataract; Chronic congestive heart failure ([facility]) (6/6/2017); Disorders of bursae and tendons in shoulder region, unspecified; Esophageal stricture (07/2007); Essential hypertension, benign; Foot fracture (12/07); Gastric ulcer (07/2007); GERD (gastroesophageal reflux disease); Glaucoma; Heart burn; Hypertension; Hypertrophy of prostate without urinary obstruction and other lower urinary tract symptoms (LUTS); Indigestion; Pain (2/13/12); Personal history of venous thrombosis and embolism (11/2008); Pneumonia; Pneumonia (2004); Reflux esophagitis; Rosacea; Skin cancer; Sleep apnea; Snoring; Unspecified cataract; Unspecified glaucoma(365.9); and Urinary incontinence. He also has no past medical history of Bronchitis; COPD (chronic obstructive pulmonary disease) ([facility]); or Fall.        ROS  As stated in HPI and additionally  Gen: No F/C, fatigue, malaise  Endo: No polyuria or polydipsia  : No hesitancy, urgency, incontinence, retention, dysuria     Objective:     Blood pressure 140/80, pulse (!) 102, temperature 36.8 °C  "(98.3 °F), temperature source Temporal, height 1.778 m (5' 10\"), weight 112.9 kg (249 lb), SpO2 96 %. Body mass index is 35.73 kg/m².  Physical Exam:  General: Alert, oriented, in no acute distress.  Eye contact is good, speech goal directed, affect calm  CNs grossly intact.  HEENT: OS has been removed. OD conjunctiva non-injected, sclera non-icteric  Gross hearing intact.  Lungs: unlabored. clear to auscultation bilaterally with good excursion.  CV: regular rate and rhythm.  Ext:   RLE: 2+ pitting edema, very slightly more warm than LLE, edema worse around ankle joint medial aspect  LLE: no edema, normal color and temperature.   Skin: No rashes or lesions in visible areas  Gait steady.     Assessment and Plan:   Assessment/Plan:  1. Osteomyelitis of right foot, unspecified type (HCC)  Stable. Check CRP for monitoring since right leg edema occurring  - BASIC METABOLIC PANEL; Future  - CRP QUANTITIVE (NON-CARDIAC); Future    2. Leg edema, right  Start Furosemide 20 mg daily for 3 days. If swelling not improved, due to his hx of DVT (although most recent INR stable) will obtain US to r/o DVT and consider imaging of foot to ensure OM has not returned    3. Essential hypertension, benign  Not controlled. Add hctz 12.5 mg daily. F/u with bp check in 2-3 weeks     4. Type 2 diabetes mellitus without complication, without long-term current use of insulin (HCC)  Stable. Continue without medication. Monitor glucose fasting daily 3 times/week  - BASIC METABOLIC PANEL; Future    5. Anticoagulant long-term use  Stable continue INR monitoring        Follow up:  Return in about 6 weeks (around 1/22/2019).    Educated in proper administration of medication(s) ordered today including safety, possible SE, risks, benefits, rationale and alternatives to therapy.   Supportive care, differential diagnoses, and indications for immediate follow-up discussed with patient.    Pathogenesis of diagnosis discussed including typical length and " natural progression.    Instructed to return to clinic or nearest emergency department for any change in condition, further concerns, or worsening of symptoms.  Patient states understanding of the plan of care and discharge instructions.      Please note that this dictation was created using voice recognition software. I have made every reasonable attempt to correct obvious errors, but I expect that there are errors of grammar and possibly content that I did not discover before finalizing the note.    Followup: Return in about 6 weeks (around 1/22/2019). sooner should new symptoms or problems arise.

## 2018-12-11 NOTE — LETTER
[facility]  Jesus Craft M.D.  [address]  [place] [address]  Fax: [phone]   Authorization for Release/Disclosure of   Protected Health Information   Name: DOLLY EASON : 1940 SSN: [id number]   Address: [place] [address] Phone:    [phone] (home)    I authorize the entity listed below to release/disclose the PHI below to:   [facility]/Jesus Craft M.D. and KEYONA Man   Provider or Entity Name:  [facility]   Address   City, State, Zip   Phone:      Fax:     Reason for request: continuity of care   Information to be released:    [  ] LAST COLONOSCOPY,  including any PATH REPORT and follow-up  [  ] LAST FIT/COLOGUARD RESULT [  ] LAST DEXA  [  ] LAST MAMMOGRAM  [  ] LAST PAP  [  ] LAST LABS [  ] RETINA EXAM REPORT  [  ] IMMUNIZATION RECORDS  [  xx] Release all info      [  ] Check here and initial the line next to each item to release ALL health information INCLUDING  _____ Care and treatment for drug and / or alcohol abuse  _____ HIV testing, infection status, or AIDS  _____ Genetic Testing    DATES OF SERVICE OR TIME PERIOD TO BE DISCLOSED: _____________  I understand and acknowledge that:  * This Authorization may be revoked at any time by you in writing, except if your health information has already been used or disclosed.  * Your health information that will be used or disclosed as a result of you signing this authorization could be re-disclosed by the recipient. If this occurs, your re-disclosed health information may no longer be protected by State or Federal laws.  * You may refuse to sign this Authorization. Your refusal will not affect your ability to obtain treatment.  * This Authorization becomes effective upon signing and will  on (date) __________.      If no date is indicated, this Authorization will  one (1) year from the signature date.    Name: Dolly Eason    Signature:   Date:     2018       PLEASE FAX REQUESTED RECORDS BACK TO: [phone]

## 2018-12-12 LAB
ANION GAP SERPL CALC-SCNC: 9 MMOL/L (ref 0–11.9)
BUN SERPL-MCNC: 30 MG/DL (ref 8–22)
CALCIUM SERPL-MCNC: 10 MG/DL (ref 8.5–10.5)
CHLORIDE SERPL-SCNC: 107 MMOL/L (ref 96–112)
CO2 SERPL-SCNC: 20 MMOL/L (ref 20–33)
CREAT SERPL-MCNC: 1.37 MG/DL (ref 0.5–1.4)
GLUCOSE SERPL-MCNC: 92 MG/DL (ref 65–99)
POTASSIUM SERPL-SCNC: 4.4 MMOL/L (ref 3.6–5.5)
SODIUM SERPL-SCNC: 136 MMOL/L (ref 135–145)

## 2018-12-14 ENCOUNTER — TELEPHONE (OUTPATIENT)
Dept: MEDICAL GROUP | Facility: PHYSICIAN GROUP | Age: 78
End: 2018-12-14

## 2018-12-14 NOTE — TELEPHONE ENCOUNTER
Please call to check on high leg edema. If it is not improving, we will need to get ultrasound.     CRYSTAL Man.

## 2018-12-14 NOTE — TELEPHONE ENCOUNTER
----- Message from KEYONA Man sent at 12/11/2018 12:54 PM PST -----  Check on RLE edema. Consider US of leg and/or when to do labs

## 2018-12-15 NOTE — TELEPHONE ENCOUNTER
1. Caller Name: Kiran Flores [place]                                         Call Back Number: ph      Patient approves a detailed voicemail message: N\A    Pt states edema is gone completely.  Kiran stated Thank you and Saundra Demarco

## 2018-12-31 ENCOUNTER — ANTICOAGULATION MONITORING (OUTPATIENT)
Dept: VASCULAR LAB | Facility: MEDICAL CENTER | Age: 78
End: 2018-12-31

## 2018-12-31 DIAGNOSIS — Z79.01 CHRONIC ANTICOAGULATION: ICD-10-CM

## 2019-01-11 ENCOUNTER — ANTICOAGULATION MONITORING (OUTPATIENT)
Dept: VASCULAR LAB | Facility: MEDICAL CENTER | Age: 79
End: 2019-01-11

## 2019-01-11 DIAGNOSIS — Z79.01 CHRONIC ANTICOAGULATION: ICD-10-CM

## 2019-01-11 LAB — INR PPP: 2.5 (ref 2–3.5)

## 2019-01-11 NOTE — PROGRESS NOTES
Anticoagulation Summary  As of 1/11/2019    INR goal:   2.0-3.0   TTR:   100.0 % (1.1 mo)   Today's INR:   2.5   Warfarin maintenance plan:   2 mg (1 mg x 2) every day   Weekly warfarin total:   14 mg   No change documented:   Edouard Herring Ass't   Plan last modified:   Vinicio Schaeffer, PharmD (11/29/2018)   Next INR check:      Target end date:       Indications    Chronic anticoagulation [Z79.01]  Chronic deep vein thrombosis (DVT) of popliteal vein (HCC) (Resolved) [I82.539]  Pulmonary embolism on right (HCC) (Resolved) [I26.99]             Anticoagulation Episode Summary     INR check location:       Preferred lab:       Send INR reminders to:       Comments:         Anticoagulation Care Providers     Provider Role Specialty Phone number    Jesus Craft M.D.  Family Medicine [phone]    [facility] Anticoagulation Services Responsible  [phone]        Anticoagulation Patient Findings  Patient Findings     Negatives:   Signs/symptoms of thrombosis, Signs/symptoms of bleeding, Laboratory test error suspected, Change in health, Change in alcohol use, Change in activity, Upcoming invasive procedure, Emergency department visit, Upcoming dental procedure, Missed doses, Extra doses, Change in medications, Change in diet/appetite, Hospital admission, Bruising, Other complaints      Spoke with patient to report a therapeutic INR.  Pt instructed to continue with current warfarin dosing regimen. Pt denies any s/s of bleeding, bruising, clotting or any changes to diet or medication.  Will follow up in 2 weeks.  Edouard Herring Ass't      I have reviewed and concur with the above plan on 01/11/2019.  Alyssa Aguayo, Clinical Pharmacist

## 2019-01-15 ENCOUNTER — HOSPITAL ENCOUNTER (OUTPATIENT)
Facility: MEDICAL CENTER | Age: 79
End: 2019-01-15
Attending: NURSE PRACTITIONER
Payer: MEDICARE

## 2019-01-15 ENCOUNTER — OFFICE VISIT (OUTPATIENT)
Dept: MEDICAL GROUP | Facility: PHYSICIAN GROUP | Age: 79
End: 2019-01-15
Payer: MEDICARE

## 2019-01-15 VITALS
OXYGEN SATURATION: 94 % | SYSTOLIC BLOOD PRESSURE: 134 MMHG | HEART RATE: 104 BPM | TEMPERATURE: 98.4 F | BODY MASS INDEX: 36.79 KG/M2 | DIASTOLIC BLOOD PRESSURE: 84 MMHG | WEIGHT: 257 LBS | HEIGHT: 70 IN

## 2019-01-15 DIAGNOSIS — R39.9 LOWER URINARY TRACT SYMPTOMS (LUTS): ICD-10-CM

## 2019-01-15 DIAGNOSIS — C69.92: ICD-10-CM

## 2019-01-15 DIAGNOSIS — E11.9 TYPE 2 DIABETES MELLITUS WITHOUT COMPLICATION, WITHOUT LONG-TERM CURRENT USE OF INSULIN (HCC): ICD-10-CM

## 2019-01-15 DIAGNOSIS — R60.0 LEG EDEMA, RIGHT: ICD-10-CM

## 2019-01-15 DIAGNOSIS — I10 ESSENTIAL HYPERTENSION, BENIGN: ICD-10-CM

## 2019-01-15 LAB
APPEARANCE UR: NORMAL
BILIRUB UR STRIP-MCNC: NORMAL MG/DL
COLOR UR AUTO: YELLOW
GLUCOSE UR STRIP.AUTO-MCNC: NORMAL MG/DL
KETONES UR STRIP.AUTO-MCNC: NORMAL MG/DL
LEUKOCYTE ESTERASE UR QL STRIP.AUTO: NORMAL
NITRITE UR QL STRIP.AUTO: NORMAL
PH UR STRIP.AUTO: 5.5 [PH] (ref 5–8)
PROT UR QL STRIP: NORMAL MG/DL
RBC UR QL AUTO: NORMAL
SP GR UR STRIP.AUTO: 1.01
UROBILINOGEN UR STRIP-MCNC: 0.2 MG/DL

## 2019-01-15 PROCEDURE — 87086 URINE CULTURE/COLONY COUNT: CPT

## 2019-01-15 PROCEDURE — 81002 URINALYSIS NONAUTO W/O SCOPE: CPT | Performed by: NURSE PRACTITIONER

## 2019-01-15 PROCEDURE — 99214 OFFICE O/P EST MOD 30 MIN: CPT | Performed by: NURSE PRACTITIONER

## 2019-01-15 RX ORDER — FUROSEMIDE 20 MG/1
20 TABLET ORAL DAILY
Qty: 90 TAB | Refills: 3 | Status: SHIPPED | OUTPATIENT
Start: 2019-01-15 | End: 2019-01-15

## 2019-01-15 RX ORDER — HYDROCHLOROTHIAZIDE 12.5 MG/1
12.5 CAPSULE, GELATIN COATED ORAL DAILY
Qty: 90 CAP | Refills: 3 | Status: SHIPPED | OUTPATIENT
Start: 2019-01-15 | End: 2019-01-15

## 2019-01-15 ASSESSMENT — PATIENT HEALTH QUESTIONNAIRE - PHQ9: CLINICAL INTERPRETATION OF PHQ2 SCORE: 0

## 2019-01-15 NOTE — PROGRESS NOTES
Subjective:     Chief Complaint   Patient presents with   • Leg Problem     fv edma       HPI  Kiran Eason is a 78 y.o. male here today for close follow-up on blood pressure, diabetes, and edema.  After his recent hospitalization and change of medications, he was experiencing lower extremity edema, worse on the right side.  In the past, he did have LE edema with his HTN, and previously was on hydrochlorothiazide, but this was discontinued while hospitalized.  We did a 3-day furosemide 20 mg treatment with elevation, and his edema has gone back to his baseline.  There is no lower extremity redness, pain, or warmth.  I had added hydrochlorothiazide back to his medication regimen due to his blood pressure being elevated, but somehow there has been confusion with the pharmacy with this and this was never restarted.  Blood pressure is stable today.     He does have diabetes but home glucose readings have been in the 80s-115. He is not on his metformin or glipizide since discharge from hospital.  We will continue without these.    He did have a catheter in and had issues with significant inflammation and obstruction concerns.  After Castaneda catheter was removed, he has been instructed to self cath at home a couple times a week to ensure he is emptying bladder entirely, which she reports PVR of only 50 cc.  He does have occasional days of urinary frequency, hesitancy, and dysuria, but they resolve on their own.    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  Followed by just Dr. Chau       Diagnoses of Leg edema, right, Essential hypertension, benign, Type 2 diabetes mellitus without complication, without long-term current use of insulin (HCC), Lower urinary tract symptoms (LUTS), and Squamous cell carcinoma of left eye ([facility]) were pertinent to this visit.    Allergies: Lisinopril  Current medicines (including changes today)  Current Outpatient  Prescriptions   Medication Sig Dispense Refill   • isosorbide dinitrate (ISORDIL) 10 MG Tab TAKE 1 TABLET BY MOUTH 3  TIMES A  Tab 3   • losartan (COZAAR) 50 MG Tab TAKE 1 TABLET BY MOUTH  EVERY DAY 90 Tab 3   • carvedilol (COREG) 3.125 MG Tab TAKE 1 TABLET BY MOUTH TWO  TIMES DAILY WITH MEALS 180 Tab 3   • tamsulosin (FLOMAX) 0.4 MG capsule TAKE 1 CAPSULE BY MOUTH  EVERY DAY 90 Cap 3   • finasteride (PROSCAR) 5 MG Tab TAKE 1 TABLET BY MOUTH  EVERY DAY 90 Tab 3   • amLODIPine (NORVASC) 5 MG Tab TAKE 1 TABLET BY MOUTH  EVERY DAY 90 Tab 3   • atorvastatin (LIPITOR) 20 MG Tab TAKE 1 TABLET BY MOUTH  EVERY DAY 90 Tab 3   • NEXIUM 20 MG Pack MIX CONTENTS OF ONE 20MG  PACKET WITH 1 TABLESPOON  (15ML) OF WATER AND WAIT  2-3 MINUTES THEN DRINK  EVERY MORNING 90 Each 3   • warfarin (COUMADIN) 1 MG Tab Take as advised by anticoagulation service 135 Tab 3   • warfarin (COUMADIN) 3 MG Tab Take as advised by anticoagulation service 100 Tab 3   • SENNA PO Take  by mouth.     • vitamin D, Ergocalciferol, (DRISDOL) 34971 units Cap capsule Take  by mouth every 7 days.     • tramadol (ULTRAM) 50 MG Tab Take 50 mg by mouth every four hours as needed.     • sodium benzoate Powder Take 1 g by mouth Once.     • ipratropium-albuterol (COMBIVENT RESPIMAT)  MCG/ACT Aero Soln Inhale 1 Puff by mouth 4 times a day as needed (SOB).     • bimatoprost (LUMIGAN) 0.01 % Solution Place 1 Drop in both eyes every bedtime.       No current facility-administered medications for this visit.        He  has a past medical history of Arrhythmia; Arthritis; Arthropathy, unspecified, site unspecified; At risk for sleep apnea; Back pain (age 30); Blood clotting disorder (HCC) (current 2017); BPH (benign prostatic hyperplasia); Breath shortness; Bronchitis (12/2014); Cancer (HCC); Cancer (HCC) (6/06); Cataract; Chronic congestive heart failure (HCC) (6/6/2017); Disorders of bursae and tendons in shoulder region, unspecified; Esophageal stricture  "(07/2007); Essential hypertension, benign; Foot fracture (12/07); Gastric ulcer (07/2007); GERD (gastroesophageal reflux disease); Glaucoma; Heart burn; Hypertension; Hypertrophy of prostate without urinary obstruction and other lower urinary tract symptoms (LUTS); Indigestion; Pain (2/13/12); Personal history of venous thrombosis and embolism (11/2008); Pneumonia; Pneumonia (2004); Reflux esophagitis; Rosacea; Skin cancer; Sleep apnea; Snoring; Unspecified cataract; Unspecified glaucoma(365.9); and Urinary incontinence. He also has no past medical history of Bronchitis; COPD (chronic obstructive pulmonary disease) ([facility]); or Fall.        ROS  As stated in HPI and additionally  Gen: No F/C, fatigue, malaise  GI: No abd pain, diarrhea, N/V  : see HPI   Endo: No polyuria or polydipsia     Objective:     Blood pressure 134/84, pulse (!) 104, temperature 36.9 °C (98.4 °F), temperature source Temporal, height 1.778 m (5' 10\"), weight 116.6 kg (257 lb), SpO2 94 %. Body mass index is 36.88 kg/m².  Physical Exam:  General: Alert, oriented, in no acute distress.  Eye contact is good, speech goal directed, affect calm  CNs grossly intact.  Gross hearing intact.  Lungs: unlabored. clear to auscultation bilaterally with good excursion.  CV: regular rate and rhythm.  Ext: RLE 1+ NP edema, LLE trace NP edema, normal color and temperature.   Skin: No rashes or lesions in visible areas  Gait steady.     Assessment and Plan:   Assessment/Plan:  1. Leg edema, right  Stable and back to baseline    2. Essential hypertension, benign  Stable on current meds    3. Type 2 diabetes mellitus without complication, without long-term current use of insulin ([facility])  Stable without medication. Repeat labs in 2 months for monitoring   - COMP METABOLIC PANEL; Future  - HEMOGLOBIN A1C; Future    4. Lower urinary tract symptoms (LUTS)  Check culture d/t abnormal UA  - POCT Urinalysis  - URINE CULTURE(NEW); Future    5. Squamous cell carcinoma of left " eye (HCC)  Continue f/u with opth       Follow up:  Return in about 6 months (around 7/15/2019), or w/Venancio if their is availability or with myself .    Educated in proper administration of medication(s) ordered today including safety, possible SE, risks, benefits, rationale and alternatives to therapy.   Supportive care, differential diagnoses, and indications for immediate follow-up discussed with patient.    Pathogenesis of diagnosis discussed including typical length and natural progression.    Instructed to return to clinic or nearest emergency department for any change in condition, further concerns, or worsening of symptoms.  Patient states understanding of the plan of care and discharge instructions.      Please note that this dictation was created using voice recognition software. I have made every reasonable attempt to correct obvious errors, but I expect that there are errors of grammar and possibly content that I did not discover before finalizing the note.    Followup: Return in about 6 months (around 7/15/2019), or w/[facility] if their is availability or with myself . sooner should new symptoms or problems arise.

## 2019-01-17 LAB
BACTERIA UR CULT: NORMAL
SIGNIFICANT IND 70042: NORMAL
SITE SITE: NORMAL
SOURCE SOURCE: NORMAL

## 2019-01-31 ENCOUNTER — ANTICOAGULATION MONITORING (OUTPATIENT)
Dept: MEDICAL GROUP | Facility: MEDICAL CENTER | Age: 79
End: 2019-01-31

## 2019-01-31 DIAGNOSIS — Z79.01 CHRONIC ANTICOAGULATION: ICD-10-CM

## 2019-01-31 LAB — INR PPP: 2.3 (ref 2–3.5)

## 2019-01-31 NOTE — PROGRESS NOTES
Anticoagulation Summary  As of 2019    INR goal:   2.0-3.0   TTR:   100.0 % (1.8 mo)   INR used for dosin.3 (2019)   Warfarin maintenance plan:   2 mg (1 mg x 2) every day   Weekly warfarin total:   14 mg   Plan last modified:   Vinicio Schaeffer, PharmD (2018)   Next INR check:   2019   Target end date:       Indications    Chronic anticoagulation [Z79.01]  Chronic deep vein thrombosis (DVT) of popliteal vein (HCC) (Resolved) [I82.539]  Pulmonary embolism on right (HCC) (Resolved) [I26.99]             Anticoagulation Episode Summary     INR check location:       Preferred lab:       Send INR reminders to:       Comments:         Anticoagulation Care Providers     Provider Role Specialty Phone number    Jesus Craft M.D.  Family Medicine [phone]    [facility] Anticoagulation [facility] Responsible  [phone]        Anticoagulation Patient Findings      Spoke with patient Kiran to report a therapeutic INR.    Pt instructed to continue with current warfarin dosing regimen, confirms dosing.   Pt denies any s/s of bleeding, bruising, clotting or any changes to diet or medication.    Will follow up in 2 week(s).     Sigifredo Francois, Pharmacy Intern

## 2019-02-01 ENCOUNTER — HOSPITAL ENCOUNTER (OUTPATIENT)
Dept: HOSPITAL 8 - CVU | Age: 79
Discharge: HOME | End: 2019-02-01
Attending: INTERNAL MEDICINE
Payer: MEDICARE

## 2019-02-01 DIAGNOSIS — I65.23: ICD-10-CM

## 2019-02-01 DIAGNOSIS — I08.3: Primary | ICD-10-CM

## 2019-02-01 DIAGNOSIS — Z95.0: ICD-10-CM

## 2019-02-01 DIAGNOSIS — I11.9: ICD-10-CM

## 2019-02-01 PROCEDURE — 93880 EXTRACRANIAL BILAT STUDY: CPT

## 2019-02-01 PROCEDURE — 93306 TTE W/DOPPLER COMPLETE: CPT

## 2019-02-02 NOTE — TELEPHONE ENCOUNTER
"· Home Health paperwork received from [facility] requiring provider signature.     · All appropriate fields completed by Medical Assistant: No    · Paperwork placed in \"MA to Provider\" folder/basket. Awaiting provider completion/signature.  " No

## 2019-02-11 ENCOUNTER — HOSPITAL ENCOUNTER (OUTPATIENT)
Dept: LAB | Facility: MEDICAL CENTER | Age: 79
End: 2019-02-11
Attending: INTERNAL MEDICINE
Payer: MEDICARE

## 2019-02-11 ENCOUNTER — HOSPITAL ENCOUNTER (OUTPATIENT)
Dept: LAB | Facility: MEDICAL CENTER | Age: 79
End: 2019-02-11
Attending: NURSE PRACTITIONER
Payer: MEDICARE

## 2019-02-11 DIAGNOSIS — M86.9 OSTEOMYELITIS OF RIGHT FOOT, UNSPECIFIED TYPE (HCC): ICD-10-CM

## 2019-02-11 LAB
ALBUMIN SERPL BCP-MCNC: 4.3 G/DL (ref 3.2–4.9)
ALBUMIN/GLOB SERPL: 1.6 G/DL
ALP SERPL-CCNC: 117 U/L (ref 30–99)
ALT SERPL-CCNC: 29 U/L (ref 2–50)
ANION GAP SERPL CALC-SCNC: 9 MMOL/L (ref 0–11.9)
AST SERPL-CCNC: 19 U/L (ref 12–45)
BASOPHILS # BLD AUTO: 0.3 % (ref 0–1.8)
BASOPHILS # BLD: 0.02 K/UL (ref 0–0.12)
BILIRUB SERPL-MCNC: 0.5 MG/DL (ref 0.1–1.5)
BUN SERPL-MCNC: 34 MG/DL (ref 8–22)
CALCIUM SERPL-MCNC: 9.8 MG/DL (ref 8.5–10.5)
CHLORIDE SERPL-SCNC: 109 MMOL/L (ref 96–112)
CHOLEST SERPL-MCNC: 120 MG/DL (ref 100–199)
CO2 SERPL-SCNC: 21 MMOL/L (ref 20–33)
CREAT SERPL-MCNC: 1.49 MG/DL (ref 0.5–1.4)
CRP SERPL HS-MCNC: 0.47 MG/DL (ref 0–0.75)
EOSINOPHIL # BLD AUTO: 0.18 K/UL (ref 0–0.51)
EOSINOPHIL NFR BLD: 2.3 % (ref 0–6.9)
ERYTHROCYTE [DISTWIDTH] IN BLOOD BY AUTOMATED COUNT: 57.1 FL (ref 35.9–50)
EST. AVERAGE GLUCOSE BLD GHB EST-MCNC: 154 MG/DL
GLOBULIN SER CALC-MCNC: 2.7 G/DL (ref 1.9–3.5)
GLUCOSE SERPL-MCNC: 136 MG/DL (ref 65–99)
HBA1C MFR BLD: 7 % (ref 0–5.6)
HCT VFR BLD AUTO: 45.4 % (ref 42–52)
HDLC SERPL-MCNC: 41 MG/DL
HGB BLD-MCNC: 14.1 G/DL (ref 14–18)
IMM GRANULOCYTES # BLD AUTO: 0.06 K/UL (ref 0–0.11)
IMM GRANULOCYTES NFR BLD AUTO: 0.8 % (ref 0–0.9)
LDLC SERPL CALC-MCNC: 53 MG/DL
LYMPHOCYTES # BLD AUTO: 1.53 K/UL (ref 1–4.8)
LYMPHOCYTES NFR BLD: 19.6 % (ref 22–41)
MCH RBC QN AUTO: 29.9 PG (ref 27–33)
MCHC RBC AUTO-ENTMCNC: 31.1 G/DL (ref 33.7–35.3)
MCV RBC AUTO: 96.4 FL (ref 81.4–97.8)
MONOCYTES # BLD AUTO: 0.52 K/UL (ref 0–0.85)
MONOCYTES NFR BLD AUTO: 6.7 % (ref 0–13.4)
NEUTROPHILS # BLD AUTO: 5.5 K/UL (ref 1.82–7.42)
NEUTROPHILS NFR BLD: 70.3 % (ref 44–72)
NRBC # BLD AUTO: 0 K/UL
NRBC BLD-RTO: 0 /100 WBC
PLATELET # BLD AUTO: 132 K/UL (ref 164–446)
PMV BLD AUTO: 11 FL (ref 9–12.9)
POTASSIUM SERPL-SCNC: 4.6 MMOL/L (ref 3.6–5.5)
PROT SERPL-MCNC: 7 G/DL (ref 6–8.2)
RBC # BLD AUTO: 4.71 M/UL (ref 4.7–6.1)
SODIUM SERPL-SCNC: 139 MMOL/L (ref 135–145)
TRIGL SERPL-MCNC: 132 MG/DL (ref 0–149)
WBC # BLD AUTO: 7.8 K/UL (ref 4.8–10.8)

## 2019-02-11 PROCEDURE — 36415 COLL VENOUS BLD VENIPUNCTURE: CPT | Mod: GA

## 2019-02-11 PROCEDURE — 80053 COMPREHEN METABOLIC PANEL: CPT

## 2019-02-11 PROCEDURE — 86140 C-REACTIVE PROTEIN: CPT

## 2019-02-11 PROCEDURE — 83036 HEMOGLOBIN GLYCOSYLATED A1C: CPT | Mod: GA

## 2019-02-11 PROCEDURE — 85025 COMPLETE CBC W/AUTO DIFF WBC: CPT

## 2019-02-11 PROCEDURE — 80061 LIPID PANEL: CPT

## 2019-02-12 ENCOUNTER — TELEPHONE (OUTPATIENT)
Dept: MEDICAL GROUP | Facility: PHYSICIAN GROUP | Age: 79
End: 2019-02-12

## 2019-02-12 ENCOUNTER — OFFICE VISIT (OUTPATIENT)
Dept: MEDICAL GROUP | Facility: PHYSICIAN GROUP | Age: 79
End: 2019-02-12
Payer: MEDICARE

## 2019-02-12 VITALS
BODY MASS INDEX: 38.08 KG/M2 | SYSTOLIC BLOOD PRESSURE: 136 MMHG | HEART RATE: 96 BPM | DIASTOLIC BLOOD PRESSURE: 88 MMHG | OXYGEN SATURATION: 97 % | WEIGHT: 266 LBS | TEMPERATURE: 97.2 F | HEIGHT: 70 IN

## 2019-02-12 DIAGNOSIS — M79.661 PAIN IN BOTH LOWER LEGS: ICD-10-CM

## 2019-02-12 DIAGNOSIS — R20.8 BURNING SENSATION OF FEET: ICD-10-CM

## 2019-02-12 DIAGNOSIS — M79.662 PAIN IN BOTH LOWER LEGS: ICD-10-CM

## 2019-02-12 PROBLEM — J94.2 HEMOTHORAX ON LEFT: Status: RESOLVED | Noted: 2017-06-26 | Resolved: 2019-02-12

## 2019-02-12 PROCEDURE — 99214 OFFICE O/P EST MOD 30 MIN: CPT | Performed by: NURSE PRACTITIONER

## 2019-02-13 NOTE — PROGRESS NOTES
"  Subjective:     Chief Complaint   Patient presents with   • Foot Problem     painful while at rest. //       HPI  Kiran Eason is a 78 y.o. male here today for pain in feet. This is a new problem over the past few months. Pain is in both soles of feet and anterior lower legs, especially in bilateral great toes. Worse when laying in recliner or at rest than when walking. States the pain is \"a pretty good bite.\" It can be burning, tingling, and numbing, especially on inside of big toe both sides. Right ankle swelling the past few months prior to onset of symptoms, but no pedal edema. Denies recent worsening back pain or stiffness. No MRI available to look at spine, but he was told 10 years ago that the bottom discs in his lower back were deteriorating. No symptoms in hands. He does have chronic diabetes that started only 2 years ago that has been stable.    Diagnoses of Burning sensation of feet and Pain in both lower legs were pertinent to this visit.    Allergies: Lisinopril  Current medicines (including changes today)  Current Outpatient Prescriptions   Medication Sig Dispense Refill   • isosorbide dinitrate (ISORDIL) 10 MG Tab TAKE 1 TABLET BY MOUTH 3  TIMES A  Tab 3   • carvedilol (COREG) 3.125 MG Tab TAKE 1 TABLET BY MOUTH TWO  TIMES DAILY WITH MEALS 180 Tab 3   • tamsulosin (FLOMAX) 0.4 MG capsule TAKE 1 CAPSULE BY MOUTH  EVERY DAY 90 Cap 3   • finasteride (PROSCAR) 5 MG Tab TAKE 1 TABLET BY MOUTH  EVERY DAY 90 Tab 3   • amLODIPine (NORVASC) 5 MG Tab TAKE 1 TABLET BY MOUTH  EVERY DAY 90 Tab 3   • NEXIUM 20 MG Pack MIX CONTENTS OF ONE 20MG  PACKET WITH 1 TABLESPOON  (15ML) OF WATER AND WAIT  2-3 MINUTES THEN DRINK  EVERY MORNING 90 Each 3   • warfarin (COUMADIN) 1 MG Tab Take as advised by anticoagulation service 135 Tab 3   • warfarin (COUMADIN) 3 MG Tab Take as advised by anticoagulation service 100 Tab 3   • SENNA PO Take  by mouth.     • vitamin D, Ergocalciferol, (DRISDOL) 44149 units Cap " capsule Take  by mouth every 7 days.     • sodium benzoate Powder Take 1 g by mouth Once.     • ipratropium-albuterol (COMBIVENT RESPIMAT)  MCG/ACT Aero Soln Inhale 1 Puff by mouth 4 times a day as needed (SOB).     • bimatoprost (LUMIGAN) 0.01 % Solution Place 1 Drop in both eyes every bedtime.     • losartan (COZAAR) 50 MG Tab TAKE 1 TABLET BY MOUTH  EVERY DAY 90 Tab 3   • atorvastatin (LIPITOR) 20 MG Tab TAKE 1 TABLET BY MOUTH  EVERY DAY 90 Tab 3     No current facility-administered medications for this visit.        He  has a past medical history of Arrhythmia; Arthritis; Arthropathy, unspecified, site unspecified; At risk for sleep apnea; Back pain (age 30); Blood clotting disorder ([facility]) (current 2017); BPH (benign prostatic hyperplasia); Breath shortness; Bronchitis (12/2014); Cancer ([facility]); Cancer ([facility]) (6/06); Cataract; Chronic congestive heart failure ([facility]) (6/6/2017); Disorders of bursae and tendons in shoulder region, unspecified; Esophageal stricture (07/2007); Essential hypertension, benign; Foot fracture (12/07); Gastric ulcer (07/2007); GERD (gastroesophageal reflux disease); Glaucoma; Heart burn; Hypertension; Hypertrophy of prostate without urinary obstruction and other lower urinary tract symptoms (LUTS); Indigestion; Pain (2/13/12); Personal history of venous thrombosis and embolism (11/2008); Pneumonia; Pneumonia (2004); Reflux esophagitis; Rosacea; Skin cancer; Sleep apnea; Snoring; Unspecified cataract; Unspecified glaucoma(365.9); and Urinary incontinence. He also has no past medical history of Bronchitis; COPD (chronic obstructive pulmonary disease) ([facility]); or Fall.        ROS  As stated in HPI and additionally  Gen: No F/C, fatigue, malaise  Neuro: No LE weakness, gait changes  MSK: +chronic right ankle swelling. Foot joint pain or limited ROM. No back pain or stiffness     Objective:     Blood pressure 136/88, pulse 96, temperature 36.2 °C (97.2 °F), temperature source Temporal, height  "1.778 m (5' 10\"), weight 120.7 kg (266 lb), SpO2 97 %. Body mass index is 38.17 kg/m².  Physical Exam:  General: Alert, oriented, in no acute distress.  Eye contact is good, speech goal directed, affect calm  CNs grossly intact.  Gross hearing intact.  Ext: right ankle with 2+ edema, no warmth or erythema. Feet without edema. DP pulses 1+ bilat. Feet warm with cap refill <3 seconds  Neuro: extremity strength 5/5   Light touch sensation: decreased on right, intact on left  Pin prick: absent right medial foot/great toe, decreased on left. Decreased on right just below knee, intact on left  Spine: straight leg test negative.   Skin: No rashes or lesions in visible areas. Large callus to medial great toe. Multiple toenails with fungus   Gait steady with cane.     Assessment and Plan:   Assessment/Plan:  1-2: New acute problem not controlled.  States pain is tolerable at this time and does not need any medication for treatment.  Differentials include diabetic neuropathy, vitamin deficiency, thyroid disorder, autoimmune disorder, or lumbar etiology.  We will first check labs.  If normal we will continue forward with nerve conduction study on consideration of MRI lumbar spine with PT  1. Burning sensation of feet  - VITAMIN B12; Future  - VITAMIN B1; Future  - VITAMIN B6; Future  - REFERRAL TO NEURODIAGNOSTICS (EEG,EP,EMG/NCS/DBS) Modality Requested: NCS-Comment Extremities (BLE)  - TSH WITH REFLEX TO FT4; Future  - LOUIS REFLEXIVE PROFILE; Future  - HEPATITIS PANEL ACUTE(4 COMPONENTS); Future    2. Pain in both lower legs  - REFERRAL TO NEURODIAGNOSTICS (EEG,EP,EMG/NCS/DBS) Modality Requested: NCS-Comment Extremities (BLE)  - TSH WITH REFLEX TO FT4; Future  - LOUIS REFLEXIVE PROFILE; Future  - HEPATITIS PANEL ACUTE(4 COMPONENTS); Future       Follow up:  Return pending results .    Educated in proper administration of medication(s) ordered today including safety, possible SE, risks, benefits, rationale and alternatives to " therapy.   Supportive care, differential diagnoses, and indications for immediate follow-up discussed with patient.    Pathogenesis of diagnosis discussed including typical length and natural progression.    Instructed to return to clinic or nearest emergency department for any change in condition, further concerns, or worsening of symptoms.  Patient states understanding of the plan of care and discharge instructions.      Please note that this dictation was created using voice recognition software. I have made every reasonable attempt to correct obvious errors, but I expect that there are errors of grammar and possibly content that I did not discover before finalizing the note.    Followup: Return pending results . sooner should new symptoms or problems arise.

## 2019-02-13 NOTE — TELEPHONE ENCOUNTER
Phone Number Called: pharmacy    Message: spoke with pharmacy they inform that medication was not recall and they sent over a new order to him a day ago.     Left Message for patient to call back: N\A

## 2019-02-13 NOTE — TELEPHONE ENCOUNTER
Please call [facility] to see if the losartan 50 mg tabs he is on was recalled. He was told these were from [place] and were recalled. Wondering if I need to switch medication    CRYSTAL Man.

## 2019-02-18 NOTE — TELEPHONE ENCOUNTER
Phone Number Called: [phone] (home)       Message: pt informed    Left Message for patient to call back: no

## 2019-02-22 ENCOUNTER — APPOINTMENT (OUTPATIENT)
Dept: RADIOLOGY | Facility: MEDICAL CENTER | Age: 79
End: 2019-02-22
Attending: OPHTHALMOLOGY
Payer: MEDICARE

## 2019-03-07 ENCOUNTER — ANTICOAGULATION MONITORING (OUTPATIENT)
Dept: MEDICAL GROUP | Facility: PHYSICIAN GROUP | Age: 79
End: 2019-03-07

## 2019-03-07 DIAGNOSIS — Z79.01 CHRONIC ANTICOAGULATION: ICD-10-CM

## 2019-03-07 NOTE — PROGRESS NOTES
Anticoagulation clinic    Reminder voice message for patient regarding getting INR done ASAP for anticoagulation clinic.     Viniico Schaeffer, PharmD

## 2019-03-10 LAB — INR PPP: 4 (ref 2–3.5)

## 2019-03-11 ENCOUNTER — ANTICOAGULATION MONITORING (OUTPATIENT)
Dept: MEDICAL GROUP | Facility: PHYSICIAN GROUP | Age: 79
End: 2019-03-11

## 2019-03-11 DIAGNOSIS — Z79.01 CHRONIC ANTICOAGULATION: ICD-10-CM

## 2019-03-11 NOTE — PROGRESS NOTES
Anticoagulation Summary  As of 3/11/2019    INR goal:   2.0-3.0   TTR:   75.4 % (3 mo)   INR used for dosin.0! (3/10/2019)   Warfarin maintenance plan:   2 mg (1 mg x 2) every day   Weekly warfarin total:   14 mg   Plan last modified:   Vinicio Schaeffer PharmD (2018)   Next INR check:   3/18/2019   Target end date:       Indications    Chronic anticoagulation [Z79.01]  Chronic deep vein thrombosis (DVT) of popliteal vein (HCC) (Resolved) [I82.539]  Pulmonary embolism on right (HCC) (Resolved) [I26.99]             Anticoagulation Episode Summary     INR check location:       Preferred lab:       Send INR reminders to:       Comments:         Anticoagulation Care Providers     Provider Role Specialty Phone number    Jesus Craft M.D.  Family Medicine [phone]    [facility] Responsible  [phone]        Anticoagulation Patient Findings      Spoke to patient on the phone.   INR  supra-therapeutic.   Denies signs/symptoms of bleeding and/or thrombosis.   Denies changes to diet or medications.   Follow up appointment in 1 week(s).    Hold today then continue weekly warfarin dose as noted    Vinicio Schaeffer, PharmD

## 2019-03-12 DIAGNOSIS — E11.9 TYPE 2 DIABETES MELLITUS WITHOUT COMPLICATION, WITHOUT LONG-TERM CURRENT USE OF INSULIN (HCC): ICD-10-CM

## 2019-03-12 RX ORDER — LANCETS 30 GAUGE
EACH MISCELLANEOUS
Qty: 100 EACH | Refills: 3 | Status: SHIPPED | OUTPATIENT
Start: 2019-03-12

## 2019-03-12 NOTE — TELEPHONE ENCOUNTER
Was the patient seen in the last year in this department? Yes LOV 02/12/19 With Cholo    Does patient have an active prescription for medications requested? No     Received Request Via: Patient

## 2019-03-20 ENCOUNTER — HOSPITAL ENCOUNTER (OUTPATIENT)
Dept: HOSPITAL 8 - CFH | Age: 79
Discharge: HOME | End: 2019-03-20
Attending: FAMILY MEDICINE
Payer: MEDICARE

## 2019-03-20 DIAGNOSIS — E78.5: ICD-10-CM

## 2019-03-20 DIAGNOSIS — E11.9: ICD-10-CM

## 2019-03-20 DIAGNOSIS — I25.5: Primary | ICD-10-CM

## 2019-03-20 DIAGNOSIS — I65.29: ICD-10-CM

## 2019-03-20 PROCEDURE — A9502 TC99M TETROFOSMIN: HCPCS

## 2019-03-20 PROCEDURE — 93017 CV STRESS TEST TRACING ONLY: CPT

## 2019-03-20 PROCEDURE — 78452 HT MUSCLE IMAGE SPECT MULT: CPT

## 2019-03-26 DIAGNOSIS — E11.9 TYPE 2 DIABETES MELLITUS WITHOUT COMPLICATION, WITHOUT LONG-TERM CURRENT USE OF INSULIN (HCC): ICD-10-CM

## 2019-03-26 NOTE — TELEPHONE ENCOUNTER
Was the patient seen in the last year in this department? Yes    Does patient have an active prescription for medications requested? Yes  Went to mail order wants local   Received Request Via: Pharmacy

## 2019-04-01 ENCOUNTER — ANTICOAGULATION MONITORING (OUTPATIENT)
Dept: MEDICAL GROUP | Facility: PHYSICIAN GROUP | Age: 79
End: 2019-04-01

## 2019-04-01 DIAGNOSIS — Z79.01 CHRONIC ANTICOAGULATION: ICD-10-CM

## 2019-04-01 NOTE — PROGRESS NOTES
Anticoagulation clinic    Reminder voice message for patient regarding getting INR done ASAP for anticoagulation clinic.       Vinicio Schaeffer, PharmD

## 2019-04-09 ENCOUNTER — HOSPITAL ENCOUNTER (OUTPATIENT)
Dept: RADIOLOGY | Facility: MEDICAL CENTER | Age: 79
End: 2019-04-09
Attending: OPHTHALMOLOGY
Payer: MEDICARE

## 2019-04-09 VITALS — OXYGEN SATURATION: 92 % | HEART RATE: 84 BPM | RESPIRATION RATE: 18 BRPM

## 2019-04-09 DIAGNOSIS — C44.1292 SQUAMOUS CELL CARCINOMA OF SKIN OF LEFT LOWER EYELID INCLUDING CANTHUS: ICD-10-CM

## 2019-04-09 DIAGNOSIS — C69.62 MALIGNANT NEOPLASM OF LEFT ORBIT (HCC): ICD-10-CM

## 2019-04-09 PROCEDURE — A9585 GADOBUTROL INJECTION: HCPCS | Performed by: OPHTHALMOLOGY

## 2019-04-09 PROCEDURE — 70543 MRI ORBT/FAC/NCK W/O &W/DYE: CPT

## 2019-04-09 PROCEDURE — 700117 HCHG RX CONTRAST REV CODE 255: Performed by: OPHTHALMOLOGY

## 2019-04-09 RX ORDER — GADOBUTROL 604.72 MG/ML
12 INJECTION INTRAVENOUS ONCE
Status: COMPLETED | OUTPATIENT
Start: 2019-04-09 | End: 2019-04-09

## 2019-04-09 RX ADMIN — GADOBUTROL 15 ML: 604.72 INJECTION INTRAVENOUS at 14:02

## 2019-04-09 NOTE — PROGRESS NOTES
Pt's PPM set to MRI safe mode by rep prior to MRI. During MRI scan, pt monitored with HR and SPO2. Pt tolerated scan well. PPM Settings restored after scan by Rep. Pt discharged ambulatory.

## 2019-04-19 ENCOUNTER — ANTICOAGULATION MONITORING (OUTPATIENT)
Dept: VASCULAR LAB | Facility: MEDICAL CENTER | Age: 79
End: 2019-04-19

## 2019-04-19 DIAGNOSIS — Z79.01 CHRONIC ANTICOAGULATION: ICD-10-CM

## 2019-04-19 LAB — INR PPP: >8 (ref 2–3.5)

## 2019-04-19 NOTE — PROGRESS NOTES
Anticoagulation Summary  As of 4/19/2019    INR goal:   2.0-3.0   TTR:   75.4 % (3 mo)   INR used for dosing:   >8.0! (4/19/2019)   Warfarin maintenance plan:   2 mg (1 mg x 2) every day   Weekly warfarin total:   14 mg   Plan last modified:   Jeannie NunezD (11/29/2018)   Next INR check:   4/22/2019   Target end date:       Indications    Chronic anticoagulation [Z79.01]  Chronic deep vein thrombosis (DVT) of popliteal vein (HCC) (Resolved) [I82.539]  Pulmonary embolism on right (HCC) (Resolved) [I26.99]             Anticoagulation Episode Summary     INR check location:       Preferred lab:       Send INR reminders to:       Comments:         Anticoagulation Care Providers     Provider Role Specialty Phone number    Jesus Craft M.D. Referring Family Medicine [phone]    [facility] Anticoagulation Services Responsible  [phone]    Zafar Eubanks, PharmD Responsible          Anticoagulation Patient Findings  Patient Findings     Negatives:   Signs/symptoms of thrombosis, Signs/symptoms of bleeding, Laboratory test error suspected, Change in health, Change in alcohol use, Change in activity, Upcoming invasive procedure, Emergency department visit, Upcoming dental procedure, Missed doses, Extra doses, Change in medications, Change in diet/appetite, Hospital admission, Bruising, Other complaints        Spoke with patient today regarding supratherapeutic INR of >8.0.  Patient denies any signs/symptoms of bruising or bleeding or any changes in diet and medications.  Instructed patient to call clinic with any questions or concerns.  Patient denies extra doses, lack of appetite, new medications, or changes to diet or overall appetite.  Unclear what may have caused such high INR.  Instructed patient to HOLD X 3, then recheck INR.  Follow up in 3 days, to reduce risk of adverse events related to this high risk medication,  Warfarin.    Zafar Eubanks, JeannieD

## 2019-04-22 ENCOUNTER — ANTICOAGULATION MONITORING (OUTPATIENT)
Dept: MEDICAL GROUP | Facility: PHYSICIAN GROUP | Age: 79
End: 2019-04-22

## 2019-04-22 DIAGNOSIS — Z79.01 CHRONIC ANTICOAGULATION: ICD-10-CM

## 2019-04-22 LAB — INR PPP: 4.4 (ref 2–3.5)

## 2019-04-22 NOTE — PROGRESS NOTES
Anticoagulation Summary  As of 2019    INR goal:   2.0-3.0   TTR:   51.2 % (4.5 mo)   INR used for dosin.4! (2019)   Warfarin maintenance plan:   2 mg (1 mg x 2) every day   Weekly warfarin total:   14 mg   Plan last modified:   Vinicio Schaeffer PharmD (2018)   Next INR check:   2019   Target end date:       Indications    Chronic anticoagulation [Z79.01]  Chronic deep vein thrombosis (DVT) of popliteal vein (HCC) (Resolved) [I82.539]  Pulmonary embolism on right (HCC) (Resolved) [I26.99]             Anticoagulation Episode Summary     INR check location:       Preferred lab:       Send INR reminders to:       Comments:         Anticoagulation Care Providers     Provider Role Specialty Phone number    Jesus Craft M.D. Referring Family Medicine [phone]    [facility] Anticoagulation Services Responsible  [phone]    Zafar Eubanks, PharmD Responsible          Anticoagulation Patient Findings        Spoke to patient on the phone.   INR  supra-therapeutic.   Denies signs/symptoms of bleeding and/or thrombosis.   Denies changes to diet or medications.   Follow up appointment in 3 days       Hold today then resume     Vinicio Schaeffer, Nancy

## 2019-04-29 ENCOUNTER — ANTICOAGULATION MONITORING (OUTPATIENT)
Dept: VASCULAR LAB | Facility: MEDICAL CENTER | Age: 79
End: 2019-04-29

## 2019-04-29 DIAGNOSIS — Z79.01 CHRONIC ANTICOAGULATION: ICD-10-CM

## 2019-04-29 LAB — INR PPP: 2.2 (ref 2–3.5)

## 2019-04-29 NOTE — PROGRESS NOTES
Anticoagulation Summary  As of 2019    INR goal:   2.0-3.0   TTR:   50.5 % (4.7 mo)   INR used for dosin.20 (2019)   Warfarin maintenance plan:   2 mg (1 mg x 2) every day   Weekly warfarin total:   14 mg   Plan last modified:   Vinicio Schaeffer PharmD (2018)   Next INR check:   2019   Target end date:       Indications    Chronic anticoagulation [Z79.01]  Chronic deep vein thrombosis (DVT) of popliteal vein (HCC) (Resolved) [I82.539]  Pulmonary embolism on right (HCC) (Resolved) [I26.99]             Anticoagulation Episode Summary     INR check location:       Preferred lab:       Send INR reminders to:       Comments:         Anticoagulation Care Providers     Provider Role Specialty Phone number    Jesus Craft M.D. Referring Family Medicine [phone]    [facility] Anticoagulation Services Responsible  [phone]    Jeannie FitzpatrickD Responsible          Anticoagulation Patient Findings  Patient Findings     Negatives:   Signs/symptoms of thrombosis, Signs/symptoms of bleeding, Laboratory test error suspected, Change in health, Change in alcohol use, Change in activity, Upcoming invasive procedure, Emergency department visit, Upcoming dental procedure, Missed doses, Extra doses, Change in medications, Change in diet/appetite, Hospital admission, Bruising, Other complaints        Spoke with the patient on the phone today, reporting a therapeutic INR of 2.20.  Confirmed the current warfarin dosing regimen and patient compliance. Patient denies any interval changes to diet and/or medications. Patient denies any signs/symptoms of bleeding or clotting.  Patient instructed to continue with the current warfarin dosing regimen, and asked to follow up again in 1 week.     Davis DennisD

## 2019-05-08 ENCOUNTER — ANTICOAGULATION MONITORING (OUTPATIENT)
Dept: VASCULAR LAB | Facility: MEDICAL CENTER | Age: 79
End: 2019-05-08

## 2019-05-08 DIAGNOSIS — Z79.01 CHRONIC ANTICOAGULATION: ICD-10-CM

## 2019-05-08 LAB — INR PPP: 6 (ref 2–3.5)

## 2019-05-08 NOTE — PROGRESS NOTES
Anticoagulation Summary  As of 2019    INR goal:   2.0-3.0   TTR:   48.7 % (5 mo)   INR used for dosin.00! (2019)   Warfarin maintenance plan:   2 mg (1 mg x 2) every day   Weekly warfarin total:   14 mg   Plan last modified:   Vinicio Schaeffer, PharmD (2018)   Next INR check:   2019   Target end date:       Indications    Chronic anticoagulation [Z79.01]  Chronic deep vein thrombosis (DVT) of popliteal vein (HCC) (Resolved) [I82.539]  Pulmonary embolism on right (HCC) (Resolved) [I26.99]             Anticoagulation Episode Summary     INR check location:       Preferred lab:       Send INR reminders to:       Comments:         Anticoagulation Care Providers     Provider Role Specialty Phone number    Jesus Craft M.D. Referring Family Medicine [phone]    [facility] Responsible  [phone]    Zafar Eubanks, PharmD Responsible          Anticoagulation Patient Findings  Patient Findings     Negatives:   Signs/symptoms of thrombosis, Signs/symptoms of bleeding, Laboratory test error suspected, Change in health, Change in alcohol use, Change in activity, Upcoming invasive procedure, Emergency department visit, Upcoming dental procedure, Missed doses, Extra doses, Change in medications, Change in diet/appetite, Hospital admission, Bruising, Other complaints        Spoke with the patient on the phone today, reporting a SUPRA-therapeutic INR of 6.0.  Confirmed the current warfarin dosing regimen and patient compliance. Patient denies any cranberries, EtOH, or extra doses. Patient denies any interval changes to diet and/or medications. Patient denies any signs/symptoms of bleeding or clotting. Only recent change patient has had recently was topical CBD oil on foot for pain.  Patient instructed to HOLD warfarin x 3 days, then resume 2mg on Sat and Sun and retest INR on Monday.     Davis Hay  PharmD

## 2019-05-09 ENCOUNTER — OFFICE VISIT (OUTPATIENT)
Dept: URGENT CARE | Facility: PHYSICIAN GROUP | Age: 79
End: 2019-05-09
Payer: MEDICARE

## 2019-05-09 VITALS
HEIGHT: 70 IN | WEIGHT: 267.4 LBS | TEMPERATURE: 99.2 F | DIASTOLIC BLOOD PRESSURE: 70 MMHG | HEART RATE: 76 BPM | OXYGEN SATURATION: 96 % | SYSTOLIC BLOOD PRESSURE: 136 MMHG | BODY MASS INDEX: 38.28 KG/M2

## 2019-05-09 DIAGNOSIS — N30.01 ACUTE CYSTITIS WITH HEMATURIA: ICD-10-CM

## 2019-05-09 DIAGNOSIS — M25.561 ACUTE PAIN OF RIGHT KNEE: ICD-10-CM

## 2019-05-09 DIAGNOSIS — R30.9 PAINFUL URINATION: ICD-10-CM

## 2019-05-09 LAB
APPEARANCE UR: NORMAL
BILIRUB UR STRIP-MCNC: NEGATIVE MG/DL
COLOR UR AUTO: YELLOW
GLUCOSE UR STRIP.AUTO-MCNC: NEGATIVE MG/DL
KETONES UR STRIP.AUTO-MCNC: NEGATIVE MG/DL
LEUKOCYTE ESTERASE UR QL STRIP.AUTO: NORMAL
NITRITE UR QL STRIP.AUTO: NEGATIVE
PH UR STRIP.AUTO: 6 [PH] (ref 5–8)
PROT UR QL STRIP: 30 MG/DL
RBC UR QL AUTO: NORMAL
SP GR UR STRIP.AUTO: 1.02
UROBILINOGEN UR STRIP-MCNC: 0.2 MG/DL

## 2019-05-09 PROCEDURE — 81002 URINALYSIS NONAUTO W/O SCOPE: CPT | Performed by: PHYSICIAN ASSISTANT

## 2019-05-09 PROCEDURE — 99214 OFFICE O/P EST MOD 30 MIN: CPT | Performed by: PHYSICIAN ASSISTANT

## 2019-05-09 RX ORDER — AMOXICILLIN AND CLAVULANATE POTASSIUM 875; 125 MG/1; MG/1
1 TABLET, FILM COATED ORAL 2 TIMES DAILY
Qty: 14 TAB | Refills: 0 | Status: SHIPPED | OUTPATIENT
Start: 2019-05-09 | End: 2019-05-16

## 2019-05-14 ENCOUNTER — ANTICOAGULATION MONITORING (OUTPATIENT)
Dept: VASCULAR LAB | Facility: MEDICAL CENTER | Age: 79
End: 2019-05-14

## 2019-05-14 DIAGNOSIS — Z79.01 CHRONIC ANTICOAGULATION: ICD-10-CM

## 2019-05-14 LAB — INR PPP: 2.6 (ref 2–3.5)

## 2019-05-14 NOTE — PROGRESS NOTES
Anticoagulation Summary  As of 2019    INR goal:   2.0-3.0   TTR:   47.3 % (5.2 mo)   INR used for dosin.60 (2019)   Warfarin maintenance plan:   1 mg (1 mg x 1) every Tue, Thu, Sat; 2 mg (1 mg x 2) all other days   Weekly warfarin total:   11 mg   Plan last modified:   Mahi Hay (2019)   Next INR check:   2019   Target end date:       Indications    Chronic anticoagulation [Z79.01]  Chronic deep vein thrombosis (DVT) of popliteal vein (HCC) (Resolved) [I82.539]  Pulmonary embolism on right (HCC) (Resolved) [I26.99]             Anticoagulation Episode Summary     INR check location:       Preferred lab:       Send INR reminders to:       Comments:         Anticoagulation Care Providers     Provider Role Specialty Phone number    Jesus Craft M.D. Referring Family Medicine [phone]    [facility] Responsible  [phone]    Zafar Eubanks, PharmD Responsible          Anticoagulation Patient Findings  Patient Findings     Negatives:   Signs/symptoms of thrombosis, Signs/symptoms of bleeding, Laboratory test error suspected, Change in health, Change in alcohol use, Change in activity, Upcoming invasive procedure, Emergency department visit, Upcoming dental procedure, Missed doses, Extra doses, Change in medications, Change in diet/appetite, Hospital admission, Bruising, Other complaints        Spoke with the patient on the phone today, reporting a therapeutic INR of 2.6.  Confirmed the current warfarin dosing regimen and patient compliance. Patient denies any interval changes to diet and/or medications. Patient denies any signs/symptoms of bleeding or clotting.  Patient will begin decreased weekly regimen of 1mg on Tues, Thurs & Sat and 2mg ROW. Patient will follow up again in 1 week.     Davis Hay  PharmD

## 2019-05-18 NOTE — PROGRESS NOTES
"Chief Complaint   Patient presents with   • UTI     R knee pain, painful urination, urgency, burning, x4 days        HISTORY OF PRESENT ILLNESS: Patient is a 79 y.o. male who presents today for about 4 days of suspected UTI symptoms.  Patient states he has had increased urgency, frequency or urination and has also been experiencing burning with urination.  She notes he has not had abdominal pain, flank pain, fevers, chills, nausea or vomiting.  He denies groin pain or any increase in hesitancy of urination. Hx of BPH.     Patient also notes that his right knee has become more painful and swollen in last few days.  He denies any trauma he can recall.    He does have hx of TKA on the left knee due to arthritis but generally has not had issue with the right.  No feeling of \"giving out\"  No heat or tenderness to touch.  Has not taken anything for symptoms.     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[facility]) 06/14/2017     Priority: Low   • Chronic anticoagulation 11/12/2018   • Cardiac pacemaker in situ 07/03/2018   • Type 2 diabetes mellitus without complication, without long-term current use of insulin ([facility]) 03/27/2018   • REKHA on CPAP 10/21/2016   • History of inferior vena caval filter placement 07/26/2016   • Chronic gout 12/17/2012   • Carpal tunnel syndrome, bilateral 02/14/2012   • Low serum testosterone level 01/06/2012   • Vitamin D deficiency disease 01/06/2012   • BPH (benign prostatic hyperplasia) 12/30/2010   • Foot pain 09/28/2010   • Rosacea 09/18/2009   • Arthropathy of ankle and foot 09/18/2009   • Glaucoma 09/18/2009   • " Essential hypertension, benign 05/18/2009   • Reflux esophagitis 05/18/2009   • Esophageal stricture 05/18/2009       Allergies:Lisinopril    Current Outpatient Prescriptions Ordered in Epic   Medication Sig Dispense Refill   • Blood Glucose Test Strips Test strips order: Test strips for One Touch Verio meter. Sig: use QD and prn ssx high or low sugar #100  Strip 3   • Blood Glucose Monitoring Suppl Device Meter: Dispense Device of Insurance Preference (OneTouch Verio meter). Sig. QD Use as directed for blood sugar monitoring. #1. NR. 1 Device 0   • Lancets Lancets order: Lancets for One Touch Verio meter. Sig: use QD and prn ssx high or low sugar. #100 100 Each 3   • isosorbide dinitrate (ISORDIL) 10 MG Tab TAKE 1 TABLET BY MOUTH 3  TIMES A  Tab 3   • losartan (COZAAR) 50 MG Tab TAKE 1 TABLET BY MOUTH  EVERY DAY 90 Tab 3   • carvedilol (COREG) 3.125 MG Tab TAKE 1 TABLET BY MOUTH TWO  TIMES DAILY WITH MEALS 180 Tab 3   • tamsulosin (FLOMAX) 0.4 MG capsule TAKE 1 CAPSULE BY MOUTH  EVERY DAY 90 Cap 3   • finasteride (PROSCAR) 5 MG Tab TAKE 1 TABLET BY MOUTH  EVERY DAY 90 Tab 3   • amLODIPine (NORVASC) 5 MG Tab TAKE 1 TABLET BY MOUTH  EVERY DAY 90 Tab 3   • atorvastatin (LIPITOR) 20 MG Tab TAKE 1 TABLET BY MOUTH  EVERY DAY 90 Tab 3   • NEXIUM 20 MG Pack MIX CONTENTS OF ONE 20MG  PACKET WITH 1 TABLESPOON  (15ML) OF WATER AND WAIT  2-3 MINUTES THEN DRINK  EVERY MORNING 90 Each 3   • warfarin (COUMADIN) 1 MG Tab Take as advised by anticoagulation service 135 Tab 3   • warfarin (COUMADIN) 3 MG Tab Take as advised by anticoagulation service 100 Tab 3   • SENNA PO Take  by mouth.     • vitamin D, Ergocalciferol, (DRISDOL) 50602 units Cap capsule Take  by mouth every 7 days.     • sodium benzoate Powder Take 1 g by mouth Once.     • ipratropium-albuterol (COMBIVENT RESPIMAT)  MCG/ACT Aero Soln Inhale 1 Puff by mouth 4 times a day as needed (SOB).     • bimatoprost (LUMIGAN) 0.01 % Solution Place 1 Drop in  both eyes every bedtime.       No current Epic-ordered facility-administered medications on file.        Past Medical History:   Diagnosis Date   • Arrhythmia     unsure of time. thinks he was in hospital    • Arthritis    • Arthropathy, unspecified, site unspecified    • At risk for sleep apnea    • Back pain age 30    lower back pain   • Blood clotting disorder (HCC) current     DVT and PE   • BPH (benign prostatic hyperplasia)    • Breath shortness    • Bronchitis 2014   • Cancer ([facility])     squamous cell carinoma left eye    • Cancer ([facility])     lower lip skin cancer--   • Cataract     cataract and glacoma   • Chronic congestive heart failure (HCC) 2017    pt denies   • Disorders of bursae and tendons in shoulder region, unspecified    • Esophageal stricture 2007   • Essential hypertension, benign    • Foot fracture    • Gastric ulcer 2007   • GERD (gastroesophageal reflux disease)    • Glaucoma    • Heart burn    • Hypertension    • Hypertrophy of prostate without urinary obstruction and other lower urinary tract symptoms (LUTS)    • Indigestion    • Pain 2/13/12    2/10 ankles, lower back   • Personal history of venous thrombosis and embolism 2008    post knee replacement -took coumadin then   • Pneumonia        • Pneumonia    • Reflux esophagitis    • Rosacea    • Skin cancer    • Sleep apnea     cpap    • Snoring    • Unspecified cataract     ONE REMOVED   • Unspecified glaucoma(365.9)    • Urinary incontinence     occasional incontinence        Social History   Substance Use Topics   • Smoking status: Never Smoker   • Smokeless tobacco: Former User     Types: Chew     Quit date: 6/3/1967   • Alcohol use No      Comment: ocassionally       Family Status   Relation Status   • Mo    • Fa    History reviewed. No pertinent family history.    ROS:  Review of Systems   Constitutional: Negative for fever, chills, weight loss and malaise/fatigue.   HENT:  "Negative for ear pain, nosebleeds, congestion, sore throat and neck pain.    Eyes: Negative for blurred vision.   Respiratory: Negative for cough, sputum production, shortness of breath and wheezing.    Cardiovascular: Negative for chest pain, palpitations, orthopnea and leg swelling.   Gastrointestinal: Negative for heartburn, nausea, vomiting and abdominal pain.   Genitourinary: SEE HPI  Musculoskeletal: SEE HPI  All other systems reviewed and are negative.       Exam:  /70 (BP Location: Left arm, Patient Position: Sitting, BP Cuff Size: Large adult)   Pulse 76   Temp 37.3 °C (99.2 °F) (Temporal)   Ht 1.778 m (5' 10\")   Wt 121.3 kg (267 lb 6.4 oz)   SpO2 96%   General:  Well nourished, well developed male in NAD  Eyes: PERRLA, EOM within normal limits, no conjunctival injection, no scleral icterus, visual fields and acuity grossly intact.  Ears: Normal shape and symmetry, no tenderness, no discharge. External canals are without any significant edema or erythema. Tympanic membranes are without any inflammation, no effusion. Gross auditory acuity is intact  Nose: Symmetrical, sinuses without tenderness, no discharge.   Mouth: reasonable hygiene, no erythema exudates or tonsillar enlargement.  Neck: no masses, range of motion within normal limits, no tracheal deviation. No lymphadenopathy  Pulmonary: Normal respiratory effort, no wheezes, crackles, or rhonchi.  Cardiovascular: regular rate and rhythm without murmurs, rubs, or gallops.  Abdomen: Soft, nontender, nondistended. Normal bowel sounds. No hepatosplenomegaly or masses, or hernias. No rebound or guarding. No CVA tenderness.   Skin: No visible rashes or lesion. Warm, pink, dry.   Extremities: no clubbing, cyanosis.   Right knee: Mild swelling as compared to left.   There is minimal warmth noted, no erythema.  Mild TTP throughout.  Distally no swelling or tenderness.    Neuro: A&O x 3. Speech normal/clear.  Normal gait.       Component Value Ref " Range & Units Status   POC Color Yellow  Negative Final   POC Appearance Cloudy  Negative Final   POC Leukocyte Esterase Large  Negative Final   POC Nitrites Negative  Negative Final   POC Urobiligen 0.2  Negative (0.2) mg/dL Final   POC Protein 30  Negative mg/dL Final   POC Urine PH 6.0  5.0 - 8.0 Final   POC Blood small  Negative Final   POC Specific Gravity 1.020  <1.005 - >1.030 Final   POC Ketones Negative  Negative mg/dL Final   POC Bilirubin Negative  Negative mg/dL Final   POC Glucose Negative  Negative mg/dL        Assessment/Plan:  1. Acute cystitis with hematuria  amoxicillin-clavulanate (AUGMENTIN) 875-125 MG Tab   2. Painful urination  POCT Urinalysis    amoxicillin-clavulanate (AUGMENTIN) 875-125 MG Tab   3. Acute pain of left knee           -Augmentin as above.  Unable to culture urine .   -patient will follow up with INR clinic next week as sched   -patient will elevate, ice knee, Tylenol ES   -recommend PCP follow up, RTC if worsening or not improving.   -Fluids emphasized.    -signs and symptoms of worsening/ascending infection discussed and to seek prompt medical care should they arise.      Supportive care, differential diagnoses, and indications for immediate follow-up discussed with patient.   Pathogenesis of diagnosis discussed including typical length and natural progression.   Instructed to return to clinic or nearest emergency department for any change in condition, further concerns, or worsening of symptoms.      Lindsey Anderson P.A.-C.

## 2019-05-24 ENCOUNTER — ANTICOAGULATION MONITORING (OUTPATIENT)
Dept: VASCULAR LAB | Facility: MEDICAL CENTER | Age: 79
End: 2019-05-24

## 2019-05-24 ENCOUNTER — OFFICE VISIT (OUTPATIENT)
Dept: MEDICAL GROUP | Facility: PHYSICIAN GROUP | Age: 79
End: 2019-05-24
Payer: MEDICARE

## 2019-05-24 VITALS
OXYGEN SATURATION: 94 % | WEIGHT: 265 LBS | DIASTOLIC BLOOD PRESSURE: 92 MMHG | HEIGHT: 70 IN | TEMPERATURE: 98.3 F | BODY MASS INDEX: 37.94 KG/M2 | HEART RATE: 90 BPM | SYSTOLIC BLOOD PRESSURE: 142 MMHG

## 2019-05-24 DIAGNOSIS — M79.89 RIGHT LEG SWELLING: ICD-10-CM

## 2019-05-24 DIAGNOSIS — I10 ESSENTIAL HYPERTENSION, BENIGN: ICD-10-CM

## 2019-05-24 DIAGNOSIS — Z79.01 CHRONIC ANTICOAGULATION: ICD-10-CM

## 2019-05-24 DIAGNOSIS — E11.9 TYPE 2 DIABETES MELLITUS WITHOUT COMPLICATION, WITHOUT LONG-TERM CURRENT USE OF INSULIN (HCC): ICD-10-CM

## 2019-05-24 DIAGNOSIS — R60.0 BILATERAL LEG EDEMA: ICD-10-CM

## 2019-05-24 LAB
HBA1C MFR BLD: 6.5 % (ref 0–5.6)
INR PPP: 1.1 (ref 2–3.5)
INT CON NEG: NEGATIVE
INT CON POS: POSITIVE

## 2019-05-24 PROCEDURE — 83036 HEMOGLOBIN GLYCOSYLATED A1C: CPT | Performed by: NURSE PRACTITIONER

## 2019-05-24 PROCEDURE — 99214 OFFICE O/P EST MOD 30 MIN: CPT | Performed by: NURSE PRACTITIONER

## 2019-05-24 NOTE — ASSESSMENT & PLAN NOTE
Ongoing chronic condition. Blood pressure at goal or slightly above goal. He is on losartan 50 mg daily, carvedilol 3.125 mg BID, isosorbide 10 mg TID,

## 2019-05-24 NOTE — PROGRESS NOTES
Anticoagulation Summary  As of 2019    INR goal:   2.0-3.0   TTR:   46.9 % (5.5 mo)   INR used for dosin.10! (2019)   Warfarin maintenance plan:   1 mg (1 mg x 1) every Tue, Thu, Sat; 2 mg (1 mg x 2) all other days   Weekly warfarin total:   11 mg   Plan last modified:   Mahi Hay (2019)   Next INR check:   2019   Target end date:       Indications    Chronic anticoagulation [Z79.01]  Chronic deep vein thrombosis (DVT) of popliteal vein (HCC) (Resolved) [I82.539]  Pulmonary embolism on right (HCC) (Resolved) [I26.99]             Anticoagulation Episode Summary     INR check location:       Preferred lab:       Send INR reminders to:       Comments:         Anticoagulation Care Providers     Provider Role Specialty Phone number    Jesus Craft M.D. Referring Family Medicine [phone]    [facility] Responsible  [phone]    Zafar Eubanks, PharmD Responsible          Anticoagulation Patient Findings          Spoke with Kiran, who misunderstood his last dosing instructions.  Will bolus dose with 3mg po qhs x3 then resume current dosing regimen. Follow up in 4 days, to reduce the risk of adverse events related to this high risk medication, warfarin.    Alyssa Aguayo, Clinical Pharmacist

## 2019-05-24 NOTE — ASSESSMENT & PLAN NOTE
Has ongoing diabetes. He is not on any treatment for this. A1C has been stable without treatment. He monitors glucose at home about every 2 weeks and it runs <130. A1C today is 6.5%, down from 7%.   He is on statin therapy and ARB. He does not exercise. No polyuria, polydipsia, or polyphagia is occurring. He does have neuropathy of his feet which is being worked up.

## 2019-05-27 PROBLEM — R60.0 BILATERAL LEG EDEMA: Status: ACTIVE | Noted: 2019-05-27

## 2019-05-27 NOTE — PROGRESS NOTES
Subjective:     Chief Complaint   Patient presents with   • Hypertension     6 month fv   • Diabetes   • Hyperlipidemia       HPI  Kiran Eason is a 79 y.o. male here today for routine f/u. Here with his sister today.     Essential hypertension, benign  Ongoing chronic condition. Blood pressure at goal or slightly above goal. He is on losartan 50 mg daily, carvedilol 3.125 mg BID, isosorbide 10 mg TID,     Type 2 diabetes mellitus without complication, without long-term current use of insulin ([facility]) (HCC)  Has ongoing diabetes. He is not on any treatment for this. A1C has been stable without treatment. He monitors glucose at home about every 2 weeks and it runs <130. A1C today is 6.5%, down from 7%.   He is on statin therapy and ARB. He does not exercise. No polyuria, polydipsia, or polyphagia is occurring. He does have neuropathy of his feet which is being worked up.     Bilateral leg edema  Ongoing issue. It waxes and waves, but when it occurs, RLE becomes very swollen compared to left. He is on chronic anticoagulation and INR remains in therapeutic rage. He is on a water pill, but he cannot recall which one. Lasix when given for 3 days did significantly help his swelling previously.        Diagnoses of Type 2 diabetes mellitus without complication, without long-term current use of insulin ([facility]), Essential hypertension, benign, Right leg swelling, and Bilateral leg edema were pertinent to this visit.    Allergies: Lisinopril  Current medicines (including changes today)  Current Outpatient Prescriptions   Medication Sig Dispense Refill   • Blood Glucose Test Strips Test strips order: Test strips for One Touch Verio meter. Sig: use QD and prn ssx high or low sugar #100  Strip 3   • Blood Glucose Monitoring Suppl Device Meter: Dispense Device of Insurance Preference (OneTouch Verio meter). Sig. QD Use as directed for blood sugar monitoring. #1. NR. 1 Device 0   • Lancets Lancets order: Lancets for One  Touch Verio meter. Sig: use QD and prn ssx high or low sugar. #100 100 Each 3   • isosorbide dinitrate (ISORDIL) 10 MG Tab TAKE 1 TABLET BY MOUTH 3  TIMES A  Tab 3   • losartan (COZAAR) 50 MG Tab TAKE 1 TABLET BY MOUTH  EVERY DAY 90 Tab 3   • carvedilol (COREG) 3.125 MG Tab TAKE 1 TABLET BY MOUTH TWO  TIMES DAILY WITH MEALS 180 Tab 3   • tamsulosin (FLOMAX) 0.4 MG capsule TAKE 1 CAPSULE BY MOUTH  EVERY DAY 90 Cap 3   • finasteride (PROSCAR) 5 MG Tab TAKE 1 TABLET BY MOUTH  EVERY DAY 90 Tab 3   • amLODIPine (NORVASC) 5 MG Tab TAKE 1 TABLET BY MOUTH  EVERY DAY 90 Tab 3   • atorvastatin (LIPITOR) 20 MG Tab TAKE 1 TABLET BY MOUTH  EVERY DAY 90 Tab 3   • NEXIUM 20 MG Pack MIX CONTENTS OF ONE 20MG  PACKET WITH 1 TABLESPOON  (15ML) OF WATER AND WAIT  2-3 MINUTES THEN DRINK  EVERY MORNING 90 Each 3   • warfarin (COUMADIN) 1 MG Tab Take as advised by anticoagulation service 135 Tab 3   • warfarin (COUMADIN) 3 MG Tab Take as advised by anticoagulation service 100 Tab 3   • SENNA PO Take  by mouth.     • vitamin D, Ergocalciferol, (DRISDOL) 91284 units Cap capsule Take  by mouth every 7 days.     • sodium benzoate Powder Take 1 g by mouth Once.     • ipratropium-albuterol (COMBIVENT RESPIMAT)  MCG/ACT Aero Soln Inhale 1 Puff by mouth 4 times a day as needed (SOB).     • bimatoprost (LUMIGAN) 0.01 % Solution Place 1 Drop in both eyes every bedtime.       No current facility-administered medications for this visit.        He  has a past medical history of Arrhythmia; Arthritis; Arthropathy, unspecified, site unspecified; At risk for sleep apnea; Back pain (age 30); Blood clotting disorder (HCC) (current 2017); BPH (benign prostatic hyperplasia); Breath shortness; Bronchitis (12/2014); Cancer (HCC); Cancer (HCC) (6/06); Cataract; Chronic congestive heart failure (HCC) (6/6/2017); Disorders of bursae and tendons in shoulder region, unspecified; Esophageal stricture (07/2007); Essential hypertension, benign; Foot  "fracture (12/07); Gastric ulcer (07/2007); GERD (gastroesophageal reflux disease); Glaucoma; Heart burn; Hypertension; Hypertrophy of prostate without urinary obstruction and other lower urinary tract symptoms (LUTS); Indigestion; Pain (2/13/12); Personal history of venous thrombosis and embolism (11/2008); Pneumonia; Pneumonia (2004); Reflux esophagitis; Rosacea; Skin cancer; Sleep apnea; Snoring; Unspecified cataract; Unspecified glaucoma(365.9); and Urinary incontinence. He also has no past medical history of Bronchitis; COPD (chronic obstructive pulmonary disease) ([facility]); or Fall.        ROS  As stated in HPI and additionally  Gen: No weight gain, excessive fatigue  Neuro: No headache, dizziness  CV: +LE edema. No chest pain, KING, syncope  Pulm: No sob or dyspnea  Endo: see hpi      Objective:     /92 (BP Location: Right arm, Patient Position: Sitting, BP Cuff Size: Large adult)   Pulse 90   Temp 36.8 °C (98.3 °F) (Temporal)   Ht 1.778 m (5' 10\")   Wt 120.2 kg (265 lb)   SpO2 94%  Body mass index is 38.02 kg/m².  Physical Exam:  General: Alert, oriented, in no acute distress.  Eye contact is good, speech goal directed, affect calm  CNs grossly intact.  HEENT:right  conjunctiva non-injected, sclera non-icteric. Left eye absent    Pinna normal without skin lesions. Gross hearing intact.  Neck: Supple. No adenopathy or masses in the cervical or supraclavicular regions.  Lungs: unlabored. clear to auscultation bilaterally with good excursion.  CV: regular rate and rhythm. Grade II systolic murmur present  RLE: 2+ pitting edema, temperature same as LLE, edema worse around ankle joint medial aspect  LLE: no edema, normal color and temperature.   Skin: No rashes or lesions in visible areas  Gait steady.     Assessment and Plan:   Assessment/Plan:  1. Type 2 diabetes mellitus without complication, without long-term current use of insulin ([facility])  Stable. Monitor Q6 months.   - POCT Hemoglobin A1C    2. Essential " hypertension, benign  Borderline stable.     3. Right leg swelling  Due to the significant different between left to right leg, even though INR is therapeutic, will check US to ensure there is no DVT   - US-EXTREMITY VENOUS LOWER BILAT; Future    4. Bilateral leg edema  He will review his medications and let us know which water pill he is taking daily.   - US-EXTREMITY VENOUS LOWER BILAT; Future       Follow up:  Return in about 6 months (around 11/24/2019).    Educated in proper administration of medication(s) ordered today including safety, possible SE, risks, benefits, rationale and alternatives to therapy.   Supportive care, differential diagnoses, and indications for immediate follow-up discussed with patient.    Pathogenesis of diagnosis discussed including typical length and natural progression.    Instructed to return to clinic or nearest emergency department for any change in condition, further concerns, or worsening of symptoms.  Patient states understanding of the plan of care and discharge instructions.      Please note that this dictation was created using voice recognition software. I have made every reasonable attempt to correct obvious errors, but I expect that there are errors of grammar and possibly content that I did not discover before finalizing the note.    Followup: Return in about 6 months (around 11/24/2019). sooner should new symptoms or problems arise.

## 2019-05-27 NOTE — ASSESSMENT & PLAN NOTE
Ongoing issue. It waxes and waves, but when it occurs, RLE becomes very swollen compared to left. He is on chronic anticoagulation and INR remains in therapeutic rage. He is on a water pill, but he cannot recall which one. Lasix when given for 3 days did significantly help his swelling previously.

## 2019-05-28 ENCOUNTER — ANTICOAGULATION MONITORING (OUTPATIENT)
Dept: VASCULAR LAB | Facility: MEDICAL CENTER | Age: 79
End: 2019-05-28

## 2019-05-28 DIAGNOSIS — Z79.01 CHRONIC ANTICOAGULATION: ICD-10-CM

## 2019-05-28 LAB — INR PPP: 2.8 (ref 2–3.5)

## 2019-05-28 NOTE — PROGRESS NOTES
Anticoagulation Summary  As of 2019    INR goal:   2.0-3.0   TTR:   46.9 % (5.7 mo)   INR used for dosin.80 (2019)   Warfarin maintenance plan:   1 mg (1 mg x 1) every Tue, Thu, Sat; 2 mg (1 mg x 2) all other days   Weekly warfarin total:   11 mg   No change documented:   Gayla GRAY'Rayfrench, Med Ass't   Plan last modified:   Mahi Hay (2019)   Next INR check:   2019   Target end date:       Indications    Chronic anticoagulation [Z79.01]  Chronic deep vein thrombosis (DVT) of popliteal vein (HCC) (Resolved) [I82.539]  Pulmonary embolism on right (HCC) (Resolved) [I26.99]             Anticoagulation Episode Summary     INR check location:       Preferred lab:       Send INR reminders to:       Comments:         Anticoagulation Care Providers     Provider Role Specialty Phone number    Jesus Craft M.D. Referring Family Medicine [phone]    [facility] Anticoagulation Services Responsible  [phone]    Zafar Eubanks, PharmD Responsible          Anticoagulation Patient Findings  Patient Findings     Negatives:   Signs/symptoms of thrombosis, Signs/symptoms of bleeding, Laboratory test error suspected, Change in health, Change in alcohol use, Change in activity, Upcoming invasive procedure, Emergency department visit, Upcoming dental procedure, Missed doses, Extra doses, Change in medications, Change in diet/appetite, Hospital admission, Bruising, Other complaints        Spoke with patient to report a therapeutic INR.  Pt instructed to continue with current warfarin dosing regimen. Pt denies any s/s of bleeding, bruising, clotting or any changes to diet or medication.  Will follow up in 1 week.    Gayla GRAY'Rayfrench, Med Ass't     I have reviewed and concur with the above plan on 2019.  Alyssa Aguaoy, Clinical Pharmacist

## 2019-06-06 ENCOUNTER — HOSPITAL ENCOUNTER (OUTPATIENT)
Dept: LAB | Facility: MEDICAL CENTER | Age: 79
End: 2019-06-06
Attending: INTERNAL MEDICINE
Payer: MEDICARE

## 2019-06-06 LAB
ALBUMIN SERPL BCP-MCNC: 4.4 G/DL (ref 3.2–4.9)
ALBUMIN/GLOB SERPL: 1.6 G/DL
ALP SERPL-CCNC: 118 U/L (ref 30–99)
ALT SERPL-CCNC: 17 U/L (ref 2–50)
ANION GAP SERPL CALC-SCNC: 11 MMOL/L (ref 0–11.9)
AST SERPL-CCNC: 15 U/L (ref 12–45)
BASOPHILS # BLD AUTO: 0.5 % (ref 0–1.8)
BASOPHILS # BLD: 0.03 K/UL (ref 0–0.12)
BILIRUB SERPL-MCNC: 0.5 MG/DL (ref 0.1–1.5)
BUN SERPL-MCNC: 37 MG/DL (ref 8–22)
CALCIUM SERPL-MCNC: 9.7 MG/DL (ref 8.5–10.5)
CHLORIDE SERPL-SCNC: 110 MMOL/L (ref 96–112)
CHOLEST SERPL-MCNC: 134 MG/DL (ref 100–199)
CO2 SERPL-SCNC: 18 MMOL/L (ref 20–33)
CREAT SERPL-MCNC: 1.4 MG/DL (ref 0.5–1.4)
EOSINOPHIL # BLD AUTO: 0.11 K/UL (ref 0–0.51)
EOSINOPHIL NFR BLD: 1.7 % (ref 0–6.9)
ERYTHROCYTE [DISTWIDTH] IN BLOOD BY AUTOMATED COUNT: 51.4 FL (ref 35.9–50)
EST. AVERAGE GLUCOSE BLD GHB EST-MCNC: 148 MG/DL
GLOBULIN SER CALC-MCNC: 2.8 G/DL (ref 1.9–3.5)
GLUCOSE SERPL-MCNC: 130 MG/DL (ref 65–99)
HBA1C MFR BLD: 6.8 % (ref 0–5.6)
HCT VFR BLD AUTO: 46.3 % (ref 42–52)
HDLC SERPL-MCNC: 42 MG/DL
HGB BLD-MCNC: 14.5 G/DL (ref 14–18)
IMM GRANULOCYTES # BLD AUTO: 0.05 K/UL (ref 0–0.11)
IMM GRANULOCYTES NFR BLD AUTO: 0.8 % (ref 0–0.9)
LDLC SERPL CALC-MCNC: 66 MG/DL
LYMPHOCYTES # BLD AUTO: 1.38 K/UL (ref 1–4.8)
LYMPHOCYTES NFR BLD: 20.8 % (ref 22–41)
MCH RBC QN AUTO: 30.1 PG (ref 27–33)
MCHC RBC AUTO-ENTMCNC: 31.3 G/DL (ref 33.7–35.3)
MCV RBC AUTO: 96.1 FL (ref 81.4–97.8)
MONOCYTES # BLD AUTO: 0.49 K/UL (ref 0–0.85)
MONOCYTES NFR BLD AUTO: 7.4 % (ref 0–13.4)
NEUTROPHILS # BLD AUTO: 4.59 K/UL (ref 1.82–7.42)
NEUTROPHILS NFR BLD: 68.8 % (ref 44–72)
NRBC # BLD AUTO: 0 K/UL
NRBC BLD-RTO: 0 /100 WBC
PLATELET # BLD AUTO: 127 K/UL (ref 164–446)
PMV BLD AUTO: 10.8 FL (ref 9–12.9)
POTASSIUM SERPL-SCNC: 4.4 MMOL/L (ref 3.6–5.5)
PROT SERPL-MCNC: 7.2 G/DL (ref 6–8.2)
RBC # BLD AUTO: 4.82 M/UL (ref 4.7–6.1)
SODIUM SERPL-SCNC: 139 MMOL/L (ref 135–145)
TRIGL SERPL-MCNC: 129 MG/DL (ref 0–149)
WBC # BLD AUTO: 6.7 K/UL (ref 4.8–10.8)

## 2019-06-06 PROCEDURE — 85025 COMPLETE CBC W/AUTO DIFF WBC: CPT

## 2019-06-06 PROCEDURE — 80061 LIPID PANEL: CPT

## 2019-06-06 PROCEDURE — 80053 COMPREHEN METABOLIC PANEL: CPT

## 2019-06-06 PROCEDURE — 83036 HEMOGLOBIN GLYCOSYLATED A1C: CPT | Mod: GA

## 2019-06-06 PROCEDURE — 36415 COLL VENOUS BLD VENIPUNCTURE: CPT | Mod: GA

## 2019-06-10 ENCOUNTER — ANTICOAGULATION MONITORING (OUTPATIENT)
Dept: VASCULAR LAB | Facility: MEDICAL CENTER | Age: 79
End: 2019-06-10

## 2019-06-10 DIAGNOSIS — Z79.01 CHRONIC ANTICOAGULATION: ICD-10-CM

## 2019-06-10 LAB — INR PPP: 1.5 (ref 2–3.5)

## 2019-06-10 NOTE — PROGRESS NOTES
Anticoagulation Summary  As of 6/10/2019    INR goal:   2.0-3.0   TTR:   47.9 % (6.1 mo)   INR used for dosin.50! (6/10/2019)   Warfarin maintenance plan:   1 mg (1 mg x 1) every Tue, Thu, Sat; 2 mg (1 mg x 2) all other days   Weekly warfarin total:   11 mg   Plan last modified:   Mahi Hay (2019)   Next INR check:   2019   Target end date:       Indications    Chronic anticoagulation [Z79.01]  Chronic deep vein thrombosis (DVT) of popliteal vein (HCC) (Resolved) [I82.539]  Pulmonary embolism on right (HCC) (Resolved) [I26.99]             Anticoagulation Episode Summary     INR check location:       Preferred lab:       Send INR reminders to:       Comments:         Anticoagulation Care Providers     Provider Role Specialty Phone number    Jesus Craft M.D. Referring Family Medicine [phone]    [facility] Anticoagulation Services Responsible  [phone]    Zafar Eubanks, PharmD Responsible          Anticoagulation Patient Findings    HPI:  Kiran Eason, on anticoagulation therapy with warfarin for history of DVT/PE    A/P   INR SUB-therapeutic.     Left message for patient instructing to BOLUS today with warfarin 3 mg then to continue weekly regimen. Instructed patient to call if any changes to medications, diet, or any signs or symptoms of bleeding or clots.     Next INR in 1 week(s).    Komal Gonzalez, PharmD

## 2019-06-20 ENCOUNTER — ANTICOAGULATION MONITORING (OUTPATIENT)
Dept: VASCULAR LAB | Facility: MEDICAL CENTER | Age: 79
End: 2019-06-20

## 2019-06-20 DIAGNOSIS — Z79.01 CHRONIC ANTICOAGULATION: ICD-10-CM

## 2019-06-20 LAB — INR PPP: 2.1 (ref 2–3.5)

## 2019-06-20 NOTE — PROGRESS NOTES
Anticoagulation Summary  As of 2019    INR goal:   2.0-3.0   TTR:   46.3 % (6.4 mo)   INR used for dosin.10 (2019)   Warfarin maintenance plan:   1 mg (1 mg x 1) every Tue, Thu, Sat; 2 mg (1 mg x 2) all other days   Weekly warfarin total:   11 mg   No change documented:   Gayla GRAY'Rayfrench, Med Ass't   Plan last modified:   Mahi Hay (2019)   Next INR check:   2019   Target end date:       Indications    Chronic anticoagulation [Z79.01]  Chronic deep vein thrombosis (DVT) of popliteal vein (HCC) (Resolved) [I82.539]  Pulmonary embolism on right (HCC) (Resolved) [I26.99]             Anticoagulation Episode Summary     INR check location:       Preferred lab:       Send INR reminders to:       Comments:         Anticoagulation Care Providers     Provider Role Specialty Phone number    Jesus Craft M.D. Referring Family Medicine [phone]    [facility] Anticoagulation Services Responsible  [phone]    Zafar Eubanks, PharmD Responsible          Anticoagulation Patient Findings  Patient Findings     Negatives:   Signs/symptoms of thrombosis, Signs/symptoms of bleeding, Laboratory test error suspected, Change in health, Change in alcohol use, Change in activity, Upcoming invasive procedure, Emergency department visit, Upcoming dental procedure, Missed doses, Extra doses, Change in medications, Change in diet/appetite, Hospital admission, Bruising, Other complaints        Spoke with patient to report a therapeutic INR.  Pt instructed to continue with current warfarin dosing regimen. Pt denies any s/s of bleeding, bruising, clotting or any changes to diet or medication.  Will follow up in 1 week.    Gayla GRAY'Rayfrench, Med Ass't      I have reviewed and concur with the above plan     Vinicio Schaeffer, JeannieD

## 2019-07-03 ENCOUNTER — ANTICOAGULATION MONITORING (OUTPATIENT)
Dept: VASCULAR LAB | Facility: MEDICAL CENTER | Age: 79
End: 2019-07-03

## 2019-07-03 DIAGNOSIS — Z79.01 CHRONIC ANTICOAGULATION: ICD-10-CM

## 2019-07-03 LAB — INR PPP: 2.3 (ref 2–3.5)

## 2019-07-03 NOTE — PROGRESS NOTES
Anticoagulation Summary  As of 7/3/2019    INR goal:   2.0-3.0   TTR:   49.7 % (6.9 mo)   INR used for dosin.30 (7/3/2019)   Warfarin maintenance plan:   2 mg (1 mg x 2) every day   Weekly warfarin total:   14 mg   Plan last modified:   Gayla Palomino Med Ass't (7/3/2019)   Next INR check:   2019   Target end date:       Indications    Chronic anticoagulation [Z79.01]  Chronic deep vein thrombosis (DVT) of popliteal vein (HCC) (Resolved) [I82.539]  Pulmonary embolism on right (HCC) (Resolved) [I26.99]             Anticoagulation Episode Summary     INR check location:       Preferred lab:       Send INR reminders to:       Comments:         Anticoagulation Care Providers     Provider Role Specialty Phone number    Jesus Craft M.D. Referring Family Medicine [phone]    [facility] Anticoagulation Services Responsible  [phone]    Zafar Eubanks, PharmD Responsible          Anticoagulation Patient Findings  Patient Findings     Negatives:   Signs/symptoms of thrombosis, Signs/symptoms of bleeding, Laboratory test error suspected, Change in health, Change in alcohol use, Change in activity, Upcoming invasive procedure, Emergency department visit, Upcoming dental procedure, Missed doses, Extra doses, Change in medications, Change in diet/appetite, Hospital admission, Bruising, Other complaints        Spoke with patient to report a therapeutic INR.  Pt instructed to continue with current warfarin dosing regimen. Pt denies any s/s of bleeding, bruising, clotting or any changes to diet.  Patient states that he has been taking 2 mg everyday. Will follow up in 2 weeks.    Gayla Palomino, Med Ass't    I have reviewed and am in agreement with the above stated plan on 7-3-19.  Zafar Eubanks, PharmD, BCACP

## 2019-07-25 ENCOUNTER — ANTICOAGULATION MONITORING (OUTPATIENT)
Dept: VASCULAR LAB | Facility: MEDICAL CENTER | Age: 79
End: 2019-07-25

## 2019-07-25 DIAGNOSIS — Z79.01 CHRONIC ANTICOAGULATION: ICD-10-CM

## 2019-07-25 LAB — INR PPP: 2.4 (ref 2–3.5)

## 2019-07-25 NOTE — PROGRESS NOTES
OP Telephone Anticoagulation Service Note    Date: 2019      Anticoagulation Summary  As of 2019    INR goal:   2.0-3.0   TTR:   54.5 % (7.6 mo)   INR used for dosin.40 (2019)   Warfarin maintenance plan:   2 mg (1 mg x 2) every day   Weekly warfarin total:   14 mg   Plan last modified:   Gayla Palomino, Med Ass't (7/3/2019)   Next INR check:   2019   Target end date:       Indications    Chronic anticoagulation [Z79.01]  Chronic deep vein thrombosis (DVT) of popliteal vein (HCC) (Resolved) [I82.539]  Pulmonary embolism on right (HCC) (Resolved) [I26.99]             Anticoagulation Episode Summary     INR check location:       Preferred lab:       Send INR reminders to:       Comments:         Anticoagulation Care Providers     Provider Role Specialty Phone number    Jesus Craft M.D. Referring Family Medicine [phone]    [facility] Anticoagulation Services Responsible  [phone]    Zafar Eubanks, PharmD Responsible          Anticoagulation Patient Findings        Plan: Spoke with patient on the phone. Patient is  therapeutic today. Confirmed dosing. No missed tablets in the last week. Patient denies any changes in medications or diet. Patient denies any signs or symptoms of bleeding or clotting. Instructed patient to call clinic if any unusual bleeding or bruising occurs. Will continue dosing as outlined. Will follow-up with patient in 2 week(s).        Daniella Chaudhari, JeannieD

## 2019-08-22 ENCOUNTER — HOSPITAL ENCOUNTER (OUTPATIENT)
Dept: LAB | Facility: MEDICAL CENTER | Age: 79
End: 2019-08-22
Attending: UROLOGY
Payer: MEDICARE

## 2019-08-22 ENCOUNTER — ANTICOAGULATION MONITORING (OUTPATIENT)
Dept: MEDICAL GROUP | Facility: PHYSICIAN GROUP | Age: 79
End: 2019-08-22

## 2019-08-22 DIAGNOSIS — Z79.01 CHRONIC ANTICOAGULATION: ICD-10-CM

## 2019-08-22 LAB
ANION GAP SERPL CALC-SCNC: 7 MMOL/L (ref 0–11.9)
BUN SERPL-MCNC: 32 MG/DL (ref 8–22)
CALCIUM SERPL-MCNC: 9.4 MG/DL (ref 8.5–10.5)
CHLORIDE SERPL-SCNC: 111 MMOL/L (ref 96–112)
CO2 SERPL-SCNC: 19 MMOL/L (ref 20–33)
CREAT SERPL-MCNC: 1.7 MG/DL (ref 0.5–1.4)
FASTING STATUS PATIENT QL REPORTED: NORMAL
GLUCOSE SERPL-MCNC: 125 MG/DL (ref 65–99)
POTASSIUM SERPL-SCNC: 4.6 MMOL/L (ref 3.6–5.5)
PSA SERPL-MCNC: 0.94 NG/ML (ref 0–4)
SODIUM SERPL-SCNC: 137 MMOL/L (ref 135–145)

## 2019-08-22 PROCEDURE — 84153 ASSAY OF PSA TOTAL: CPT | Mod: GA

## 2019-08-22 PROCEDURE — 36415 COLL VENOUS BLD VENIPUNCTURE: CPT

## 2019-08-22 PROCEDURE — 80048 BASIC METABOLIC PNL TOTAL CA: CPT

## 2019-08-22 NOTE — PROGRESS NOTES
Anticoagulation clinic    Reminder voice message for patient regarding getting INR done ASAP for anticoagulation clinic.     Vinicio Schafefer, PharmD

## 2019-08-23 ENCOUNTER — ANTICOAGULATION MONITORING (OUTPATIENT)
Dept: VASCULAR LAB | Facility: MEDICAL CENTER | Age: 79
End: 2019-08-23

## 2019-08-23 DIAGNOSIS — Z79.01 CHRONIC ANTICOAGULATION: ICD-10-CM

## 2019-08-23 LAB — INR PPP: 4.1 (ref 2–3.5)

## 2019-08-23 NOTE — PROGRESS NOTES
"Anticoagulation Summary  As of 2019    INR goal:   2.0-3.0   TTR:   52.4 % (8.6 mo)   INR used for dosin.10! (2019)   Warfarin maintenance plan:   2 mg (1 mg x 2) every day   Weekly warfarin total:   14 mg   Plan last modified:   Gayla Palomino, Med Ass't (7/3/2019)   Next INR check:   2019   Target end date:       Indications    Chronic anticoagulation [Z79.01]  Chronic deep vein thrombosis (DVT) of popliteal vein (HCC) (Resolved) [I82.539]  Pulmonary embolism on right (HCC) (Resolved) [I26.99]             Anticoagulation Episode Summary     INR check location:       Preferred lab:       Send INR reminders to:       Comments:   Alere      Anticoagulation Care Providers     Provider Role Specialty Phone number    Jesus Craft M.D. Referring Family Medicine [phone]    [facility] Anticoagulation Services Responsible  [phone]    Zafar Eubanks, PharmD Responsible          Anticoagulation Patient Findings  Patient Findings     Positives:   Change in health    Negatives:   Signs/symptoms of thrombosis, Signs/symptoms of bleeding, Laboratory test error suspected, Change in alcohol use, Change in activity, Upcoming invasive procedure, Emergency department visit, Upcoming dental procedure, Missed doses, Extra doses, Change in medications, Change in diet/appetite, Hospital admission, Bruising, Other complaints    Comments:   Not feeling well this past week, had a \"stomach bug\"        Spoke with patient today regarding supratherapeutic INR of 4.1.  Patient denies any signs/symptoms of bruising or bleeding or any changes in diet and medications.  Instructed patient to call clinic with any questions or concerns.  Instructed patient to HOLD tomorrow's dose (already took today's), then resume current warfarin regimen.  Follow up in 1 weeks, to reduce risk of adverse events related to this high risk medication,  Warfarin.    Zafar Eubanks, PharmD, BCACP      "

## 2019-09-03 ENCOUNTER — HOSPITAL ENCOUNTER (OUTPATIENT)
Dept: LAB | Facility: MEDICAL CENTER | Age: 79
End: 2019-09-03
Attending: INTERNAL MEDICINE
Payer: MEDICARE

## 2019-09-03 LAB
ALBUMIN SERPL BCP-MCNC: 3.9 G/DL (ref 3.2–4.9)
ALBUMIN/GLOB SERPL: 1.4 G/DL
ALP SERPL-CCNC: 100 U/L (ref 30–99)
ALT SERPL-CCNC: 45 U/L (ref 2–50)
ANION GAP SERPL CALC-SCNC: 10 MMOL/L (ref 0–11.9)
AST SERPL-CCNC: 40 U/L (ref 12–45)
BASOPHILS # BLD AUTO: 0.4 % (ref 0–1.8)
BASOPHILS # BLD: 0.03 K/UL (ref 0–0.12)
BILIRUB SERPL-MCNC: 0.5 MG/DL (ref 0.1–1.5)
BUN SERPL-MCNC: 30 MG/DL (ref 8–22)
CALCIUM SERPL-MCNC: 9.1 MG/DL (ref 8.5–10.5)
CHLORIDE SERPL-SCNC: 110 MMOL/L (ref 96–112)
CHOLEST SERPL-MCNC: 109 MG/DL (ref 100–199)
CO2 SERPL-SCNC: 19 MMOL/L (ref 20–33)
CREAT SERPL-MCNC: 1.62 MG/DL (ref 0.5–1.4)
CREAT UR-MCNC: 84.3 MG/DL
EOSINOPHIL # BLD AUTO: 0.15 K/UL (ref 0–0.51)
EOSINOPHIL NFR BLD: 2 % (ref 0–6.9)
ERYTHROCYTE [DISTWIDTH] IN BLOOD BY AUTOMATED COUNT: 53 FL (ref 35.9–50)
GLOBULIN SER CALC-MCNC: 2.7 G/DL (ref 1.9–3.5)
GLUCOSE SERPL-MCNC: 131 MG/DL (ref 65–99)
HCT VFR BLD AUTO: 45.4 % (ref 42–52)
HDLC SERPL-MCNC: 36 MG/DL
HGB BLD-MCNC: 14.5 G/DL (ref 14–18)
IMM GRANULOCYTES # BLD AUTO: 0.05 K/UL (ref 0–0.11)
IMM GRANULOCYTES NFR BLD AUTO: 0.7 % (ref 0–0.9)
LDLC SERPL CALC-MCNC: 51 MG/DL
LYMPHOCYTES # BLD AUTO: 1.3 K/UL (ref 1–4.8)
LYMPHOCYTES NFR BLD: 17.7 % (ref 22–41)
MCH RBC QN AUTO: 31.7 PG (ref 27–33)
MCHC RBC AUTO-ENTMCNC: 31.9 G/DL (ref 33.7–35.3)
MCV RBC AUTO: 99.3 FL (ref 81.4–97.8)
MICROALBUMIN UR-MCNC: 9.7 MG/DL
MICROALBUMIN/CREAT UR: 115 MG/G (ref 0–30)
MONOCYTES # BLD AUTO: 0.56 K/UL (ref 0–0.85)
MONOCYTES NFR BLD AUTO: 7.6 % (ref 0–13.4)
NEUTROPHILS # BLD AUTO: 5.27 K/UL (ref 1.82–7.42)
NEUTROPHILS NFR BLD: 71.6 % (ref 44–72)
NRBC # BLD AUTO: 0 K/UL
NRBC BLD-RTO: 0 /100 WBC
PLATELET # BLD AUTO: 131 K/UL (ref 164–446)
PMV BLD AUTO: 11.4 FL (ref 9–12.9)
POTASSIUM SERPL-SCNC: 4.9 MMOL/L (ref 3.6–5.5)
PROT SERPL-MCNC: 6.6 G/DL (ref 6–8.2)
RBC # BLD AUTO: 4.57 M/UL (ref 4.7–6.1)
SODIUM SERPL-SCNC: 139 MMOL/L (ref 135–145)
TRIGL SERPL-MCNC: 108 MG/DL (ref 0–149)
WBC # BLD AUTO: 7.4 K/UL (ref 4.8–10.8)

## 2019-09-03 PROCEDURE — 85025 COMPLETE CBC W/AUTO DIFF WBC: CPT

## 2019-09-03 PROCEDURE — 83036 HEMOGLOBIN GLYCOSYLATED A1C: CPT | Mod: GA

## 2019-09-03 PROCEDURE — 80053 COMPREHEN METABOLIC PANEL: CPT

## 2019-09-03 PROCEDURE — 82570 ASSAY OF URINE CREATININE: CPT

## 2019-09-03 PROCEDURE — 36415 COLL VENOUS BLD VENIPUNCTURE: CPT | Mod: GA

## 2019-09-03 PROCEDURE — 80061 LIPID PANEL: CPT

## 2019-09-03 PROCEDURE — 82043 UR ALBUMIN QUANTITATIVE: CPT

## 2019-09-04 LAB
EST. AVERAGE GLUCOSE BLD GHB EST-MCNC: 151 MG/DL
HBA1C MFR BLD: 6.9 % (ref 0–5.6)

## 2019-09-10 ENCOUNTER — ANTICOAGULATION MONITORING (OUTPATIENT)
Dept: VASCULAR LAB | Facility: MEDICAL CENTER | Age: 79
End: 2019-09-10

## 2019-09-10 DIAGNOSIS — Z79.01 CHRONIC ANTICOAGULATION: ICD-10-CM

## 2019-09-11 ENCOUNTER — ANTICOAGULATION MONITORING (OUTPATIENT)
Dept: VASCULAR LAB | Facility: MEDICAL CENTER | Age: 79
End: 2019-09-11

## 2019-09-11 DIAGNOSIS — Z79.01 CHRONIC ANTICOAGULATION: ICD-10-CM

## 2019-09-11 LAB — INR PPP: 2 (ref 2–3.5)

## 2019-09-11 NOTE — PROGRESS NOTES
Anticoagulation Summary  As of 2019    INR goal:   2.0-3.0   TTR:   52.0 % (9.2 mo)   INR used for dosin.00 (2019)   Warfarin maintenance plan:   2 mg (1 mg x 2) every day   Weekly warfarin total:   14 mg   Plan last modified:   Gayla Palomino, Med Ass't (7/3/2019)   Next INR check:   2019   Target end date:       Indications    Chronic anticoagulation [Z79.01]  Chronic deep vein thrombosis (DVT) of popliteal vein (HCC) (Resolved) [I82.539]  Pulmonary embolism on right (HCC) (Resolved) [I26.99]             Anticoagulation Episode Summary     INR check location:       Preferred lab:       Send INR reminders to:       Comments:   Anna Marie      Anticoagulation Care Providers     Provider Role Specialty Phone number    Jesus Craft M.D. Referring Family Medicine [phone]    [facility] Anticoagulation Services Responsible  [phone]    Zafar Eubanks, PharmD Responsible          Anticoagulation Patient Findings    Left voicemail message to report a therapeutic INR of  2.0.  Pt to continue with current warfarin dosing regimen. Requested pt contact the clinic for any s/s of unusual bleeding, bruising, clotting or any changes to diet or medication. FU 2 weeks.  Zafar Eubanks, PharmD, BCACP

## 2019-09-26 ENCOUNTER — ANTICOAGULATION MONITORING (OUTPATIENT)
Dept: VASCULAR LAB | Facility: MEDICAL CENTER | Age: 79
End: 2019-09-26

## 2019-09-26 DIAGNOSIS — Z79.01 CHRONIC ANTICOAGULATION: ICD-10-CM

## 2019-09-26 LAB — INR PPP: 2.3 (ref 2–3.5)

## 2019-09-26 NOTE — PROGRESS NOTES
Anticoagulation Summary  As of 2019    INR goal:   2.0-3.0   TTR:   54.5 % (9.7 mo)   INR used for dosin.30 (2019)   Warfarin maintenance plan:   2 mg (1 mg x 2) every day   Weekly warfarin total:   14 mg   No change documented:   Gayla Palomino, Med Ass't   Plan last modified:   Gayla GRAY'Harlan, Med Ass't (7/3/2019)   Next INR check:   10/10/2019   Target end date:       Indications    Chronic anticoagulation [Z79.01]  Chronic deep vein thrombosis (DVT) of popliteal vein (HCC) (Resolved) [I82.539]  Pulmonary embolism on right (HCC) (Resolved) [I26.99]             Anticoagulation Episode Summary     INR check location:       Preferred lab:       Send INR reminders to:       Comments:   Anna Marie      Anticoagulation Care Providers     Provider Role Specialty Phone number    Jesus Craft M.D. Referring Family Medicine [phone]    [facility] Anticoagulation Services Responsible  [phone]    Zafar Eubanks, PharmD Responsible          Anticoagulation Patient Findings  Patient Findings     Negatives:   Signs/symptoms of thrombosis, Signs/symptoms of bleeding, Laboratory test error suspected, Change in health, Change in alcohol use, Change in activity, Upcoming invasive procedure, Emergency department visit, Upcoming dental procedure, Missed doses, Extra doses, Change in medications, Change in diet/appetite, Hospital admission, Bruising, Other complaints        Spoke with patient to report a therapeutic INR.  Pt instructed to continue with current warfarin dosing regimen. Pt denies any s/s of bleeding, bruising, clotting or any changes to diet or medication.  Will follow up in 2 weeks.    Gayla Palomino, Med Ass't     I have reviewed and concur with the above plan on 2019.  Alyssa Aguayo, Clinical Pharmacist, CDE, CACP

## 2019-10-21 ENCOUNTER — APPOINTMENT (OUTPATIENT)
Dept: RADIOLOGY | Facility: IMAGING CENTER | Age: 79
End: 2019-10-21
Attending: PHYSICIAN ASSISTANT
Payer: MEDICARE

## 2019-10-21 ENCOUNTER — OFFICE VISIT (OUTPATIENT)
Dept: URGENT CARE | Facility: PHYSICIAN GROUP | Age: 79
End: 2019-10-21
Payer: MEDICARE

## 2019-10-21 VITALS
TEMPERATURE: 97.8 F | SYSTOLIC BLOOD PRESSURE: 110 MMHG | HEART RATE: 64 BPM | DIASTOLIC BLOOD PRESSURE: 76 MMHG | RESPIRATION RATE: 26 BRPM | OXYGEN SATURATION: 98 %

## 2019-10-21 DIAGNOSIS — T56.0X1D LEAD-INDUCED CHRONIC GOUT WITHOUT TOPHUS, UNSPECIFIED SITE, SUBSEQUENT ENCOUNTER: ICD-10-CM

## 2019-10-21 DIAGNOSIS — R06.02 SHORTNESS OF BREATH: ICD-10-CM

## 2019-10-21 DIAGNOSIS — I25.10 CORONARY ARTERY DISEASE INVOLVING NATIVE CORONARY ARTERY OF NATIVE HEART WITHOUT ANGINA PECTORIS: ICD-10-CM

## 2019-10-21 DIAGNOSIS — J06.9 UPPER RESPIRATORY TRACT INFECTION, UNSPECIFIED TYPE: ICD-10-CM

## 2019-10-21 DIAGNOSIS — I50.42 CHRONIC COMBINED SYSTOLIC AND DIASTOLIC CONGESTIVE HEART FAILURE (HCC): ICD-10-CM

## 2019-10-21 DIAGNOSIS — I10 ESSENTIAL HYPERTENSION, BENIGN: ICD-10-CM

## 2019-10-21 DIAGNOSIS — E11.9 TYPE 2 DIABETES MELLITUS WITHOUT COMPLICATION, WITHOUT LONG-TERM CURRENT USE OF INSULIN (HCC): ICD-10-CM

## 2019-10-21 DIAGNOSIS — M1A.10X0 LEAD-INDUCED CHRONIC GOUT WITHOUT TOPHUS, UNSPECIFIED SITE, SUBSEQUENT ENCOUNTER: ICD-10-CM

## 2019-10-21 PROCEDURE — 71046 X-RAY EXAM CHEST 2 VIEWS: CPT | Mod: TC | Performed by: PHYSICIAN ASSISTANT

## 2019-10-21 PROCEDURE — 99214 OFFICE O/P EST MOD 30 MIN: CPT | Performed by: PHYSICIAN ASSISTANT

## 2019-10-21 RX ORDER — PREDNISONE 20 MG/1
20 TABLET ORAL DAILY
Qty: 7 TAB | Refills: 0 | Status: SHIPPED | OUTPATIENT
Start: 2019-10-21 | End: 2019-10-28

## 2019-10-21 RX ORDER — IPRATROPIUM BROMIDE AND ALBUTEROL SULFATE 2.5; .5 MG/3ML; MG/3ML
3 SOLUTION RESPIRATORY (INHALATION) ONCE
Status: COMPLETED | OUTPATIENT
Start: 2019-10-21 | End: 2019-10-21

## 2019-10-21 RX ORDER — FUROSEMIDE 20 MG/1
20 TABLET ORAL 2 TIMES DAILY
COMMUNITY
End: 2019-11-20 | Stop reason: SDUPTHER

## 2019-10-21 RX ORDER — MULTIVIT WITH MINERALS/LUTEIN
TABLET ORAL
COMMUNITY

## 2019-10-21 RX ORDER — DOXYCYCLINE HYCLATE 100 MG
100 TABLET ORAL 2 TIMES DAILY
Qty: 14 TAB | Refills: 0 | Status: SHIPPED | OUTPATIENT
Start: 2019-10-21 | End: 2019-10-28

## 2019-10-21 RX ADMIN — IPRATROPIUM BROMIDE AND ALBUTEROL SULFATE 3 ML: 2.5; .5 SOLUTION RESPIRATORY (INHALATION) at 19:32

## 2019-10-22 ENCOUNTER — ANTICOAGULATION MONITORING (OUTPATIENT)
Dept: VASCULAR LAB | Facility: MEDICAL CENTER | Age: 79
End: 2019-10-22

## 2019-10-22 DIAGNOSIS — Z79.01 CHRONIC ANTICOAGULATION: ICD-10-CM

## 2019-10-23 ASSESSMENT — ENCOUNTER SYMPTOMS
SPUTUM PRODUCTION: 1
ABDOMINAL PAIN: 0
VOMITING: 0
CHILLS: 0
DIZZINESS: 0
LEG PAIN: 0
COUGH: 1
WHEEZING: 1
RHINORRHEA: 0
DIARRHEA: 0
ORTHOPNEA: 0
FEVER: 0
SORE THROAT: 0
HEMOPTYSIS: 0
NAUSEA: 0
MUSCULOSKELETAL NEGATIVE: 1
SYNCOPE: 0
SHORTNESS OF BREATH: 1

## 2019-10-23 ASSESSMENT — COPD QUESTIONNAIRES: COPD: 0

## 2019-10-23 NOTE — PROGRESS NOTES
Subjective:      Kiran Eason is a 79 y.o. male who presents with Shortness of Breath (Fatigue, wheezing ongoing 1 months)        Patient is accompanied by his wife.     Shortness of Breath   This is a chronic problem. The current episode started more than 1 month ago (4-5 weeks). The problem occurs constantly. The problem has been waxing and waning. Associated symptoms include leg swelling, sputum production and wheezing. Pertinent negatives include no abdominal pain, chest pain, ear pain, fever, hemoptysis, leg pain, orthopnea, rash, rhinorrhea, sore throat, syncope or vomiting. The symptoms are aggravated by exercise. He has tried ipratropium inhalers and beta agonist inhalers for the symptoms. The treatment provided mild relief. His past medical history is significant for chronic lung disease, DVT, a heart failure and PE. There is no history of allergies, asthma, CAD, COPD, pneumonia or a recent surgery.     Patient presents to urgent care reporting a 1 month history of productive cough, wheezing, fatigue, and worsening SOB with exertion. PMH is significant for CHF, type 2 diabetes, CAD, and history of PE. He is currently taking warfarin, last INR was measured at 2.3. No fevers, chills, body aches, chest pain, palpitations, hemoptysis, dizziness, or calf pain. He has chronic lower extremity edema that has been waxing and waning recently. No known sick contacts or recent use of antibiotics.     Review of Systems   Constitutional: Negative for chills and fever.   HENT: Positive for congestion. Negative for ear pain, rhinorrhea and sore throat.    Respiratory: Positive for cough, sputum production, shortness of breath and wheezing. Negative for hemoptysis.    Cardiovascular: Positive for leg swelling. Negative for chest pain, orthopnea and syncope.   Gastrointestinal: Negative for abdominal pain, diarrhea, nausea and vomiting.   Genitourinary: Negative.    Musculoskeletal: Negative.    Skin: Negative for rash.    Neurological: Negative for dizziness.        Objective:     /76   Pulse 64   Temp 36.6 °C (97.8 °F) (Temporal)   Resp (!) 26   SpO2 98%      Physical Exam   Constitutional: He is oriented to person, place, and time. He appears well-developed and well-nourished. No distress.   HENT:   Head: Normocephalic and atraumatic.   Right Ear: External ear normal.   Left Ear: External ear normal.   Mouth/Throat: Oropharynx is clear and moist. No oropharyngeal exudate.   Eyes: Pupils are equal, round, and reactive to light. Conjunctivae are normal. Right eye exhibits no discharge. Left eye exhibits no discharge.   Neck: Normal range of motion.   Cardiovascular: Normal rate, regular rhythm and normal heart sounds.   No murmur heard.  Pulmonary/Chest: Effort normal. No stridor. No respiratory distress. He has wheezes.   Decreased breath sounds throughout    Musculoskeletal: Normal range of motion. He exhibits edema.   1+ pitting edema of left lower extremity, 2+ pitting edema of right lower extremity. Jarred's negative bilaterally.      Lymphadenopathy:     He has no cervical adenopathy.   Neurological: He is alert and oriented to person, place, and time.   Skin: Skin is warm and dry. He is not diaphoretic.   Psychiatric: He has a normal mood and affect. His behavior is normal.   Nursing note and vitals reviewed.         PMH:  has a past medical history of Arrhythmia, Arthritis, Arthropathy, unspecified, site unspecified, At risk for sleep apnea, Back pain (age 30), Blood clotting disorder ([facility]) (current 2017), BPH (benign prostatic hyperplasia), Breath shortness, Bronchitis (12/2014), Cancer ([facility]), Cancer ([facility]) (6/06), Cataract, Chronic congestive heart failure ([facility]) (6/6/2017), Disorders of bursae and tendons in shoulder region, unspecified, Esophageal stricture (07/2007), Essential hypertension, benign, Foot fracture (12/07), Gastric ulcer (07/2007), GERD (gastroesophageal reflux disease), Glaucoma, Heart burn,  Hypertension, Hypertrophy of prostate without urinary obstruction and other lower urinary tract symptoms (LUTS), Indigestion, Pain (2/13/12), Personal history of venous thrombosis and embolism (11/2008), Pneumonia, Pneumonia (2004), Reflux esophagitis, Rosacea, Skin cancer, Sleep apnea, Snoring, Unspecified cataract, Unspecified glaucoma(365.9), and Urinary incontinence. He also has no past medical history of Bronchitis, COPD (chronic obstructive pulmonary disease) (HCC), or Fall.  MEDS:   Current Outpatient Medications:   •  furosemide (LASIX) 20 MG Tab, Take 20 mg by mouth 2 times a day., Disp: , Rfl:   •  Ascorbic Acid (VITAMIN C) 1000 MG Tab, Take  by mouth., Disp: , Rfl:   •  predniSONE (DELTASONE) 20 MG Tab, Take 1 Tab by mouth every day for 7 days., Disp: 7 Tab, Rfl: 0  •  ipratropium-albuterol (COMBIVENT RESPIMAT)  MCG/ACT Aero Soln, Inhale 1 Puff by mouth 4 times a day as needed (SOB)., Disp: 1 Inhaler, Rfl: 0  •  doxycycline (VIBRAMYCIN) 100 MG Tab, Take 1 Tab by mouth 2 times a day for 7 days., Disp: 14 Tab, Rfl: 0  •  isosorbide dinitrate (ISORDIL) 10 MG Tab, TAKE 1 TABLET BY MOUTH 3  TIMES A DAY, Disp: 270 Tab, Rfl: 3  •  carvedilol (COREG) 3.125 MG Tab, TAKE 1 TABLET BY MOUTH TWO  TIMES DAILY WITH MEALS, Disp: 180 Tab, Rfl: 3  •  tamsulosin (FLOMAX) 0.4 MG capsule, TAKE 1 CAPSULE BY MOUTH  EVERY DAY, Disp: 90 Cap, Rfl: 3  •  finasteride (PROSCAR) 5 MG Tab, TAKE 1 TABLET BY MOUTH  EVERY DAY, Disp: 90 Tab, Rfl: 3  •  atorvastatin (LIPITOR) 20 MG Tab, TAKE 1 TABLET BY MOUTH  EVERY DAY, Disp: 90 Tab, Rfl: 3  •  NEXIUM 20 MG Pack, MIX CONTENTS OF ONE 20MG  PACKET WITH 1 TABLESPOON  (15ML) OF WATER AND WAIT  2-3 MINUTES THEN DRINK  EVERY MORNING, Disp: 90 Each, Rfl: 3  •  warfarin (COUMADIN) 1 MG Tab, Take as advised by anticoagulation service, Disp: 135 Tab, Rfl: 3  •  Blood Glucose Test Strips, Test strips order: Test strips for One Touch Verio meter. Sig: use QD and prn ssx high or low sugar #100  RF, Disp: 100 Strip, Rfl: 3  •  Blood Glucose Monitoring Suppl Device, Meter: Dispense Device of Insurance Preference (OneTouch Verio meter). Sig. QD Use as directed for blood sugar monitoring. #1. NR., Disp: 1 Device, Rfl: 0  •  Lancets, Lancets order: Lancets for One Touch Verio meter. Sig: use QD and prn ssx high or low sugar. #100, Disp: 100 Each, Rfl: 3  •  losartan (COZAAR) 50 MG Tab, TAKE 1 TABLET BY MOUTH  EVERY DAY, Disp: 90 Tab, Rfl: 3  •  amLODIPine (NORVASC) 5 MG Tab, TAKE 1 TABLET BY MOUTH  EVERY DAY, Disp: 90 Tab, Rfl: 3  •  warfarin (COUMADIN) 3 MG Tab, Take as advised by anticoagulation service, Disp: 100 Tab, Rfl: 3  •  SENNA PO, Take  by mouth., Disp: , Rfl:   •  vitamin D, Ergocalciferol, (DRISDOL) 04429 units Cap capsule, Take  by mouth every 7 days., Disp: , Rfl:   •  sodium benzoate Powder, Take 1 g by mouth Once., Disp: , Rfl:   •  bimatoprost (LUMIGAN) 0.01 % Solution, Place 1 Drop in both eyes every bedtime., Disp: , Rfl:   ALLERGIES:   Allergies   Allergen Reactions   • Lisinopril      Cough       SURGHX:   Past Surgical History:   Procedure Laterality Date   • ORBITOTOMY Left 3/21/2018    Procedure: ORBITOTOMY- ORBITAL EXENTERTATION WITH PLACEMENT OF SKIN GRAFT INTO LEFT SOCKET;  Surgeon: Artem Garcia M.D.;  Location: SURGERY SAME DAY [facility];  Service: Ophthalmology   • EYE ENUCLEATION Left 03/21/2018    Also with orbitotomy.  Done by Dr. Artem Garcia and Dr. Frederick Hagen   • EXPLORATORY LAPAROTOMY  6/21/2017    Procedure: EXPLORATORY LAPAROTOMY;  Surgeon: Wilfred Amin M.D.;  Location: SURGERY [facility];  Service:    • RECOVERY  6/29/2016    Procedure: VASCULAR CASE-ARTHUR-INFERIOR VENA CAVA FILTER PLACEMENT 37191-DEEP VEIN THROMBOSIS I82.401;  Surgeon: Recoveryonrani Surgery;  Location: SURGERY PRE-POST PROC UNIT [facility];  Service:    • FLAP CLOSURE  4/1/2015    Performed by Artem Garcia M.D. at SURGERY [facility]   • EYE LESION EXCISION  2/11/2015    Performed by  Artem Garcia M.D. at SURGERY [facility]   • CARPAL TUNNEL RELEASE  9/2012    left side   • FULL THICKNESS SKIN GRAFT  4/3/2012    Performed by JAMESON PATEL at SURGERY SAME DAY [facility] ORS   • BLEPHAROPLASTY  3/6/2012    Performed by JAMESON PATEL at SURGERY SAME DAY [facility] ORS   • FULL THICKNESS SKIN GRAFT  3/6/2012    Performed by JAMESON PATEL at SURGERY SAME DAY [facility] ORS   • ATHROPLASTY  11/18/08    Bilat. TKRs   • KNEE ARTHROPLASTY TOTAL  11/18/08    Performed by MICHEAL WINSTON at SURGERY [facility] ORS   • OPEN REDUCTION  12/07    left foot   • FOOT SURGERY  12/2007    left foot    • OTHER      IVC filter 2016   • OTHER      skin cancer removed   • OTHER ORTHOPEDIC SURGERY  greater than 10 years ago    bilat knee replacement with hardware   • OTHER ORTHOPEDIC SURGERY  greater than 10 years    left ankle / foot repair complted 2 years before knee surgery     SOCHX:  reports that he has never smoked. He quit smokeless tobacco use about 52 years ago.  His smokeless tobacco use included chew. He reports that he does not drink alcohol or use drugs.  FH: family history is not on file.       Assessment/Plan:     1. Upper respiratory tract infection, unspecified type  - predniSONE (DELTASONE) 20 MG Tab; Take 1 Tab by mouth every day for 7 days.  Dispense: 7 Tab; Refill: 0  - doxycycline (VIBRAMYCIN) 100 MG Tab; Take 1 Tab by mouth 2 times a day for 7 days.  Dispense: 14 Tab; Refill: 0   - Complete full course of antibiotics as prescribed     2. Shortness of breath  - DX-CHEST-2 VIEWS; Future  Impression       1.  Mild cardiomegaly again noted.    2.  No infiltrates or consolidations are identified.       - EKG - Clinic Performed --> paced ventricular rhythm, no evidence of acute ischemic changes or arrhythmias   - ipratropium-albuterol (DUONEB) nebulizer solution   - Given in urgent care with mild improvement in symptoms afterwards   - ipratropium-albuterol (COMBIVENT RESPIMAT)   MCG/ACT Aero Soln; Inhale 1 Puff by mouth 4 times a day as needed (SOB).  Dispense: 1 Inhaler; Refill: 0    Encouraged increased fluids and rest. Use of Combivent inhaler as needed for SOB. Encouraged salt restriction, elevation of the legs, and use of compressive stockings. STRICT ED precautions discussed for any persistent/worsening symptoms. The patient and his wife demonstrated a good understanding and agreed with the treatment plan.

## 2019-10-25 ENCOUNTER — ANTICOAGULATION MONITORING (OUTPATIENT)
Dept: VASCULAR LAB | Facility: MEDICAL CENTER | Age: 79
End: 2019-10-25

## 2019-10-25 DIAGNOSIS — I82.402 LEG DVT (DEEP VENOUS THROMBOEMBOLISM), ACUTE, LEFT (HCC): ICD-10-CM

## 2019-10-25 DIAGNOSIS — Z79.01 CHRONIC ANTICOAGULATION: ICD-10-CM

## 2019-10-25 DIAGNOSIS — I26.99 PULMONARY EMBOLISM ON RIGHT (HCC): ICD-10-CM

## 2019-10-25 LAB — INR PPP: 3.9 (ref 2–3.5)

## 2019-10-25 NOTE — PROGRESS NOTES
Anticoagulation Summary  As of 10/25/2019    INR goal:   2.0-3.0   TTR:   53.5 % (10.7 mo)   INR used for dosing:   3.90! (10/25/2019)   Warfarin maintenance plan:   2 mg (1 mg x 2) every day   Weekly warfarin total:   14 mg   Plan last modified:   Gayla Palomino, Med Ass't (7/3/2019)   Next INR check:   11/7/2019   Target end date:       Indications    Chronic anticoagulation [Z79.01]  Chronic deep vein thrombosis (DVT) of popliteal vein (HCC) (Resolved) [I82.539]  Pulmonary embolism on right (HCC) (Resolved) [I26.99]             Anticoagulation Episode Summary     INR check location:       Preferred lab:       Send INR reminders to:       Comments:   Alere      Anticoagulation Care Providers     Provider Role Specialty Phone number    Jesus Craft M.D. Referring Family Medicine [phone]    [facility]ticoagulation [facility] Responsible  [phone]    Zafar Eubanks, PharmD Responsible          Anticoagulation Patient Findings          Left voicemail message to report a SUPRA therapeutic INR of 3.9.  Pt to hold warfarin today then continue with current warfarin dosing regimen. Requested pt contact the clinic for any s/s of unusual bleeding, bruising, clotting or any changes to diet or medication. FU 2 weeks.    Colt Jordan, PharmD

## 2019-10-28 ENCOUNTER — ANTICOAGULATION MONITORING (OUTPATIENT)
Dept: MEDICAL GROUP | Facility: PHYSICIAN GROUP | Age: 79
End: 2019-10-28

## 2019-10-28 DIAGNOSIS — Z79.01 CHRONIC ANTICOAGULATION: ICD-10-CM

## 2019-10-28 RX ORDER — WARFARIN SODIUM 1 MG/1
TABLET ORAL
Qty: 135 TAB | Refills: 3 | Status: SHIPPED | OUTPATIENT
Start: 2019-10-28 | End: 2020-02-25

## 2019-10-28 NOTE — TELEPHONE ENCOUNTER
Was the patient seen in the last year in this department? Yes lov 5/24/19 with humble    Does patient have an active prescription for medications requested? No     Received Request Via: Pharmacy

## 2019-11-11 ENCOUNTER — TELEPHONE (OUTPATIENT)
Dept: MEDICAL GROUP | Facility: PHYSICIAN GROUP | Age: 79
End: 2019-11-11

## 2019-11-11 NOTE — TELEPHONE ENCOUNTER
VOICEMAIL  1. Caller Name: Kiran Eason                      Call Back Number: [phone] (home)     2. Message: Pt is inquiring id you wan t Labs done prior to Visit on 11/20/19? If please put order in.  Thank you    3. Patient approves office to leave a detailed voicemail/MyChart message: N\A

## 2019-11-12 DIAGNOSIS — E11.9 TYPE 2 DIABETES MELLITUS WITHOUT COMPLICATION, WITHOUT LONG-TERM CURRENT USE OF INSULIN (HCC): ICD-10-CM

## 2019-11-12 DIAGNOSIS — M1A.10X0 LEAD-INDUCED CHRONIC GOUT WITHOUT TOPHUS, UNSPECIFIED SITE, SUBSEQUENT ENCOUNTER: ICD-10-CM

## 2019-11-12 DIAGNOSIS — I50.42 CHRONIC COMBINED SYSTOLIC AND DIASTOLIC CONGESTIVE HEART FAILURE (HCC): ICD-10-CM

## 2019-11-12 DIAGNOSIS — I25.10 CORONARY ARTERY DISEASE INVOLVING NATIVE CORONARY ARTERY OF NATIVE HEART WITHOUT ANGINA PECTORIS: ICD-10-CM

## 2019-11-12 DIAGNOSIS — T56.0X1D LEAD-INDUCED CHRONIC GOUT WITHOUT TOPHUS, UNSPECIFIED SITE, SUBSEQUENT ENCOUNTER: ICD-10-CM

## 2019-11-12 DIAGNOSIS — I10 ESSENTIAL HYPERTENSION, BENIGN: ICD-10-CM

## 2019-11-12 DIAGNOSIS — E55.9 VITAMIN D DEFICIENCY DISEASE: ICD-10-CM

## 2019-11-13 NOTE — TELEPHONE ENCOUNTER
Phone Number Called: [phone] (home)     Call outcome: spoke to patient regarding message below    Message: Pt has been informed of Lab orders

## 2019-11-14 ENCOUNTER — HOSPITAL ENCOUNTER (OUTPATIENT)
Dept: LAB | Facility: MEDICAL CENTER | Age: 79
End: 2019-11-14
Attending: FAMILY MEDICINE
Payer: MEDICARE

## 2019-11-14 LAB
25(OH)D3 SERPL-MCNC: 12 NG/ML (ref 30–100)
ALBUMIN SERPL BCP-MCNC: 3.9 G/DL (ref 3.2–4.9)
ALBUMIN/GLOB SERPL: 1.6 G/DL
ALP SERPL-CCNC: 149 U/L (ref 30–99)
ALT SERPL-CCNC: 42 U/L (ref 2–50)
ANION GAP SERPL CALC-SCNC: 10 MMOL/L (ref 0–11.9)
AST SERPL-CCNC: 27 U/L (ref 12–45)
BASOPHILS # BLD AUTO: 0.5 % (ref 0–1.8)
BASOPHILS # BLD: 0.03 K/UL (ref 0–0.12)
BILIRUB SERPL-MCNC: 0.8 MG/DL (ref 0.1–1.5)
BUN SERPL-MCNC: 26 MG/DL (ref 8–22)
CALCIUM SERPL-MCNC: 9.1 MG/DL (ref 8.5–10.5)
CHLORIDE SERPL-SCNC: 110 MMOL/L (ref 96–112)
CHOLEST SERPL-MCNC: 93 MG/DL (ref 100–199)
CO2 SERPL-SCNC: 19 MMOL/L (ref 20–33)
CREAT SERPL-MCNC: 1.55 MG/DL (ref 0.5–1.4)
CREAT UR-MCNC: 68.8 MG/DL
EOSINOPHIL # BLD AUTO: 0.14 K/UL (ref 0–0.51)
EOSINOPHIL NFR BLD: 2.2 % (ref 0–6.9)
ERYTHROCYTE [DISTWIDTH] IN BLOOD BY AUTOMATED COUNT: 52.5 FL (ref 35.9–50)
EST. AVERAGE GLUCOSE BLD GHB EST-MCNC: 171 MG/DL
FASTING STATUS PATIENT QL REPORTED: NORMAL
GLOBULIN SER CALC-MCNC: 2.4 G/DL (ref 1.9–3.5)
GLUCOSE SERPL-MCNC: 151 MG/DL (ref 65–99)
HBA1C MFR BLD: 7.6 % (ref 0–5.6)
HCT VFR BLD AUTO: 44.7 % (ref 42–52)
HDLC SERPL-MCNC: 35 MG/DL
HGB BLD-MCNC: 14.1 G/DL (ref 14–18)
IMM GRANULOCYTES # BLD AUTO: 0.07 K/UL (ref 0–0.11)
IMM GRANULOCYTES NFR BLD AUTO: 1.1 % (ref 0–0.9)
LDLC SERPL CALC-MCNC: 35 MG/DL
LYMPHOCYTES # BLD AUTO: 0.85 K/UL (ref 1–4.8)
LYMPHOCYTES NFR BLD: 13.4 % (ref 22–41)
MCH RBC QN AUTO: 29.9 PG (ref 27–33)
MCHC RBC AUTO-ENTMCNC: 31.5 G/DL (ref 33.7–35.3)
MCV RBC AUTO: 94.7 FL (ref 81.4–97.8)
MICROALBUMIN UR-MCNC: 11.8 MG/DL
MICROALBUMIN/CREAT UR: 172 MG/G (ref 0–30)
MONOCYTES # BLD AUTO: 0.54 K/UL (ref 0–0.85)
MONOCYTES NFR BLD AUTO: 8.5 % (ref 0–13.4)
NEUTROPHILS # BLD AUTO: 4.72 K/UL (ref 1.82–7.42)
NEUTROPHILS NFR BLD: 74.3 % (ref 44–72)
NRBC # BLD AUTO: 0 K/UL
NRBC BLD-RTO: 0 /100 WBC
PLATELET # BLD AUTO: 141 K/UL (ref 164–446)
PMV BLD AUTO: 10.9 FL (ref 9–12.9)
POTASSIUM SERPL-SCNC: 4.7 MMOL/L (ref 3.6–5.5)
PROT SERPL-MCNC: 6.3 G/DL (ref 6–8.2)
RBC # BLD AUTO: 4.72 M/UL (ref 4.7–6.1)
SODIUM SERPL-SCNC: 139 MMOL/L (ref 135–145)
TRIGL SERPL-MCNC: 116 MG/DL (ref 0–149)
URATE SERPL-MCNC: 8.7 MG/DL (ref 2.5–8.3)
WBC # BLD AUTO: 6.4 K/UL (ref 4.8–10.8)

## 2019-11-14 PROCEDURE — 82570 ASSAY OF URINE CREATININE: CPT

## 2019-11-14 PROCEDURE — 80053 COMPREHEN METABOLIC PANEL: CPT

## 2019-11-14 PROCEDURE — 82306 VITAMIN D 25 HYDROXY: CPT

## 2019-11-14 PROCEDURE — 84550 ASSAY OF BLOOD/URIC ACID: CPT

## 2019-11-14 PROCEDURE — 83036 HEMOGLOBIN GLYCOSYLATED A1C: CPT | Mod: GA

## 2019-11-14 PROCEDURE — 85025 COMPLETE CBC W/AUTO DIFF WBC: CPT

## 2019-11-14 PROCEDURE — 36415 COLL VENOUS BLD VENIPUNCTURE: CPT

## 2019-11-14 PROCEDURE — 80061 LIPID PANEL: CPT

## 2019-11-14 PROCEDURE — 82043 UR ALBUMIN QUANTITATIVE: CPT

## 2019-11-15 ENCOUNTER — ANTICOAGULATION MONITORING (OUTPATIENT)
Dept: VASCULAR LAB | Facility: MEDICAL CENTER | Age: 79
End: 2019-11-15

## 2019-11-15 ENCOUNTER — TELEPHONE (OUTPATIENT)
Dept: MEDICAL GROUP | Facility: PHYSICIAN GROUP | Age: 79
End: 2019-11-15

## 2019-11-15 DIAGNOSIS — Z79.01 CHRONIC ANTICOAGULATION: ICD-10-CM

## 2019-11-15 LAB — INR PPP: 4.2 (ref 2–3.5)

## 2019-11-15 NOTE — LETTER
November 15, 2019         Kiran Flores [facility] [address]  [facility] [address]        Dear Kiran:  The test results show that:    Here are your results, which we will go over with you in more detail when I see you in the clinic.we need better control of your blood sugars.  Jesus Craft MD     Below are the results from your recent visit:    Resulted Orders   CBC WITH DIFFERENTIAL   Result Value Ref Range    WBC 6.4 4.8 - 10.8 K/uL    RBC 4.72 4.70 - 6.10 M/uL    Hemoglobin 14.1 14.0 - 18.0 g/dL    Hematocrit 44.7 42.0 - 52.0 %    MCV 94.7 81.4 - 97.8 fL    MCH 29.9 27.0 - 33.0 pg    MCHC 31.5 (L) 33.7 - 35.3 g/dL    RDW 52.5 (H) 35.9 - 50.0 fL    Platelet Count 141 (L) 164 - 446 K/uL    MPV 10.9 9.0 - 12.9 fL    Neutrophils-Polys 74.30 (H) 44.00 - 72.00 %    Lymphocytes 13.40 (L) 22.00 - 41.00 %    Monocytes 8.50 0.00 - 13.40 %    Eosinophils 2.20 0.00 - 6.90 %    Basophils 0.50 0.00 - 1.80 %    Immature Granulocytes 1.10 (H) 0.00 - 0.90 %    Nucleated RBC 0.00 /100 WBC    Neutrophils (Absolute) 4.72 1.82 - 7.42 K/uL      Comment:      Includes immature neutrophils, if present.    Lymphs (Absolute) 0.85 (L) 1.00 - 4.80 K/uL    Monos (Absolute) 0.54 0.00 - 0.85 K/uL    Eos (Absolute) 0.14 0.00 - 0.51 K/uL    Baso (Absolute) 0.03 0.00 - 0.12 K/uL    Immature Granulocytes (abs) 0.07 0.00 - 0.11 K/uL    NRBC (Absolute) 0.00 K/uL   Comp Metabolic Panel   Result Value Ref Range    Sodium 139 135 - 145 mmol/L    Potassium 4.7 3.6 - 5.5 mmol/L    Chloride 110 96 - 112 mmol/L    Co2 19 (L) 20 - 33 mmol/L    Anion Gap 10.0 0.0 - 11.9    Glucose 151 (H) 65 - 99 mg/dL    Bun 26 (H) 8 - 22 mg/dL    Creatinine 1.55 (H) 0.50 - 1.40 mg/dL    Calcium 9.1 8.5 - 10.5 mg/dL    AST(SGOT) 27 12 - 45 U/L    ALT(SGPT) 42 2 - 50 U/L    Alkaline Phosphatase 149 (H) 30 - 99 U/L    Total Bilirubin 0.8 0.1 - 1.5 mg/dL    Albumin 3.9 3.2 - 4.9 g/dL    Total Protein 6.3 6.0 - 8.2 g/dL    Globulin 2.4 1.9 - 3.5 g/dL    A-G Ratio 1.6 g/dL      Lipid Profile   Result Value Ref Range    Cholesterol,Tot 93 (L) 100 - 199 mg/dL    Triglycerides 116 0 - 149 mg/dL    HDL 35 (A) >=40 mg/dL    LDL 35 <100 mg/dL   MICROALBUMIN CREAT RATIO URINE   Result Value Ref Range    Creatinine, Urine 68.80 mg/dL    Microalbumin, Urine Random 11.8 mg/dL    Micro Alb Creat Ratio 172 (H) 0 - 30 mg/g   HEMOGLOBIN A1C   Result Value Ref Range    Glycohemoglobin 7.6 (H) 0.0 - 5.6 %      Comment:      Increased risk for diabetes:  5.7 -6.4%  Diabetes:  >6.4%  Glycemic control for adults with diabetes:  <7.0%  The above interpretations are per ADA guidelines.  Diagnosis  of diabetes mellitus on the basis of elevated Hemoglobin A1c  should be confirmed by repeating the Hb A1c test.      Est Avg Glucose 171 mg/dL      Comment:      The eAG calculation is based on the A1c-Derived Daily Glucose  (ADAG) study.  See the ADA's website for additional information.     URIC ACID   Result Value Ref Range    Uric Acid 8.7 (H) 2.5 - 8.3 mg/dL   VITAMIN D,25 HYDROXY   Result Value Ref Range    25-Hydroxy   Vitamin D 25 12 (L) 30 - 100 ng/mL      Comment:      Adult Ranges:   <20 ng/mL - Deficiency  20-29 ng/mL - Insufficiency   ng/mL - Sufficiency  The [employer]ia Centaur Vitamin D Assay is standardized to the  [facility] reference measurement procedures, a  reference method for the Vitamin D Standardization Program  (VDSP).  The VDSP aligns patient results among 25 (OH)  Vitamin D methods.     FASTING STATUS   Result Value Ref Range    Fasting Status Fasting    ESTIMATED GFR   Result Value Ref Range    GFR If  53 (A) >60 mL/min/1.73 m 2    GFR If Non  43 (A) >60 mL/min/1.73 m 2     The test results show that ***.    If you have any questions or concerns, please don't hesitate to call.        Sincerely,      Jesus Craft M.D.    Electronically Signed

## 2019-11-15 NOTE — TELEPHONE ENCOUNTER
----- Message from Jesus Craft M.D. sent at 11/15/2019 12:02 PM PST -----  Dear Pete  Here are your results, which we will go over with you in more detail when I see you in the clinic.we need better control of your blood sugars.Jesus Craft MD

## 2019-11-16 NOTE — PROGRESS NOTES
Anticoagulation Summary  As of 11/15/2019    INR goal:   2.0-3.0   TTR:   50.2 % (11.4 mo)   INR used for dosin.20! (11/15/2019)   Warfarin maintenance plan:   1 mg (1 mg x 1) every Sat; 2 mg (1 mg x 2) all other days   Weekly warfarin total:   13 mg   Plan last modified:   Alice Ramires, PharmD (11/15/2019)   Next INR check:   2019   Target end date:       Indications    Chronic anticoagulation [Z79.01]  Chronic deep vein thrombosis (DVT) of popliteal vein (HCC) (Resolved) [I82.539]  Pulmonary embolism on right (HCC) (Resolved) [I26.99]             Anticoagulation Episode Summary     INR check location:       Preferred lab:       Send INR reminders to:       Comments:   Anna Marie      Anticoagulation Care Providers     Provider Role Specialty Phone number    Jesus Craft M.D. Referring Family Medicine [phone]    [facility]ticoagulation Services Responsible  [phone]    Zafar Eubanks, PharmD Responsible          Anticoagulation Patient Findings      Left voicemail message to report a SUPRA therapeutic INR of 4.2.    Will have pt HOLD his dose of warfarin today and then start a 7% decreased weekly warfarin dosing regimen.   Requested pt contact the clinic for any s/s of unusual bleeding, bruising, clotting or any changes to diet or medication.    FU INR in 1 week(s).    Alice Ramires, PharmD

## 2019-11-19 ENCOUNTER — HOSPITAL ENCOUNTER (OUTPATIENT)
Dept: HOSPITAL 8 - CFH | Age: 79
Discharge: HOME | End: 2019-11-19
Attending: UROLOGY
Payer: MEDICARE

## 2019-11-19 DIAGNOSIS — K57.30: ICD-10-CM

## 2019-11-19 DIAGNOSIS — J84.10: ICD-10-CM

## 2019-11-19 DIAGNOSIS — N13.39: Primary | ICD-10-CM

## 2019-11-19 DIAGNOSIS — M47.815: ICD-10-CM

## 2019-11-19 DIAGNOSIS — M25.78: ICD-10-CM

## 2019-11-19 DIAGNOSIS — J98.4: ICD-10-CM

## 2019-11-19 PROCEDURE — 74176 CT ABD & PELVIS W/O CONTRAST: CPT

## 2019-11-20 ENCOUNTER — OFFICE VISIT (OUTPATIENT)
Dept: MEDICAL GROUP | Facility: PHYSICIAN GROUP | Age: 79
End: 2019-11-20
Payer: MEDICARE

## 2019-11-20 VITALS
HEIGHT: 70 IN | SYSTOLIC BLOOD PRESSURE: 136 MMHG | HEART RATE: 84 BPM | DIASTOLIC BLOOD PRESSURE: 64 MMHG | WEIGHT: 270 LBS | OXYGEN SATURATION: 95 % | RESPIRATION RATE: 18 BRPM | TEMPERATURE: 98.4 F | BODY MASS INDEX: 38.65 KG/M2

## 2019-11-20 DIAGNOSIS — E55.9 VITAMIN D DEFICIENCY: ICD-10-CM

## 2019-11-20 DIAGNOSIS — R60.0 LOCALIZED EDEMA: ICD-10-CM

## 2019-11-20 DIAGNOSIS — I10 ESSENTIAL HYPERTENSION, BENIGN: ICD-10-CM

## 2019-11-20 DIAGNOSIS — Z23 NEEDS FLU SHOT: ICD-10-CM

## 2019-11-20 DIAGNOSIS — R06.09 DYSPNEA ON EXERTION: ICD-10-CM

## 2019-11-20 DIAGNOSIS — E11.9 TYPE 2 DIABETES MELLITUS WITHOUT COMPLICATION, WITHOUT LONG-TERM CURRENT USE OF INSULIN (HCC): ICD-10-CM

## 2019-11-20 PROCEDURE — 99214 OFFICE O/P EST MOD 30 MIN: CPT | Mod: 25 | Performed by: FAMILY MEDICINE

## 2019-11-20 PROCEDURE — 90662 IIV NO PRSV INCREASED AG IM: CPT | Performed by: FAMILY MEDICINE

## 2019-11-20 PROCEDURE — G0008 ADMIN INFLUENZA VIRUS VAC: HCPCS | Performed by: FAMILY MEDICINE

## 2019-11-20 RX ORDER — PIOGLITAZONEHYDROCHLORIDE 30 MG/1
30 TABLET ORAL DAILY
Qty: 30 TAB | Refills: 3 | Status: SHIPPED | OUTPATIENT
Start: 2019-11-20

## 2019-11-20 RX ORDER — ERGOCALCIFEROL 1.25 MG/1
50000 CAPSULE ORAL
Qty: 12 CAP | Refills: 3 | Status: SHIPPED | OUTPATIENT
Start: 2019-11-20

## 2019-11-20 RX ORDER — FUROSEMIDE 20 MG/1
20 TABLET ORAL DAILY
Qty: 30 TAB | Refills: 3 | Status: SHIPPED | OUTPATIENT
Start: 2019-11-20 | End: 2019-11-20

## 2019-11-20 RX ORDER — FUROSEMIDE 20 MG/1
20 TABLET ORAL 2 TIMES DAILY
Qty: 60 TAB | Refills: 3 | Status: SHIPPED | OUTPATIENT
Start: 2019-11-20

## 2019-11-21 NOTE — PROGRESS NOTES
Patient comes in with his wife.  He wants to review his recent lab work.  It turns out he has not really been paying that much attention to his blood sugar levels or his glucose intake.  He is not on any medications for his diabetes because in the past he was well controlled with diet.  Unfortunately his recent labs show that that is no longer the case.  He does have problems with decreased kidney function so I need to be careful and not to use metformin.  He has swelling in his ankles.  He is also complaining of some dyspnea on exertion.  He does not have any fever or chills.  He is due for a flu shot.    I reviewed the following    Past Medical History:   Diagnosis Date   • Arrhythmia     unsure of time. thinks he was in hospital    • Arthritis    • Arthropathy, unspecified, site unspecified    • At risk for sleep apnea    • Back pain age 30    lower back pain   • Blood clotting disorder ([facility]) current 2017    DVT and PE   • BPH (benign prostatic hyperplasia)    • Breath shortness    • Bronchitis 12/2014   • Cancer ([facility])     squamous cell carinoma left eye    • Cancer ([facility]) 6/06    lower lip skin cancer--   • Cataract     cataract and glacoma   • Chronic congestive heart failure ([facility]) 6/6/2017    pt denies   • Disorders of bursae and tendons in shoulder region, unspecified    • Esophageal stricture 07/2007   • Essential hypertension, benign    • Foot fracture 12/07   • Gastric ulcer 07/2007   • GERD (gastroesophageal reflux disease)    • Glaucoma    • Heart burn    • Hypertension    • Hypertrophy of prostate without urinary obstruction and other lower urinary tract symptoms (LUTS)    • Indigestion    • Pain 2/13/12    2/10 ankles, lower back   • Personal history of venous thrombosis and embolism 11/2008    post knee replacement -took coumadin then   • Pneumonia     2007   • Pneumonia 2004   • Reflux esophagitis    • Rosacea    • Skin cancer    • Sleep apnea     cpap    • Snoring    • Unspecified cataract      ONE REMOVED   • Unspecified glaucoma(365.9)    • Urinary incontinence     occasional incontinence         Past Surgical History:   Procedure Laterality Date   • ORBITOTOMY Left 3/21/2018    Procedure: ORBITOTOMY- ORBITAL EXENTERTATION WITH PLACEMENT OF SKIN GRAFT INTO LEFT SOCKET;  Surgeon: Artem Garcia M.D.;  Location: SURGERY SAME DAY [facility];  Service: Ophthalmology   • EYE ENUCLEATION Left 03/21/2018    Also with orbitotomy.  Done by Dr. Artem Garcia and Dr. Frederick Hagen   • EXPLORATORY LAPAROTOMY  6/21/2017    Procedure: EXPLORATORY LAPAROTOMY;  Surgeon: Wilfred Amin M.D.;  Location: SURGERY [facility];  Service:    • RECOVERY  6/29/2016    Procedure: VASCULAR CASE-ARTHUR-INFERIOR VENA CAVA FILTER PLACEMENT 37191-DEEP VEIN THROMBOSIS I82.401;  Surgeon: Recoveryonly Surgery;  Location: SURGERY PRE-POST PROC UNIT [facility];  Service:    • FLAP CLOSURE  4/1/2015    Performed by Artem Garcia M.D. at SURGERY [facility]   • EYE LESION EXCISION  2/11/2015    Performed by Artem Garcia M.D. at SURGERY [facility]   • CARPAL TUNNEL RELEASE  9/2012    left side   • FULL THICKNESS SKIN GRAFT  4/3/2012    Performed by JAMESON PATEL at SURGERY SAME DAY [facility]   • BLEPHAROPLASTY  3/6/2012    Performed by JAMESON PATEL at SURGERY SAME DAY [facility]   • FULL THICKNESS SKIN GRAFT  3/6/2012    Performed by JAMESON PATEL at SURGERY SAME DAY [facility] ORS   • ATHROPLASTY  11/18/08    Bilat. TKRs   • KNEE ARTHROPLASTY TOTAL  11/18/08    Performed by MICHEAL WINSTON at SURGERY [facility]   • OPEN REDUCTION  12/07    left foot   • FOOT SURGERY  12/2007    left foot    • OTHER      IVC filter 2016   • OTHER      skin cancer removed   • OTHER ORTHOPEDIC SURGERY  greater than 10 years ago    bilat knee replacement with hardware   • OTHER ORTHOPEDIC SURGERY  greater than 10 years    left ankle / foot repair complted 2 years before knee surgery       Allergies   Allergen Reactions   •  Lisinopril      Cough         Current Outpatient Medications   Medication Sig Dispense Refill   • vitamin D, Ergocalciferol, (DRISDOL) 30868 units Cap capsule Take 1 Cap by mouth every 7 days. 12 Cap 3   • pioglitazone (ACTOS) 30 MG Tab Take 1 Tab by mouth every day. 30 Tab 3   • furosemide (LASIX) 20 MG Tab Take 1 Tab by mouth 2 times a day. 60 Tab 3   • warfarin (COUMADIN) 1 MG Tab TAKE 1 TABLET BY MOUTH  EVERY OTHER DAY ALTERNATING WITH 2 TABLETS EVERY OTHER   Tab 3   • Ascorbic Acid (VITAMIN C) 1000 MG Tab Take  by mouth.     • ipratropium-albuterol (COMBIVENT RESPIMAT)  MCG/ACT Aero Soln Inhale 1 Puff by mouth 4 times a day as needed (SOB). 1 Inhaler 0   • Blood Glucose Test Strips Test strips order: Test strips for One Touch Verio meter. Sig: use QD and prn ssx high or low sugar #100  Strip 3   • Blood Glucose Monitoring Suppl Device Meter: Dispense Device of Insurance Preference (OneTouch Verio meter). Sig. QD Use as directed for blood sugar monitoring. #1. NR. 1 Device 0   • Lancets Lancets order: Lancets for One Touch Verio meter. Sig: use QD and prn ssx high or low sugar. #100 100 Each 3   • isosorbide dinitrate (ISORDIL) 10 MG Tab TAKE 1 TABLET BY MOUTH 3  TIMES A  Tab 3   • carvedilol (COREG) 3.125 MG Tab TAKE 1 TABLET BY MOUTH TWO  TIMES DAILY WITH MEALS 180 Tab 3   • tamsulosin (FLOMAX) 0.4 MG capsule TAKE 1 CAPSULE BY MOUTH  EVERY DAY 90 Cap 3   • finasteride (PROSCAR) 5 MG Tab TAKE 1 TABLET BY MOUTH  EVERY DAY 90 Tab 3   • atorvastatin (LIPITOR) 20 MG Tab TAKE 1 TABLET BY MOUTH  EVERY DAY 90 Tab 3   • NEXIUM 20 MG Pack MIX CONTENTS OF ONE 20MG  PACKET WITH 1 TABLESPOON  (15ML) OF WATER AND WAIT  2-3 MINUTES THEN DRINK  EVERY MORNING 90 Each 3   • warfarin (COUMADIN) 3 MG Tab Take as advised by anticoagulation service 100 Tab 3   • SENNA PO Take  by mouth.     • bimatoprost (LUMIGAN) 0.01 % Solution Place 1 Drop in both eyes every bedtime.     • losartan (COZAAR) 50 MG Tab  TAKE 1 TABLET BY MOUTH  EVERY DAY 90 Tab 3   • amLODIPine (NORVASC) 5 MG Tab TAKE 1 TABLET BY MOUTH  EVERY DAY 90 Tab 3   • warfarin (COUMADIN) 1 MG Tab Take as advised by anticoagulation service (Patient not taking: Reported on 11/20/2019) 135 Tab 3   • sodium benzoate Powder Take 1 g by mouth Once.       No current facility-administered medications for this visit.         History reviewed. No pertinent family history.    Social History     Socioeconomic History   • Marital status:      Spouse name: Not on file   • Number of children: Not on file   • Years of education: Not on file   • Highest education level: Not on file   Occupational History   • Not on file   Social Needs   • Financial resource strain: Not on file   • Food insecurity:     Worry: Not on file     Inability: Not on file   • Transportation needs:     Medical: Not on file     Non-medical: Not on file   Tobacco Use   • Smoking status: Never Smoker   • Smokeless tobacco: Former User     Types: Chew   Substance and Sexual Activity   • Alcohol use: No     Alcohol/week: 0.0 - 0.6 oz     Comment: ocassionally   • Drug use: No   • Sexual activity: Never   Lifestyle   • Physical activity:     Days per week: Not on file     Minutes per session: Not on file   • Stress: Not on file   Relationships   • Social connections:     Talks on phone: Not on file     Gets together: Not on file     Attends Gnosticist service: Not on file     Active member of club or organization: Not on file     Attends meetings of clubs or organizations: Not on file     Relationship status: Not on file   • Intimate partner violence:     Fear of current or ex partner: Not on file     Emotionally abused: Not on file     Physically abused: Not on file     Forced sexual activity: Not on file   Other Topics Concern   • Not on file   Social History Narrative    ** Merged History Encounter **         ** Merged History Encounter **           Anticoagulation Monitoring on 11/15/2019    Component Date Value   • INR 11/15/2019 4.20    Hospital Outpatient Visit on 11/14/2019   Component Date Value   • WBC 11/14/2019 6.4    • RBC 11/14/2019 4.72    • Hemoglobin 11/14/2019 14.1    • Hematocrit 11/14/2019 44.7    • MCV 11/14/2019 94.7    • MCH 11/14/2019 29.9    • MCHC 11/14/2019 31.5*   • RDW 11/14/2019 52.5*   • Platelet Count 11/14/2019 141*   • MPV 11/14/2019 10.9    • Neutrophils-Polys 11/14/2019 74.30*   • Lymphocytes 11/14/2019 13.40*   • Monocytes 11/14/2019 8.50    • Eosinophils 11/14/2019 2.20    • Basophils 11/14/2019 0.50    • Immature Granulocytes 11/14/2019 1.10*   • Nucleated RBC 11/14/2019 0.00    • Neutrophils (Absolute) 11/14/2019 4.72    • Lymphs (Absolute) 11/14/2019 0.85*   • Monos (Absolute) 11/14/2019 0.54    • Eos (Absolute) 11/14/2019 0.14    • Baso (Absolute) 11/14/2019 0.03    • Immature Granulocytes (a* 11/14/2019 0.07    • NRBC (Absolute) 11/14/2019 0.00    • Sodium 11/14/2019 139    • Potassium 11/14/2019 4.7    • Chloride 11/14/2019 110    • Co2 11/14/2019 19*   • Anion Gap 11/14/2019 10.0    • Glucose 11/14/2019 151*   • Bun 11/14/2019 26*   • Creatinine 11/14/2019 1.55*   • Calcium 11/14/2019 9.1    • AST(SGOT) 11/14/2019 27    • ALT(SGPT) 11/14/2019 42    • Alkaline Phosphatase 11/14/2019 149*   • Total Bilirubin 11/14/2019 0.8    • Albumin 11/14/2019 3.9    • Total Protein 11/14/2019 6.3    • Globulin 11/14/2019 2.4    • A-G Ratio 11/14/2019 1.6    • Cholesterol,Tot 11/14/2019 93*   • Triglycerides 11/14/2019 116    • HDL 11/14/2019 35*   • LDL 11/14/2019 35    • Creatinine, Urine 11/14/2019 68.80    • Microalbumin, Urine Rand* 11/14/2019 11.8    • Micro Alb Creat Ratio 11/14/2019 172*   • Glycohemoglobin 11/14/2019 7.6*   • Est Avg Glucose 11/14/2019 171    • Uric Acid 11/14/2019 8.7*   • 25-Hydroxy   Vitamin D 25 11/14/2019 12*   • Fasting Status 11/14/2019 Fasting    • GFR If  11/14/2019 53*   • GFR If Non  Ameri* 11/14/2019 43*    Anticoagulation Monitoring on 10/25/2019   Component Date Value   • INR 10/25/2019 3.90      Physical Exam   Constitutional: He is oriented. He appears well-developed and obese. No distress.   HENT:   Head: Normocephalic and atraumatic.   Right Ear: External ear normal. Ear canal and TM normal   Left Ear: External ear normal. Ear canal and TM normal   Nose: Nose normal.   Mouth/Throat: Oropharynx is clear and moist.   Eyes: Conjunctivae and extraocular motions are normal. Pupils are equal, round, and reactive to light.        Fundi benign bilaterally   Neck: No thyromegaly present.   Cardiovascular: Normal rate, regular rhythm, normal heart sounds and intact distal pulses.  Exam reveals no gallop.    No murmur heard.  Pulmonary/Chest: Effort normal and breath sounds normal. No respiratory distress. He has no wheezes. He has no rales.   Abdominal: Soft. Bowel sounds are normal. No hepatosplenomegaly. He exhibits no distension. No tenderness. He has no rebound and no guarding.   Musculoskeletal: Normal range of motion. He exhibits 2+ bilateral ankle edema and no tenderness.   Lymphadenopathy:     He has no cervical adenopathy.  No supraclavicular adenopathy.   Neurological: He is alert and oriented. He has normal reflexes.        Babinskis downgoing bilaterally   Skin: Skin is warm and dry. No rash noted. No erythema.   Psychiatric: He has a normal mood and appropriate affect. His behavior is normal. Judgment and thought content normal.     1. Needs flu shot  INFLUENZA VACCINE, HIGH DOSE (65+ ONLY)   2. Vitamin D deficiency  vitamin D, Ergocalciferol, (DRISDOL) 67027 units Cap capsule--resume this at 50,000 units once a week   3. Type 2 diabetes mellitus without complication, without long-term current use of insulin (HCC)  pioglitazone (ACTOS) 30 MG Tab--begin this at 1 p.o. daily  Patient is advised to decrease his intake of carbohydrates and sugar and sweets.  I doubt that he will follow my advice.   4. Localized  edema  furosemide (LASIX) 20 MG Tab--increase dose to 2 p.o. daily       5. Dyspnea on exertion   I am hoping the increased dose of furosemide will help this   6. Essential hypertension, benign   controlled     Please note that this dictation was created using voice recognition software. I have worked with consultants from the vendor as well as technical experts from [facility] to optimize the interface. I have made every reasonable attempt to correct obvious errors, but I expect that there are errors of grammar and possibly content that I did not discover before finalizing the note.      Recheck with me 3 weeks or as needed

## 2019-11-21 NOTE — PATIENT INSTRUCTIONS
Patient given written instructions regarding labs,  medications, referrals, dietary and lifestyle management, and return visit.    Jesus Craft MD

## 2019-11-27 ENCOUNTER — ANTICOAGULATION MONITORING (OUTPATIENT)
Dept: VASCULAR LAB | Facility: MEDICAL CENTER | Age: 79
End: 2019-11-27

## 2019-11-27 DIAGNOSIS — Z79.01 CHRONIC ANTICOAGULATION: ICD-10-CM

## 2019-11-27 LAB — INR PPP: 4.5 (ref 2–3.5)

## 2019-11-27 NOTE — PROGRESS NOTES
Anticoagulation Summary  As of 2019    INR goal:   2.0-3.0   TTR:   48.5 % (11.8 mo)   INR used for dosin.50! (2019)   Warfarin maintenance plan:   1 mg (1 mg x 1) every Mon, Fri; 2 mg (1 mg x 2) all other days   Weekly warfarin total:   12 mg   Plan last modified:   NIA Cabrera (2019)   Next INR check:   2019   Target end date:       Indications    Chronic anticoagulation [Z79.01]  Chronic deep vein thrombosis (DVT) of popliteal vein (HCC) (Resolved) [I82.539]  Pulmonary embolism on right (HCC) (Resolved) [I26.99]             Anticoagulation Episode Summary     INR check location:       Preferred lab:       Send INR reminders to:       Comments:   Anna Marie      Anticoagulation Care Providers     Provider Role Specialty Phone number    Jesus Craft M.D. Referring Family Medicine [phone]    [facility] Anticoagulation Services Responsible  [phone]    Zafar Eubanks, PharmD Responsible          Anticoagulation Patient Findings        Spoke with patient by phone. Patient is therapeutic. Denies any medication or diet changes. No current symptoms of bleeding or thrombosis reported. Continue current regimen. Follow up in 1 weeks.        Changes to current medical/health status since last appt: none.   Denies signs/symptoms of bleeding and/or thrombosis since the last appt.   Denies any interval changes to diet  Denies any interval changes to medications since last appt.   Denies any complications or cost restrictions with current therapy  Follow up appointment in 1 week(s).    Hold dose tonight then follow the above dose test in 1 week.      The patient is on a high risk medication and is supra-therapeudic. This increases the patients risk of bleeding and therefore requires frequent monitoring and follow up.          CHEST guidelines recommend frequent INR monitoring at regular intervals (a few days up to a max of 12 weeks) to ensure they are on the proper dose of  warfarin and not having any complications from therapy.  INRs can dramatically change over a short time period due to diet, medications, and medical conditions.     The patient instructed to go to the ER for falls with a head injury,  blood in urine or stool or any bleeding that last longer than 20 min.      BETO Cabrera.

## 2019-12-11 ENCOUNTER — OFFICE VISIT (OUTPATIENT)
Dept: MEDICAL GROUP | Facility: PHYSICIAN GROUP | Age: 79
End: 2019-12-11
Payer: MEDICARE

## 2019-12-11 VITALS
RESPIRATION RATE: 18 BRPM | BODY MASS INDEX: 38.65 KG/M2 | OXYGEN SATURATION: 100 % | SYSTOLIC BLOOD PRESSURE: 122 MMHG | HEIGHT: 70 IN | DIASTOLIC BLOOD PRESSURE: 62 MMHG | WEIGHT: 270 LBS | TEMPERATURE: 98.2 F | HEART RATE: 85 BPM

## 2019-12-11 DIAGNOSIS — K59.00 CONSTIPATION, UNSPECIFIED CONSTIPATION TYPE: ICD-10-CM

## 2019-12-11 DIAGNOSIS — R60.0 LOCALIZED EDEMA: ICD-10-CM

## 2019-12-11 DIAGNOSIS — I10 ESSENTIAL HYPERTENSION, BENIGN: ICD-10-CM

## 2019-12-11 DIAGNOSIS — R06.02 SHORTNESS OF BREATH: ICD-10-CM

## 2019-12-11 DIAGNOSIS — E11.9 TYPE 2 DIABETES MELLITUS WITHOUT COMPLICATION, WITHOUT LONG-TERM CURRENT USE OF INSULIN (HCC): ICD-10-CM

## 2019-12-11 DIAGNOSIS — Z86.711 HISTORY OF PULMONARY EMBOLUS (PE): ICD-10-CM

## 2019-12-11 PROCEDURE — 99214 OFFICE O/P EST MOD 30 MIN: CPT | Performed by: FAMILY MEDICINE

## 2019-12-11 RX ORDER — POLYETHYLENE GLYCOL 3350 17 G/17G
17 POWDER, FOR SOLUTION ORAL DAILY
Qty: 30 EACH | Refills: 3
Start: 2019-12-11

## 2019-12-12 ENCOUNTER — HOSPITAL ENCOUNTER (OUTPATIENT)
Dept: LAB | Facility: MEDICAL CENTER | Age: 79
End: 2019-12-12
Attending: FAMILY MEDICINE
Payer: MEDICARE

## 2019-12-12 DIAGNOSIS — E11.9 TYPE 2 DIABETES MELLITUS WITHOUT COMPLICATION, WITHOUT LONG-TERM CURRENT USE OF INSULIN (HCC): ICD-10-CM

## 2019-12-12 DIAGNOSIS — R60.0 LOCALIZED EDEMA: ICD-10-CM

## 2019-12-12 DIAGNOSIS — I10 ESSENTIAL HYPERTENSION, BENIGN: ICD-10-CM

## 2019-12-12 PROCEDURE — 80061 LIPID PANEL: CPT

## 2019-12-12 PROCEDURE — 36415 COLL VENOUS BLD VENIPUNCTURE: CPT

## 2019-12-12 PROCEDURE — 80053 COMPREHEN METABOLIC PANEL: CPT

## 2019-12-12 NOTE — PROGRESS NOTES
Patient comes back for follow-up of his diabetes.  He is tolerating Actos 30 mg p.o. daily without any problems.  He says his blood sugar was 104 this morning.  He denies dizziness or sweating.  He states his shortness of breath is improved somewhat with the furosemide 20 mg p.o. twice daily.  He complains of constipation and wonders what he can take.  The swelling in his legs has gotten better.    I reviewed the following    Past Medical History:   Diagnosis Date   • Arrhythmia     unsure of time. thinks he was in hospital    • Arthritis    • Arthropathy, unspecified, site unspecified    • At risk for sleep apnea    • Back pain age 30    lower back pain   • Blood clotting disorder ([facility]) current 2017    DVT and PE   • BPH (benign prostatic hyperplasia)    • Breath shortness    • Bronchitis 12/2014   • Cancer ([facility])     squamous cell carinoma left eye    • Cancer ([facility]) 6/06    lower lip skin cancer--   • Cataract     cataract and glacoma   • Chronic congestive heart failure ([facility]) 6/6/2017    pt denies   • Disorders of bursae and tendons in shoulder region, unspecified    • Esophageal stricture 07/2007   • Essential hypertension, benign    • Foot fracture 12/07   • Gastric ulcer 07/2007   • GERD (gastroesophageal reflux disease)    • Glaucoma    • Heart burn    • Hypertension    • Hypertrophy of prostate without urinary obstruction and other lower urinary tract symptoms (LUTS)    • Indigestion    • Pain 2/13/12    2/10 ankles, lower back   • Personal history of venous thrombosis and embolism 11/2008    post knee replacement -took coumadin then   • Pneumonia     2007   • Pneumonia 2004   • Reflux esophagitis    • Rosacea    • Skin cancer    • Sleep apnea     cpap    • Snoring    • Unspecified cataract     ONE REMOVED   • Unspecified glaucoma(365.9)    • Urinary incontinence     occasional incontinence         Past Surgical History:   Procedure Laterality Date   • ORBITOTOMY Left 3/21/2018    Procedure:  ORBITOTOMY- ORBITAL EXENTERTATION WITH PLACEMENT OF SKIN GRAFT INTO LEFT SOCKET;  Surgeon: Artem Garcia M.D.;  Location: SURGERY SAME DAY [facility];  Service: Ophthalmology   • EYE ENUCLEATION Left 03/21/2018    Also with orbitotomy.  Done by Dr. Artem Garcia and Dr. Frederick Hagen   • EXPLORATORY LAPAROTOMY  6/21/2017    Procedure: EXPLORATORY LAPAROTOMY;  Surgeon: Wilfred Amin M.D.;  Location: SURGERY [facility];  Service:    • RECOVERY  6/29/2016    Procedure: VASCULAR CASE-ARTHUR-INFERIOR VENA CAVA FILTER PLACEMENT 37191-DEEP VEIN THROMBOSIS I82.401;  Surgeon: Recoveryonly Surgery;  Location: SURGERY PRE-POST PROC UNIT [facility];  Service:    • FLAP CLOSURE  4/1/2015    Performed by Artem Garcia M.D. at SURGERY [facility]   • EYE LESION EXCISION  2/11/2015    Performed by Artem Garcia M.D. at [facility]   • CARPAL TUNNEL RELEASE  9/2012    left side   • FULL THICKNESS SKIN GRAFT  4/3/2012    Performed by JAMESON PATEL at SURGERY SAME DAY [facility]   • BLEPHAROPLASTY  3/6/2012    Performed by JAMESON PATEL at SURGERY SAME DAY [facility]   • FULL THICKNESS SKIN GRAFT  3/6/2012    Performed by JAMESON PATEL at SURGERY SAME DAY [facility]   • ATHROPLASTY  11/18/08    Bilat. TKRs   • KNEE ARTHROPLASTY TOTAL  11/18/08    Performed by MICHEAL WINSTON at SURGERY [facility]   • OPEN REDUCTION  12/07    left foot   • FOOT SURGERY  12/2007    left foot    • OTHER      IVC filter 2016   • OTHER      skin cancer removed   • OTHER ORTHOPEDIC SURGERY  greater than 10 years ago    bilat knee replacement with hardware   • OTHER ORTHOPEDIC SURGERY  greater than 10 years    left ankle / foot repair complted 2 years before knee surgery       Allergies   Allergen Reactions   • Lisinopril      Cough         Current Outpatient Medications   Medication Sig Dispense Refill   • polyethylene glycol/lytes (MIRALAX) Pack Take 1 Packet by mouth every day. Take with 8 oz of water 30 Each 3    • ipratropium-albuterol (COMBIVENT RESPIMAT)  MCG/ACT Aero Soln Inhale 1 Puff by mouth 4 times a day as needed (SOB). 1 Inhaler 0   • vitamin D, Ergocalciferol, (DRISDOL) 28794 units Cap capsule Take 1 Cap by mouth every 7 days. 12 Cap 3   • pioglitazone (ACTOS) 30 MG Tab Take 1 Tab by mouth every day. 30 Tab 3   • furosemide (LASIX) 20 MG Tab Take 1 Tab by mouth 2 times a day. 60 Tab 3   • warfarin (COUMADIN) 1 MG Tab TAKE 1 TABLET BY MOUTH  EVERY OTHER DAY ALTERNATING WITH 2 TABLETS EVERY OTHER   Tab 3   • Ascorbic Acid (VITAMIN C) 1000 MG Tab Take  by mouth.     • isosorbide dinitrate (ISORDIL) 10 MG Tab TAKE 1 TABLET BY MOUTH 3  TIMES A  Tab 3   • losartan (COZAAR) 50 MG Tab TAKE 1 TABLET BY MOUTH  EVERY DAY 90 Tab 3   • carvedilol (COREG) 3.125 MG Tab TAKE 1 TABLET BY MOUTH TWO  TIMES DAILY WITH MEALS 180 Tab 3   • tamsulosin (FLOMAX) 0.4 MG capsule TAKE 1 CAPSULE BY MOUTH  EVERY DAY 90 Cap 3   • finasteride (PROSCAR) 5 MG Tab TAKE 1 TABLET BY MOUTH  EVERY DAY 90 Tab 3   • amLODIPine (NORVASC) 5 MG Tab TAKE 1 TABLET BY MOUTH  EVERY DAY 90 Tab 3   • NEXIUM 20 MG Pack MIX CONTENTS OF ONE 20MG  PACKET WITH 1 TABLESPOON  (15ML) OF WATER AND WAIT  2-3 MINUTES THEN DRINK  EVERY MORNING 90 Each 3   • warfarin (COUMADIN) 3 MG Tab Take as advised by anticoagulation service 100 Tab 3   • SENNA PO Take  by mouth.     • bimatoprost (LUMIGAN) 0.01 % Solution Place 1 Drop in both eyes every bedtime.     • Blood Glucose Test Strips Test strips order: Test strips for One Touch Verio meter. Sig: use QD and prn ssx high or low sugar #100  Strip 3   • Blood Glucose Monitoring Suppl Device Meter: Dispense Device of Insurance Preference (OneTouch Verio meter). Sig. QD Use as directed for blood sugar monitoring. #1. NR. 1 Device 0   • Lancets Lancets order: Lancets for One Touch Verio meter. Sig: use QD and prn ssx high or low sugar. #100 100 Each 3   • atorvastatin (LIPITOR) 20 MG Tab TAKE 1 TABLET BY  MOUTH  EVERY DAY 90 Tab 3   • warfarin (COUMADIN) 1 MG Tab Take as advised by anticoagulation service (Patient not taking: Reported on 11/20/2019) 135 Tab 3   • sodium benzoate Powder Take 1 g by mouth Once.       No current facility-administered medications for this visit.         History reviewed. No pertinent family history.    Social History     Socioeconomic History   • Marital status:      Spouse name: Not on file   • Number of children: Not on file   • Years of education: Not on file   • Highest education level: Not on file   Occupational History   • Not on file   Social Needs   • Financial resource strain: Not on file   • Food insecurity:     Worry: Not on file     Inability: Not on file   • Transportation needs:     Medical: Not on file     Non-medical: Not on file   Tobacco Use   • Smoking status: Never Smoker   • Smokeless tobacco: Former User     Types: Chew   Substance and Sexual Activity   • Alcohol use: No     Alcohol/week: 0.0 - 0.6 oz     Comment: ocassionally   • Drug use: No   • Sexual activity: Never   Lifestyle   • Physical activity:     Days per week: Not on file     Minutes per session: Not on file   • Stress: Not on file   Relationships   • Social connections:     Talks on phone: Not on file     Gets together: Not on file     Attends Mu-ism service: Not on file     Active member of club or organization: Not on file     Attends meetings of clubs or organizations: Not on file     Relationship status: Not on file   • Intimate partner violence:     Fear of current or ex partner: Not on file     Emotionally abused: Not on file     Physically abused: Not on file     Forced sexual activity: Not on file   Other Topics Concern   • Not on file   Social History Narrative    ** Merged History Encounter **         ** Merged History Encounter **           Physical Exam   Constitutional: He is oriented. He appears well-developed and obese. No distress.   HENT:   Head: Normocephalic and atraumatic.    Right Ear: External ear normal. Ear canal and TM normal   Left Ear: External ear normal. Ear canal and TM normal   Nose: Nose normal.   Mouth/Throat: Oropharynx is clear and moist.   Eyes: Conjunctivae and extraocular motions are normal. Pupils are equal, round, and reactive to light.        Fundi benign bilaterally   Neck: No thyromegaly present.   Cardiovascular: Normal rate, regular rhythm, normal heart sounds and intact distal pulses.  Exam reveals no gallop.    No murmur heard.  Pulmonary/Chest: Effort normal and breath sounds normal. No respiratory distress. He has no wheezes. He has no rales.   Abdominal: Soft. Bowel sounds are normal. No hepatosplenomegaly. He exhibits no distension. No tenderness. He has no rebound and no guarding.   Musculoskeletal: Normal range of motion. He exhibits 1+ bilateral ankle edema and no tenderness.  Negative Homans sign bilaterally  Lymphadenopathy:     He has no cervical adenopathy.  No supraclavicular adenopathy.   Neurological: He is alert and oriented. He has normal reflexes.        Babinskis downgoing bilaterally   Skin: Skin is warm and dry. No rash noted. No erythema.   Psychiatric: He has a normal mood and appropriate affect. His behavior is normal. Judgment and thought content normal.     1. History of pulmonary embolus (PE)   patient remains on anticoagulation   2. Constipation, unspecified constipation type  polyethylene glycol/lytes (MIRALAX) Pack--1 scoop in 8 ounces of water daily over-the-counter   3. Type 2 diabetes mellitus without complication, without long-term current use of insulin (HCC) --needs reassessment but it sounds like we are getting better control of his diabetes Comp Metabolic Panel    Lipid Profile   4. Essential hypertension, benign--controlled Comp Metabolic Panel    Lipid Profile   5. Localized edema --improving Comp Metabolic Panel   6. Shortness of breath --improving ipratropium-albuterol (COMBIVENT RESPIMAT)  MCG/ACT Aero  Soln--refill     Please note that this dictation was created using voice recognition software. I have worked with consultants from the vendor as well as technical experts from [facility] to optimize the interface. I have made every reasonable attempt to correct obvious errors, but I expect that there are errors of grammar and possibly content that I did not discover before finalizing the note.    Recheck 6 weeks or as needed

## 2019-12-13 LAB
ALBUMIN SERPL BCP-MCNC: 3.7 G/DL (ref 3.2–4.9)
ALBUMIN/GLOB SERPL: 1.3 G/DL
ALP SERPL-CCNC: 164 U/L (ref 30–99)
ALT SERPL-CCNC: 17 U/L (ref 2–50)
ANION GAP SERPL CALC-SCNC: 11 MMOL/L (ref 0–11.9)
AST SERPL-CCNC: 14 U/L (ref 12–45)
BILIRUB SERPL-MCNC: 0.7 MG/DL (ref 0.1–1.5)
BUN SERPL-MCNC: 32 MG/DL (ref 8–22)
CALCIUM SERPL-MCNC: 9.3 MG/DL (ref 8.5–10.5)
CHLORIDE SERPL-SCNC: 108 MMOL/L (ref 96–112)
CHOLEST SERPL-MCNC: 95 MG/DL (ref 100–199)
CO2 SERPL-SCNC: 22 MMOL/L (ref 20–33)
CREAT SERPL-MCNC: 1.55 MG/DL (ref 0.5–1.4)
FASTING STATUS PATIENT QL REPORTED: NORMAL
GLOBULIN SER CALC-MCNC: 2.8 G/DL (ref 1.9–3.5)
GLUCOSE SERPL-MCNC: 118 MG/DL (ref 65–99)
HDLC SERPL-MCNC: 34 MG/DL
LDLC SERPL CALC-MCNC: 39 MG/DL
POTASSIUM SERPL-SCNC: 4.7 MMOL/L (ref 3.6–5.5)
PROT SERPL-MCNC: 6.5 G/DL (ref 6–8.2)
SODIUM SERPL-SCNC: 141 MMOL/L (ref 135–145)
TRIGL SERPL-MCNC: 109 MG/DL (ref 0–149)

## 2019-12-16 ENCOUNTER — ANTICOAGULATION MONITORING (OUTPATIENT)
Dept: VASCULAR LAB | Facility: MEDICAL CENTER | Age: 79
End: 2019-12-16

## 2019-12-16 DIAGNOSIS — Z79.01 CHRONIC ANTICOAGULATION: ICD-10-CM

## 2019-12-16 LAB — INR PPP: 4 (ref 2–3.5)

## 2019-12-17 NOTE — PROGRESS NOTES
Anticoagulation Summary  As of 2019    INR goal:   2.0-3.0   TTR:   46.1 % (1 y)   INR used for dosin.00! (2019)   Warfarin maintenance plan:   1 mg (1 mg x 1) every Mon, Fri; 2 mg (1 mg x 2) all other days   Weekly warfarin total:   12 mg   Plan last modified:   NIA Cabrera (2019)   Next INR check:   2019   Target end date:       Indications    Chronic anticoagulation [Z79.01]  Chronic deep vein thrombosis (DVT) of popliteal vein (HCC) (Resolved) [I82.539]  Pulmonary embolism on right (HCC) (Resolved) [I26.99]             Anticoagulation Episode Summary     INR check location:       Preferred lab:       Send INR reminders to:       Comments:   Anna Marie      Anticoagulation Care Providers     Provider Role Specialty Phone number    Jesus Craft M.D. Referring Family Medicine [phone]    [facility] Anticoagulation Services Responsible  [phone]    Jeannie FitzpatrickD Responsible          Anticoagulation Patient Findings      INR  supra-therapeutic.   Left a voice message for the patient, asked patient to please call the anticoagulation clinic if they have any signs/symptoms of bleeding and/or thrombosis or any changes to diet or medications.    Follow up appointment in 1 week(s)    Hold tonight then continue weekly warfarin dose as noted      Vinicio Schaeffer, PharmD, MS, BCACP, C    This note was created using voice recognition software (Dragon). The accuracy of the dictation is limited by the abilities of the software. I have reviewed the note prior to signing, however some errors in grammar and context are still possible. If you have any questions related to this note please do not hesitate to contact our office.

## 2019-12-20 ENCOUNTER — HOSPITAL ENCOUNTER (OUTPATIENT)
Dept: RADIOLOGY | Facility: MEDICAL CENTER | Age: 79
End: 2019-12-20

## 2019-12-24 ENCOUNTER — OFFICE VISIT (OUTPATIENT)
Dept: URGENT CARE | Facility: PHYSICIAN GROUP | Age: 79
End: 2019-12-24
Payer: MEDICARE

## 2019-12-24 VITALS
DIASTOLIC BLOOD PRESSURE: 72 MMHG | SYSTOLIC BLOOD PRESSURE: 124 MMHG | TEMPERATURE: 97.6 F | BODY MASS INDEX: 37.22 KG/M2 | HEART RATE: 64 BPM | WEIGHT: 260 LBS | RESPIRATION RATE: 20 BRPM | HEIGHT: 70 IN | OXYGEN SATURATION: 96 %

## 2019-12-24 DIAGNOSIS — R31.0 GROSS HEMATURIA: ICD-10-CM

## 2019-12-24 LAB
APPEARANCE UR: NORMAL
BILIRUB UR STRIP-MCNC: NORMAL MG/DL
COLOR UR AUTO: NORMAL
GLUCOSE UR STRIP.AUTO-MCNC: NORMAL MG/DL
KETONES UR STRIP.AUTO-MCNC: NORMAL MG/DL
LEUKOCYTE ESTERASE UR QL STRIP.AUTO: NORMAL
NITRITE UR QL STRIP.AUTO: NORMAL
PH UR STRIP.AUTO: 5 [PH] (ref 5–8)
PROT UR QL STRIP: 300 MG/DL
RBC UR QL AUTO: NORMAL
SP GR UR STRIP.AUTO: 1.02
UROBILINOGEN UR STRIP-MCNC: 1 MG/DL

## 2019-12-24 PROCEDURE — 81002 URINALYSIS NONAUTO W/O SCOPE: CPT | Performed by: NURSE PRACTITIONER

## 2019-12-24 PROCEDURE — 99214 OFFICE O/P EST MOD 30 MIN: CPT | Performed by: NURSE PRACTITIONER

## 2019-12-24 ASSESSMENT — ENCOUNTER SYMPTOMS
ABDOMINAL PAIN: 0
NAUSEA: 0
DIZZINESS: 0
FLANK PAIN: 0
CHILLS: 0
HEARTBURN: 0
FEVER: 0

## 2019-12-24 NOTE — PROGRESS NOTES
Subjective:      Kiran Eason is a 79 y.o. male who presents with Painful Urination (blood in urine, started last night, dysuria a little )            UTI   This is a new problem. The current episode started yesterday. The problem occurs intermittently. The problem has been gradually worsening. Pertinent negatives include no abdominal pain, chills, fever or nausea. Associated symptoms comments: Patient woke last night and noticed his urine was red. Has progressed to painful urination today, Denies, Nausea, vomiting, fever, chills or flank pain. . Nothing aggravates the symptoms. He has tried nothing for the symptoms.       Review of Systems   Constitutional: Negative for chills and fever.   Gastrointestinal: Negative for abdominal pain, heartburn and nausea.   Genitourinary: Positive for dysuria and hematuria. Negative for flank pain, frequency and urgency.   Neurological: Negative for dizziness.       Past Medical History:   Diagnosis Date   • Arrhythmia     unsure of time. thinks he was in hospital    • Arthritis    • Arthropathy, unspecified, site unspecified    • At risk for sleep apnea    • Back pain age 30    lower back pain   • Blood clotting disorder (HCC) current 2017    DVT and PE   • BPH (benign prostatic hyperplasia)    • Breath shortness    • Bronchitis 12/2014   • Cancer ([facility])     squamous cell carinoma left eye    • Cancer ([facility]) 6/06    lower lip skin cancer--   • Cataract     cataract and glacoma   • Chronic congestive heart failure (HCC) 6/6/2017    pt denies   • Disorders of bursae and tendons in shoulder region, unspecified    • Esophageal stricture 07/2007   • Essential hypertension, benign    • Foot fracture 12/07   • Gastric ulcer 07/2007   • GERD (gastroesophageal reflux disease)    • Glaucoma    • Heart burn    • Hypertension    • Hypertrophy of prostate without urinary obstruction and other lower urinary tract symptoms (LUTS)    • Indigestion    • Pain 2/13/12    2/10 ankles,  lower back   • Personal history of venous thrombosis and embolism 11/2008    post knee replacement -took coumadin then   • Pneumonia     2007   • Pneumonia 2004   • Reflux esophagitis    • Rosacea    • Skin cancer    • Sleep apnea     cpap    • Snoring    • Unspecified cataract     ONE REMOVED   • Unspecified glaucoma(365.9)    • Urinary incontinence     occasional incontinence       Past Surgical History:   Procedure Laterality Date   • ORBITOTOMY Left 3/21/2018    Procedure: ORBITOTOMY- ORBITAL EXENTERTATION WITH PLACEMENT OF SKIN GRAFT INTO LEFT SOCKET;  Surgeon: Artem Garcia M.D.;  Location: SURGERY SAME DAY [facility];  Service: Ophthalmology   • EYE ENUCLEATION Left 03/21/2018    Also with orbitotomy.  Done by Dr. Artem Garcia and Dr. Frederick Hagen   • EXPLORATORY LAPAROTOMY  6/21/2017    Procedure: EXPLORATORY LAPAROTOMY;  Surgeon: Wilfred Amin M.D.;  Location: SURGERY [facility];  Service:    • RECOVERY  6/29/2016    Procedure: VASCULAR CASE-ARTHUR-INFERIOR VENA CAVA FILTER PLACEMENT 37191-DEEP VEIN THROMBOSIS I82.401;  Surgeon: Recoveryonly Surgery;  Location: SURGERY PRE-POST PROC UNIT [facility];  Service:    • FLAP CLOSURE  4/1/2015    Performed by Artem Garcia M.D. at SURGERY [facility]   • EYE LESION EXCISION  2/11/2015    Performed by Artem Garcia M.D. at SURGERY [facility]   • CARPAL TUNNEL RELEASE  9/2012    left side   • FULL THICKNESS SKIN GRAFT  4/3/2012    Performed by JAMESON PATEL at SURGERY SAME DAY [facility]   • BLEPHAROPLASTY  3/6/2012    Performed by JAMESON PATEL at SURGERY SAME DAY [facility]   • FULL THICKNESS SKIN GRAFT  3/6/2012    Performed by JAMESON PATEL at SURGERY SAME DAY [facility]   • ATHROPLASTY  11/18/08    Bilat. TKRs   • KNEE ARTHROPLASTY TOTAL  11/18/08    Performed by MICHEAL WINSTON at SURGERY [facility]   • OPEN REDUCTION  12/07    left foot   • FOOT SURGERY  12/2007    left foot    • OTHER      IVC filter 2016   • OTHER       "skin cancer removed   • OTHER ORTHOPEDIC SURGERY  greater than 10 years ago    bilat knee replacement with hardware   • OTHER ORTHOPEDIC SURGERY  greater than 10 years    left ankle / foot repair complted 2 years before knee surgery      Social History     Socioeconomic History   • Marital status:      Spouse name: Not on file   • Number of children: Not on file   • Years of education: Not on file   • Highest education level: Not on file   Occupational History   • Not on file   Social Needs   • Financial resource strain: Not on file   • Food insecurity:     Worry: Not on file     Inability: Not on file   • Transportation needs:     Medical: Not on file     Non-medical: Not on file   Tobacco Use   • Smoking status: Never Smoker   • Smokeless tobacco: Former User     Types: Chew   Substance and Sexual Activity   • Alcohol use: No     Alcohol/week: 0.0 - 0.6 oz     Comment: ocassionally   • Drug use: No   • Sexual activity: Never   Lifestyle   • Physical activity:     Days per week: Not on file     Minutes per session: Not on file   • Stress: Not on file   Relationships   • Social connections:     Talks on phone: Not on file     Gets together: Not on file     Attends Muslim service: Not on file     Active member of club or organization: Not on file     Attends meetings of clubs or organizations: Not on file     Relationship status: Not on file   • Intimate partner violence:     Fear of current or ex partner: Not on file     Emotionally abused: Not on file     Physically abused: Not on file     Forced sexual activity: Not on file   Other Topics Concern   • Not on file   Social History Narrative    ** Merged History Encounter **         ** Merged History Encounter **         Lisinopril       Objective:     /72 (BP Location: Right arm, Patient Position: Sitting, BP Cuff Size: Large adult)   Pulse 64   Temp 36.4 °C (97.6 °F) (Temporal)   Resp 20   Ht 1.778 m (5' 10\")   Wt 117.9 kg (260 lb)   SpO2 96%  "  BMI 37.31 kg/m²      Physical Exam  Vitals signs reviewed.   Constitutional:       Appearance: Normal appearance.   Cardiovascular:      Rate and Rhythm: Normal rate and regular rhythm.      Heart sounds: Normal heart sounds.   Pulmonary:      Effort: Pulmonary effort is normal.      Breath sounds: Normal breath sounds.   Abdominal:      General: Abdomen is flat. Bowel sounds are normal.      Palpations: Abdomen is soft.      Tenderness: There is no tenderness. There is no right CVA tenderness or left CVA tenderness.   Skin:     General: Skin is warm.   Neurological:      Mental Status: He is alert and oriented to person, place, and time.   Psychiatric:         Mood and Affect: Mood normal.         Behavior: Behavior normal.         Thought Content: Thought content normal.         Judgment: Judgment normal.       Lab Results   Component Value Date/Time    POCCOLOR RED 12/24/2019 11:09 AM    POCAPPEAR CLOUDY 12/24/2019 11:09 AM    POCLEUKEST MODERATE 12/24/2019 11:09 AM    POCNITRITE NEG 12/24/2019 11:09 AM    POCUROBILIGE 1 12/24/2019 11:09 AM    POCPROTEIN 300 12/24/2019 11:09 AM    POCURPH 5.0 12/24/2019 11:09 AM    POCBLOOD LARGE 12/24/2019 11:09 AM    POCSPGRV 1.025 12/24/2019 11:09 AM    POCKETONES SMALL 12/24/2019 11:09 AM    POCBILIRUBIN MODERATE 12/24/2019 11:09 AM    POCGLUCUA NEG 12/24/2019 11:09 AM                  Assessment/Plan:     1. Gross hematuria    Discussed patient physical examination findings as well as urinalysis findings that included large blood, small ketones, moderate bilirubin, and large 300+ protein with patient.  Due to the fact the patient is on Coumadin suggested patient follow-up for higher level of care to establish reasoning for bleeding and possible urological evaluation as well as underlying possible kidney dysfunction.  Also discussed that patient does indeed have white blood cells and without underlying labs it would be prudent to prescribe antibiotic due to unknown kidney  function at this time.  Patient expressed understanding and agrees to plan and states that he will report to [facility] for further work-up and evaluation in the emergency department today    Supportive care, differential diagnoses, and indications for immediate follow-up discussed with patient.    Pathogenesis of diagnosis discussed including typical length and natural progression. Patient expresses understanding and agrees to plan.       Please note that this dictation was created using voice recognition software. I have made every reasonable attempt to correct obvious errors, but I expect that there are errors of grammar and possibly content that I did not discover before finalizing the note.

## 2020-01-02 ENCOUNTER — ANTICOAGULATION MONITORING (OUTPATIENT)
Dept: VASCULAR LAB | Facility: MEDICAL CENTER | Age: 80
End: 2020-01-02

## 2020-01-02 DIAGNOSIS — Z79.01 CHRONIC ANTICOAGULATION: ICD-10-CM

## 2020-01-02 LAB — INR PPP: 2.4 (ref 2–3.5)

## 2020-01-02 NOTE — PROGRESS NOTES
Anticoagulation Summary  As of 2020    INR goal:   2.0-3.0   TTR:   45.7 % (1.1 y)   INR used for dosin.40 (2020)   Warfarin maintenance plan:   1 mg (1 mg x 1) every Mon, Fri; 2 mg (1 mg x 2) all other days   Weekly warfarin total:   12 mg   Plan last modified:   NIA Cabrera (2019)   Next INR check:      Target end date:       Indications    Chronic anticoagulation [Z79.01]  Chronic deep vein thrombosis (DVT) of popliteal vein (HCC) (Resolved) [I82.539]  Pulmonary embolism on right (HCC) (Resolved) [I26.99]             Anticoagulation Episode Summary     INR check location:       Preferred lab:       Send INR reminders to:       Comments:   Alere      Anticoagulation Care Providers     Provider Role Specialty Phone number    Jesus Craft M.D. Referring Family Medicine [phone]    [facility] Anticoagulation Services Responsible  [phone]    Zafar Eubanks, PharmD Responsible            A/P     Left VM with patient for therapeutic INR. Counseled pt to continue current regimen with next INR in 2 weeks.     Jeffrey Hernandez, Pharmacy Intern

## 2020-01-08 RX ORDER — ATORVASTATIN CALCIUM 20 MG/1
TABLET, FILM COATED ORAL
Qty: 90 TAB | Refills: 1 | Status: SHIPPED | OUTPATIENT
Start: 2020-01-08

## 2020-01-20 ENCOUNTER — HOSPITAL ENCOUNTER (OUTPATIENT)
Dept: LAB | Facility: MEDICAL CENTER | Age: 80
End: 2020-01-20
Attending: PHYSICIAN ASSISTANT
Payer: MEDICARE

## 2020-01-20 LAB — BUN SERPL-MCNC: 36 MG/DL (ref 8–22)

## 2020-01-20 PROCEDURE — 84520 ASSAY OF UREA NITROGEN: CPT

## 2020-01-20 PROCEDURE — 36415 COLL VENOUS BLD VENIPUNCTURE: CPT

## 2020-01-30 ENCOUNTER — TELEPHONE (OUTPATIENT)
Dept: VASCULAR LAB | Facility: MEDICAL CENTER | Age: 80
End: 2020-01-30

## 2020-02-06 PROBLEM — C66.1: Status: ACTIVE | Noted: 2020-02-06

## 2020-02-12 ENCOUNTER — HOSPITAL ENCOUNTER (OUTPATIENT)
Dept: HOSPITAL 8 - OUT | Age: 80
Discharge: HOME | End: 2020-02-12
Attending: PHYSICIAN ASSISTANT
Payer: MEDICARE

## 2020-02-12 VITALS — HEIGHT: 71 IN | WEIGHT: 256.18 LBS | BODY MASS INDEX: 35.86 KG/M2

## 2020-02-12 VITALS — SYSTOLIC BLOOD PRESSURE: 118 MMHG | DIASTOLIC BLOOD PRESSURE: 62 MMHG

## 2020-02-12 DIAGNOSIS — N13.30: ICD-10-CM

## 2020-02-12 DIAGNOSIS — Z79.899: ICD-10-CM

## 2020-02-12 DIAGNOSIS — I11.0: ICD-10-CM

## 2020-02-12 DIAGNOSIS — Z79.01: ICD-10-CM

## 2020-02-12 DIAGNOSIS — I50.9: ICD-10-CM

## 2020-02-12 DIAGNOSIS — E11.9: ICD-10-CM

## 2020-02-12 DIAGNOSIS — C66.1: Primary | ICD-10-CM

## 2020-02-12 PROCEDURE — 99156 MOD SED OTH PHYS/QHP 5/>YRS: CPT

## 2020-02-12 PROCEDURE — 99157 MOD SED OTHER PHYS/QHP EA: CPT

## 2020-02-12 PROCEDURE — 50432 PLMT NEPHROSTOMY CATHETER: CPT

## 2020-02-12 PROCEDURE — C1729 CATH, DRAINAGE: HCPCS

## 2020-02-18 ENCOUNTER — HOSPITAL ENCOUNTER (OUTPATIENT)
Dept: LAB | Facility: MEDICAL CENTER | Age: 80
End: 2020-02-18
Attending: INTERNAL MEDICINE
Payer: MEDICARE

## 2020-02-18 LAB
ALBUMIN SERPL BCP-MCNC: 3.3 G/DL (ref 3.2–4.9)
ALBUMIN/GLOB SERPL: 0.9 G/DL
ALP SERPL-CCNC: 423 U/L (ref 30–99)
ALT SERPL-CCNC: 41 U/L (ref 2–50)
ANION GAP SERPL CALC-SCNC: 16 MMOL/L (ref 0–11.9)
AST SERPL-CCNC: 54 U/L (ref 12–45)
BILIRUB SERPL-MCNC: 0.4 MG/DL (ref 0.1–1.5)
BUN SERPL-MCNC: 37 MG/DL (ref 8–22)
CALCIUM SERPL-MCNC: 8.8 MG/DL (ref 8.5–10.5)
CHLORIDE SERPL-SCNC: 104 MMOL/L (ref 96–112)
CO2 SERPL-SCNC: 16 MMOL/L (ref 20–33)
CREAT SERPL-MCNC: 1.7 MG/DL (ref 0.5–1.4)
FASTING STATUS PATIENT QL REPORTED: NORMAL
GLOBULIN SER CALC-MCNC: 3.7 G/DL (ref 1.9–3.5)
GLUCOSE SERPL-MCNC: 151 MG/DL (ref 65–99)
POTASSIUM SERPL-SCNC: 4.8 MMOL/L (ref 3.6–5.5)
PROT SERPL-MCNC: 7 G/DL (ref 6–8.2)
SODIUM SERPL-SCNC: 136 MMOL/L (ref 135–145)
TSH SERPL DL<=0.005 MIU/L-ACNC: 3.77 UIU/ML (ref 0.38–5.33)

## 2020-02-18 PROCEDURE — 84443 ASSAY THYROID STIM HORMONE: CPT | Mod: GZ

## 2020-02-18 PROCEDURE — 36415 COLL VENOUS BLD VENIPUNCTURE: CPT | Mod: GZ

## 2020-02-18 PROCEDURE — 80053 COMPREHEN METABOLIC PANEL: CPT

## 2020-02-25 ENCOUNTER — TELEPHONE (OUTPATIENT)
Dept: MEDICAL GROUP | Facility: PHYSICIAN GROUP | Age: 80
End: 2020-02-25

## 2020-02-25 DIAGNOSIS — Z86.711 HISTORY OF PULMONARY EMBOLUS (PE): ICD-10-CM

## 2020-02-25 DIAGNOSIS — C66.1 CANCER OF URETER, RIGHT (HCC): ICD-10-CM

## 2020-02-25 DIAGNOSIS — I25.10 CORONARY ARTERY DISEASE INVOLVING NATIVE CORONARY ARTERY OF NATIVE HEART WITHOUT ANGINA PECTORIS: ICD-10-CM

## 2020-02-25 DIAGNOSIS — I50.42 CHRONIC COMBINED SYSTOLIC AND DIASTOLIC CONGESTIVE HEART FAILURE (HCC): ICD-10-CM

## 2020-02-25 NOTE — TELEPHONE ENCOUNTER
I returned the patient's wife's call.  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  The patient and his wife request a home health referral.  I would be glad to refer him to [facility] for evaluation and help with his situation.  When he was hospitalized recently he was switched from Coumadin to Eliquis 2.5 mg p.o. twice daily.  The patient's wife is only able to afford to get this once a month so she asked me to send in monthly refills to [facility] for them.  I have sent Eliquis 2.5 mg p.o. twice daily #60 with 11 refills to [facility].  I wish him well.    Jesus Craft MD

## 2020-02-25 NOTE — TELEPHONE ENCOUNTER
VOICEMAIL  1. Caller Name: Emma Eason (wife)                      Call Back Number: [phone]    2. Message: pt's wife has left several messages back to back wanting to talk you about her husbands stage 4 cancer. She seems very worried. She is also calling for a refill on his Eliquis, she states she can only afford a month at a time and that they are no longer on warfarin.  I do not see Eliquis on pt's chart     3. Patient approves office to leave a detailed voicemail/[employer] message: N\A

## 2020-02-26 ENCOUNTER — HOSPITAL ENCOUNTER (OUTPATIENT)
Dept: HOSPITAL 8 - RAD | Age: 80
Discharge: HOME | End: 2020-02-26
Attending: INTERNAL MEDICINE
Payer: MEDICARE

## 2020-02-26 DIAGNOSIS — I25.10: ICD-10-CM

## 2020-02-26 DIAGNOSIS — C66.1: ICD-10-CM

## 2020-02-26 DIAGNOSIS — M89.9: ICD-10-CM

## 2020-02-26 DIAGNOSIS — R91.1: ICD-10-CM

## 2020-02-26 DIAGNOSIS — M48.54XA: ICD-10-CM

## 2020-02-26 DIAGNOSIS — Z96.653: ICD-10-CM

## 2020-02-26 DIAGNOSIS — C79.51: Primary | ICD-10-CM

## 2020-02-26 PROCEDURE — A9503 TC99M MEDRONATE: HCPCS

## 2020-02-26 PROCEDURE — 71260 CT THORAX DX C+: CPT

## 2020-02-26 PROCEDURE — 78306 BONE IMAGING WHOLE BODY: CPT

## 2020-02-27 DIAGNOSIS — N40.1 BENIGN PROSTATIC HYPERPLASIA WITH LOWER URINARY TRACT SYMPTOMS, SYMPTOM DETAILS UNSPECIFIED: ICD-10-CM

## 2020-02-27 NOTE — TELEPHONE ENCOUNTER
Received request via: Pharmacy    Was the patient seen in the last year in this department? Yes LOV 12/11/2019     Does the patient have an active prescription (recently filled or refills available) for medication(s) requested? No

## 2020-02-28 RX ORDER — TAMSULOSIN HYDROCHLORIDE 0.4 MG/1
0.4 CAPSULE ORAL
Qty: 90 CAP | Refills: 3 | Status: SHIPPED | OUTPATIENT
Start: 2020-02-28

## 2020-03-02 ENCOUNTER — TELEPHONE (OUTPATIENT)
Dept: MEDICAL GROUP | Facility: PHYSICIAN GROUP | Age: 80
End: 2020-03-02

## 2020-03-02 NOTE — TELEPHONE ENCOUNTER
VOICEMAIL  1. Caller Name: Emma Patel                     Call Back Number: [phone]     2. Message: Wife is seeking assistance with paperwork for [facility] for pt. So she may get him taken care of.     3. Patient approves office to leave a detailed voicemail/MyChart message: N\A

## 2020-03-03 ENCOUNTER — TELEPHONE (OUTPATIENT)
Dept: MEDICAL GROUP | Facility: PHYSICIAN GROUP | Age: 80
End: 2020-03-03

## 2020-03-03 NOTE — TELEPHONE ENCOUNTER
1. Caller Name: Emma Eason                         Call Back Number: [phone]        Emma called to speak with Dr. Craft. I advised her that I would request a call back but that it would later this afternoon.  Reminded Emma to contact Oncology provider for Kiran Eason for needed referral.  Emma states she has a call with him this afternoon.

## 2020-03-03 NOTE — TELEPHONE ENCOUNTER
called pt's wife yesterday. She was offered an appointment for the next available appointment she did not want to wait for it she stated she would ask his oncologist to do the referral

## 2020-03-05 ENCOUNTER — TELEPHONE (OUTPATIENT)
Dept: MEDICAL GROUP | Facility: PHYSICIAN GROUP | Age: 80
End: 2020-03-05

## 2020-03-05 NOTE — TELEPHONE ENCOUNTER
1. Caller Name: Kiran Eason                        Call Back Number: [phone] (home)       How would the patient prefer to be contacted with a response:     Pt wife Emma wanted to advise you Pete has been admitted into the hospital and requesting a referral for home health. I advise her the hospital should take care of this upon his discharge.

## 2020-03-13 ENCOUNTER — TELEPHONE (OUTPATIENT)
Dept: VASCULAR LAB | Facility: MEDICAL CENTER | Age: 80
End: 2020-03-13

## 2020-03-13 NOTE — TELEPHONE ENCOUNTER
1X - LFT VM     recently changed from warfarin to Eliquis; needs appt for Eliquis teaching and discussion of monitoring.  I think he used to go to [facility].

## 2020-03-20 ENCOUNTER — TELEPHONE (OUTPATIENT)
Dept: VASCULAR LAB | Facility: MEDICAL CENTER | Age: 80
End: 2020-03-20

## 2020-03-20 NOTE — TELEPHONE ENCOUNTER
2X- LFT VM     Schedule anticoag follow up  Kiran was recently changed from warfarin to Eliquis; needs appt for Eliquis teaching and discussion of monitoring.  I think he used to go to [facility].

## 2020-03-24 ENCOUNTER — TELEPHONE (OUTPATIENT)
Dept: MEDICAL GROUP | Facility: PHYSICIAN GROUP | Age: 80
End: 2020-03-24

## 2020-03-24 NOTE — TELEPHONE ENCOUNTER
1. Caller Name: Emma (wife)                        Call Back Number: [phone] (home)         How would the patient prefer to be contacted with a response: Phone call OK to leave a detailed message    Emma is requesting a letter stating her  David is no longer competent to handle his finances.  David is currently at the [facility].   Please advise. Pt  Emma stated you may call if anymore question.  Please advise

## 2020-03-24 NOTE — LETTER
March 25, 2020         Patient: Kiran Eason   YOB: 1940   Date of Visit: 3/24/2020           To Whom it May Concern:    Kiran Eason is a long time patient in our clinic.  Due to his Stage IV cancer diagnosis, he is unable to handle his financial matters.      If you have any questions or concerns, please don't hesitate to call.        Sincerely,           Jesus Craft M.D.  Electronically Signed

## 2020-03-25 NOTE — TELEPHONE ENCOUNTER
1. Caller Name: Emma                        Call Back Number: [phone] (home)         How would the patient prefer to be contacted with a response:     Left a message for Emma to call back to advise if she wants the letter mailed, fax or pickup

## 2020-03-25 NOTE — TELEPHONE ENCOUNTER
VOICEMAIL  1. Caller Name: Emma                      Call Back Number: [phone] (home)     2. Message: asked to have letter mail to Her PO box on file    3. Patient approves office to leave a detailed voicemail/MyChart message: N\A

## 2020-03-25 NOTE — TELEPHONE ENCOUNTER
Sorry I can't print such a letter from home--please ask one of the other Docs to write this at [facility].  Jesus Craft M.D.

## 2021-06-16 NOTE — Clinical Note
July 6, 2017        Patient: Kiran Eason   YOB: 1940   Date of Visit: 7/6/2017           To Whom It May Concern:    It is my medical opinion that Kiran Eason has a history of congestive heart failure and needs to be on oxygen 2 L a minute at nighttime via nasal cannula. Dx Z86.79.    If you have any questions or concerns, please don't hesitate to call.        Sincerely,          Jesus Craft M.D.  Electronically Signed    [address] [id number] [phone] (Phone)  [phone] (Fax)     Melolabial Transposition Flap Text: The defect edges were debeveled with a #15 scalpel blade.  Given the location of the defect and the proximity to free margins a melolabial flap was deemed most appropriate.  Using a sterile surgical marker, an appropriate melolabial transposition flap was drawn incorporating the defect.    The area thus outlined was incised deep to adipose tissue with a #15 scalpel blade.  The skin margins were undermined to an appropriate distance in all directions utilizing iris scissors.

## 2021-09-19 NOTE — PROGRESS NOTES
Patient transferred to [facility] from SICU. Patient ambulated to bed x1 assist, respirations are unlabored and regular, midline abdominal incision with staples JOSY, no drainage, coccyx red, blanching, bottom of left foot on arch is red non blanching, bilateral feet calloused, skin otherwise is intact, belongings at the bedside. Patient denies pain. Patient states no needs at this time.   General

## 2022-08-09 NOTE — TELEPHONE ENCOUNTER
Application for anticoagulation home monitor faxed to [facility].  Zafar Eubanks, GABOD     LV-3/14  NA-8/16    MESHA PETTIT

## 2022-11-28 NOTE — PROGRESS NOTES
EKG and troponin reviewed.  Cardiology has previously seen patient during this stray.  Results reviewed with SOLIS Kwan, cardiology.  Will see patient      Dupixent Counseling: I discussed with the patient the risks of dupilumab including but not limited to eye infection and irritation, cold sores, injection site reactions, worsening of asthma, allergic reactions and increased risk of parasitic infection.  Live vaccines should be avoided while taking dupilumab. Dupilumab will also interact with certain medications such as warfarin and cyclosporine. The patient understands that monitoring is required and they must alert us or the primary physician if symptoms of infection or other concerning signs are noted.

## 2023-07-31 NOTE — TELEPHONE ENCOUNTER
1. Caller Name: Kiran Eason                                         Call Back Number: [phone] (home)         Patient approves a detailed voicemail message: N\A    2. SPECIFIC Action To Be Taken: Referral Needed Urgent if possible     3. 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 Specialty & Provider Name/Lab/Imaging Location: [facility] Dr. Leisa MCKENZIE [phone]    5. Is appointment scheduled for requested order/referral: no    Patient informed they will receive a return phone call from the office ONLY if there are any questions before processing their request. Advised to call back if they haven't received a call from the referral department in 5 days.  
Referral to Dr Leisa Craft M.D.    
Yes

## 2023-12-05 NOTE — TELEPHONE ENCOUNTER
Was the patient seen in the last year in this department? Yes     Does patient have an active prescription for medications requested? No     Received Request Via: Pharmacy  
Ambulatory

## (undated) DEVICE — ELECTRODE DUAL RETURN W/ CORD - (50/PK)

## (undated) DEVICE — SPONGE GAUZESTER 4 X 4 4PLY - (128PK/CA)

## (undated) DEVICE — LEAD SET 6 DISP. EKG NIHON KOHDEN (100EA/CA) [9859].

## (undated) DEVICE — GOWN SURGICAL XX-LARGE - (28EA/CA) SIRUS NON REINFORCED

## (undated) DEVICE — GOWN WARMING STANDARD FLEX - (30/CA)

## (undated) DEVICE — BOVIE NEEDLE TIP 3CM COLORADO

## (undated) DEVICE — GRAFT MESH SEPRAFILM PRO PACK - 5/BX CONTAINS 6 3X5 PIECES

## (undated) DEVICE — HEAD HOLDER JUNIOR/ADULT

## (undated) DEVICE — GLOVE SZ 7.5 BIOGEL PI MICRO - PF LF (50PR/BX)

## (undated) DEVICE — SUCTION INSTRUMENT YANKAUER BULBOUS TIP W/O VENT (50EA/CA)

## (undated) DEVICE — GOWN SURGEONS LARGE - (32/CA)

## (undated) DEVICE — GLOVE BIOGEL PI INDICATOR SZ 7.0 SURGICAL PF LF - (50/BX 4BX/CA)

## (undated) DEVICE — STAPLER SKIN DISP - (6/BX 10BX/CA) VISISTAT

## (undated) DEVICE — GLOVES, #7 1/2 BIOGEL M

## (undated) DEVICE — GOWN SURGEONS X-LARGE - DISP. (30/CA)

## (undated) DEVICE — TUBE E-T HI-LO CUFF 8.0MM (10EA/PK)

## (undated) DEVICE — TRAY SRGPRP PVP IOD WT PRP - (20/CA)

## (undated) DEVICE — GLOVE BIOGEL PI INDICATOR SZ 6.5 SURGICAL PF LF - (50/BX 4BX/CA)

## (undated) DEVICE — SPONGE XRAY 8X4 STERL. 12PL - (10EA/TY 80TY/CA)

## (undated) DEVICE — PENCIL ELECTSURG 10FT BTN SWH - (50/CA)

## (undated) DEVICE — GLOVE BIOGEL SZ 6.5 SURGICAL PF LTX (50PR/BX 4BX/CA)

## (undated) DEVICE — GVL 3 STAT DISPOSABLE - (10/BX)

## (undated) DEVICE — MASK ANESTHESIA ADULT  - (100/CA)

## (undated) DEVICE — SUTURE 3-0 VICRYL PLUS SH - 8X 18 INCH (12/BX)

## (undated) DEVICE — CATHETER IV 20 GA X 1-1/4 ---SURG.& SDS ONLY--- (50EA/BX)

## (undated) DEVICE — KIT ANESTHESIA W/CIRCUIT & 3/LT BAG W/FILTER (20EA/CA)

## (undated) DEVICE — SODIUM CHL IRRIGATION 0.9% 1000ML (12EA/CA)

## (undated) DEVICE — TUBING CLEARLINK DUO-VENT - C-FLO (48EA/CA)

## (undated) DEVICE — GLOVE BIOGEL SZ 8.5 SURGICAL PF LTX - (50PR/BX 4BX/CA)

## (undated) DEVICE — GAUZE FLUFF STERILE 2-PLY 36 X 36 (100EA/CA)

## (undated) DEVICE — ELECTRODE 850 FOAM ADHESIVE - HYDROGEL RADIOTRNSPRNT (50/PK)

## (undated) DEVICE — SUTURE 2-0 SILK SH C/R ETHICON (12PK/BX)

## (undated) DEVICE — STERI STRIP COMPOUND BENZOIN - TINCTURE 0.6ML WITH APPLICATOR (40EA/BX)

## (undated) DEVICE — SUTURE 2-0 VICRYL PLUS SH - 27 INCH (36/BX)

## (undated) DEVICE — SUTURE 3-0 SILK 12 X 18 IN - (36/BX)

## (undated) DEVICE — SET LEADWIRE 5 LEAD BEDSIDE DISPOSABLE ECG (1SET OF 5/EA)

## (undated) DEVICE — PEN SKIN MARKER W/RULER - (50EA/BX)

## (undated) DEVICE — BANDAGE ROLL STERILE BULKEE 4.5 IN X 4 YD (100EA/CA)

## (undated) DEVICE — DRESSING NON-ADHERING 8 X 3 - (50/BX)

## (undated) DEVICE — SUTURE 3-0 VICRYL PLUS SH - 27 INCH (36/BX)

## (undated) DEVICE — SPECIMEN PLASTIC CONVERTOR - (10/CA)

## (undated) DEVICE — CHLORAPREP 26 ML APPLICATOR - ORANGE TINT(25/CA)

## (undated) DEVICE — PACK MAJOR BASIN - (2EA/CA)

## (undated) DEVICE — CANISTER SUCTION 3000ML MECHANICAL FILTER AUTO SHUTOFF MEDI-VAC NONSTERILE LF DISP  (40EA/CA)

## (undated) DEVICE — GLOVE BIOGEL SZ 7.5 SURGICAL PF LTX - (50PR/BX 4BX/CA)

## (undated) DEVICE — SENSOR SPO2 NEO LNCS ADHESIVE (20/BX) SEE USER NOTES

## (undated) DEVICE — KIT  I.V. START (100EA/CA)

## (undated) DEVICE — PROTECTOR ULNA NERVE - (36PR/CA)

## (undated) DEVICE — SUTURE 4-0 VICRYL PLUS P-3 18 (12PK/BX)"

## (undated) DEVICE — TUBE CONNECT SUCTION CLEAR 120 X 1/4" (50EA/CA)"

## (undated) DEVICE — LACTATED RINGERS INJ 1000 ML - (14EA/CA 60CA/PF)

## (undated) DEVICE — GLOVE BIOGEL PI INDICATOR SZ 8.0 SURGICAL PF LF -(50/BX 4BX/CA)

## (undated) DEVICE — APPLICATOR COTTONTIP 3 IN - STERILE (10EA/PK 100PK/CA)

## (undated) DEVICE — SUTURE 1 VICRYL PLUS CTX - 36 INCH (36/BX)

## (undated) DEVICE — GLOVE BIOGEL INDICATOR SZ 8.5 SURGICAL PF LTX - (50/BX 4BX/CA)

## (undated) DEVICE — KIT ROOM DECONTAMINATION

## (undated) DEVICE — SLEEVE, VASO, THIGH, MED

## (undated) DEVICE — TUBE CONNECTING SUCTION - CLEAR PLASTIC STERILE 72 IN (50EA/CA)

## (undated) DEVICE — SUTURE GENERAL

## (undated) DEVICE — SYRINGE SAFETY 10 ML 18 GA X 1 1/2 BLUNT LL (100/BX 4BX/CA)

## (undated) DEVICE — SUTURE 1 VICRYL PLUS CTX - 8 X 18 INCH (12/BX)

## (undated) DEVICE — GLOVE BIOGEL INDICATOR SZ 7SURGICAL PF LTX - (50/BX 4BX/CA)

## (undated) DEVICE — SUTURE 1 PDS-2 PLUS CTX - (24/BX)

## (undated) DEVICE — TUBE SHILEY ENDOTRACHEAL ORAL RAE CUFFED 8.0MM WITH TAPERGUARD (10EA/BX)

## (undated) DEVICE — DETERGENT RENUZYME PLUS 10 OZ PACKET (50/BX)

## (undated) DEVICE — SYRINGE EAR/NOSE 3 OZ STERILE (50/CA

## (undated) DEVICE — GLOVE BIOGEL SZ 8 SURGICAL PF LTX - (50PR/BX 4BX/CA)

## (undated) DEVICE — NEPTUNE 4 PORT MANIFOLD - (20/PK)

## (undated) DEVICE — TRAY CATHETER FOLEY URINE METER W/STATLOCK 350ML (10EA/CA)

## (undated) DEVICE — CANISTER SUCTION RIGID RED 1500CC (40EA/CA)

## (undated) DEVICE — BANDAGE ELASTIC 6 HONEYCOMB - 6X5YD LF (20/CA)"

## (undated) DEVICE — DRAPE LAPAROTOMY T SHEET - (12EA/CA)

## (undated) DEVICE — PAD EYE GAUZE COVERED OVAL 1 5/8 X 2 5/8" STERILE"

## (undated) DEVICE — SYRINGE SAFETY TB 1 ML 27 GA X 1/2 IN (100/BX 4BX/CA)

## (undated) DEVICE — GLOVE BIOGEL INDICATOR SZ 7.5 SURGICAL PF LTX - (50PR/BX 4BX/CA)

## (undated) DEVICE — BLADE DERMATOME NEW AIR (10/BX)

## (undated) DEVICE — SET EXTENSION WITH 2 PORTS (48EA/CA) ***PART #2C8610 IS A SUBSTITUTE*****

## (undated) DEVICE — TRAY SKIN SCRUB PVP WET (20EA/CA) PART #DYND70356 DISCONTINUED

## (undated) DEVICE — BLADE SURGICAL #15 - (50/BX 3BX/CA)

## (undated) DEVICE — SUTURE 4-0 MONOCRYL PLUS PS-2 - 27 INCH (36/BX)

## (undated) DEVICE — DRESSING TRANSPARENT FILM TEGADERM 4 X 4.75" (50EA/BX)"